# Patient Record
Sex: MALE | Race: WHITE | NOT HISPANIC OR LATINO | Employment: OTHER | URBAN - METROPOLITAN AREA
[De-identification: names, ages, dates, MRNs, and addresses within clinical notes are randomized per-mention and may not be internally consistent; named-entity substitution may affect disease eponyms.]

---

## 2017-05-15 DIAGNOSIS — Z79.899 OTHER LONG TERM (CURRENT) DRUG THERAPY: ICD-10-CM

## 2017-05-15 DIAGNOSIS — E78.5 HYPERLIPIDEMIA: ICD-10-CM

## 2017-05-15 DIAGNOSIS — E11.9 TYPE 2 DIABETES MELLITUS WITHOUT COMPLICATIONS (HCC): ICD-10-CM

## 2017-05-15 DIAGNOSIS — E78.00 PURE HYPERCHOLESTEROLEMIA: ICD-10-CM

## 2017-05-15 DIAGNOSIS — I10 ESSENTIAL (PRIMARY) HYPERTENSION: ICD-10-CM

## 2017-05-22 ENCOUNTER — APPOINTMENT (OUTPATIENT)
Dept: LAB | Facility: CLINIC | Age: 77
End: 2017-05-22
Payer: MEDICARE

## 2017-05-22 ENCOUNTER — GENERIC CONVERSION - ENCOUNTER (OUTPATIENT)
Dept: OTHER | Facility: OTHER | Age: 77
End: 2017-05-22

## 2017-05-22 DIAGNOSIS — E78.00 PURE HYPERCHOLESTEROLEMIA: ICD-10-CM

## 2017-05-22 DIAGNOSIS — E11.9 TYPE 2 DIABETES MELLITUS WITHOUT COMPLICATIONS (HCC): ICD-10-CM

## 2017-05-22 DIAGNOSIS — Z79.899 OTHER LONG TERM (CURRENT) DRUG THERAPY: ICD-10-CM

## 2017-05-22 DIAGNOSIS — I10 ESSENTIAL (PRIMARY) HYPERTENSION: ICD-10-CM

## 2017-05-22 DIAGNOSIS — E78.5 HYPERLIPIDEMIA: ICD-10-CM

## 2017-05-22 LAB
ALBUMIN SERPL BCP-MCNC: 3.8 G/DL (ref 3.5–5)
ALP SERPL-CCNC: 94 U/L (ref 46–116)
ALT SERPL W P-5'-P-CCNC: 29 U/L (ref 12–78)
ANION GAP SERPL CALCULATED.3IONS-SCNC: 5 MMOL/L (ref 4–13)
AST SERPL W P-5'-P-CCNC: 28 U/L (ref 5–45)
BILIRUB SERPL-MCNC: 0.48 MG/DL (ref 0.2–1)
BUN SERPL-MCNC: 16 MG/DL (ref 5–25)
CALCIUM SERPL-MCNC: 9 MG/DL (ref 8.3–10.1)
CHLORIDE SERPL-SCNC: 103 MMOL/L (ref 100–108)
CHOLEST SERPL-MCNC: 111 MG/DL (ref 50–200)
CO2 SERPL-SCNC: 28 MMOL/L (ref 21–32)
CREAT SERPL-MCNC: 0.91 MG/DL (ref 0.6–1.3)
CREAT UR-MCNC: 76.4 MG/DL
ERYTHROCYTE [DISTWIDTH] IN BLOOD BY AUTOMATED COUNT: 13.3 % (ref 11.6–15.1)
EST. AVERAGE GLUCOSE BLD GHB EST-MCNC: 180 MG/DL
GFR SERPL CREATININE-BSD FRML MDRD: >60 ML/MIN/1.73SQ M
GLUCOSE P FAST SERPL-MCNC: 139 MG/DL (ref 65–99)
HBA1C MFR BLD: 7.9 % (ref 4.2–6.3)
HCT VFR BLD AUTO: 36.5 % (ref 36.5–49.3)
HDLC SERPL-MCNC: 40 MG/DL (ref 40–60)
HGB BLD-MCNC: 11.8 G/DL (ref 12–17)
LDLC SERPL CALC-MCNC: 55 MG/DL (ref 0–100)
MCH RBC QN AUTO: 29.8 PG (ref 26.8–34.3)
MCHC RBC AUTO-ENTMCNC: 32.3 G/DL (ref 31.4–37.4)
MCV RBC AUTO: 92 FL (ref 82–98)
MICROALBUMIN UR-MCNC: 13.2 MG/L (ref 0–20)
MICROALBUMIN/CREAT 24H UR: 17 MG/G CREATININE (ref 0–30)
PLATELET # BLD AUTO: 266 THOUSANDS/UL (ref 149–390)
PMV BLD AUTO: 10.8 FL (ref 8.9–12.7)
POTASSIUM SERPL-SCNC: 4.4 MMOL/L (ref 3.5–5.3)
PROT SERPL-MCNC: 6.9 G/DL (ref 6.4–8.2)
RBC # BLD AUTO: 3.96 MILLION/UL (ref 3.88–5.62)
SODIUM SERPL-SCNC: 136 MMOL/L (ref 136–145)
TRIGL SERPL-MCNC: 79 MG/DL
WBC # BLD AUTO: 5.38 THOUSAND/UL (ref 4.31–10.16)

## 2017-05-22 PROCEDURE — 80053 COMPREHEN METABOLIC PANEL: CPT

## 2017-05-22 PROCEDURE — 83036 HEMOGLOBIN GLYCOSYLATED A1C: CPT

## 2017-05-22 PROCEDURE — 36415 COLL VENOUS BLD VENIPUNCTURE: CPT

## 2017-05-22 PROCEDURE — 82043 UR ALBUMIN QUANTITATIVE: CPT

## 2017-05-22 PROCEDURE — 82570 ASSAY OF URINE CREATININE: CPT

## 2017-05-22 PROCEDURE — 85027 COMPLETE CBC AUTOMATED: CPT

## 2017-05-22 PROCEDURE — 80061 LIPID PANEL: CPT

## 2017-06-06 ENCOUNTER — ALLSCRIPTS OFFICE VISIT (OUTPATIENT)
Dept: OTHER | Facility: OTHER | Age: 77
End: 2017-06-06

## 2017-06-06 ENCOUNTER — GENERIC CONVERSION - ENCOUNTER (OUTPATIENT)
Dept: OTHER | Facility: OTHER | Age: 77
End: 2017-06-06

## 2017-09-26 DIAGNOSIS — E11.9 TYPE 2 DIABETES MELLITUS WITHOUT COMPLICATIONS (HCC): ICD-10-CM

## 2017-09-26 DIAGNOSIS — S31.809A OPEN WOUND OF BUTTOCK: ICD-10-CM

## 2017-09-27 ENCOUNTER — GENERIC CONVERSION - ENCOUNTER (OUTPATIENT)
Dept: OTHER | Facility: OTHER | Age: 77
End: 2017-09-27

## 2017-09-29 ENCOUNTER — GENERIC CONVERSION - ENCOUNTER (OUTPATIENT)
Dept: OTHER | Facility: OTHER | Age: 77
End: 2017-09-29

## 2017-10-02 ENCOUNTER — APPOINTMENT (OUTPATIENT)
Dept: LAB | Facility: CLINIC | Age: 77
End: 2017-10-02
Payer: MEDICARE

## 2017-10-02 DIAGNOSIS — E11.9 TYPE 2 DIABETES MELLITUS WITHOUT COMPLICATIONS (HCC): ICD-10-CM

## 2017-10-02 LAB
EST. AVERAGE GLUCOSE BLD GHB EST-MCNC: 180 MG/DL
HBA1C MFR BLD: 7.9 % (ref 4.2–6.3)

## 2017-10-02 PROCEDURE — 83036 HEMOGLOBIN GLYCOSYLATED A1C: CPT

## 2017-10-02 PROCEDURE — 36415 COLL VENOUS BLD VENIPUNCTURE: CPT

## 2017-10-03 ENCOUNTER — GENERIC CONVERSION - ENCOUNTER (OUTPATIENT)
Dept: OTHER | Facility: OTHER | Age: 77
End: 2017-10-03

## 2017-10-12 ENCOUNTER — ALLSCRIPTS OFFICE VISIT (OUTPATIENT)
Dept: OTHER | Facility: OTHER | Age: 77
End: 2017-10-12

## 2017-10-30 ENCOUNTER — GENERIC CONVERSION - ENCOUNTER (OUTPATIENT)
Dept: OTHER | Facility: OTHER | Age: 77
End: 2017-10-30

## 2017-10-30 ENCOUNTER — DOCTOR'S OFFICE (OUTPATIENT)
Dept: URBAN - METROPOLITAN AREA CLINIC 136 | Facility: CLINIC | Age: 77
Setting detail: OPHTHALMOLOGY
End: 2017-10-30
Payer: COMMERCIAL

## 2017-10-30 DIAGNOSIS — E11.9: ICD-10-CM

## 2017-10-30 DIAGNOSIS — H35.372: ICD-10-CM

## 2017-10-30 DIAGNOSIS — H43.813: ICD-10-CM

## 2017-10-30 DIAGNOSIS — H27.8: ICD-10-CM

## 2017-10-30 PROCEDURE — 92134 CPTRZ OPH DX IMG PST SGM RTA: CPT | Performed by: OPHTHALMOLOGY

## 2017-10-30 PROCEDURE — 92014 COMPRE OPH EXAM EST PT 1/>: CPT | Performed by: OPHTHALMOLOGY

## 2017-10-30 ASSESSMENT — CONFRONTATIONAL VISUAL FIELD TEST (CVF)
OD_FINDINGS: FULL
OS_FINDINGS: FULL

## 2017-10-30 ASSESSMENT — REFRACTION_AUTOREFRACTION
OS_SPHERE: +0.75
OD_CYLINDER: -0.75
OD_SPHERE: +0.75
OS_CYLINDER: PL
OS_AXIS: 180
OD_AXIS: 70

## 2017-10-30 ASSESSMENT — REFRACTION_CURRENTRX
OS_OVR_VA: 20/
OS_ADD: +2.50
OS_VPRISM_DIRECTION: BF
OS_CYLINDER: SPHERE
OD_OVR_VA: 20/
OD_VPRISM_DIRECTION: BF
OD_ADD: +2.50
OD_CYLINDER: -1.25
OS_OVR_VA: 20/
OD_AXIS: 070
OS_OVR_VA: 20/
OD_SPHERE: +1.00
OS_SPHERE: +0.75
OD_OVR_VA: 20/
OD_OVR_VA: 20/

## 2017-10-30 ASSESSMENT — VISUAL ACUITY
OS_BCVA: 20/20
OD_BCVA: 20/20

## 2017-10-30 ASSESSMENT — REFRACTION_MANIFEST
OU_VA: 20/
OS_VA1: 20/
OD_VA2: 20/
OU_VA: 20/
OS_VA1: 20/
OS_VA3: 20/
OD_VA1: 20/
OS_VA3: 20/
OS_VA1: 20/
OD_VA1: 20/
OD_VA2: 20/
OU_VA: 20/
OD_VA3: 20/
OD_VA1: 20/
OS_VA2: 20/
OD_VA3: 20/
OD_VA2: 20/
OD_VA3: 20/
OS_VA3: 20/
OS_VA2: 20/
OS_VA2: 20/

## 2017-10-30 ASSESSMENT — SPHEQUIV_DERIVED: OD_SPHEQUIV: 0.375

## 2017-12-07 ENCOUNTER — ALLSCRIPTS OFFICE VISIT (OUTPATIENT)
Dept: OTHER | Facility: OTHER | Age: 77
End: 2017-12-07

## 2017-12-07 DIAGNOSIS — E11.9 TYPE 2 DIABETES MELLITUS WITHOUT COMPLICATIONS (HCC): ICD-10-CM

## 2017-12-07 DIAGNOSIS — Z00.00 ENCOUNTER FOR GENERAL ADULT MEDICAL EXAMINATION WITHOUT ABNORMAL FINDINGS: ICD-10-CM

## 2018-01-10 NOTE — RESULT NOTES
Verified Results  (1) COMPREHENSIVE METABOLIC PANEL [id number] 73:77SG Jayson Norton Order Number: [id number]  TW Order Number: [id number] Order Number: [id number]     Test Name Result Flag Reference   GLUCOSE,RANDM 124 mg/dL     If the patient is fasting, the ADA then defines impaired fasting glucose as > 100 mg/dL and diabetes as > or equal to 123 mg/dL  SODIUM 138 mmol/L  136-145   POTASSIUM 4 7 mmol/L  3 5-5 3   CHLORIDE 103 mmol/L  100-108   CARBON DIOXIDE 27 mmol/L  21-32   ANION GAP (CALC) 8 mmol/L  4-13   BLOOD UREA NITROGEN 20 mg/dL  5-25   CREATININE 0 93 mg/dL  0 60-1 30   Standardized to IDMS reference method   CALCIUM 8 8 mg/dL  8 3-10 1   BILI, TOTAL 0 49 mg/dL  0 20-1 00   ALK PHOSPHATAS 81 U/L     ALT (SGPT) 36 U/L  12-78   AST(SGOT) 31 U/L  5-45   ALBUMIN 3 9 g/dL  3 5-5 0   TOTAL PROTEIN 6 6 g/dL  6 4-8 2   eGFR Non-African American      >60 0 ml/min/1 73sq [facility] Disease Education Program recommendations are as follows:  GFR calculation is accurate only with a steady state creatinine  Chronic Kidney disease less than 60 ml/min/1 73 sq  meters  Kidney failure less than 15 ml/min/1 73 sq  meters  (1) HEMOGLOBIN A1C 71DQI2659 11:39AM [place]   TW Order Number: [id number]  TW Order Number: [id number]     Test Name Result Flag Reference   HEMOGLOBIN A1C 7 7 % H 4 2-6 3   EST  AVG   GLUCOSE 174 mg/dl       (1) MICROALBUMIN CREATININE RATIO, RANDOM URINE [id number] 11:39AM John [facility]   TW Order Number: [id number]  TW Order Number: [id number]     Test Name Result Flag Reference   MICROALBUMIN/ CREAT R 17 mg/g creatinine  0-30   MICROALBUMIN,URINE 11 2 mg/L  0 0-20 0   CREATININE URINE 65 4 mg/dL       (1) LIPID PANEL, FASTING [id number] 11:39AM John B[place]   TW Order Number: [id number]  TW Order Number: [id number] Order Number: [id number]     Test Name Result Flag Reference   CHOLESTEROL 112 mg/dL     HDL,DIRECT 40 mg/dL  40-60   Specimen collection should occur prior to Metamizole administration due to the potential for falsely depressed results  LDL CHOLESTEROL CALCULATED 53 mg/dL  0-100   Triglyceride:         Normal              <150 mg/dl       Borderline High    150-199 mg/dl       High               200-499 mg/dl       Very High          >499 mg/dl  Cholesterol:         Desirable        <200 mg/dl      Borderline High  200-239 mg/dl      High             >239 mg/dl  HDL Cholesterol:        High    >59 mg/dL      Low     <41 mg/dL  LDL CALCULATED:    This screening LDL is a calculated result  It does not have the accuracy of the Direct Measured LDL in the monitoring of patients with hyperlipidemia and/or statin therapy  Direct Measure LDL (DGF077) must be ordered separately in these patients  TRIGLYCERIDES 97 mg/dL  <=150   Specimen collection should occur prior to N-Acetylcysteine or Metamizole administration due to the potential for falsely depressed results

## 2018-01-11 NOTE — RESULT NOTES
Verified Results  (1) CBC/ PLT (NO DIFF) 23IQV5049 09:38AM [facility]    Order Number: [id number]     Test Name Result Flag Reference   HEMATOCRIT 36 5 %  36 5-49 3   HEMOGLOBIN 11 8 g/dL L 12 0-17 0   MCHC 32 3 g/dL  31 4-37 4   MCH 29 8 pg  26 8-34 3   MCV 92 fL  82-98   PLATELET COUNT 591 Thousands/uL  149-390   RBC COUNT 3 96 Million/uL  3 88-5 62   RDW 13 3 %  11 6-15 1   WBC COUNT 5 38 Thousand/uL  4 31-10 16   MPV 10 8 fL  8 9-12 7     (1) COMPREHENSIVE METABOLIC PANEL 55KZX8027 45:75ZR [facility]    Order Number: [id number]     Test Name Result Flag Reference   SODIUM 136 mmol/L  136-145   POTASSIUM 4 4 mmol/L  3 5-5 3   CHLORIDE 103 mmol/L  100-108   CARBON DIOXIDE 28 mmol/L  21-32   ANION GAP (CALC) 5 mmol/L  4-13   BLOOD UREA NITROGEN 16 mg/dL  5-25   CREATININE 0 91 mg/dL  0 60-1 30   Standardized to IDMS reference method   CALCIUM 9 0 mg/dL  8 3-10 1   BILI, TOTAL 0 48 mg/dL  0 20-1 00   ALK PHOSPHATAS 94 U/L     ALT (SGPT) 29 U/L  12-78   AST(SGOT) 28 U/L  5-45   ALBUMIN 3 8 g/dL  3 5-5 0   TOTAL PROTEIN 6 9 g/dL  6 4-8 2   eGFR Non-African American      >60 0 ml/min/1 73sq [facility] Disease Education Program recommendations are as follows:  GFR calculation is accurate only with a steady state creatinine  Chronic Kidney disease less than 60 ml/min/1 73 sq  meters  Kidney failure less than 15 ml/min/1 73 sq  meters  GLUCOSE FASTING 139 mg/dL H 65-99     (1) HEMOGLOBIN A1C [id number] 09:38AM [facility] Order Number: [id number]     Test Name Result Flag Reference   HEMOGLOBIN A1C 7 9 % H 4 2-6 3   EST  AVG   GLUCOSE 180 mg/dl       (1) LIPID PANEL, FASTING 45CKB0560 09:38AM [facility]    Order Number: [id number]     Test Name Result Flag Reference   CHOLESTEROL 111 mg/dL     HDL,DIRECT 40 mg/dL  40-60   Specimen collection should occur prior to Metamizole administration due to the potential for falsely depressed results  LDL CHOLESTEROL CALCULATED 55 mg/dL  0-100   Triglyceride:         Normal              <150 mg/dl       Borderline High    150-199 mg/dl       High               200-499 mg/dl       Very High          >499 mg/dl  Cholesterol:         Desirable        <200 mg/dl      Borderline High  200-239 mg/dl      High             >239 mg/dl  HDL Cholesterol:        High    >59 mg/dL      Low     <41 mg/dL  LDL CALCULATED:    This screening LDL is a calculated result  It does not have the accuracy of the Direct Measured LDL in the monitoring of patients with hyperlipidemia and/or statin therapy  Direct Measure LDL (NKJ960) must be ordered separately in these patients  TRIGLYCERIDES 79 mg/dL  <=150   Specimen collection should occur prior to N-Acetylcysteine or Metamizole administration due to the potential for falsely depressed results       (1) MICROALBUMIN CREATININE RATIO, RANDOM URINE 04HWU0283 09:38AM Sylvia Devi    Order Number: [id number]     Test Name Result Flag Reference   MICROALBUMIN/ CREAT R 17 mg/g creatinine  0-30   MICROALBUMIN,URINE 13 2 mg/L  0 0-20 0   CREATININE URINE 76 4 mg/dL

## 2018-01-13 VITALS
HEIGHT: 72 IN | SYSTOLIC BLOOD PRESSURE: 156 MMHG | DIASTOLIC BLOOD PRESSURE: 56 MMHG | WEIGHT: 175.38 LBS | BODY MASS INDEX: 23.75 KG/M2

## 2018-01-14 NOTE — RESULT NOTES
Verified Results  DIGITAL RECTAL EXAM 88UGU4098 12:20PM [facility] [place]     Test Name Result Flag Reference   DIGITAL RECTAL EXAM      Benign prostatic hypertrophy him a little concerned with the right lobe of the prostate, which is asymmetrically enlarged compared to the left lobe       Plan  Screening for diabetic peripheral neuropathy    · *VB - Foot Exam; Status:Active;  Requested ANANDA:[id number];

## 2018-01-15 NOTE — RESULT NOTES
Discussion/Summary   Hemoglobin A1c has remained unchanged  It is in the target range for his age and comorbid conditions  Verified Results  (1) HEMOGLOBIN A1C 02Oct2017 09:04AM Daryle Mould Order Number: [id number]     Test Name Result Flag Reference   HEMOGLOBIN A1C 7 9 % H 4 2-6 3   EST  AVG   GLUCOSE 180 mg/dl

## 2018-01-17 NOTE — PROGRESS NOTES
Assessment    1  Former smoker (V15 82) (C09 292)   2  Screening for neurological condition (V80 09) (Z13 89)   3  Screening for depression (V79 0) (Z13 89)   4  Encounter for preventive health examination (V70 0) (Z00 00)    Plan  Health Maintenance    · Amoxicillin 500 MG Oral Capsule; TAKE 2 CAPSULES TWICE DAILY UNTIL GONE  Screening for depression    · *VB-Depression Screening; Status:Complete - Retrospective By Protocol Authorization;    Done: 64YRZ6467 03:46PM  Screening for neurological condition    · *VB - Fall Risk Assessment  (Dx Z13 89 Screen for Neurologic Disorder);  Status:Complete - Retrospective By Protocol Authorization;   Done: 16FAW1985 03:45PM   · *VB - Urinary Incontinence Screen (Dx Z13 89 Screen for UI); Status:Complete -  Retrospective By Protocol Authorization;   Done: 10QJM4151 03:46PM    History of Present Illness  Welcome to Medicare and Wellness Visits: The patient is being seen for the subsequent annual wellness visit  Medicare Screening and Risk Factors   Hospitalizations: no previous hospitalizations  Medicare Screening Tests Risk Questions   Abdominal aortic aneurysm risk assessment: none indicated  Osteoporosis risk assessment: none indicated  HIV risk assessment: none indicated  Drug and Alcohol Use: The patient has never smoked cigarettes  The patient reports occasional alcohol use and drinking 2 drinks per day  Alcohol concern:   The patient has no concerns about alcohol abuse  He has never used illicit drugs  Diet and Physical Activity: Current diet includes well balanced meals, 3-4 servings of fruit per day, 1-2 servings of vegetables per day, 1 servings of meat per day, 1 servings of dairy products per day and 2 cups of tea per day  He exercises daily  Exercise: walking, strength training minutes per day  Mood Disorder and Cognitive Impairment Screening:   Depression screening was done using PHQ-9     negative for symptoms   He denies feeling down, depressed, or hopeless over the past two weeks  He denies feeling little interest or pleasure in doing things over the past two weeks  Cognitive impairment screening: denies difficulty learning/retaining new information, denies difficulty handling complex tasks, denies difficulty with reasoning, denies difficulty with spatial ability and orientation, denies difficulty with language and denies difficulty with behavior  Functional Ability/Level of Safety: Hearing is normal bilaterally, normal in the right ear, normal in the left ear and a hearing aid is not used  The patient is currently able to do activities of daily living without limitations  Activities of daily living details: does not need help using the phone, no transportation help needed, does not need help shopping, no meal preparation help needed, does not need help doing housework, does not need help doing laundry, does not need help managing medications and does not need help managing money  Fall risk factors: The patient fell 0 times in the past 12 months  Home safety risk factors:  no unfamiliar surroundings, no loose rugs, no poor household lighting, no uneven floors, no household clutter, grab bars in the bathroom and handrails on the stairs  Advance Directives: Advance directives: living will and durable power of  for health care directives, but no advance directives  Co-Managers and Medical Equipment/Suppliers: See Patient Care Team   Preventive Quality Program 65 and Older: Falls Risk: The patient fell 0 times in the past 12 months  The patient is currently asymptomatic Symptoms Include:  Associated symptoms:  No associated symptoms are reported  The patient currently has no urinary incontinence symptoms  Patient Care Team    Care Team Member Role Specialty Office Number   Gwenith Zayda   [phone]        Review of Systems    Constitutional: negative  Eyes: negative  ENT: negative  Cardiovascular: negative  Respiratory: negative  Gastrointestinal: negative  Genitourinary: negative  Musculoskeletal: negative  Integumentary and Breasts: negative  Neurological: negative  Psychiatric: negative  Endocrine: negative  Hematologic and Lymphatic: negative  Over the past 2 weeks, how often have you been bothered by the following problems? 1 ) Little interest or pleasure in doing things? Not at all    2 ) Feeling down, depressed or hopeless? Not at all    3 ) Trouble falling asleep or sleeping too much? Not at all    4 ) Feeling tired or having little energy? Not at all    5 ) Poor appetite or overeating? Not at all    6 ) Feeling bad about yourself, or that you are a failure, or have let yourself or your family down? Not at all    7 ) Trouble concentrating on things, such as reading a newspaper or watching television? Not at all    8 ) Moving or speaking so slowly that other people could have noticed, or the opposite, moving or speaking faster than usual? Not at all  How difficult have these problems made it for you to do your work, take care of things at home, or get along with people? Not at all  Score       Active Problems    1  Benign essential hypertension (401 1) (I10)   2  Chronic allergic rhinitis (477 9) (J30 9)   3  Dermatitis (692 9) (L30 9)   4  Diabetes mellitus type 2, controlled (250 00) (E11 9)   5  Flu vaccine need (V04 81) (Z23)   6  Herniated lumbar intervertebral disc (722 10) (M51 26)   7  High risk medication use (V58 69) (Z79 899)   8  Hypercholesteremia (272 0) (E78 00)   9  Hyperlipidemia (272 4) (E78 5)   10  Mechanical low back pain (724 2) (M54 5)   11  Need for influenza vaccination (V04 81) (Z23)   12  Need for pneumococcal vaccination (V03 82) (Z23)   13   Tick bite (919 4,E906 4) (W57 XXXA)    Past Medical History    · History of Abnormal blood chemistry (790 6) (R79 9)   · History of Abnormal glucose (790 29) (R73 09)   · History of Cellulitis (682 9) (L03 90)   · History of common cold (V12 09) (Z86 19)   · History of Influenza vaccine needed (V04 81) (Z23)   · History of Sciatica (724 3) (M54 30)   · History of Septic Bursitis (727 3)   · History of Sinusitis (473 9) (J32 9)   · History of Special screening examination for neoplasm of prostate (V76 44) (Z12 5)    The active problems and past medical history were reviewed and updated today  Surgical History    · History of Cataract Surgery   · History of Colonoscopy (Fiberoptic)   · History of Pilonidal Cyst Resection    The surgical history was reviewed and updated today  Family History  Mother    · Family history of Benign essential hypertension (401 1) (I10)  Family History    · Family history of colon cancer (V16 0) (Z80 0)    The family history was reviewed and updated today  Social History    · Being A Social Drinker   · Denied: Drug use (305 90) (F19 90)   · Former smoker (B40 95) (O22 410)  The social history was reviewed and updated today  The social history was reviewed and is unchanged  Current Meds   1  AmLODIPine Besylate 5 MG Oral Tablet; take 1 tablet by mouth every day; Therapy: 44TWS2328 to (Evaluate:04Nov2017)  Requested for: 30YAP9129; Last   Rx:09Nov2016 Ordered   2  B Complete TABS Recorded   3  Fly Contour Next Test In [employer]; TEST ONCE DAILY; Therapy: 60IGT7674 to (Evaluate:22Jan2017)  Requested for: 27Apr2016; Last   Rx:27Apr2016 Ordered   4  Fly Microlet Lancets Miscellaneous; TEST BLOOD SUGAR ONCE DAILY AS   DIRECTED; Therapy: 02Apr2013 to (Evaluate:02Jun2016)  Requested for: 79LSS4384; Last   Rx:08Jun2015 Ordered   5  Colace CAPS; TAKE 1 CAPSULE AT BEDTIME AS NEEDED Recorded   6  Fexofenadine HCl - 180 MG Oral Tablet Recorded   7  Fluticasone Propionate 50 MCG/ACT Nasal Suspension; USE 1 SPRAY EACH NOSTRIL   TWICE A DAY; Therapy: 68BNX2102 to (Ming Lucia)  Requested for: 68Wgf7538; Last   Rx:96Kuw9876 Ordered   8   Glimepiride 2 MG Oral Tablet; TAKE 1 TABLET EVERY DAY WITH BREAKFAST; Therapy: 08XUI2730 to (Lilly Hunter)  Requested for: 50RDF3024; Last   Rx:10Nov2015 Ordered   9  Ketoconazole 2 % External Cream; APPLY A THIN LAYER TO AFFECTED AREA(S)   AND RUB IN WELL TWICE DAILY; Therapy: 36UWU0838 to (Last Rx:16Jun2015)  Requested for: 25QJJ5691 Ordered   10  MetFORMIN HCl - 500 MG Oral Tablet; TAKE 2 OR 4 TABLETS DEPENDING ON    EVENING BLOOD SUGAR READING; Therapy: 63WPS6615 to (Lilly Andersonley)  Requested for: 29CNM4738; Last    Rx:10Nov2015 Ordered   11  Simvastatin 40 MG Oral Tablet; take 1 tablet every day; Therapy: 09CIH8082 to (Lilly Andersonley)  Requested for: 93EHC0799; Last    Rx:10Nov2015 Ordered   12  Thiamine CAPS Recorded    The medication list was reviewed and updated today  Allergies    1  No Known Drug Allergies    Immunizations   ** Printed in Appendix #1 below  Vitals  Signs    Systolic: 711  Diastolic: 82  Temperature: 97 7 F  Height: 6 ft   Weight: 175 lb 6 08 oz  BMI Calculated: 23 79  BSA Calculated: 2 01    Physical Exam    Constitutional   General appearance: No acute distress, well appearing and well nourished  Eyes   Conjunctiva and lids: No swelling, erythema, or discharge  Pupils and irises: Equal, round and reactive to light  Ears, Nose, Mouth, and Throat   External inspection of ears and nose: Normal     Otoscopic examination: Tympanic membrance translucent with normal light reflex  Canals patent without erythema  Oropharynx: Normal with no erythema, edema, exudate or lesions  Pulmonary   Respiratory effort: No increased work of breathing or signs of respiratory distress  Auscultation of lungs: Clear to auscultation  Cardiovascular   Palpation of heart: Normal PMI, no thrills  Auscultation of heart: Normal rate and rhythm, normal S1 and S2, without murmurs  Examination of extremities for edema and/or varicosities: Normal     Abdomen   Abdomen: Non-tender, no masses      Liver and spleen: No hepatomegaly or splenomegaly  Lymphatic   Palpation of lymph nodes in neck: No lymphadenopathy  Musculoskeletal   Gait and station: Normal     Digits and nails: Normal without clubbing or cyanosis  Inspection/palpation of joints, bones, and muscles: Normal     Skin   Skin and subcutaneous tissue: Normal without rashes or lesions  Neurologic   Cranial nerves: Cranial nerves 2-12 intact  Reflexes: 2+ and symmetric  Sensation: No sensory loss  Psychiatric   Orientation to person, place and time: Normal     Mood and affect: Normal        Results/Data  PHQ-2 Adult Depression Screening 59UXR2707 03:47PM User, Ahs     Test Name Result Flag Reference   PHQ-2 Adult Depression Score 0     Over the last two weeks, how often have you been bothered by any of the following problems? Little interest or pleasure in doing things: Not at all - 0  Feeling down, depressed, or hopeless: Not at all - 0   PHQ-2 Adult Depression Screening Negative       Falls Risk Assessment (Dx Z13 89 Screen for Neurologic Disorder) 66JGK6106 03:47PM User, Ahs     Test Name Result Flag Reference   Falls Risk      No falls in the past year     *VB - Urinary Incontinence Screen (Dx Z13 89 Screen for UI) 99PNN0072 03:46PM Jerre All     Test Name Result Flag Reference   Urinary Incontinence Assessment 66NEU3175       *VB-Depression Screening 01AET0978 03:46PM Jerre All     Test Name Result Flag Reference   Depression Scale Result      Depression Screen - Negative For Symptoms     *VB - Fall Risk Assessment  (Dx Z13 89 Screen for Neurologic Disorder) 41DWA4210 03:45PM Jerre All     Test Name Result Flag Reference   Falls Risk      No falls in the past year       Health Management  Health Maintenance   Medicare Annual Wellness Visit; every 1 year; Last 40VTD3110; Next Due: 83DMY9058;   Overdue    Signatures   Electronically signed by : Thomas Finch DO; Nov 17 2016  4:17PM EST (Author)    Appendix #1     Patient: Nathalia Gomez ; : 1940; MRN: [id number]      1 2 3 4 5 6    Influenza  13-Sep-2012 03-Sep-2013 11-Sep-2014 20-Oct-2015 06-Oct-2016 2011    PCV  10-Nov-2015         PPSV  Oct 2002         Tdap  2010         Zoster  2009

## 2018-01-22 VITALS
WEIGHT: 178 LBS | HEIGHT: 71 IN | SYSTOLIC BLOOD PRESSURE: 132 MMHG | DIASTOLIC BLOOD PRESSURE: 68 MMHG | BODY MASS INDEX: 24.92 KG/M2

## 2018-01-22 VITALS
DIASTOLIC BLOOD PRESSURE: 60 MMHG | WEIGHT: 170.25 LBS | SYSTOLIC BLOOD PRESSURE: 138 MMHG | HEART RATE: 82 BPM | BODY MASS INDEX: 23.09 KG/M2

## 2018-01-22 VITALS
SYSTOLIC BLOOD PRESSURE: 142 MMHG | WEIGHT: 169.25 LBS | BODY MASS INDEX: 23.69 KG/M2 | DIASTOLIC BLOOD PRESSURE: 64 MMHG | HEART RATE: 84 BPM | HEIGHT: 71 IN

## 2018-01-23 NOTE — PROGRESS NOTES
Assessment    1  Exercise counseling (V65 41) (Z71 82)   2  Advance directive on file (V49 89) (Z78 9)   3  Encounter for preventive health examination (V70 0) (Z00 00)    Plan  Benign essential hypertension    · AmLODIPine Besylate 5 MG Oral Tablet  Diabetes mellitus type 2, controlled, Health Maintenance    · (1) HEMOGLOBIN A1C; Status:Active; Requested for:41Tne3948; Health Maintenance    · Medicare Annual Wellness Visit ; every 1 year; Last 43NQI3597; Next 66TCE4240;  Status:Active  Screening for depression    · *VB-Depression Screening; Status:Complete - Retrospective Authorization;   Done:  57PKU9319 11:01AM  Screening for diabetic peripheral neuropathy    · *VB - Foot Exam; Status:Complete - Retrospective Authorization;   Done: 41XIU0179  11:01AM  Screening for neurological condition    · *VB - Fall Risk Assessment  (Dx Z13 89 Screen for Neurologic Disorder);  Status:Complete - Retrospective By Protocol Authorization;   Done: 26EKJ1791 11:02AM    Discussion/Summary  Impression: Subsequent Annual Wellness Visit  Cardiovascular screening and counseling: the risks and benefits of screening were discussed and screening is current  Diabetes screening and counseling: the risks and benefits of screening were discussed and screening is current  Colorectal cancer screening and counseling: the risks and benefits of screening were discussed and screening is current  Prostate cancer screening and counseling: the risks and benefits of screening were discussed and screening is current  Glaucoma screening and counseling: the risks and benefits of screening were discussed and screening is current  History of Present Illness  The patient is being seen for the subsequent annual wellness visit  Medicare Screening and Risk Factors   Hospitalizations: no previous hospitalizations  Once per lifetime medicare screening tests: ECG has not been done and AAA screening US has not yet been done    Medicare Screening Tests Risk Questions   Abdominal aortic aneurysm risk assessment: none indicated  Osteoporosis risk assessment:  and over 48years of age  HIV risk assessment: none indicated  Drug and Alcohol Use: The patient is a former cigarette smoker and quit smoking 30 years ago  He has 10 pack year(s) of cigarette use  The patient reports occasional alcohol use and drinking 2 drinks per day  Alcohol concern:   The patient has no concerns about alcohol abuse  He has never used illicit drugs  Diet and Physical Activity: Current diet includes well balanced meals, limited junk food and 3 cups of tea per day  He exercises 7 times per week  Exercise: walking, stretching, strength training, home exercises 30 minutes per day  Mood Disorder and Cognitive Impairment Screening: He denies feeling down, depressed, or hopeless over the past two weeks  He denies feeling little interest or pleasure in doing things over the past two weeks  Cognitive impairment screening: denies difficulty learning/retaining new information, denies difficulty handling complex tasks, denies difficulty with reasoning, denies difficulty with spatial ability and orientation, denies difficulty with language and denies difficulty with behavior  Functional Ability/Level of Safety: Hearing is normal bilaterally  He does not use a hearing aid  The patient is currently able to do activities of daily living without limitations, able to do instrumental activities of daily living without limitations, able to participate in social activities without limitations and able to drive without limitations  Activities of daily living details: does not need help using the phone, no transportation help needed, does not need help shopping, no meal preparation help needed, does not need help doing housework, does not need help doing laundry, does not need help managing medications and does not need help managing money     Injury History: no polypharmacy, alcohol use, no mobility impairment, no antidepressant use, no deconditioning, no postural hypotension, no sedative use, visual impairment, no urinary incontinence, antihypertensive use, no cognitive impairment, up and go test was normal and no previous fall  Home safety risk factors:  no unfamiliar surroundings, no loose rugs, no poor household lighting, no uneven floors, no household clutter, grab bars in the bathroom and handrails on the stairs  Advance Directives: Advance directives: living will, durable power of  for health care directives and advance directives  Co-Managers and Medical Equipment/Suppliers: See Patient Care Team     Last Medicare Wellness Visit Information was reviewed, patient interviewed and updates made to the record today  Falls Risk: The patient fell 0 times in the past 12 months  The patient is currently asymptomatic Symptoms Include: The patient currently has no urinary incontinence symptoms  Date of last glaucoma screen was October 2017 no diabetic retinopathy or glaucoma      Patient Care Team    Care Team Member Role Specialty Office Number   Rolando Wayne   [id number], [address] [place] [address] Referring Family Medicine [phone]   Corey Diaz MD Specialist Urology [phone]     Review of Systems    Head and Face: negative  Eyes: negative  ENT: negative  Cardiovascular: negative  Respiratory: negative  Gastrointestinal: negative  Genitourinary: negative  Musculoskeletal: negative  Integumentary and Breasts: negative  Neurological: negative  Psychiatric: negative  Endocrine: negative  Hematologic and Lymphatic: negative  Over the past 2 weeks, how often have you been bothered by the following problems? 1 ) Little interest or pleasure in doing things? Not at all    2 ) Feeling down, depressed or hopeless? Not at all    3 ) Trouble falling asleep or sleeping too much?  Not at all    4 ) Feeling tired or having little energy? Not at all    5 ) Poor appetite or overeating? Not at all    6 ) Feeling bad about yourself, or that you are a failure, or have let yourself or your family down? Not at all    7 ) Trouble concentrating on things, such as reading a newspaper or watching television? Not at all    8 ) Moving or speaking so slowly that other people could have noticed, or the opposite, moving or speaking faster than usual? Not at all    9 ) Thoughts that you would be better off dead or of hurting yourself in some way? Not at all  Score 0      Active Problems    1  Benign essential hypertension (401 1) (I10)   2  Benign prostatic hyperplasia, presence of lower urinary tract symptoms unspecified,   unspecified morphology   3  Buttock wound, unspecified laterality, initial encounter (877 0) (S31 809A)   4  Diabetes mellitus type 2, controlled (250 00) (E11 9)   5  Encounter for special screening examination for neoplasm of prostate (V76 44) (Z12 5)   6  Esophageal dysphagia (787 20) (R13 10)   7  Flu vaccine need (V04 81) (Z23)   8  Herniated lumbar intervertebral disc (722 10) (M51 26)   9  Hypercholesteremia (272 0) (E78 00)   10  Hyperlipidemia (272 4) (E78 5)   11  Screening for diabetic peripheral neuropathy (V80 09) (Z13 89)   12  Vasomotor rhinitis (477 9) (J30 0)    Past Medical History    1  History of Abnormal blood chemistry (790 6) (R79 9)   2  History of Abnormal glucose (790 29) (R73 09)   3  History of Benign essential hypertension (401 1) (I10)   4  History of Cellulitis (682 9) (L03 90)   5  History of Chronic allergic rhinitis (477 9) (J30 9)   6  History of High risk medication use (V58 69) (Z79 899)   7  History of common cold (V12 09) (Z86 19)   8  History of dermatitis (V13 3) (Z87 2)   9  History of Influenza vaccine needed (V04 81) (Z23)   10  History of Mechanical low back pain (724 2) (M54 5)   11  History of Need for influenza vaccination (V04 81) (Z23)   12   History of Need for pneumococcal vaccination (V03 82) (Z23)   13  History of Sciatica (724 3) (M54 30)   14  History of Septic Bursitis (727 3)   15  History of Sinusitis (473 9) (J32 9)   16  History of Special screening examination for neoplasm of prostate (V76 44) (Z12 5)   17  History of Tick bite (919 4,E906 4) (W57 XXXA)    The active problems and past medical history were reviewed and updated today  Surgical History    1  History of Cataract Surgery   2  History of Colonoscopy (Fiberoptic)   3  History of Pilonidal Cyst Resection    The surgical history was reviewed and updated today  Family History  Mother    1  Family history of Benign essential hypertension (401 1) (I10)  Family History    2  Family history of colon cancer (V16 0) (Z80 0)    The family history was reviewed and updated today  Social History    · Being A Social Drinker   · Denied: Drug use (305 90) (F19 90)   · Former smoker (L76 61) (A73 648)   ·   The social history was reviewed and updated today  The social history was reviewed and is unchanged  Current Meds   1  AmLODIPine Besylate 5 MG Oral Tablet; take 1 tablet by mouth every day; Therapy: 54DGK6339 to ([phone])  Requested for: 31Oct2017; Last   Rx:31Oct2017 Ordered   2  B Complete TABS Recorded   3  Fly Contour Next Test In [employer]; TEST ONCE DAILY; Therapy: 22PMW0861 to (Evaluate:22Jan2017)  Requested for: 27Apr2016; Last   Rx:27Apr2016 Ordered   4  Fly Microlet Lancets Miscellaneous; TEST BLOOD SUGAR ONCE DAILY AS   DIRECTED; Therapy: 02Apr2013 to (Evaluate:02Jun2016)  Requested for: 16EQH3747; Last   Rx:08Jun2015 Ordered   5  Colace CAPS; TAKE 1 CAPSULE AT BEDTIME AS NEEDED Recorded   6  Fexofenadine HCl - 180 MG Oral Tablet Recorded   7  Fluticasone Propionate 50 MCG/ACT Nasal Suspension; USE 1 SPRAY EACH NOSTRIL   TWICE A DAY; Therapy: 61HUF9773 to (Evaluate:05Grn5904)  Requested for: 83RFL1657; Last   MY:15HHL0429 Ordered   8   Glimepiride 2 MG Oral Tablet; TAKE 1/2 TABLET DAILY; Therapy: 86DFA0898 to (Evaluate:99Etc0034)  Requested for: 53IXG8927; Last   Rx:06Jun2017 Ordered   9  Lisinopril 20 MG Oral Tablet; take 1 tablet by mouth every day; Therapy: 15NJF0236 to (Evaluate:00Gaj9121)  Requested for: 72Ntp1488; Last   Rx:50Mjc9477 Ordered   10  MetFORMIN HCl - 500 MG Oral Tablet; TAKE 2-4 TABLETS EVERY DAY DEPENDING ON    EVENING BLOOD SUGAR READING; Therapy: 00FRH5719 to (Evaluate:04Jun2018)  Requested for: 56Iru2006; Last    Rx:82Mqj2101 Ordered   11  Simvastatin 40 MG Oral Tablet; take 1 tablet by mouth every day; Therapy: 10LCL8601 to (Evaluate:01Feb2018)  Requested for: 47AQM0047; Last    Rx:28Azo4212 Ordered   12  Thiamine CAPS Recorded    The medication list was reviewed and updated today  Allergies    1  No Known Drug Allergies    Immunizations  Influenza --- Jennifer Moody: 13-Sep-2012; Pepper Brought: 03-Sep-2013; Renuka Bon: 11-Sep-2014; Series4:  20-Oct-2015; Series5: 06-Oct-2016; Series6: 12-Oct-2017; Series7: 2011   PCV --- Series1: 10-Nov-2015   PPSV --- Series1: Oct 2002   Tdap --- Series1: 02-Nov-2010   Zoster --- Series1: 2009     Vitals  Signs   Recorded: 27XFR9754 57:63RE   Systolic: 164  Diastolic: 68  Recorded: 98PUK0635 54:69TI   Systolic: 981, LUE, Sitting  Diastolic: 68, LUE, Sitting  Height: 5 ft 11 in  Weight: 178 lb   BMI Calculated: 24 83  BSA Calculated: 2 01    Results/Data  *VB - Fall Risk Assessment  (Dx Z13 89 Screen for Neurologic Disorder) 03VMD8844 11:02AM [facility]     Test Name Result Flag Reference   Falls Risk      No falls in the past year     *VB - Foot Exam 12IHX0613 11:01AM [facility]     Test Name Result Flag Reference   FOOT [place] 12USA3142       *VB-Depression Screening 60JCX6733 11:01AM [facility]     Test Name Result Flag Reference   Depression Scale Result      Depression Screen - Negative For Symptoms       Health Management  Health Maintenance   Medicare Annual Wellness Visit; every 1 year;  Last 54NZD7526; Next Due: 10MAJ7904;  Active    Future Appointments    Date/Time Provider Specialty Site   01/29/2018 02:30 PM Kaye Richards [facility] ENDOCRINOLOGY   09/27/2018 09:30 AM Sony Campos MD Urology [address]     Signatures   Electronically signed by : Hemanth Donahue DO; Dec  7 2017 11:24AM EST                       (Author)

## 2018-01-29 ENCOUNTER — OFFICE VISIT (OUTPATIENT)
Dept: ENDOCRINOLOGY | Facility: CLINIC | Age: 78
End: 2018-01-29
Payer: MEDICARE

## 2018-01-29 VITALS
DIASTOLIC BLOOD PRESSURE: 62 MMHG | HEART RATE: 80 BPM | BODY MASS INDEX: 23.98 KG/M2 | HEIGHT: 72 IN | SYSTOLIC BLOOD PRESSURE: 138 MMHG | WEIGHT: 177 LBS

## 2018-01-29 DIAGNOSIS — I10 BENIGN ESSENTIAL HYPERTENSION: ICD-10-CM

## 2018-01-29 DIAGNOSIS — E78.2 MIXED HYPERLIPIDEMIA: ICD-10-CM

## 2018-01-29 DIAGNOSIS — IMO0001 UNCONTROLLED TYPE 2 DIABETES MELLITUS WITHOUT COMPLICATION, WITHOUT LONG-TERM CURRENT USE OF INSULIN: ICD-10-CM

## 2018-01-29 DIAGNOSIS — E11.9 TYPE 2 DIABETES MELLITUS WITHOUT COMPLICATION, WITHOUT LONG-TERM CURRENT USE OF INSULIN (HCC): Primary | ICD-10-CM

## 2018-01-29 PROBLEM — J30.0 VASOMOTOR RHINITIS: Status: ACTIVE | Noted: 2017-06-06

## 2018-01-29 PROBLEM — N40.1 BENIGN PROSTATIC HYPERPLASIA WITH LOWER URINARY TRACT SYMPTOMS: Status: ACTIVE | Noted: 2017-06-06

## 2018-01-29 PROBLEM — R13.19 ESOPHAGEAL DYSPHAGIA: Status: ACTIVE | Noted: 2017-06-06

## 2018-01-29 PROCEDURE — 99214 OFFICE O/P EST MOD 30 MIN: CPT | Performed by: PHYSICIAN ASSISTANT

## 2018-01-29 PROCEDURE — 95250 CONT GLUC MNTR PHYS/QHP EQP: CPT | Performed by: PHYSICIAN ASSISTANT

## 2018-01-29 RX ORDER — PYRIDOXINE HCL (VITAMIN B6) 100 MG
TABLET ORAL DAILY
COMMUNITY

## 2018-01-29 RX ORDER — DOCUSATE SODIUM 100 MG/1
1 CAPSULE, LIQUID FILLED ORAL AS NEEDED
COMMUNITY

## 2018-01-29 RX ORDER — AMLODIPINE BESYLATE 5 MG/1
1 TABLET ORAL DAILY
COMMUNITY
Start: 2011-03-16 | End: 2018-11-18 | Stop reason: SDUPTHER

## 2018-01-29 RX ORDER — LISINOPRIL 20 MG/1
20 TABLET ORAL DAILY
Refills: 3 | COMMUNITY
Start: 2017-12-13 | End: 2018-12-16 | Stop reason: SDUPTHER

## 2018-01-29 RX ORDER — SIMVASTATIN 40 MG
1 TABLET ORAL DAILY
COMMUNITY
Start: 2011-03-14 | End: 2018-02-25 | Stop reason: SDUPTHER

## 2018-01-29 RX ORDER — FEXOFENADINE HCL 180 MG/1
TABLET ORAL AS NEEDED
COMMUNITY

## 2018-01-29 RX ORDER — GLIMEPIRIDE 2 MG/1
TABLET ORAL
Refills: 3 | COMMUNITY
Start: 2017-12-17 | End: 2018-07-02 | Stop reason: SDUPTHER

## 2018-01-29 RX ORDER — FLUTICASONE PROPIONATE 50 MCG
SPRAY, SUSPENSION (ML) NASAL
COMMUNITY
Start: 2012-03-26 | End: 2018-05-13 | Stop reason: SDUPTHER

## 2018-01-29 NOTE — PROGRESS NOTES
Established Patient Progress Note      Chief Complaint   Patient presents with    Diabetes Type 2          History of Present Illness:   Quiana Qureshi is a 78 y o  male with a history of type 2 diabetes without long term use of insulin    Reports No known complications  Denies recent illness or hospitalizations, though getting over an upper respiratory infection    Denies recent severe hypoglycemic or severe hyperglycemic episodes  Denies any issues with his current regimen  home glucose monitoring: are performed regularly    Home blood glucose readings:   Before breakfast: around 140  Before lunch: around 115  Before dinner: not checking  Bedtime: not checking  Current regimen: Metformin 1000mg twice per day, Glimepiride 1mg daily in the morning  compliant all of the timedenies any side effects from medications  No Hypoglycemia  Last Eye Exam: October 2017  Last Foot Exam: Every 9 weeks, due February, Dr John Dumont    Diabetes education: No   Would be interested in meeting with dietician  -     Has hypertension: Taking amlodipine and lisinopril  Has hyperlipidemia: Taking atorvastatin  Thyroid disorders: none  Patient Active Problem List   Diagnosis    Benign essential hypertension    Benign prostatic hyperplasia with lower urinary tract symptoms    Uncontrolled diabetes mellitus type 2 without complications (HCC)    Esophageal dysphagia    Herniated lumbar intervertebral disc    Hyperlipidemia    Vasomotor rhinitis      History reviewed  No pertinent past medical history  History reviewed  No pertinent surgical history     Family History   Problem Relation Age of Onset    Hypertension Mother     Osteoporosis Mother     Diabetes type I Paternal Aunt      Social History   Substance Use Topics    Smoking status: Former Smoker    Smokeless tobacco: Never Used    Alcohol use Yes      Comment: social usage     No Known Allergies      Current Outpatient Prescriptions:    amLODIPine (NORVASC) 5 mg tablet, Take 1 tablet by mouth daily, Disp: , Rfl:     B Complex-Biotin-FA (B COMPLETE) TABS, Take by mouth, Disp: , Rfl:     docusate sodium (COLACE) 100 mg capsule, Take 1 capsule by mouth as needed, Disp: , Rfl:     fexofenadine (ALLEGRA) 180 MG tablet, Take by mouth, Disp: , Rfl:     fluticasone (FLONASE) 50 mcg/act nasal spray, into each nostril, Disp: , Rfl:     glimepiride (AMARYL) 2 mg tablet, TAKE ONE-HALF TABLET BY MOUTH EVERY DAY, Disp: , Rfl: 3    lisinopril (ZESTRIL) 20 mg tablet, Take 20 mg by mouth daily, Disp: , Rfl: 3    metFORMIN (GLUCOPHAGE) 500 mg tablet, TAKE TWO TO FOUR TABLETS BY MOUTH EVERY DAY PER BLOOD GLUCOSE LEVELS, Disp: , Rfl: 1    simvastatin (ZOCOR) 40 mg tablet, Take 1 tablet by mouth daily, Disp: , Rfl:     Thiamine 50 MG CAPS, Take 1 capsule by mouth daily, Disp: , Rfl:     Review of Systems   Constitutional: Negative for activity change, appetite change and fatigue  HENT: Negative for sore throat, trouble swallowing and voice change  Eyes: Negative for visual disturbance  Respiratory: Negative for choking, chest tightness and shortness of breath  Cardiovascular: Negative for chest pain, palpitations and leg swelling  Gastrointestinal: Negative for abdominal pain, constipation and diarrhea  Endocrine: Negative for cold intolerance, heat intolerance, polydipsia, polyphagia and polyuria  Genitourinary: Negative for frequency  Musculoskeletal: Negative for arthralgias and myalgias  Skin: Negative for rash  Neurological: Negative for dizziness and syncope  Hematological: Negative for adenopathy  Psychiatric/Behavioral: Negative for sleep disturbance  All other systems reviewed and are negative  Physical Exam:  Body mass index is 24 01 kg/m²    /62   Pulse 80   Ht 6' (1 829 m)   Wt 80 3 kg (177 lb)   BMI 24 01 kg/m²    Wt Readings from Last 3 Encounters:   01/29/18 80 3 kg (177 lb)   12/07/17 80 7 kg (178 lb) 09/29/17 77 2 kg (170 lb 4 oz)       Physical Exam   Constitutional: He is oriented to person, place, and time  He appears well-developed and well-nourished  No distress  HENT:   Head: Normocephalic and atraumatic  Mouth/Throat: Oropharynx is clear and moist    Eyes: Conjunctivae and EOM are normal  Pupils are equal, round, and reactive to light  Neck: Normal range of motion  Neck supple  No thyromegaly present  Cardiovascular: Normal rate, regular rhythm and normal heart sounds  No murmur heard  Pulmonary/Chest: Effort normal and breath sounds normal  No respiratory distress  He has no wheezes  He has no rales  Abdominal: Soft  Bowel sounds are normal  He exhibits no distension  There is no tenderness  Musculoskeletal: Normal range of motion  Lymphadenopathy:     He has no cervical adenopathy  Neurological: He is alert and oriented to person, place, and time  Skin: Skin is warm and dry  Psychiatric: He has a normal mood and affect  Vitals reviewed  Diabetic Foot Exam    Labs:     Lab Results   Component Value Date    HGBA1C 7 9 (H) 10/02/2017       Lab Results   Component Value Date    CREATININE 0 91 05/22/2017    CREATININE 0 93 05/12/2016    CREATININE 0 88 10/20/2015    BUN 16 05/22/2017     05/22/2017    K 4 4 05/22/2017     05/22/2017    CO2 28 05/22/2017     eGFR   Date Value Ref Range Status   05/22/2017 >60 0 ml/min/1 73sq m Final       Lab Results   Component Value Date    CHOL 111 05/22/2017    HDL 40 05/22/2017    TRIG 79 05/22/2017       Lab Results   Component Value Date    ALT 29 05/22/2017    AST 28 05/22/2017    ALKPHOS 94 05/22/2017    BILITOT 0 48 05/22/2017     Impression & Plan:    Problem List Items Addressed This Visit     Benign essential hypertension     Well controlled  Continue amlodipine and lisinopril            Relevant Medications    amLODIPine (NORVASC) 5 mg tablet    lisinopril (ZESTRIL) 20 mg tablet    Uncontrolled diabetes mellitus type 2 without complications ([facility] [address] )     Uncontrolled Based on last A1C of 7 9  He has order for upcoming A1C through family physician  He will be getting Parvin Diagnostic CGM placed today and will adjust medication if needed based on report  Refer to medical nutrition therapy  Relevant Medications    glimepiride (AMARYL) 2 mg tablet    metFORMIN (GLUCOPHAGE) 500 mg tablet    Hyperlipidemia     LDL at goal, continue atorvastatin  Relevant Medications    simvastatin (ZOCOR) 40 mg tablet      Other Visit Diagnoses     Type 2 diabetes mellitus without complication, without long-term current use of insulin ([facility])    -  Primary    Relevant Medications    glimepiride (AMARYL) 2 mg tablet    metFORMIN (GLUCOPHAGE) 500 mg tablet    Other Relevant Orders    Ambulatory referral to medical nutrition therapy for diabetes    Continous glucose monitoring parvin placement    Continous glucose monitoring parvin intrepretation          Orders Placed This Encounter   Procedures    Continous glucose monitoring parvin placement     Standing Status:   Future     Number of Occurrences:   1     Standing Expiration Date:   1/29/2019    Continous glucose monitoring parvin intrepretation     Standing Status:   Future     Standing Expiration Date:   1/29/2019    Ambulatory referral to medical nutrition therapy for diabetes     Standing Status:   Future     Standing Expiration Date:   7/29/2018     Referral Priority:   Routine     Referral Type:   Consult - AMB     Referral Reason:   Specialty Services Required     Requested Specialty:   Endocrinology     Number of Visits Requested:   1     Expiration Date:   1/29/2019       Patient Instructions   Will place Continuous glucose monitor today  Return in two weeks for sensor remonal and will review Data and call you with any medication changes if needed  Set up with dietician  When you get lab testing done through family doctor, please send copy of report           Discussed with the patient and all questioned fully answered  He will call me if any problems arise  Follow-up appointment in 4 months       Counseled patient on diagnostic results, prognosis, risk and benefit of treatment options, instruction for management, importance of treatment compliance, Risk  factor reduction and impressions      Meredith Rich PA-C

## 2018-01-29 NOTE — PATIENT INSTRUCTIONS
Will place Continuous glucose monitor today  Return in two weeks for sensor remonal and will review Data and call you with any medication changes if needed  Set up with dietician  When you get lab testing done through family doctor, please send copy of report

## 2018-01-29 NOTE — ASSESSMENT & PLAN NOTE
Uncontrolled Based on last A1C of 7 9  He has order for upcoming A1C through family physician  He will be getting Parvin Diagnostic CGM placed today and will adjust medication if needed based on report  Refer to medical nutrition therapy

## 2018-01-29 NOTE — PROGRESS NOTES
Continous glucose monitoring yanick placement     Date/Time 1/29/2018 3:02 PM     Performed by  Gretchen Llanes by Susan Cabrera       Consent: Verbal consent obtained  Written consent obtained    Risks and benefits: risks, benefits and alternatives were discussed  Consent given by: patient  Patient understanding: patient states understanding of the procedure being performed  Patient consent: the patient's understanding of the procedure matches consent given  Procedure consent: procedure consent matches procedure scheduled  Relevant documents: relevant documents present and verified  Test results: test results not available  Site marked: the operative site was not marked  Imaging studies: imaging studies not available  Required items: required blood products, implants, devices, and special equipment available  Patient identity confirmed: verbally with patient      Site preparation: Isopropyl alcohol    Local anesthesia used: no     Anesthesia   Local anesthesia used: no     Sedation   Patient sedated: no      Specimen: no    Culture: no    Patient tolerance: Patient tolerated the procedure well with no immediate complications

## 2018-01-29 NOTE — LETTER
January 29, 2018     Lavonne Thorpe DO  [facility]    Patient: Bhavna Martinez   YOB: 1940   Date of Visit: 1/29/2018       Dear Dr Clotilde Carey: Thank you for referring Ravin Vargas to me for evaluation  Below are my notes for this consultation  If you have questions, please do not hesitate to call me  I look forward to following your patient along with you  Sincerely,        Anshu Nelson PA-C        CC: No Recipients  Anshu Nelson PA-C  1/29/2018  3:10 PM  Sign at close encounter      Established Patient Progress Note      Chief Complaint   Patient presents with    Diabetes Type 2          History of Present Illness:   Bhavna Martinez is a 78 y o  male with a history of type 2 diabetes without long term use of insulin    Reports No known complications  Denies recent illness or hospitalizations, though getting over an upper respiratory infection    Denies recent severe hypoglycemic or severe hyperglycemic episodes  Denies any issues with his current regimen  home glucose monitoring: are performed regularly    Home blood glucose readings:   Before breakfast: around 140  Before lunch: around 115  Before dinner: not checking  Bedtime: not checking  Current regimen: Metformin 1000mg twice per day, Glimepiride 1mg daily in the morning  compliant all of the timedenies any side effects from medications  No Hypoglycemia  Last Eye Exam: October 2017  Last Foot Exam: Every 9 weeks, due February, Dr Kris Maloney    Diabetes education: No   Would be interested in meeting with dietician  -     Has hypertension: Taking amlodipine and lisinopril  Has hyperlipidemia: Taking atorvastatin  Thyroid disorders: none         Patient Active Problem List   Diagnosis    Benign essential hypertension    Benign prostatic hyperplasia with lower urinary tract symptoms    Uncontrolled diabetes mellitus type 2 without complications ([address] )    Esophageal dysphagia    Herniated lumbar intervertebral disc    Hyperlipidemia    Vasomotor rhinitis      History reviewed  No pertinent past medical history  History reviewed  No pertinent surgical history  Family History   Problem Relation Age of Onset    Hypertension Mother     Osteoporosis Mother     Diabetes type I Paternal Aunt      Social History   Substance Use Topics    Smoking status: Former Smoker    Smokeless tobacco: Never Used    Alcohol use Yes      Comment: social usage     No Known Allergies      Current Outpatient Prescriptions:     amLODIPine (NORVASC) 5 mg tablet, Take 1 tablet by mouth daily, Disp: , Rfl:     B Complex-Biotin-FA (B COMPLETE) TABS, Take by mouth, Disp: , Rfl:     docusate sodium (COLACE) 100 mg capsule, Take 1 capsule by mouth as needed, Disp: , Rfl:     fexofenadine (ALLEGRA) 180 MG tablet, Take by mouth, Disp: , Rfl:     fluticasone (FLONASE) 50 mcg/act nasal spray, into each nostril, Disp: , Rfl:     glimepiride (AMARYL) 2 mg tablet, TAKE ONE-HALF TABLET BY MOUTH EVERY DAY, Disp: , Rfl: 3    lisinopril (ZESTRIL) 20 mg tablet, Take 20 mg by mouth daily, Disp: , Rfl: 3    metFORMIN (GLUCOPHAGE) 500 mg tablet, TAKE TWO TO FOUR TABLETS BY MOUTH EVERY DAY PER BLOOD GLUCOSE LEVELS, Disp: , Rfl: 1    simvastatin (ZOCOR) 40 mg tablet, Take 1 tablet by mouth daily, Disp: , Rfl:     Thiamine 50 MG CAPS, Take 1 capsule by mouth daily, Disp: , Rfl:     Review of Systems   Constitutional: Negative for activity change, appetite change and fatigue  HENT: Negative for sore throat, trouble swallowing and voice change  Eyes: Negative for visual disturbance  Respiratory: Negative for choking, chest tightness and shortness of breath  Cardiovascular: Negative for chest pain, palpitations and leg swelling  Gastrointestinal: Negative for abdominal pain, constipation and diarrhea  Endocrine: Negative for cold intolerance, heat intolerance, polydipsia, polyphagia and polyuria  Genitourinary: Negative for frequency  Musculoskeletal: Negative for arthralgias and myalgias  Skin: Negative for rash  Neurological: Negative for dizziness and syncope  Hematological: Negative for adenopathy  Psychiatric/Behavioral: Negative for sleep disturbance  All other systems reviewed and are negative  Physical Exam:  Body mass index is 24 01 kg/m²  /62   Pulse 80   Ht 6' (1 829 m)   Wt 80 3 kg (177 lb)   BMI 24 01 kg/m²     Wt Readings from Last 3 Encounters:   01/29/18 80 3 kg (177 lb)   12/07/17 80 7 kg (178 lb)   09/29/17 77 2 kg (170 lb 4 oz)       Physical Exam   Constitutional: He is oriented to person, place, and time  He appears well-developed and well-nourished  No distress  HENT:   Head: Normocephalic and atraumatic  Mouth/Throat: Oropharynx is clear and moist    Eyes: Conjunctivae and EOM are normal  Pupils are equal, round, and reactive to light  Neck: Normal range of motion  Neck supple  No thyromegaly present  Cardiovascular: Normal rate, regular rhythm and normal heart sounds  No murmur heard  Pulmonary/Chest: Effort normal and breath sounds normal  No respiratory distress  He has no wheezes  He has no rales  Abdominal: Soft  Bowel sounds are normal  He exhibits no distension  There is no tenderness  Musculoskeletal: Normal range of motion  Lymphadenopathy:     He has no cervical adenopathy  Neurological: He is alert and oriented to person, place, and time  Skin: Skin is warm and dry  Psychiatric: He has a normal mood and affect  Vitals reviewed      Diabetic Foot Exam    Labs:     Lab Results   Component Value Date    HGBA1C 7 9 (H) 10/02/2017       Lab Results   Component Value Date    CREATININE 0 91 05/22/2017    CREATININE 0 93 05/12/2016    CREATININE 0 88 10/20/2015    BUN 16 05/22/2017     05/22/2017    K 4 4 05/22/2017     05/22/2017    CO2 28 05/22/2017     eGFR   Date Value Ref Range Status   05/22/2017 >60 0 ml/min/1 73sq m Final       Lab Results   Component Value Date    CHOL 111 05/22/2017    HDL 40 05/22/2017    TRIG 79 05/22/2017       Lab Results   Component Value Date    ALT 29 05/22/2017    AST 28 05/22/2017    ALKPHOS 94 05/22/2017    BILITOT 0 48 05/22/2017     Impression & Plan:    Problem List Items Addressed This Visit     Benign essential hypertension     Well controlled  Continue amlodipine and lisinopril  Relevant Medications    amLODIPine (NORVASC) 5 mg tablet    lisinopril (ZESTRIL) 20 mg tablet    Uncontrolled diabetes mellitus type 2 without complications ([facility] [address] )     Uncontrolled Based on last A1C of 7 9  He has order for upcoming A1C through family physician  He will be getting Parvin Diagnostic CGM placed today and will adjust medication if needed based on report  Refer to medical nutrition therapy  Relevant Medications    glimepiride (AMARYL) 2 mg tablet    metFORMIN (GLUCOPHAGE) 500 mg tablet    Hyperlipidemia     LDL at goal, continue atorvastatin            Relevant Medications    simvastatin (ZOCOR) 40 mg tablet      Other Visit Diagnoses     Type 2 diabetes mellitus without complication, without long-term current use of insulin ([facility])    -  Primary    Relevant Medications    glimepiride (AMARYL) 2 mg tablet    metFORMIN (GLUCOPHAGE) 500 mg tablet    Other Relevant Orders    Ambulatory referral to medical nutrition therapy for diabetes    Continous glucose monitoring parvin placement    Continous glucose monitoring parvin intrepretation          Orders Placed This Encounter   Procedures    Continous glucose monitoring parvin placement     Standing Status:   Future     Number of Occurrences:   1     Standing Expiration Date:   1/29/2019    Continous glucose monitoring parvin intrepretation     Standing Status:   Future     Standing Expiration Date:   1/29/2019    Ambulatory referral to medical nutrition therapy for diabetes     Standing Status:   Future     Standing Expiration Date:   7/29/2018     Referral Priority:   Routine     Referral Type:   Consult - AMB     Referral Reason:   Specialty Services Required     Requested Specialty:   Endocrinology     Number of Visits Requested:   1     Expiration Date:   1/29/2019       Patient Instructions   Will place Continuous glucose monitor today  Return in two weeks for sensor remonal and will review Data and call you with any medication changes if needed  Set up with dietician  When you get lab testing done through family doctor, please send copy of report  Discussed with the patient and all questioned fully answered  He will call me if any problems arise  Follow-up appointment in 4 months       Counseled patient on diagnostic results, prognosis, risk and benefit of treatment options, instruction for management, importance of treatment compliance, Risk  factor reduction and impressions      Ramez Oneill PA-C

## 2018-01-29 NOTE — LETTER
January 29, 2018     Author DO Murali Montalvo    Patient: Audi Willis   YOB: 1940   Date of Visit: 1/29/2018       Dear Dr Amalia Marquez: Thank you for referring Macho Sánchez to me for evaluation  Below are my notes for this consultation  If you have questions, please do not hesitate to call me  I look forward to following your patient along with you  Sincerely,        Jordy Velásquez PA-C        CC: No Recipients  Jordy Velásquez PA-C  1/29/2018  3:10 PM  Sign at close encounter      Established Patient Progress Note      Chief Complaint   Patient presents with    Diabetes Type 2          History of Present Illness:   Audi Willis is a 78 y o  male with a history of type 2 diabetes without long term use of insulin    Reports No known complications  Denies recent illness or hospitalizations, though getting over an upper respiratory infection    Denies recent severe hypoglycemic or severe hyperglycemic episodes  Denies any issues with his current regimen  home glucose monitoring: are performed regularly    Home blood glucose readings:   Before breakfast: around 140  Before lunch: around 115  Before dinner: not checking  Bedtime: not checking  Current regimen: Metformin 1000mg twice per day, Glimepiride 1mg daily in the morning  compliant all of the timedenies any side effects from medications  No Hypoglycemia  Last Eye Exam: October 2017  Last Foot Exam: Every 9 weeks, due February, Dr Marc Mcmahon    Diabetes education: No   Would be interested in meeting with dietician  -     Has hypertension: Taking amlodipine and lisinopril  Has hyperlipidemia: Taking atorvastatin  Thyroid disorders: none         Patient Active Problem List   Diagnosis    Benign essential hypertension    Benign prostatic hyperplasia with lower urinary tract symptoms    Uncontrolled diabetes mellitus type 2 without complications ([address] )    Esophageal dysphagia    Herniated lumbar intervertebral disc    Hyperlipidemia    Vasomotor rhinitis      History reviewed  No pertinent past medical history  History reviewed  No pertinent surgical history  Family History   Problem Relation Age of Onset    Hypertension Mother     Osteoporosis Mother     Diabetes type I Paternal Aunt      Social History   Substance Use Topics    Smoking status: Former Smoker    Smokeless tobacco: Never Used    Alcohol use Yes      Comment: social usage     No Known Allergies      Current Outpatient Prescriptions:     amLODIPine (NORVASC) 5 mg tablet, Take 1 tablet by mouth daily, Disp: , Rfl:     B Complex-Biotin-FA (B COMPLETE) TABS, Take by mouth, Disp: , Rfl:     docusate sodium (COLACE) 100 mg capsule, Take 1 capsule by mouth as needed, Disp: , Rfl:     fexofenadine (ALLEGRA) 180 MG tablet, Take by mouth, Disp: , Rfl:     fluticasone (FLONASE) 50 mcg/act nasal spray, into each nostril, Disp: , Rfl:     glimepiride (AMARYL) 2 mg tablet, TAKE ONE-HALF TABLET BY MOUTH EVERY DAY, Disp: , Rfl: 3    lisinopril (ZESTRIL) 20 mg tablet, Take 20 mg by mouth daily, Disp: , Rfl: 3    metFORMIN (GLUCOPHAGE) 500 mg tablet, TAKE TWO TO FOUR TABLETS BY MOUTH EVERY DAY PER BLOOD GLUCOSE LEVELS, Disp: , Rfl: 1    simvastatin (ZOCOR) 40 mg tablet, Take 1 tablet by mouth daily, Disp: , Rfl:     Thiamine 50 MG CAPS, Take 1 capsule by mouth daily, Disp: , Rfl:     Review of Systems   Constitutional: Negative for activity change, appetite change and fatigue  HENT: Negative for sore throat, trouble swallowing and voice change  Eyes: Negative for visual disturbance  Respiratory: Negative for choking, chest tightness and shortness of breath  Cardiovascular: Negative for chest pain, palpitations and leg swelling  Gastrointestinal: Negative for abdominal pain, constipation and diarrhea  Endocrine: Negative for cold intolerance, heat intolerance, polydipsia, polyphagia and polyuria  Genitourinary: Negative for frequency  Musculoskeletal: Negative for arthralgias and myalgias  Skin: Negative for rash  Neurological: Negative for dizziness and syncope  Hematological: Negative for adenopathy  Psychiatric/Behavioral: Negative for sleep disturbance  All other systems reviewed and are negative  Physical Exam:  Body mass index is 24 01 kg/m²  /62   Pulse 80   Ht 6' (1 829 m)   Wt 80 3 kg (177 lb)   BMI 24 01 kg/m²     Wt Readings from Last 3 Encounters:   01/29/18 80 3 kg (177 lb)   12/07/17 80 7 kg (178 lb)   09/29/17 77 2 kg (170 lb 4 oz)       Physical Exam   Constitutional: He is oriented to person, place, and time  He appears well-developed and well-nourished  No distress  HENT:   Head: Normocephalic and atraumatic  Mouth/Throat: Oropharynx is clear and moist    Eyes: Conjunctivae and EOM are normal  Pupils are equal, round, and reactive to light  Neck: Normal range of motion  Neck supple  No thyromegaly present  Cardiovascular: Normal rate, regular rhythm and normal heart sounds  No murmur heard  Pulmonary/Chest: Effort normal and breath sounds normal  No respiratory distress  He has no wheezes  He has no rales  Abdominal: Soft  Bowel sounds are normal  He exhibits no distension  There is no tenderness  Musculoskeletal: Normal range of motion  Lymphadenopathy:     He has no cervical adenopathy  Neurological: He is alert and oriented to person, place, and time  Skin: Skin is warm and dry  Psychiatric: He has a normal mood and affect  Vitals reviewed      Diabetic Foot Exam    Labs:     Lab Results   Component Value Date    HGBA1C 7 9 (H) 10/02/2017       Lab Results   Component Value Date    CREATININE 0 91 05/22/2017    CREATININE 0 93 05/12/2016    CREATININE 0 88 10/20/2015    BUN 16 05/22/2017     05/22/2017    K 4 4 05/22/2017     05/22/2017    CO2 28 05/22/2017     eGFR   Date Value Ref Range Status   05/22/2017 >60 0 ml/min/1 73sq m Final       Lab Results   Component Value Date    CHOL 111 05/22/2017    HDL 40 05/22/2017    TRIG 79 05/22/2017       Lab Results   Component Value Date    ALT 29 05/22/2017    AST 28 05/22/2017    ALKPHOS 94 05/22/2017    BILITOT 0 48 05/22/2017     Impression & Plan:    Problem List Items Addressed This Visit     Benign essential hypertension     Well controlled  Continue amlodipine and lisinopril  Relevant Medications    amLODIPine (NORVASC) 5 mg tablet    lisinopril (ZESTRIL) 20 mg tablet    Uncontrolled diabetes mellitus type 2 without complications ([facility] [address] )     Uncontrolled Based on last A1C of 7 9  He has order for upcoming A1C through family physician  He will be getting Parvin Diagnostic CGM placed today and will adjust medication if needed based on report  Refer to medical nutrition therapy  Relevant Medications    glimepiride (AMARYL) 2 mg tablet    metFORMIN (GLUCOPHAGE) 500 mg tablet    Hyperlipidemia     LDL at goal, continue atorvastatin            Relevant Medications    simvastatin (ZOCOR) 40 mg tablet      Other Visit Diagnoses     Type 2 diabetes mellitus without complication, without long-term current use of insulin ([facility])    -  Primary    Relevant Medications    glimepiride (AMARYL) 2 mg tablet    metFORMIN (GLUCOPHAGE) 500 mg tablet    Other Relevant Orders    Ambulatory referral to medical nutrition therapy for diabetes    Continous glucose monitoring parvin placement    Continous glucose monitoring parvin intrepretation          Orders Placed This Encounter   Procedures    Continous glucose monitoring parvin placement     Standing Status:   Future     Number of Occurrences:   1     Standing Expiration Date:   1/29/2019    Continous glucose monitoring parvin intrepretation     Standing Status:   Future     Standing Expiration Date:   1/29/2019    Ambulatory referral to medical nutrition therapy for diabetes     Standing Status:   Future     Standing Expiration Date:   7/29/2018     Referral Priority:   Routine     Referral Type:   Consult - AMB     Referral Reason:   Specialty Services Required     Requested Specialty:   Endocrinology     Number of Visits Requested:   1     Expiration Date:   1/29/2019       Patient Instructions   Will place Continuous glucose monitor today  Return in two weeks for sensor remonal and will review Data and call you with any medication changes if needed  Set up with dietician  When you get lab testing done through family doctor, please send copy of report  Discussed with the patient and all questioned fully answered  He will call me if any problems arise  Follow-up appointment in 4 months       Counseled patient on diagnostic results, prognosis, risk and benefit of treatment options, instruction for management, importance of treatment compliance, Risk  factor reduction and impressions      Anshu Nelson PA-C

## 2018-01-31 DIAGNOSIS — E11.9 TYPE 2 DIABETES MELLITUS WITHOUT COMPLICATION, WITHOUT LONG-TERM CURRENT USE OF INSULIN (HCC): Primary | ICD-10-CM

## 2018-02-12 ENCOUNTER — OFFICE VISIT (OUTPATIENT)
Dept: DIABETES SERVICES | Facility: CLINIC | Age: 78
End: 2018-02-12

## 2018-02-12 ENCOUNTER — CLINICAL SUPPORT (OUTPATIENT)
Dept: ENDOCRINOLOGY | Facility: CLINIC | Age: 78
End: 2018-02-12
Payer: MEDICARE

## 2018-02-12 DIAGNOSIS — E11.9 TYPE 2 DIABETES MELLITUS WITHOUT COMPLICATION, WITHOUT LONG-TERM CURRENT USE OF INSULIN (HCC): Primary | ICD-10-CM

## 2018-02-12 DIAGNOSIS — E11.9 TYPE 2 DIABETES MELLITUS WITHOUT COMPLICATION, WITHOUT LONG-TERM CURRENT USE OF INSULIN (HCC): ICD-10-CM

## 2018-02-12 PROCEDURE — 97802 MEDICAL NUTRITION INDIV IN: CPT | Performed by: DIETITIAN, REGISTERED

## 2018-02-12 PROCEDURE — 95251 CONT GLUC MNTR ANALYSIS I&R: CPT | Performed by: PHYSICIAN ASSISTANT

## 2018-02-12 NOTE — PROGRESS NOTES
[facility]     Date/Time 2/12/2018 9:56 AM     Performed by  Raffaele Shafer by Ananda Gant       Consent: Verbal consent obtained  Written consent obtained    Risks and benefits: risks, benefits and alternatives were discussed  Consent given by: patient  Patient understanding: patient states understanding of the procedure being performed  Patient consent: the patient's understanding of the procedure matches consent given  Procedure consent: procedure consent matches procedure scheduled  Relevant documents: relevant documents present and verified  Test results: test results available and properly labeled  Site marked: the operative site was not marked  Imaging studies: imaging studies not available  Patient identity confirmed: verbally with patient      Site preparation: Isopropyl alcohol    Local anesthesia used: no     Anesthesia   Local anesthesia used: no     Sedation   Patient sedated: no      Specimen: no    Culture: no    Patient tolerance: Patient tolerated the procedure well with no immediate complications

## 2018-02-12 NOTE — PROGRESS NOTES
Medical Nutrition Therapy        Assessment    Visit Type: Initial visit  Chief complaint/Medical Diagnosis/reason for visit E11 9 (Type 2 Diabetes Mellitus w/o complications w/o current use of     HPI Patient states, "when I do the right thing I get better results  I know this  It's a matter of doing it"  Problems identified in food recall include inconsistent carbohydrate intake at meals, low intake of plant based foods, whole grains and low fat dairy and general lack of balance  Discussed carbohydrate intake for a 1700 calorie meal plan to assist with consistency, balance and portion control  Encouraged the consumption of regular meals at regular times  Advised patient to keep carbohydrate intake to 45-60 grams per meal to assist with glycemic control  Marshall Alberto was encouraged to initiate aerobic exercise and work his way up to 150 minutes a week  Patient agreed to keep daily food logs  Follow-up TBD  RD will remain available for further dietary questions/concerns       Ht Readings from Last 1 Encounters:   01/29/18 6' (1 829 m)     Wt Readings from Last 3 Encounters:   01/29/18 80 3 kg (177 lb)   12/07/17 80 7 kg (178 lb)   09/29/17 77 2 kg (170 lb 4 oz)     Weight Change: No    Barriers to Learning: no barriers    Do you follow any special diet presently?: Yes - more vegetables and hard cooked eggs  Who shops: patient  Who cooks: patient and spouse    Food Log: Completed via the method of food recall    Breakfast:9:30-10:00AM medium banana, Greek yogurt or Light-n-Fit OR 1 cup oatmeal with 1/4 cup blueberries OR 1 cup Cheerios with 1 Tblsp raisins or Craisins, and 1/2 cup 1% milk; 4 oz OJ and tea with milk and Equal OR out, 2 eggs, 2 slices rye or wheat buttered toast, 1/2 cup home fries, meat or not  Morning Snack:none  Lunch:1:30-2:00PM home 1/2 sandwich: tuna made with mayonnanise on white bread or a roll OR turkey or ham on white bread with carvalho, sometimes potato chips; diet soda or 2x a week out: tuna or egg salad sandwich or bowl of soup (chicken rice or noodle or bean and kaba), tea with milk and Equal  Afternoon Snack: none  Dinner:6:30-6:45PM 3-4 oz chicken or hamburger or pork chops, 3/4 cup broccoli, peas, beans, corn, 3/4 cup brown/white rice or potato OR 1 cup pasta with marinara and sausage or ground beef; 1 beer or a mixed drink or 5 oz wine and SFJell-O pudding or fruit out of the can or a brownie  Evening Snack:none  Beverages: tea or water or diet beverage or juice  Eating out/Take out:2x a month: diner turkey with potato and gravy  Exercise strength training for 5 minutes a day, 5 minutes stretches, walks with wife  Calorie needs 1700 kcals/day Carbs: 45-60g/meal         Nutrition Diagnosis:  Inconsistent carbohydrate intake  intake related to Food and nutrition related knowledge deficit concerning appropriate amount and timing of carbohydrate intake as evidenced by  Estimated carbohydrate intake that is different from recommended types or ingested on an irregular basis    Intervention: label reading, behavior modification strategies, carbohydrate counting, increased plant based foods, weight/ BMI goals, exercise guidelines and food diary     Treatment Goals: Patient will monitor food intake daily with tracker, Patient will count carbohydrates and Patient will exercise    Monitoring and evaluation:    Term code indicator  FH 1 3 2 Food Intake Criteria: keep daily food logs  Term code indicator  FH 1 6 3 Carbohydrate Intake Criteria: 45-60 grams CHO per meal   Term code indicator  CH 2 2 Treatments/Therapy/Alternative Medicine Criteria: initiate aerobic exercise    Patients Response to Instruction:  Comprehensionvery good  Motivationvery good  Expected Compliancevery good    Thank you for coming to the [facility] for education today  Please feel free to call with any questions or concerns      Cari Hillman  [address], [address] [facility] [id number]

## 2018-02-25 DIAGNOSIS — E78.2 COMBINED HYPERLIPIDEMIA: Primary | ICD-10-CM

## 2018-02-26 RX ORDER — SIMVASTATIN 40 MG
TABLET ORAL
Qty: 90 TABLET | Refills: 0 | Status: SHIPPED | OUTPATIENT
Start: 2018-02-26 | End: 2018-05-13 | Stop reason: SDUPTHER

## 2018-02-27 ENCOUNTER — APPOINTMENT (OUTPATIENT)
Dept: LAB | Facility: CLINIC | Age: 78
End: 2018-02-27
Payer: MEDICARE

## 2018-02-27 DIAGNOSIS — Z00.00 ENCOUNTER FOR GENERAL ADULT MEDICAL EXAMINATION WITHOUT ABNORMAL FINDINGS: ICD-10-CM

## 2018-02-27 DIAGNOSIS — E11.9 TYPE 2 DIABETES MELLITUS WITHOUT COMPLICATIONS (HCC): ICD-10-CM

## 2018-02-27 LAB
EST. AVERAGE GLUCOSE BLD GHB EST-MCNC: 186 MG/DL
HBA1C MFR BLD: 8.1 % (ref 4.2–6.3)

## 2018-02-27 PROCEDURE — 83036 HEMOGLOBIN GLYCOSYLATED A1C: CPT

## 2018-02-27 PROCEDURE — 36415 COLL VENOUS BLD VENIPUNCTURE: CPT

## 2018-03-13 ENCOUNTER — OFFICE VISIT (OUTPATIENT)
Dept: FAMILY MEDICINE CLINIC | Facility: CLINIC | Age: 78
End: 2018-03-13
Payer: MEDICARE

## 2018-03-13 VITALS
HEIGHT: 72 IN | DIASTOLIC BLOOD PRESSURE: 74 MMHG | WEIGHT: 174 LBS | BODY MASS INDEX: 23.57 KG/M2 | SYSTOLIC BLOOD PRESSURE: 172 MMHG

## 2018-03-13 DIAGNOSIS — IMO0001 UNCONTROLLED TYPE 2 DIABETES MELLITUS WITHOUT COMPLICATION, WITHOUT LONG-TERM CURRENT USE OF INSULIN: ICD-10-CM

## 2018-03-13 DIAGNOSIS — I10 BENIGN ESSENTIAL HYPERTENSION: ICD-10-CM

## 2018-03-13 DIAGNOSIS — J30.0 VASOMOTOR RHINITIS, UNSPECIFIED CHRONICITY: ICD-10-CM

## 2018-03-13 DIAGNOSIS — E78.2 MIXED HYPERLIPIDEMIA: Primary | ICD-10-CM

## 2018-03-13 PROCEDURE — 99213 OFFICE O/P EST LOW 20 MIN: CPT | Performed by: FAMILY MEDICINE

## 2018-03-13 RX ORDER — IPRATROPIUM BROMIDE 42 UG/1
2 SPRAY, METERED NASAL 4 TIMES DAILY
Qty: 15 ML | Refills: 0 | Status: SHIPPED | OUTPATIENT
Start: 2018-03-13 | End: 2018-07-13 | Stop reason: SDUPTHER

## 2018-03-13 RX ORDER — IPRATROPIUM BROMIDE 42 UG/1
2 SPRAY, METERED NASAL 4 TIMES DAILY
COMMUNITY
End: 2018-03-13 | Stop reason: SDUPTHER

## 2018-03-13 NOTE — PROGRESS NOTES
Assessment/Plan:    No problem-specific Assessment & Plan notes found for this encounter  Diagnoses and all orders for this visit:    Mixed hyperlipidemia    Benign essential hypertension    Uncontrolled type 2 diabetes mellitus without complication, without long-term current use of insulin ([facility])    Vasomotor rhinitis, unspecified chronicity    Other orders  -     ipratropium (ATROVENT) 0 06 % nasal spray; 2 sprays into each nostril 4 (four) times a day          Subjective:      Patient ID: Nahid Wilcox is a 70 y o  male  Patient is here today for a follow-up of visit he brought along a continuous blood glucose monitor that was done from January 29th to February 12th actually it is really good his average glucose was 135 is estimated A1c was 6 3 and he met with a dietitian made a lot of improvements in his diet in fact he has developed quite a yet be diet now he is eating very healthy on and has noticed a big improvement in his blood sugars and feels well he has a little bit of a head cold right now which she caught from his granddaughter but I do not think on exam he warrants any antibiotics at the present time        The following portions of the patient's history were reviewed and updated as appropriate:   He  has a past medical history of Abnormal blood chemistry; Abnormal glucose; Benign essential hypertension; Chronic allergic rhinitis; Dermatitis; High risk medication use; Sciatica; Septic bursitis; and Tick bite    He   Patient Active Problem List    Diagnosis Date Noted    Benign essential hypertension 10/31/2017    Benign prostatic hyperplasia with lower urinary tract symptoms 06/06/2017    Esophageal dysphagia 06/06/2017    Vasomotor rhinitis 06/06/2017    [facility] 90/45/3624    Herniated lumbar intervertebral disc 06/21/2012    Hyperlipidemia 06/21/2012     He  has a past surgical history that includes Cataract extraction; Colonoscopy; and PILONIDAL CYST EXCISION  His family history includes Colon cancer in his family; Diabetes type I in his paternal aunt; Hypertension in his mother; Osteoporosis in his mother  He  reports that he has quit smoking  He has never used smokeless tobacco  He reports that he drinks alcohol  He reports that he does not use drugs  Current Outpatient Prescriptions   Medication Sig Dispense Refill    amLODIPine (NORVASC) 5 mg tablet Take 1 tablet by mouth daily      B Complex-Biotin-FA (B COMPLETE) TABS Take by mouth      BLAKE MICROLET LANCETS lancets Test twice daily 100 each 0    docusate sodium (COLACE) 100 mg capsule Take 1 capsule by mouth as needed      fexofenadine (ALLEGRA) 180 MG tablet Take by mouth      fluticasone (FLONASE) 50 mcg/act nasal spray into each nostril      glimepiride (AMARYL) 2 mg tablet TAKE ONE-HALF TABLET BY MOUTH EVERY DAY  3    glucose blood (BLAKE CONTOUR NEXT TEST) test strip Test blood sugar twice daily 100 each 2    ipratropium (ATROVENT) 0 06 % nasal spray 2 sprays into each nostril 4 (four) times a day      lisinopril (ZESTRIL) 20 mg tablet Take 20 mg by mouth daily  3    metFORMIN (GLUCOPHAGE) 500 mg tablet TAKE TWO TO FOUR TABLETS BY MOUTH EVERY DAY PER BLOOD GLUCOSE LEVELS  1    simvastatin (ZOCOR) 40 mg tablet TAKE ONE TABLET BY MOUTH EVERY DAY 90 tablet 0    Thiamine 50 MG CAPS Take 1 capsule by mouth daily       No current facility-administered medications for this visit        Current Outpatient Prescriptions on File Prior to Visit   Medication Sig    amLODIPine (NORVASC) 5 mg tablet Take 1 tablet by mouth daily    B Complex-Biotin-FA (B COMPLETE) TABS Take by mouth    BLAKE MICROLET LANCETS lancets Test twice daily    docusate sodium (COLACE) 100 mg capsule Take 1 capsule by mouth as needed    fexofenadine (ALLEGRA) 180 MG tablet Take by mouth    fluticasone (FLONASE) 50 mcg/act nasal spray into each nostril    glimepiride (AMARYL) 2 mg tablet TAKE ONE-HALF TABLET BY MOUTH EVERY DAY    glucose blood (BLAKE CONTOUR NEXT TEST) test strip Test blood sugar twice daily    lisinopril (ZESTRIL) 20 mg tablet Take 20 mg by mouth daily    metFORMIN (GLUCOPHAGE) 500 mg tablet TAKE TWO TO FOUR TABLETS BY MOUTH EVERY DAY PER BLOOD GLUCOSE LEVELS    simvastatin (ZOCOR) 40 mg tablet TAKE ONE TABLET BY MOUTH EVERY DAY    Thiamine 50 MG CAPS Take 1 capsule by mouth daily     No current facility-administered medications on file prior to visit  He has No Known Allergies       Review of Systems   Constitutional: Negative for activity change, appetite change, diaphoresis, fatigue and fever  HENT: Positive for postnasal drip and rhinorrhea  Eyes: Negative  Respiratory: Negative for apnea, cough, chest tightness, shortness of breath and wheezing  Cardiovascular: Negative for chest pain, palpitations and leg swelling  Gastrointestinal: Negative for abdominal distention, abdominal pain, anal bleeding, constipation, diarrhea, nausea and vomiting  Endocrine: Negative for cold intolerance, heat intolerance, polydipsia, polyphagia and polyuria  Genitourinary: Negative for difficulty urinating, dysuria, flank pain, hematuria and urgency  Musculoskeletal: Negative for arthralgias, back pain, gait problem, joint swelling and myalgias  Skin: Negative for color change, rash and wound  Allergic/Immunologic: Negative for environmental allergies, food allergies and immunocompromised state  Neurological: Negative for dizziness, seizures, syncope, speech difficulty, numbness and headaches  Hematological: Negative for adenopathy  Does not bruise/bleed easily  Psychiatric/Behavioral: Negative for agitation, behavioral problems, hallucinations, sleep disturbance and suicidal ideas           Objective:      BP (!) 172/74 (BP Location: Left arm, Patient Position: Sitting, Cuff Size: Standard)   Ht 6' (1 829 m)   Wt 78 9 kg (174 lb)   BMI 23 60 kg/m² Physical Exam   Constitutional: He is oriented to person, place, and time  He appears well-developed and well-nourished  No distress  HENT:   Head: Normocephalic  Right Ear: External ear normal    Left Ear: External ear normal    Nose: Nose normal    Mouth/Throat: Oropharynx is clear and moist    Eyes: Conjunctivae and EOM are normal  Pupils are equal, round, and reactive to light  Right eye exhibits no discharge  Left eye exhibits no discharge  No scleral icterus  Neck: Normal range of motion  No tracheal deviation present  No thyromegaly present  Cardiovascular: Normal rate, regular rhythm and normal heart sounds  Exam reveals no gallop and no friction rub  No murmur heard  Pulmonary/Chest: Effort normal and breath sounds normal  No respiratory distress  He has no wheezes  Abdominal: Soft  Bowel sounds are normal  He exhibits no mass  There is no tenderness  There is no guarding  Musculoskeletal: He exhibits no edema or deformity  Lymphadenopathy:     He has no cervical adenopathy  Neurological: He is alert and oriented to person, place, and time  No cranial nerve deficit  Skin: Skin is warm and dry  No rash noted  He is not diaphoretic  No erythema  Psychiatric: He has a normal mood and affect   Thought content normal

## 2018-04-25 DIAGNOSIS — E11.9 TYPE 2 DIABETES MELLITUS WITHOUT COMPLICATION, WITHOUT LONG-TERM CURRENT USE OF INSULIN (HCC): ICD-10-CM

## 2018-05-13 DIAGNOSIS — J30.1 SEASONAL ALLERGIC RHINITIS DUE TO POLLEN: Primary | ICD-10-CM

## 2018-05-13 DIAGNOSIS — E78.2 COMBINED HYPERLIPIDEMIA: ICD-10-CM

## 2018-05-14 RX ORDER — FLUTICASONE PROPIONATE 50 MCG
SPRAY, SUSPENSION (ML) NASAL
Qty: 32 G | Refills: 1 | Status: SHIPPED | OUTPATIENT
Start: 2018-05-14 | End: 2019-01-23 | Stop reason: SDUPTHER

## 2018-05-14 RX ORDER — SIMVASTATIN 40 MG
TABLET ORAL
Qty: 90 TABLET | Refills: 0 | Status: SHIPPED | OUTPATIENT
Start: 2018-05-14 | End: 2018-08-09 | Stop reason: SDUPTHER

## 2018-06-01 ENCOUNTER — LAB (OUTPATIENT)
Dept: LAB | Facility: CLINIC | Age: 78
End: 2018-06-01
Payer: MEDICARE

## 2018-06-01 ENCOUNTER — TRANSCRIBE ORDERS (OUTPATIENT)
Dept: LAB | Facility: CLINIC | Age: 78
End: 2018-06-01

## 2018-06-01 DIAGNOSIS — E78.2 MIXED HYPERLIPIDEMIA: ICD-10-CM

## 2018-06-01 DIAGNOSIS — IMO0001 UNCONTROLLED TYPE 2 DIABETES MELLITUS WITHOUT COMPLICATION, WITHOUT LONG-TERM CURRENT USE OF INSULIN: ICD-10-CM

## 2018-06-01 DIAGNOSIS — I10 BENIGN ESSENTIAL HYPERTENSION: ICD-10-CM

## 2018-06-01 LAB
ALBUMIN SERPL BCP-MCNC: 3.5 G/DL (ref 3.5–5)
ALP SERPL-CCNC: 100 U/L (ref 46–116)
ALT SERPL W P-5'-P-CCNC: 27 U/L (ref 12–78)
ANION GAP SERPL CALCULATED.3IONS-SCNC: 7 MMOL/L (ref 4–13)
AST SERPL W P-5'-P-CCNC: 21 U/L (ref 5–45)
BILIRUB SERPL-MCNC: 0.39 MG/DL (ref 0.2–1)
BUN SERPL-MCNC: 19 MG/DL (ref 5–25)
CALCIUM SERPL-MCNC: 8.9 MG/DL (ref 8.3–10.1)
CHLORIDE SERPL-SCNC: 103 MMOL/L (ref 100–108)
CHOLEST SERPL-MCNC: 90 MG/DL (ref 50–200)
CO2 SERPL-SCNC: 26 MMOL/L (ref 21–32)
CREAT SERPL-MCNC: 1.02 MG/DL (ref 0.6–1.3)
CREAT UR-MCNC: 66.1 MG/DL
EST. AVERAGE GLUCOSE BLD GHB EST-MCNC: 177 MG/DL
GFR SERPL CREATININE-BSD FRML MDRD: 71 ML/MIN/1.73SQ M
GLUCOSE P FAST SERPL-MCNC: 125 MG/DL (ref 65–99)
HBA1C MFR BLD: 7.8 % (ref 4.2–6.3)
HDLC SERPL-MCNC: 36 MG/DL (ref 40–60)
LDLC SERPL CALC-MCNC: 41 MG/DL (ref 0–100)
MICROALBUMIN UR-MCNC: 8.3 MG/L (ref 0–20)
MICROALBUMIN/CREAT 24H UR: 13 MG/G CREATININE (ref 0–30)
NONHDLC SERPL-MCNC: 54 MG/DL
POTASSIUM SERPL-SCNC: 4.4 MMOL/L (ref 3.5–5.3)
PROT SERPL-MCNC: 6.9 G/DL (ref 6.4–8.2)
SODIUM SERPL-SCNC: 136 MMOL/L (ref 136–145)
TRIGL SERPL-MCNC: 63 MG/DL
TSH SERPL DL<=0.05 MIU/L-ACNC: 4.14 UIU/ML (ref 0.36–3.74)

## 2018-06-01 PROCEDURE — 82043 UR ALBUMIN QUANTITATIVE: CPT

## 2018-06-01 PROCEDURE — 84443 ASSAY THYROID STIM HORMONE: CPT

## 2018-06-01 PROCEDURE — 36415 COLL VENOUS BLD VENIPUNCTURE: CPT

## 2018-06-01 PROCEDURE — 80061 LIPID PANEL: CPT

## 2018-06-01 PROCEDURE — 82570 ASSAY OF URINE CREATININE: CPT

## 2018-06-01 PROCEDURE — 83036 HEMOGLOBIN GLYCOSYLATED A1C: CPT

## 2018-06-01 PROCEDURE — 80053 COMPREHEN METABOLIC PANEL: CPT

## 2018-06-04 ENCOUNTER — OFFICE VISIT (OUTPATIENT)
Dept: ENDOCRINOLOGY | Facility: CLINIC | Age: 78
End: 2018-06-04
Payer: MEDICARE

## 2018-06-04 VITALS
SYSTOLIC BLOOD PRESSURE: 130 MMHG | DIASTOLIC BLOOD PRESSURE: 60 MMHG | WEIGHT: 165.4 LBS | BODY MASS INDEX: 22.4 KG/M2 | HEIGHT: 72 IN | HEART RATE: 88 BPM

## 2018-06-04 DIAGNOSIS — IMO0001 UNCONTROLLED TYPE 2 DIABETES MELLITUS WITHOUT COMPLICATION, WITHOUT LONG-TERM CURRENT USE OF INSULIN: ICD-10-CM

## 2018-06-04 DIAGNOSIS — I10 BENIGN ESSENTIAL HYPERTENSION: ICD-10-CM

## 2018-06-04 DIAGNOSIS — R79.89 TSH ELEVATION: ICD-10-CM

## 2018-06-04 DIAGNOSIS — E11.9 TYPE 2 DIABETES MELLITUS WITHOUT COMPLICATION, WITHOUT LONG-TERM CURRENT USE OF INSULIN (HCC): Primary | ICD-10-CM

## 2018-06-04 DIAGNOSIS — E78.2 MIXED HYPERLIPIDEMIA: ICD-10-CM

## 2018-06-04 PROCEDURE — 99214 OFFICE O/P EST MOD 30 MIN: CPT | Performed by: INTERNAL MEDICINE

## 2018-06-04 NOTE — PROGRESS NOTES
Please call the patient regarding his abnormal result   Urine sample is great on not spilling any protein in the urine chemistry panel is great sugar is 125 kidney function is excellent liver function is excellent lipid profile is excellent cholesterol 90 LD HDL 36 very good thyroid is a little bit under active it is possible that that is the laboratory air let's just repeat the TSH the next time he goes for labs because I am not convinced that he needs to be on thyroid hemoglobin A1c is 7 8 that is excellent

## 2018-06-04 NOTE — PROGRESS NOTES
Cateiam Filter 70 y o  male MRN: [id number]    Encounter: [id number]      Assessment/Plan     Assessment: This is a 70y o -year-old male with diabetes with hyperglycemia, hypertension and hyperlipidemia  Recently, he has noted to have an elevated TSH  Plan:  1  Type 2 diabetes with hyperglycemia-since his last visit, had decreased his glimepiride to 1 mg per day  I suspect this is the reason he had a less than optimal decrease in his hemoglobin A1c  His hemoglobin A1c remains above target of 7%  I have asked him to increase the glimepiride to 1 mg twice a day  Check hemoglobin A1c in about three months  2   Hypertension is well controlled  3   Hyperlipidemia-continue statin  4   Elevated TSH-he is scheduled to have a repeat TSH and free T4 prior to the next visit  CC: Diabetes    History of Present Illness     HPI:  82-year-old male with type 2 diabetes presents for follow-up  He is currently on glimepiride and metformin  He denies any hypoglycemic episodes  He did make changes to his diet that has helped his blood sugar  Hypertension is well controlled  Hyperlipidemia, he is on a statin  He denies any myalgia  Review of Systems   Constitutional: Negative for chills and fever  Respiratory: Negative for shortness of breath  Cardiovascular: Negative for chest pain  Gastrointestinal: Negative for constipation, diarrhea, nausea and vomiting  Endocrine: Negative for polydipsia and polyuria  Neurological: Negative for numbness  All other systems reviewed and are negative        Historical Information   Past Medical History:   Diagnosis Date    Abnormal blood chemistry     Abnormal glucose     Last assessed - 2/20/14    Benign essential hypertension     Last assessed - 5/15/17    Chronic allergic rhinitis     Last assessed - 8/1/16    Dermatitis     Last assessed - 6/16/15    High risk medication use     Last assessed - 5/2/16    Sciatica     Last assessed - 12/18/12  Septic bursitis     Last assessed - 9/3/13    Tick bite     Last assessed - 8/8/16     Past Surgical History:   Procedure Laterality Date    CATARACT EXTRACTION      COLONOSCOPY      Fiberoptic    PILONIDAL CYST EXCISION       Social History   History   Alcohol Use    Yes     Comment: social usage     History   Drug Use No     History   Smoking Status    Former Smoker   Smokeless Tobacco    Never Used     Family History:   Family History   Problem Relation Age of Onset    Hypertension Mother     Osteoporosis Mother     Diabetes type I Paternal Aunt     Colon cancer Family        Meds/Allergies   Current Outpatient Prescriptions   Medication Sig Dispense Refill    amLODIPine (NORVASC) 5 mg tablet Take 1 tablet by mouth daily      B Complex-Biotin-FA (B COMPLETE) TABS Take by mouth      BLAKE MICROLET LANCETS lancets TEST TWICE DAILY 100 each 0    docusate sodium (COLACE) 100 mg capsule Take 1 capsule by mouth as needed      fexofenadine (ALLEGRA) 180 MG tablet Take by mouth      fluticasone (FLONASE) 50 mcg/act nasal spray INSTILL ONE SPRAY TO EACH NOSTRIL TWICE A DAY 32 g 1    glimepiride (AMARYL) 2 mg tablet TAKE ONE-HALF TABLET BY MOUTH EVERY DAY  3    glucose blood (BLAKE CONTOUR NEXT TEST) test strip Test blood sugar twice daily 100 each 2    ipratropium (ATROVENT) 0 06 % nasal spray 2 sprays into each nostril 4 (four) times a day 15 mL 0    lisinopril (ZESTRIL) 20 mg tablet Take 20 mg by mouth daily  3    metFORMIN (GLUCOPHAGE) 500 mg tablet TAKE TWO TO FOUR TABLETS BY MOUTH EVERY DAY PER BLOOD GLUCOSE LEVELS  1    simvastatin (ZOCOR) 40 mg tablet TAKE ONE TABLET BY MOUTH EVERY DAY 90 tablet 0    Thiamine 50 MG CAPS Take 1 capsule by mouth daily       No current facility-administered medications for this visit  No Known Allergies    Objective   Vitals: Blood pressure 130/60, pulse 88, height 6' (1 829 m), weight 75 kg (165 lb 6 4 oz)      Physical Exam   Constitutional: He is oriented to person, place, and time  He appears well-developed and well-nourished  No distress  HENT:   Head: Normocephalic and atraumatic  Mouth/Throat: Oropharynx is clear and moist and mucous membranes are normal  No oropharyngeal exudate  Eyes: Conjunctivae, EOM and lids are normal  Right eye exhibits no discharge  Left eye exhibits no discharge  No scleral icterus  Neck: Neck supple  No thyromegaly present  Cardiovascular: Normal rate, regular rhythm and normal heart sounds  Exam reveals no gallop and no friction rub  No murmur heard  Pulmonary/Chest: Effort normal and breath sounds normal  No respiratory distress  He has no wheezes  Abdominal: Soft  Bowel sounds are normal  He exhibits no distension  There is no tenderness  Musculoskeletal: Normal range of motion  He exhibits no edema, tenderness or deformity  Lymphadenopathy:        Head (right side): No occipital adenopathy present  Head (left side): No occipital adenopathy present  Right: No supraclavicular adenopathy present  Left: No supraclavicular adenopathy present  Neurological: He is alert and oriented to person, place, and time  No cranial nerve deficit  Coordination normal    Skin: Skin is warm and intact  No rash noted  He is not diaphoretic  No erythema  Psychiatric: He has a normal mood and affect  His behavior is normal    Vitals reviewed  The history was obtained from the review of the chart, patient      Lab Results:   Lab Results   Component Value Date/Time    Hemoglobin A1C 7 8 (H) 06/01/2018 09:12 AM    Hemoglobin A1C 8 1 (H) 02/27/2018 09:13 AM    Hemoglobin A1C 7 9 (H) 10/02/2017 09:04 AM    BUN 19 06/01/2018 09:12 AM    Sodium 136 06/01/2018 09:12 AM    Potassium 4 4 06/01/2018 09:12 AM    Chloride 103 06/01/2018 09:12 AM    CO2 26 06/01/2018 09:12 AM    Creatinine 1 02 06/01/2018 09:12 AM    AST 21 06/01/2018 09:12 AM    ALT 27 06/01/2018 09:12 AM    Albumin 3 5 06/01/2018 09:12 AM Cholesterol 90 06/01/2018 09:12 AM    HDL, Direct 36 (L) 06/01/2018 09:12 AM    Triglycerides 63 06/01/2018 09:12 AM           Imaging Studies: I have personally reviewed pertinent reports  Portions of the record may have been created with voice recognition software  Occasional wrong word or "sound a like" substitutions may have occurred due to the inherent limitations of voice recognition software  Read the chart carefully and recognize, using context, where substitutions have occurred

## 2018-06-13 DIAGNOSIS — E11.59 TYPE 2 DIABETES MELLITUS WITH OTHER CIRCULATORY COMPLICATION, WITH LONG-TERM CURRENT USE OF INSULIN (HCC): Primary | ICD-10-CM

## 2018-06-13 DIAGNOSIS — Z79.4 TYPE 2 DIABETES MELLITUS WITH OTHER CIRCULATORY COMPLICATION, WITH LONG-TERM CURRENT USE OF INSULIN (HCC): Primary | ICD-10-CM

## 2018-07-02 DIAGNOSIS — E11.9 TYPE 2 DIABETES MELLITUS WITHOUT COMPLICATION, WITHOUT LONG-TERM CURRENT USE OF INSULIN (HCC): Primary | ICD-10-CM

## 2018-07-02 RX ORDER — GLIMEPIRIDE 2 MG/1
1 TABLET ORAL 2 TIMES DAILY
Qty: 90 TABLET | Refills: 1 | Status: SHIPPED | OUTPATIENT
Start: 2018-07-02 | End: 2019-01-02 | Stop reason: SDUPTHER

## 2018-07-13 DIAGNOSIS — J30.0 VASOMOTOR RHINITIS: ICD-10-CM

## 2018-07-13 RX ORDER — IPRATROPIUM BROMIDE 42 UG/1
SPRAY, METERED NASAL
Qty: 15 ML | Refills: 0 | Status: SHIPPED | OUTPATIENT
Start: 2018-07-13 | End: 2020-03-15 | Stop reason: SDUPTHER

## 2018-08-07 ENCOUNTER — OFFICE VISIT (OUTPATIENT)
Dept: FAMILY MEDICINE CLINIC | Facility: CLINIC | Age: 78
End: 2018-08-07
Payer: MEDICARE

## 2018-08-07 VITALS
WEIGHT: 164.4 LBS | SYSTOLIC BLOOD PRESSURE: 152 MMHG | BODY MASS INDEX: 22.27 KG/M2 | HEIGHT: 72 IN | DIASTOLIC BLOOD PRESSURE: 64 MMHG

## 2018-08-07 DIAGNOSIS — E03.9 ACQUIRED HYPOTHYROIDISM: Primary | ICD-10-CM

## 2018-08-07 DIAGNOSIS — E11.9 NON-INSULIN DEPENDENT TYPE 2 DIABETES MELLITUS (HCC): ICD-10-CM

## 2018-08-07 DIAGNOSIS — IMO0001 UNCONTROLLED TYPE 2 DIABETES MELLITUS WITHOUT COMPLICATION, WITHOUT LONG-TERM CURRENT USE OF INSULIN: ICD-10-CM

## 2018-08-07 DIAGNOSIS — I10 BENIGN ESSENTIAL HYPERTENSION: ICD-10-CM

## 2018-08-07 DIAGNOSIS — E78.2 MIXED HYPERLIPIDEMIA: ICD-10-CM

## 2018-08-07 PROCEDURE — 99213 OFFICE O/P EST LOW 20 MIN: CPT | Performed by: FAMILY MEDICINE

## 2018-08-07 NOTE — PROGRESS NOTES
Assessment/Plan:    No problem-specific Assessment & Plan notes found for this encounter  Diagnoses and all orders for this visit:    Acquired hypothyroidism    Non-insulin dependent type 2 diabetes mellitus ([facility] )  -     HEMOGLOBIN A1C W/ EAG ESTIMATION; Standing  -     Microalbumin / creatinine urine ratio; Standing  -     Basic metabolic panel; Standing    Uncontrolled type 2 diabetes mellitus without complication, without long-term current use of insulin ([facility])    Benign essential hypertension    Mixed hyperlipidemia          Subjective:      Patient ID: Robbie Home is a 70 y o  male  Mr Selina Simon here follow-up visit doing well no real complaints most recent labs were good hemoglobin A1c was little bit up for him but all is all is going well thyroid was evaluated by   But no concern on his part        The following portions of the patient's history were reviewed and updated as appropriate:   He  has a past medical history of Abnormal blood chemistry; Abnormal glucose; Benign essential hypertension; Chronic allergic rhinitis; Dermatitis; High risk medication use; Sciatica; Septic bursitis; and Tick bite  He   Patient Active Problem List    Diagnosis Date Noted    Benign essential hypertension 10/31/2017    Benign prostatic hyperplasia with lower urinary tract symptoms 06/06/2017    Esophageal dysphagia 06/06/2017    Vasomotor rhinitis 06/06/2017    [facility] 28/34/2900    Herniated lumbar intervertebral disc 06/21/2012    Hyperlipidemia 06/21/2012     He  has a past surgical history that includes Cataract extraction; Colonoscopy; and PILONIDAL CYST EXCISION  His family history includes Colon cancer in his family; Diabetes type I in his paternal aunt; Hypertension in his mother; Osteoporosis in his mother  He  reports that he quit smoking about 38 years ago  He has never used smokeless tobacco  He reports that he drinks alcohol   He reports that he does not use drugs  Current Outpatient Prescriptions   Medication Sig Dispense Refill    amLODIPine (NORVASC) 5 mg tablet Take 1 tablet by mouth daily      B Complex-Biotin-FA (B COMPLETE) TABS Take by mouth      BLAKE MICROLET LANCETS lancets TEST TWICE DAILY 100 each 0    docusate sodium (COLACE) 100 mg capsule Take 1 capsule by mouth as needed      fexofenadine (ALLEGRA) 180 MG tablet Take by mouth      fluticasone (FLONASE) 50 mcg/act nasal spray INSTILL ONE SPRAY TO EACH NOSTRIL TWICE A DAY 32 g 1    glimepiride (AMARYL) 2 mg tablet Take 0 5 tablets (1 mg total) by mouth 2 (two) times a day 90 tablet 1    glucose blood (BLAKE CONTOUR NEXT TEST) test strip Test blood sugar twice daily 100 each 2    ipratropium (ATROVENT) 0 06 % nasal spray INSTILL TWO SPRAYS INTO EACH NOSTRIL FOUR TIMES A DAY 15 mL 0    lisinopril (ZESTRIL) 20 mg tablet Take 20 mg by mouth daily  3    metFORMIN (GLUCOPHAGE) 500 mg tablet TAKE TWO TO FOUR TABLETS BY MOUTH EVERY DAY PER BLOOD GLUCOSE LEVELS 360 tablet 1    simvastatin (ZOCOR) 40 mg tablet TAKE ONE TABLET BY MOUTH EVERY DAY 90 tablet 0    Thiamine 50 MG CAPS Take 1 capsule by mouth daily       No current facility-administered medications for this visit        Current Outpatient Prescriptions on File Prior to Visit   Medication Sig    amLODIPine (NORVASC) 5 mg tablet Take 1 tablet by mouth daily    B Complex-Biotin-FA (B COMPLETE) TABS Take by mouth    BLAKE MICROLET LANCETS lancets TEST TWICE DAILY    docusate sodium (COLACE) 100 mg capsule Take 1 capsule by mouth as needed    fexofenadine (ALLEGRA) 180 MG tablet Take by mouth    fluticasone (FLONASE) 50 mcg/act nasal spray INSTILL ONE SPRAY TO EACH NOSTRIL TWICE A DAY    glimepiride (AMARYL) 2 mg tablet Take 0 5 tablets (1 mg total) by mouth 2 (two) times a day    glucose blood (BLAKE CONTOUR NEXT TEST) test strip Test blood sugar twice daily    ipratropium (ATROVENT) 0 06 % nasal spray INSTILL TWO SPRAYS INTO EACH NOSTRIL FOUR TIMES A DAY    lisinopril (ZESTRIL) 20 mg tablet Take 20 mg by mouth daily    metFORMIN (GLUCOPHAGE) 500 mg tablet TAKE TWO TO FOUR TABLETS BY MOUTH EVERY DAY PER BLOOD GLUCOSE LEVELS    simvastatin (ZOCOR) 40 mg tablet TAKE ONE TABLET BY MOUTH EVERY DAY    Thiamine 50 MG CAPS Take 1 capsule by mouth daily     No current facility-administered medications on file prior to visit  He has No Known Allergies       Review of Systems   Constitutional: Negative for fatigue  Cardiovascular: Negative for chest pain  Endocrine: Positive for polyuria  Negative for polydipsia and polyphagia  Skin: Negative for pallor  Neurological: Negative for dizziness, tremors, seizures, speech difficulty, weakness and headaches  Psychiatric/Behavioral: Negative for confusion  Objective:      /64 (BP Location: Left arm, Patient Position: Sitting, Cuff Size: Standard)   Ht 6' (1 829 m)   Wt 74 6 kg (164 lb 6 4 oz)   BMI 22 30 kg/m²          Physical Exam   Constitutional: He is oriented to person, place, and time  He appears well-developed and well-nourished  No distress  HENT:   Head: Normocephalic  Right Ear: External ear normal    Left Ear: External ear normal    Nose: Nose normal    Mouth/Throat: Oropharynx is clear and moist    Eyes: Conjunctivae and EOM are normal  Pupils are equal, round, and reactive to light  Right eye exhibits no discharge  Left eye exhibits no discharge  No scleral icterus  Neck: Normal range of motion  No tracheal deviation present  No thyromegaly present  Cardiovascular: Normal rate, regular rhythm and normal heart sounds  Exam reveals no gallop and no friction rub  No murmur heard  Pulmonary/Chest: Effort normal and breath sounds normal  No respiratory distress  He has no wheezes  Abdominal: Soft  Bowel sounds are normal  He exhibits no mass  There is no tenderness  There is no guarding     Musculoskeletal: He exhibits no edema or deformity  Lymphadenopathy:     He has no cervical adenopathy  Neurological: He is alert and oriented to person, place, and time  No cranial nerve deficit  Skin: Skin is warm and dry  No rash noted  He is not diaphoretic  No erythema  Psychiatric: He has a normal mood and affect   Thought content normal

## 2018-08-09 DIAGNOSIS — E78.2 COMBINED HYPERLIPIDEMIA: ICD-10-CM

## 2018-08-09 RX ORDER — SIMVASTATIN 40 MG
TABLET ORAL
Qty: 90 TABLET | Refills: 0 | Status: SHIPPED | OUTPATIENT
Start: 2018-08-09 | End: 2018-11-06 | Stop reason: SDUPTHER

## 2018-09-06 ENCOUNTER — APPOINTMENT (OUTPATIENT)
Dept: LAB | Facility: CLINIC | Age: 78
End: 2018-09-06
Payer: MEDICARE

## 2018-09-06 LAB
ANION GAP SERPL CALCULATED.3IONS-SCNC: 5 MMOL/L (ref 4–13)
BUN SERPL-MCNC: 21 MG/DL (ref 5–25)
CALCIUM SERPL-MCNC: 9 MG/DL (ref 8.3–10.1)
CHLORIDE SERPL-SCNC: 101 MMOL/L (ref 100–108)
CO2 SERPL-SCNC: 28 MMOL/L (ref 21–32)
CREAT SERPL-MCNC: 1.01 MG/DL (ref 0.6–1.3)
CREAT UR-MCNC: 64.1 MG/DL
EST. AVERAGE GLUCOSE BLD GHB EST-MCNC: 160 MG/DL
GFR SERPL CREATININE-BSD FRML MDRD: 72 ML/MIN/1.73SQ M
GLUCOSE P FAST SERPL-MCNC: 103 MG/DL (ref 65–99)
HBA1C MFR BLD: 7.2 % (ref 4.2–6.3)
MICROALBUMIN UR-MCNC: 7.2 MG/L (ref 0–20)
MICROALBUMIN/CREAT 24H UR: 11 MG/G CREATININE (ref 0–30)
POTASSIUM SERPL-SCNC: 4.3 MMOL/L (ref 3.5–5.3)
SODIUM SERPL-SCNC: 134 MMOL/L (ref 136–145)
TSH SERPL DL<=0.05 MIU/L-ACNC: 3.79 UIU/ML (ref 0.36–3.74)

## 2018-09-06 PROCEDURE — 80048 BASIC METABOLIC PNL TOTAL CA: CPT | Performed by: FAMILY MEDICINE

## 2018-09-06 PROCEDURE — 82570 ASSAY OF URINE CREATININE: CPT | Performed by: FAMILY MEDICINE

## 2018-09-06 PROCEDURE — 36415 COLL VENOUS BLD VENIPUNCTURE: CPT | Performed by: FAMILY MEDICINE

## 2018-09-06 PROCEDURE — 82043 UR ALBUMIN QUANTITATIVE: CPT | Performed by: FAMILY MEDICINE

## 2018-09-06 PROCEDURE — 84443 ASSAY THYROID STIM HORMONE: CPT | Performed by: FAMILY MEDICINE

## 2018-09-06 PROCEDURE — 83036 HEMOGLOBIN GLYCOSYLATED A1C: CPT | Performed by: FAMILY MEDICINE

## 2018-09-12 ENCOUNTER — OFFICE VISIT (OUTPATIENT)
Dept: ENDOCRINOLOGY | Facility: CLINIC | Age: 78
End: 2018-09-12
Payer: MEDICARE

## 2018-09-12 VITALS
SYSTOLIC BLOOD PRESSURE: 128 MMHG | HEIGHT: 72 IN | BODY MASS INDEX: 21.96 KG/M2 | HEART RATE: 66 BPM | DIASTOLIC BLOOD PRESSURE: 60 MMHG | WEIGHT: 162.1 LBS

## 2018-09-12 DIAGNOSIS — I10 BENIGN ESSENTIAL HYPERTENSION: ICD-10-CM

## 2018-09-12 DIAGNOSIS — R79.89 ELEVATED TSH: ICD-10-CM

## 2018-09-12 DIAGNOSIS — E78.2 MIXED HYPERLIPIDEMIA: ICD-10-CM

## 2018-09-12 DIAGNOSIS — IMO0001 UNCONTROLLED TYPE 2 DIABETES MELLITUS WITHOUT COMPLICATION, WITHOUT LONG-TERM CURRENT USE OF INSULIN: Primary | ICD-10-CM

## 2018-09-12 PROCEDURE — 99213 OFFICE O/P EST LOW 20 MIN: CPT | Performed by: NURSE PRACTITIONER

## 2018-09-12 NOTE — PROGRESS NOTES
Established Patient Progress Note      Chief Complaint   Patient presents with    Diabetes Type 2          History of Present Illness:   Jean-Paul Muller is a 70 y o  male with a history of HTN, HLD, and type 2 diabetes without long term use of insulin  Reports no complications of diabetes  Last A1C 7 2  Review of meter shows BG fairly well controlled  Denies recent illness or hospitalizations  Denies recent severe hypoglycemic or severe hyperglycemic episodes  Denies any issues with his current regimen  Home glucose monitoring: are performed regularly    Home blood glucose readings:   Before breakfast: 90-100s  Before lunch: does not check   Before dinner: does not check  Bedtime: does not check     Current regimen: Glimepiride 1 mg BID And Metformin 1,000 mg BID  compliant all of the timedenies any side effects from current medications     Hypoglycemic episodes: Yes infrequent   H/o of hypoglycemia causing hospitalization or Intervention such as glucagon injection or ambulance call Yes   Hypoglycemia symptoms: headache and sweating   Treatment of hypoglycemia: orange juice     Last Eye Exam: yearly, no retinopathy   Last Foot Exam: every 9 weeks     Has hypertension: Taking Amlodipine and Lisinopril   Has hyperlipidemia: Taking Simvastatin     Has elevated TSH: TSH has improved, now at 3 790, denies symptoms of hypo or hyperthyroidism, denies family history of thyroid disorders     Patient Active Problem List   Diagnosis    Benign essential hypertension    Benign prostatic hyperplasia with lower urinary tract symptoms    Uncontrolled diabetes mellitus type 2 without complications ([address] )    Esophageal dysphagia    Herniated lumbar intervertebral disc    Hyperlipidemia    Vasomotor rhinitis    Elevated TSH      Past Medical History:   Diagnosis Date    Abnormal blood chemistry     Abnormal glucose     Last assessed - 2/20/14    Benign essential hypertension     Last assessed - 5/15/17    Chronic allergic rhinitis     Last assessed - 8/1/16    Dermatitis     Last assessed - 6/16/15    High risk medication use     Last assessed - 5/2/16    Sciatica     Last assessed - 12/18/12    Septic bursitis     Last assessed - 9/3/13    Tick bite     Last assessed - 8/8/16      Past Surgical History:   Procedure Laterality Date    CATARACT EXTRACTION      COLONOSCOPY      Fiberoptic    PILONIDAL CYST EXCISION        Family History   Problem Relation Age of Onset    Hypertension Mother     Osteoporosis Mother     Diabetes type I Paternal Aunt     Colon cancer Family      Social History   Substance Use Topics    Smoking status: Former Smoker     Quit date: 1980    Smokeless tobacco: Never Used    Alcohol use Yes      Comment: social usage     No Known Allergies      Current Outpatient Prescriptions:     amLODIPine (NORVASC) 5 mg tablet, Take 1 tablet by mouth daily, Disp: , Rfl:     B Complex-Biotin-FA (B COMPLETE) TABS, Take by mouth, Disp: , Rfl:     BLAKE MICROLET LANCETS lancets, TEST TWICE DAILY, Disp: 100 each, Rfl: 0    Coenzyme Q10 (COQ10 PO), Take by mouth, Disp: , Rfl:     docusate sodium (COLACE) 100 mg capsule, Take 1 capsule by mouth as needed, Disp: , Rfl:     fexofenadine (ALLEGRA) 180 MG tablet, Take by mouth, Disp: , Rfl:     fluticasone (FLONASE) 50 mcg/act nasal spray, INSTILL ONE SPRAY TO EACH NOSTRIL TWICE A DAY, Disp: 32 g, Rfl: 1    glimepiride (AMARYL) 2 mg tablet, Take 0 5 tablets (1 mg total) by mouth 2 (two) times a day, Disp: 90 tablet, Rfl: 1    glucose blood (BLAKE CONTOUR NEXT TEST) test strip, Test blood sugar twice daily, Disp: 100 each, Rfl: 2    ipratropium (ATROVENT) 0 06 % nasal spray, INSTILL TWO SPRAYS INTO EACH NOSTRIL FOUR TIMES A DAY, Disp: 15 mL, Rfl: 0    lisinopril (ZESTRIL) 20 mg tablet, Take 20 mg by mouth daily, Disp: , Rfl: 3    metFORMIN (GLUCOPHAGE) 500 mg tablet, TAKE TWO TO FOUR TABLETS BY MOUTH EVERY DAY PER BLOOD GLUCOSE LEVELS, Disp: 360 tablet, Rfl: 1    simvastatin (ZOCOR) 40 mg tablet, TAKE ONE TABLET BY MOUTH EVERY DAY, Disp: 90 tablet, Rfl: 0    Thiamine 50 MG CAPS, Take 1 capsule by mouth daily, Disp: , Rfl:     Review of Systems   Constitutional: Negative for activity change, appetite change and fatigue  HENT: Negative for sore throat, trouble swallowing and voice change  Eyes: Negative for visual disturbance  Respiratory: Negative for choking, chest tightness and shortness of breath  Cardiovascular: Negative for chest pain, palpitations and leg swelling  Gastrointestinal: Negative for abdominal pain, constipation and diarrhea  Endocrine: Negative for cold intolerance, heat intolerance, polydipsia, polyphagia and polyuria  Genitourinary: Negative for frequency  Musculoskeletal: Negative for arthralgias and myalgias  Skin: Negative for rash  Neurological: Negative for dizziness and syncope  Hematological: Negative for adenopathy  Psychiatric/Behavioral: Negative for sleep disturbance  All other systems reviewed and are negative  Physical Exam:  Body mass index is 21 98 kg/m²  /60   Pulse 66   Ht 6' (1 829 m)   Wt 73 5 kg (162 lb 1 6 oz)   BMI 21 98 kg/m²    Wt Readings from Last 3 Encounters:   09/12/18 73 5 kg (162 lb 1 6 oz)   08/07/18 74 6 kg (164 lb 6 4 oz)   06/04/18 75 kg (165 lb 6 4 oz)       Physical Exam   Constitutional: He is oriented to person, place, and time  He appears well-developed and well-nourished  No distress  HENT:   Head: Normocephalic and atraumatic  Mouth/Throat: Oropharynx is clear and moist    Eyes: Conjunctivae and EOM are normal  Pupils are equal, round, and reactive to light  Neck: Normal range of motion  Neck supple  No thyromegaly present  Cardiovascular: Normal rate, regular rhythm and normal heart sounds  No murmur heard  Pulmonary/Chest: Effort normal and breath sounds normal  No respiratory distress  He has no wheezes  He has no rales  Abdominal: Soft   Bowel sounds are normal  He exhibits no distension  There is no tenderness  Musculoskeletal: Normal range of motion  He exhibits no edema  Lymphadenopathy:     He has no cervical adenopathy  Neurological: He is alert and oriented to person, place, and time  Skin: Skin is warm and dry  Psychiatric: He has a normal mood and affect  Vitals reviewed  Diabetic Foot Exam: not performed, sees podiatrist     Labs:     Lab Results   Component Value Date    HGBA1C 7 2 (H) 09/06/2018       Lab Results   Component Value Date     (L) 09/06/2018    K 4 3 09/06/2018     09/06/2018    CO2 28 09/06/2018    ANIONGAP 8 10/20/2015    BUN 21 09/06/2018    CREATININE 1 01 09/06/2018    GLUCOSE 112 10/20/2015    GLUF 103 (H) 09/06/2018    CALCIUM 9 0 09/06/2018    AST 21 06/01/2018    ALT 27 06/01/2018    ALKPHOS 100 06/01/2018    PROT 7 0 04/23/2015    BILITOT 0 54 04/23/2015    EGFR 72 09/06/2018         Lab Results   Component Value Date    CHOL 114 04/23/2015    HDL 36 (L) 06/01/2018    TRIG 63 06/01/2018       Lab Results   Component Value Date    LZS3AAYOLEBE 3 790 (H) 09/06/2018    OOF4USCDCWSN 4 140 (H) 06/01/2018         Impression & Plan:    Problem List Items Addressed This Visit     Benign essential hypertension     BP stable, continue current regimen          Uncontrolled diabetes mellitus type 2 without complications ([facility] [address] ) - Primary     A1C close to goal, recent BG within target range  Continue current regimen for now  Instructed to check BG 1-2x per at alternating times of day and to send readings into the office in two weeks for review              Relevant Orders    Hemoglobin A1C    Comprehensive metabolic panel    Microalbumin / creatinine urine ratio    Hyperlipidemia     Stable, continue statin         Relevant Orders    Lipid Panel with Direct LDL reflex    Elevated TSH     TSH has improved, will continue to monitor, check TSH and T4 prior to next visit         Relevant Orders    TSH, 3rd generation    T4, free          Orders Placed This Encounter   Procedures    Hemoglobin A1C     Standing Status:   Future     Standing Expiration Date:   12/12/2018    Comprehensive metabolic panel     This is a patient instruction: Patient fasting for 8 hours or longer recommended  Standing Status:   Future     Standing Expiration Date:   9/12/2019    Lipid Panel with Direct LDL reflex     This is a patient instruction: This test requires patient fasting for 10-12 hours or longer  Drinking of black coffee or black tea is acceptable  Standing Status:   Future     Standing Expiration Date:   9/12/2019    TSH, 3rd generation     This is a patient instruction: This test is non-fasting  Please drink two glasses of water morning of bloodwork  Standing Status:   Future     Standing Expiration Date:   12/12/2018    T4, free     Standing Status:   Future     Standing Expiration Date:   12/12/2018    Microalbumin / creatinine urine ratio     Standing Status:   Future     Standing Expiration Date:   9/12/2019         Discussed with the patient and all questioned fully answered  He will call me if any problems arise  Follow-up appointment in 3 months       Counseled patient on diagnostic results, prognosis, risk and benefit of treatment options, instruction for management, importance of treatment compliance, Risk  factor reduction and impressions      Hoa Mccann [address]

## 2018-09-12 NOTE — ASSESSMENT & PLAN NOTE
A1C close to goal, recent BG within target range  Continue current regimen for now  Instructed to check BG 1-2x per at alternating times of day and to send readings into the office in two weeks for review

## 2018-09-18 DIAGNOSIS — E11.9 TYPE 2 DIABETES MELLITUS WITHOUT COMPLICATION, WITHOUT LONG-TERM CURRENT USE OF INSULIN (HCC): ICD-10-CM

## 2018-09-27 ENCOUNTER — OFFICE VISIT (OUTPATIENT)
Dept: UROLOGY | Facility: AMBULATORY SURGERY CENTER | Age: 78
End: 2018-09-27
Payer: MEDICARE

## 2018-09-27 VITALS
SYSTOLIC BLOOD PRESSURE: 148 MMHG | WEIGHT: 164.4 LBS | BODY MASS INDEX: 22.27 KG/M2 | HEIGHT: 72 IN | HEART RATE: 80 BPM | DIASTOLIC BLOOD PRESSURE: 60 MMHG

## 2018-09-27 DIAGNOSIS — R35.1 BENIGN PROSTATIC HYPERPLASIA WITH NOCTURIA: Primary | ICD-10-CM

## 2018-09-27 DIAGNOSIS — N40.1 BENIGN PROSTATIC HYPERPLASIA WITH NOCTURIA: Primary | ICD-10-CM

## 2018-09-27 PROCEDURE — 99213 OFFICE O/P EST LOW 20 MIN: CPT | Performed by: UROLOGY

## 2018-09-27 NOTE — PROGRESS NOTES
9/27/2018    Peggy Johns  1940  [id number]        Assessment  Normal variant digital rectal examination, mild BPH      Discussion  I provided the patient with reassurance that his digital rectal examination is within normal limits  There is mild asymmetry but no evidence of nodularity  I see no indication to continue prostate cancer screening  There is no indication per AUA guidelines to continue PSA testing  With regards to his nocturia times 2-3 episodes per night, we discussed tamsulosin 0 4 mg daily at bedtime  The patient is not ready to start alpha blockade at this time  I recommend that he return in 1 year with a postvoid residual assessment and uroflow evaluation or sooner if he has progression of his lower urinary tract symptoms  History of Present Illness  70 y o  male with a history of prostatic asymmetry  A PSA from 2015 was noted to be 0 9  He was 70years of age at that time  There is no family history of prostate cancer  He returns for 1 year follow-up with a repeat digital rectal examination  He reports nocturia times 2-3 episodes per night but with minimal bother  We discussed alpha blockade but the patient is not interested at this time  He denies gross hematuria  AUA Symptom Score  AUA SYMPTOM SCORE      Most Recent Value   AUA SYMPTOM SCORE   How often have you had a sensation of not emptying your bladder completely after you finished urinating? 0   How often have you had to urinate again less than two hours after you finished urinating? 2   How often have you found you stopped and started again several times when you urinate? 1   How often have you found it difficult to postpone urination? 0   How often have you had a weak urinary stream?  0   How often have you had to push or strain to begin urination? 0   How many times did you most typically get up to urinate from the time you went to bed at night until the time you got up in the morning?   2   Quality of Life: If you were to spend the rest of your life with your urinary condition just the way it is now, how would you feel about that?  3   AUA SYMPTOM SCORE  5          Review of Systems  Review of Systems   Constitutional: Negative  HENT: Negative  Eyes: Negative  Respiratory: Negative  Cardiovascular: Negative  Gastrointestinal: Negative  Endocrine: Negative  Genitourinary:        Per HPI   Musculoskeletal: Negative  Skin: Negative  Allergic/Immunologic: Negative  Neurological: Negative  Hematological: Negative  Psychiatric/Behavioral: Negative  Past Medical History  Past Medical History:   Diagnosis Date    Abnormal blood chemistry     Abnormal glucose     Last assessed - 2/20/14    Benign essential hypertension     Last assessed - 5/15/17    Chronic allergic rhinitis     Last assessed - 8/1/16    Dermatitis     Last assessed - 6/16/15    High risk medication use     Last assessed - 5/2/16    Sciatica     Last assessed - 12/18/12    Septic bursitis     Last assessed - 9/3/13    Tick bite     Last assessed - 8/8/16       Past Social History  Past Surgical History:   Procedure Laterality Date    CATARACT EXTRACTION      COLONOSCOPY      Fiberoptic    PILONIDAL CYST EXCISION         Past Family History  Family History   Problem Relation Age of Onset    Hypertension Mother     Osteoporosis Mother     Diabetes type I Paternal Aunt     Colon cancer Family        Past Social history  Social History     Social History    Marital status: /Civil Union     Spouse name: N/A    Number of children: N/A    Years of education: N/A     Occupational History    Not on file       Social History Main Topics    Smoking status: Former Smoker     Quit date: 1980    Smokeless tobacco: Never Used    Alcohol use Yes      Comment: social usage    Drug use: No    Sexual activity: Not on file     Other Topics Concern    Not on file     Social History Narrative Advance directive on file           Current Medications  Current Outpatient Prescriptions   Medication Sig Dispense Refill    amLODIPine (NORVASC) 5 mg tablet Take 1 tablet by mouth daily      B Complex-Biotin-FA (B COMPLETE) TABS Take by mouth      BLAKE MICROLET LANCETS lancets TEST TWICE DAILY 100 each 0    Coenzyme Q10 (COQ10 PO) Take by mouth      docusate sodium (COLACE) 100 mg capsule Take 1 capsule by mouth as needed      fexofenadine (ALLEGRA) 180 MG tablet Take by mouth as needed        fluticasone (FLONASE) 50 mcg/act nasal spray INSTILL ONE SPRAY TO EACH NOSTRIL TWICE A DAY (Patient taking differently: INSTILL ONE SPRAY TO EACH NOSTRIL TWICE A DAY PRN) 32 g 1    glimepiride (AMARYL) 2 mg tablet Take 0 5 tablets (1 mg total) by mouth 2 (two) times a day 90 tablet 1    glucose blood (BLAKE CONTOUR NEXT TEST) test strip Test blood sugar twice daily 100 each 2    ipratropium (ATROVENT) 0 06 % nasal spray INSTILL TWO SPRAYS INTO EACH NOSTRIL FOUR TIMES A DAY (Patient taking differently: INSTILL TWO SPRAYS INTO EACH NOSTRIL FOUR TIMES A DAY PRN) 15 mL 0    lisinopril (ZESTRIL) 20 mg tablet Take 20 mg by mouth daily  3    metFORMIN (GLUCOPHAGE) 500 mg tablet TAKE TWO TO FOUR TABLETS BY MOUTH EVERY DAY PER BLOOD GLUCOSE LEVELS (Patient taking differently: 2 tabs BID) 360 tablet 1    simvastatin (ZOCOR) 40 mg tablet TAKE ONE TABLET BY MOUTH EVERY DAY 90 tablet 0    Thiamine 50 MG CAPS Take 1 capsule by mouth daily       No current facility-administered medications for this visit  Allergies  No Known Allergies    Past Medical History, Social History, Family History, medications and allergies were reviewed  Vitals  Vitals:    09/27/18 0931   BP: 148/60   BP Location: Left arm   Patient Position: Sitting   Cuff Size: Adult   Pulse: 80   Weight: 74 6 kg (164 lb 6 4 oz)   Height: 6' (1 829 m)       Physical Exam  Physical Exam  On examination he is in no acute distress    His abdomen is soft nontender nondistended   examination reveals normal phallus, scrotum and scrotal contents  Digital rectal examination reveals a 50-60 g gland with asymmetry  The right side is slightly larger than the left without evidence of nodularity or mass  There is no concern for underlying prostate cancer  Skin is warm  Extremities without edema    Neurologic is grossly intact and nonfocal   Gait normal   Affect normal      Results  Lab Results   Component Value Date    PSA 0 9 04/23/2015    PSA 0 7 04/01/2014     Lab Results   Component Value Date    GLUCOSE 112 10/20/2015    CALCIUM 9 0 09/06/2018     (L) 09/06/2018    K 4 3 09/06/2018    CO2 28 09/06/2018     09/06/2018    BUN 21 09/06/2018    CREATININE 1 01 09/06/2018     Lab Results   Component Value Date    WBC 5 38 05/22/2017    HGB 11 8 (L) 05/22/2017    HCT 36 5 05/22/2017    MCV 92 05/22/2017     05/22/2017         Office Urine Dip  No results found for this or any previous visit (from the past 1 hour(s)) ]

## 2018-10-15 ENCOUNTER — IMMUNIZATION (OUTPATIENT)
Dept: FAMILY MEDICINE CLINIC | Facility: CLINIC | Age: 78
End: 2018-10-15
Payer: MEDICARE

## 2018-10-15 DIAGNOSIS — Z23 NEED FOR VACCINATION FOR H FLU TYPE B: Primary | ICD-10-CM

## 2018-10-15 PROCEDURE — G0008 ADMIN INFLUENZA VIRUS VAC: HCPCS | Performed by: FAMILY MEDICINE

## 2018-10-15 PROCEDURE — 90662 IIV NO PRSV INCREASED AG IM: CPT | Performed by: FAMILY MEDICINE

## 2018-11-06 DIAGNOSIS — E78.2 COMBINED HYPERLIPIDEMIA: ICD-10-CM

## 2018-11-06 RX ORDER — SIMVASTATIN 40 MG
TABLET ORAL
Qty: 90 TABLET | Refills: 0 | Status: SHIPPED | OUTPATIENT
Start: 2018-11-06 | End: 2019-02-03 | Stop reason: SDUPTHER

## 2018-11-08 ENCOUNTER — DOCTOR'S OFFICE (OUTPATIENT)
Dept: URBAN - METROPOLITAN AREA CLINIC 136 | Facility: CLINIC | Age: 78
Setting detail: OPHTHALMOLOGY
End: 2018-11-08
Payer: COMMERCIAL

## 2018-11-08 DIAGNOSIS — H35.373: ICD-10-CM

## 2018-11-08 DIAGNOSIS — H27.8: ICD-10-CM

## 2018-11-08 DIAGNOSIS — H43.813: ICD-10-CM

## 2018-11-08 DIAGNOSIS — E11.9: ICD-10-CM

## 2018-11-08 DIAGNOSIS — H04.123: ICD-10-CM

## 2018-11-08 DIAGNOSIS — H35.372: ICD-10-CM

## 2018-11-08 LAB
LEFT EYE DIABETIC RETINOPATHY: NORMAL
RIGHT EYE DIABETIC RETINOPATHY: NORMAL
SEVERITY (EYE EXAM): NORMAL

## 2018-11-08 PROCEDURE — 92134 CPTRZ OPH DX IMG PST SGM RTA: CPT | Performed by: OPHTHALMOLOGY

## 2018-11-08 PROCEDURE — 92014 COMPRE OPH EXAM EST PT 1/>: CPT | Performed by: OPHTHALMOLOGY

## 2018-11-08 ASSESSMENT — CONFRONTATIONAL VISUAL FIELD TEST (CVF)
OS_FINDINGS: FULL
OD_FINDINGS: FULL

## 2018-11-09 ASSESSMENT — REFRACTION_CURRENTRX
OD_SPHERE: +1.00
OS_OVR_VA: 20/
OS_CYLINDER: SPHERE
OD_CYLINDER: -1.25
OS_OVR_VA: 20/
OS_ADD: +2.50
OD_OVR_VA: 20/
OD_ADD: +2.50
OS_SPHERE: +0.75
OD_OVR_VA: 20/
OS_OVR_VA: 20/
OD_OVR_VA: 20/
OD_VPRISM_DIRECTION: BF
OS_VPRISM_DIRECTION: BF
OD_AXIS: 070

## 2018-11-09 ASSESSMENT — REFRACTION_MANIFEST
OD_VA2: 20/
OS_VA3: 20/
OU_VA: 20/
OS_VA2: 20/
OD_VA2: 20/
OS_VA1: 20/
OD_VA3: 20/
OD_VA3: 20/
OU_VA: 20/
OD_VA1: 20/
OS_VA3: 20/
OD_VA1: 20/
OS_VA2: 20/
OS_VA1: 20/

## 2018-11-09 ASSESSMENT — REFRACTION_AUTOREFRACTION
OS_SPHERE: +0.75
OD_AXIS: 70
OS_CYLINDER: PL
OD_CYLINDER: -0.75
OD_SPHERE: +0.75
OS_AXIS: 180

## 2018-11-09 ASSESSMENT — VISUAL ACUITY
OD_BCVA: 20/20
OS_BCVA: 20/20-1

## 2018-11-09 ASSESSMENT — SPHEQUIV_DERIVED: OD_SPHEQUIV: 0.375

## 2018-11-09 NOTE — PROGRESS NOTES
Please call the patient regarding his abnormal result    Please make sure that shows up on the dashboard as a diabetic eye exam

## 2018-11-18 DIAGNOSIS — I10 ESSENTIAL HYPERTENSION: Primary | ICD-10-CM

## 2018-11-19 RX ORDER — AMLODIPINE BESYLATE 5 MG/1
TABLET ORAL
Qty: 90 TABLET | Refills: 2 | Status: SHIPPED | OUTPATIENT
Start: 2018-11-19 | End: 2019-05-14 | Stop reason: SDUPTHER

## 2018-12-08 DIAGNOSIS — Z79.4 TYPE 2 DIABETES MELLITUS WITH OTHER CIRCULATORY COMPLICATION, WITH LONG-TERM CURRENT USE OF INSULIN (HCC): ICD-10-CM

## 2018-12-08 DIAGNOSIS — E11.59 TYPE 2 DIABETES MELLITUS WITH OTHER CIRCULATORY COMPLICATION, WITH LONG-TERM CURRENT USE OF INSULIN (HCC): ICD-10-CM

## 2018-12-16 DIAGNOSIS — I10 ESSENTIAL HYPERTENSION: Primary | ICD-10-CM

## 2018-12-17 RX ORDER — LISINOPRIL 20 MG/1
TABLET ORAL
Qty: 90 TABLET | Refills: 3 | Status: SHIPPED | OUTPATIENT
Start: 2018-12-17 | End: 2019-12-05 | Stop reason: SDUPTHER

## 2018-12-27 ENCOUNTER — APPOINTMENT (OUTPATIENT)
Dept: LAB | Facility: CLINIC | Age: 78
End: 2018-12-27
Payer: MEDICARE

## 2018-12-27 DIAGNOSIS — IMO0001 UNCONTROLLED TYPE 2 DIABETES MELLITUS WITHOUT COMPLICATION, WITHOUT LONG-TERM CURRENT USE OF INSULIN: ICD-10-CM

## 2018-12-27 DIAGNOSIS — E78.2 MIXED HYPERLIPIDEMIA: ICD-10-CM

## 2018-12-27 LAB
ALBUMIN SERPL BCP-MCNC: 3.8 G/DL (ref 3.5–5)
ALP SERPL-CCNC: 81 U/L (ref 46–116)
ALT SERPL W P-5'-P-CCNC: 43 U/L (ref 12–78)
ANION GAP SERPL CALCULATED.3IONS-SCNC: 6 MMOL/L (ref 4–13)
AST SERPL W P-5'-P-CCNC: 36 U/L (ref 5–45)
BILIRUB SERPL-MCNC: 0.42 MG/DL (ref 0.2–1)
BUN SERPL-MCNC: 19 MG/DL (ref 5–25)
CALCIUM SERPL-MCNC: 9.4 MG/DL (ref 8.3–10.1)
CHLORIDE SERPL-SCNC: 100 MMOL/L (ref 100–108)
CHOLEST SERPL-MCNC: 112 MG/DL (ref 50–200)
CO2 SERPL-SCNC: 28 MMOL/L (ref 21–32)
CREAT SERPL-MCNC: 0.94 MG/DL (ref 0.6–1.3)
CREAT UR-MCNC: 76.1 MG/DL
GFR SERPL CREATININE-BSD FRML MDRD: 78 ML/MIN/1.73SQ M
GLUCOSE P FAST SERPL-MCNC: 97 MG/DL (ref 65–99)
HDLC SERPL-MCNC: 46 MG/DL (ref 40–60)
LDLC SERPL CALC-MCNC: 47 MG/DL (ref 0–100)
MICROALBUMIN UR-MCNC: 14.5 MG/L (ref 0–20)
MICROALBUMIN/CREAT 24H UR: 19 MG/G CREATININE (ref 0–30)
POTASSIUM SERPL-SCNC: 4.4 MMOL/L (ref 3.5–5.3)
PROT SERPL-MCNC: 7 G/DL (ref 6.4–8.2)
SODIUM SERPL-SCNC: 134 MMOL/L (ref 136–145)
TRIGL SERPL-MCNC: 93 MG/DL

## 2018-12-27 PROCEDURE — 82043 UR ALBUMIN QUANTITATIVE: CPT

## 2018-12-27 PROCEDURE — 84443 ASSAY THYROID STIM HORMONE: CPT | Performed by: NURSE PRACTITIONER

## 2018-12-27 PROCEDURE — 80053 COMPREHEN METABOLIC PANEL: CPT

## 2018-12-27 PROCEDURE — 82570 ASSAY OF URINE CREATININE: CPT

## 2018-12-27 PROCEDURE — 84439 ASSAY OF FREE THYROXINE: CPT | Performed by: NURSE PRACTITIONER

## 2018-12-27 PROCEDURE — 80061 LIPID PANEL: CPT

## 2018-12-27 PROCEDURE — 36415 COLL VENOUS BLD VENIPUNCTURE: CPT

## 2018-12-28 ENCOUNTER — OFFICE VISIT (OUTPATIENT)
Dept: ENDOCRINOLOGY | Facility: CLINIC | Age: 78
End: 2018-12-28
Payer: MEDICARE

## 2018-12-28 VITALS
WEIGHT: 174.8 LBS | HEART RATE: 78 BPM | SYSTOLIC BLOOD PRESSURE: 142 MMHG | BODY MASS INDEX: 23.68 KG/M2 | DIASTOLIC BLOOD PRESSURE: 60 MMHG | HEIGHT: 72 IN

## 2018-12-28 DIAGNOSIS — E78.2 MIXED HYPERLIPIDEMIA: ICD-10-CM

## 2018-12-28 DIAGNOSIS — I10 BENIGN ESSENTIAL HYPERTENSION: ICD-10-CM

## 2018-12-28 DIAGNOSIS — R79.89 ELEVATED TSH: ICD-10-CM

## 2018-12-28 DIAGNOSIS — Z79.4 ENCOUNTER FOR LONG-TERM (CURRENT) USE OF INSULIN (HCC): Primary | ICD-10-CM

## 2018-12-28 DIAGNOSIS — IMO0001 UNCONTROLLED DIABETES MELLITUS TYPE 2 WITHOUT COMPLICATIONS: ICD-10-CM

## 2018-12-28 LAB
SL AMB POCT HEMOGLOBIN AIC: 7 (ref ?–6.5)
T4 FREE SERPL-MCNC: 1.13 NG/DL (ref 0.76–1.46)
TSH SERPL DL<=0.05 MIU/L-ACNC: 4.19 UIU/ML (ref 0.36–3.74)

## 2018-12-28 PROCEDURE — 99214 OFFICE O/P EST MOD 30 MIN: CPT | Performed by: NURSE PRACTITIONER

## 2018-12-28 NOTE — PROGRESS NOTES
Established Patient Progress Note      Chief Complaint   Patient presents with    Diabetes Type 2          History of Present Illness:   Perfecto Dandy is a 70 y o  male with a history ofHTN, HLD, and type 2 diabetes without long term use of insulin  Reports no complications of diabetes  Last A1C 7 2  Denies recent illness or hospitalizations  Denies recent severe hypoglycemic or severe hyperglycemic episodes  Denies any issues with his current regimen  Home glucose monitoring: are performed regularly    Home blood glucose readings:   Before breakfast:   Before lunch: 100-130s  Before dinner: does not check   Bedtime: does not check     Current regimen: Glimipride 1 mg BID and Metformin 1,000 mg BID  compliant all of the timedenies any side effects from current medications     Hypoglycemic episodes: No never   H/o of hypoglycemia causing hospitalization or Intervention such as glucagon injection or ambulance call No     Last Eye Exam: 11/08/18  Last Foot Exam: UTD     Has hypertension: Taking Amlodipine and Lisinopril  Has hyperlipidemia: Taking Simvastatin       Patient Active Problem List   Diagnosis    Benign essential hypertension    Benign prostatic hyperplasia with lower urinary tract symptoms    Uncontrolled diabetes mellitus type 2 without complications ([address] )    Esophageal dysphagia    Herniated lumbar intervertebral disc    Hyperlipidemia    Vasomotor rhinitis    Elevated TSH      Past Medical History:   Diagnosis Date    Abnormal blood chemistry     Abnormal glucose     Last assessed - 2/20/14    Benign essential hypertension     Last assessed - 5/15/17    Chronic allergic rhinitis     Last assessed - 8/1/16    Dermatitis     Last assessed - 6/16/15    High risk medication use     Last assessed - 5/2/16    Sciatica     Last assessed - 12/18/12    Septic bursitis     Last assessed - 9/3/13    Tick bite     Last assessed - 8/8/16      Past Surgical History:   Procedure Laterality Date  CATARACT EXTRACTION      COLONOSCOPY      Fiberoptic    PILONIDAL CYST EXCISION        Family History   Problem Relation Age of Onset    Hypertension Mother     Osteoporosis Mother     Diabetes type I Paternal Aunt     Colon cancer Family      Social History   Substance Use Topics    Smoking status: Former Smoker     Quit date: 1980    Smokeless tobacco: Never Used    Alcohol use Yes      Comment: social usage     No Known Allergies      Current Outpatient Prescriptions:     amLODIPine (NORVASC) 5 mg tablet, TAKE ONE TABLET BY MOUTH EVERY DAY, Disp: 90 tablet, Rfl: 2    B Complex-Biotin-FA (B COMPLETE) TABS, Take by mouth, Disp: , Rfl:     Coenzyme Q10 (COQ10 PO), Take by mouth, Disp: , Rfl:     docusate sodium (COLACE) 100 mg capsule, Take 1 capsule by mouth as needed, Disp: , Rfl:     fexofenadine (ALLEGRA) 180 MG tablet, Take by mouth as needed  , Disp: , Rfl:     fluticasone (FLONASE) 50 mcg/act nasal spray, INSTILL ONE SPRAY TO EACH NOSTRIL TWICE A DAY (Patient taking differently: INSTILL ONE SPRAY TO EACH NOSTRIL TWICE A DAY PRN), Disp: 32 g, Rfl: 1    glimepiride (AMARYL) 2 mg tablet, Take 0 5 tablets (1 mg total) by mouth 2 (two) times a day, Disp: 90 tablet, Rfl: 1    ipratropium (ATROVENT) 0 06 % nasal spray, INSTILL TWO SPRAYS INTO EACH NOSTRIL FOUR TIMES A DAY (Patient taking differently: INSTILL TWO SPRAYS INTO EACH NOSTRIL FOUR TIMES A DAY PRN), Disp: 15 mL, Rfl: 0    lisinopril (ZESTRIL) 20 mg tablet, TAKE ONE TABLET BY MOUTH EVERY DAY, Disp: 90 tablet, Rfl: 3    metFORMIN (GLUCOPHAGE) 500 mg tablet, TAKE TWO TO FOUR TABLETS BY MOUTH EVERY DAY PER BLOOD GLUCOSE LEVELS, Disp: 360 tablet, Rfl: 1    simvastatin (ZOCOR) 40 mg tablet, TAKE ONE TABLET BY MOUTH EVERY DAY, Disp: 90 tablet, Rfl: 0    Thiamine 50 MG CAPS, Take 1 capsule by mouth daily, Disp: , Rfl:     BLAKE MICROLET LANCETS lancets, TEST BLOOD SUGAR TWO TIMES A DAY, Disp: 100 each, Rfl: 0    CONTOUR NEXT TEST test strip, TEST BLOOD SUGAR TWICE DAILY, Disp: 100 each, Rfl: 2    Review of Systems   Constitutional: Negative for activity change, appetite change and fatigue  HENT: Negative for sore throat, trouble swallowing and voice change  Eyes: Negative for visual disturbance  Respiratory: Negative for choking, chest tightness and shortness of breath  Cardiovascular: Negative for chest pain, palpitations and leg swelling  Gastrointestinal: Negative for abdominal pain, constipation and diarrhea  Endocrine: Negative for cold intolerance, heat intolerance, polydipsia, polyphagia and polyuria  Genitourinary: Negative for frequency  Musculoskeletal: Negative for arthralgias and myalgias  Skin: Negative for rash  Neurological: Negative for dizziness and syncope  Hematological: Negative for adenopathy  Psychiatric/Behavioral: Negative for sleep disturbance  All other systems reviewed and are negative  Physical Exam:  Body mass index is 23 71 kg/m²  /60   Pulse 78   Ht 6' (1 829 m)   Wt 79 3 kg (174 lb 12 8 oz)   BMI 23 71 kg/m²    Wt Readings from Last 3 Encounters:   12/28/18 79 3 kg (174 lb 12 8 oz)   09/27/18 74 6 kg (164 lb 6 4 oz)   09/12/18 73 5 kg (162 lb 1 6 oz)       Physical Exam   Constitutional: He is oriented to person, place, and time  He appears well-developed and well-nourished  No distress  HENT:   Head: Normocephalic and atraumatic  Mouth/Throat: Oropharynx is clear and moist    Eyes: Pupils are equal, round, and reactive to light  Conjunctivae and EOM are normal    Neck: Normal range of motion  Neck supple  No thyromegaly present  Cardiovascular: Normal rate, regular rhythm and normal heart sounds  No murmur heard  Pulmonary/Chest: Effort normal and breath sounds normal  No respiratory distress  He has no wheezes  He has no rales  Abdominal: Soft  Bowel sounds are normal  He exhibits no distension  There is no tenderness     Musculoskeletal: Normal range of motion  He exhibits no edema  Lymphadenopathy:     He has no cervical adenopathy  Neurological: He is alert and oriented to person, place, and time  Skin: Skin is warm and dry  Psychiatric: He has a normal mood and affect  Vitals reviewed  Diabetic Foot Exam: not performed, sees podiatrist     Labs:     Lab Results   Component Value Date    HGBA1C 7 2 (H) 09/06/2018       Lab Results   Component Value Date     10/20/2015    K 4 4 12/27/2018     12/27/2018    CO2 28 12/27/2018    ANIONGAP 8 10/20/2015    BUN 19 12/27/2018    CREATININE 0 94 12/27/2018    GLUCOSE 112 10/20/2015    GLUF 97 12/27/2018    CALCIUM 9 4 12/27/2018    AST 36 12/27/2018    ALT 43 12/27/2018    ALKPHOS 81 12/27/2018    PROT 7 0 04/23/2015    BILITOT 0 54 04/23/2015    EGFR 78 12/27/2018         Lab Results   Component Value Date    CHOL 114 04/23/2015    HDL 46 12/27/2018    TRIG 93 12/27/2018       Lab Results   Component Value Date    SOZ2ERSSZPOO 4 190 (H) 12/27/2018    HXC9KDXZPZUJ 3 790 (H) 09/06/2018    XEZ9BKOLDUJG 4 140 (H) 06/01/2018         Impression & Plan:    Problem List Items Addressed This Visit     Benign essential hypertension     BP stable, continue current regimen         Relevant Orders    Comprehensive metabolic panel    Uncontrolled diabetes mellitus type 2 without complications ([address] )     Close to goal, continue current regimen  Focus on dietary and lifestyle modifications            Relevant Orders    Hemoglobin A1C    Comprehensive metabolic panel    Hyperlipidemia     Stable, continue statin         Relevant Orders    Lipid Panel with Direct LDL reflex    Elevated TSH     TSH mildly elevated, will continue to monitor         Relevant Orders    T4, free (Completed)    TSH, 3rd generation (Completed)      Other Visit Diagnoses     Encounter for long-term (current) use of insulin ([address] )    -  Primary          Orders Placed This Encounter   Procedures    Hemoglobin A1C     Standing Status:   Future Standing Expiration Date:   12/28/2019    Comprehensive metabolic panel     This is a patient instruction: Patient fasting for 8 hours or longer recommended  Standing Status:   Future     Standing Expiration Date:   12/28/2019    Lipid Panel with Direct LDL reflex     This is a patient instruction: This test requires patient fasting for 10-12 hours or longer  Drinking of black coffee or black tea is acceptable  Standing Status:   Future     Standing Expiration Date:   12/28/2019    T4, free    TSH, 3rd generation     This is a patient instruction: This test is non-fasting  Please drink two glasses of water morning of bloodwork  Discussed with the patient and all questioned fully answered  He will call me if any problems arise  Follow-up appointment in 3 months       Counseled patient on diagnostic results, prognosis, risk and benefit of treatment options, instruction for management, importance of treatment compliance, Risk  factor reduction and impressions      Hoa Mccann [address] [facility]

## 2018-12-29 DIAGNOSIS — E11.9 TYPE 2 DIABETES MELLITUS WITHOUT COMPLICATION, WITHOUT LONG-TERM CURRENT USE OF INSULIN (HCC): ICD-10-CM

## 2018-12-31 RX ORDER — BLOOD SUGAR DIAGNOSTIC
STRIP MISCELLANEOUS
Qty: 100 EACH | Refills: 2 | Status: SHIPPED | OUTPATIENT
Start: 2018-12-31 | End: 2019-10-17 | Stop reason: SDUPTHER

## 2019-01-02 DIAGNOSIS — Z79.4 ENCOUNTER FOR LONG-TERM (CURRENT) USE OF INSULIN (HCC): Primary | ICD-10-CM

## 2019-01-02 DIAGNOSIS — E11.9 TYPE 2 DIABETES MELLITUS WITHOUT COMPLICATION, WITHOUT LONG-TERM CURRENT USE OF INSULIN (HCC): ICD-10-CM

## 2019-01-02 PROCEDURE — 83036 HEMOGLOBIN GLYCOSYLATED A1C: CPT

## 2019-01-02 RX ORDER — GLIMEPIRIDE 2 MG/1
TABLET ORAL
Qty: 90 TABLET | Refills: 1 | Status: SHIPPED | OUTPATIENT
Start: 2019-01-02 | End: 2019-05-30 | Stop reason: SDUPTHER

## 2019-01-23 DIAGNOSIS — J30.1 SEASONAL ALLERGIC RHINITIS DUE TO POLLEN: ICD-10-CM

## 2019-01-23 PROBLEM — E11.42 TYPE 2 DIABETES MELLITUS WITH DIABETIC POLYNEUROPATHY (HCC): Status: ACTIVE | Noted: 2019-01-23

## 2019-01-23 RX ORDER — FLUTICASONE PROPIONATE 50 MCG
SPRAY, SUSPENSION (ML) NASAL
Qty: 32 G | Refills: 1 | Status: SHIPPED | OUTPATIENT
Start: 2019-01-23 | End: 2019-06-05 | Stop reason: SDUPTHER

## 2019-02-03 DIAGNOSIS — E78.2 COMBINED HYPERLIPIDEMIA: ICD-10-CM

## 2019-02-04 RX ORDER — SIMVASTATIN 40 MG
TABLET ORAL
Qty: 90 TABLET | Refills: 0 | Status: SHIPPED | OUTPATIENT
Start: 2019-02-04 | End: 2019-05-01 | Stop reason: SDUPTHER

## 2019-02-27 ENCOUNTER — OFFICE VISIT (OUTPATIENT)
Dept: FAMILY MEDICINE CLINIC | Facility: CLINIC | Age: 79
End: 2019-02-27
Payer: MEDICARE

## 2019-02-27 VITALS
BODY MASS INDEX: 23.7 KG/M2 | WEIGHT: 175 LBS | HEIGHT: 72 IN | SYSTOLIC BLOOD PRESSURE: 168 MMHG | DIASTOLIC BLOOD PRESSURE: 58 MMHG

## 2019-02-27 DIAGNOSIS — I10 BENIGN ESSENTIAL HYPERTENSION: ICD-10-CM

## 2019-02-27 DIAGNOSIS — Z00.00 MEDICARE ANNUAL WELLNESS VISIT, SUBSEQUENT: Primary | ICD-10-CM

## 2019-02-27 PROBLEM — E11.9 TYPE 2 DIABETES MELLITUS WITHOUT COMPLICATION, WITHOUT LONG-TERM CURRENT USE OF INSULIN (HCC): Status: ACTIVE | Noted: 2019-02-27

## 2019-02-27 PROCEDURE — G0439 PPPS, SUBSEQ VISIT: HCPCS | Performed by: FAMILY MEDICINE

## 2019-02-27 NOTE — PROGRESS NOTES
Assessment and Plan:  Problem List Items Addressed This Visit     None        Health Maintenance Due   Topic Date Due    Medicare Annual Wellness Visit (AWV)  1940    SLP PLAN OF CARE  1940    HEPATITIS B VACCINES (1 of 3 - Risk 3-dose series) 1961    Pneumococcal PPSV23/PCV13 65+ Years / Low and Medium Risk (2 of 2 - PPSV23) 11/10/2016         HPI:  Patient Active Problem List   Diagnosis    Benign essential hypertension    Benign prostatic hyperplasia with lower urinary tract symptoms    Uncontrolled diabetes mellitus type 2 without complications ([address] )    Esophageal dysphagia    Herniated lumbar intervertebral disc    Hyperlipidemia    Vasomotor rhinitis    Elevated TSH    Type 2 diabetes mellitus with diabetic polyneuropathy (HCC)     Past Medical History:   Diagnosis Date    Abnormal blood chemistry     Abnormal glucose     Last assessed - 14    Benign essential hypertension     Last assessed - 5/15/17    Chronic allergic rhinitis     Last assessed - 16    Dermatitis     Last assessed - 6/16/15    High risk medication use     Last assessed - 16    Sciatica     Last assessed - 12    Septic bursitis     Last assessed - 9/3/13    Tick bite     Last assessed - 16     Past Surgical History:   Procedure Laterality Date    CATARACT EXTRACTION      COLONOSCOPY      Fiberoptic    PILONIDAL CYST EXCISION       Family History   Problem Relation Age of Onset    Hypertension Mother     Osteoporosis Mother     Diabetes type I Paternal Aunt     Colon cancer Family      Social History     Tobacco Use   Smoking Status Former Smoker    Last attempt to quit: Carlyle Rodríguez Years since quittin 1   Smokeless Tobacco Never Used     Social History     Substance and Sexual Activity   Alcohol Use Yes    Comment: social usage      Social History     Substance and Sexual Activity   Drug Use Never         Current Outpatient Medications   Medication Sig Dispense Refill  amLODIPine (NORVASC) 5 mg tablet TAKE ONE TABLET BY MOUTH EVERY DAY 90 tablet 2    B Complex-Biotin-FA (B COMPLETE) TABS Take by mouth      BLAKE MICROLET LANCETS lancets TEST BLOOD SUGAR TWO TIMES A DAY (Patient taking differently: TEST BLOOD SUGAR daily) 100 each 0    Coenzyme Q10 (COQ10 PO) Take by mouth      CONTOUR NEXT TEST test strip TEST BLOOD SUGAR TWICE DAILY (Patient taking differently: TEST BLOOD SUGAR  DAILY) 100 each 2    docusate sodium (COLACE) 100 mg capsule Take 1 capsule by mouth as needed      fexofenadine (ALLEGRA) 180 MG tablet Take by mouth as needed        fluticasone (FLONASE) 50 mcg/act nasal spray INSTILL ONE SPRAY TO EACH NOSTRIL TWICE A DAY 32 g 1    glimepiride (AMARYL) 2 mg tablet TAKE ONE-HALF TABLET BY MOUTH TWICE A DAY 90 tablet 1    ipratropium (ATROVENT) 0 06 % nasal spray INSTILL TWO SPRAYS INTO EACH NOSTRIL FOUR TIMES A DAY (Patient taking differently: INSTILL TWO SPRAYS INTO EACH NOSTRIL FOUR TIMES A DAY PRN) 15 mL 0    lisinopril (ZESTRIL) 20 mg tablet TAKE ONE TABLET BY MOUTH EVERY DAY 90 tablet 3    metFORMIN (GLUCOPHAGE) 500 mg tablet TAKE TWO TO FOUR TABLETS BY MOUTH EVERY DAY PER BLOOD GLUCOSE LEVELS 360 tablet 1    simvastatin (ZOCOR) 40 mg tablet TAKE ONE TABLET BY MOUTH EVERY DAY 90 tablet 0    Thiamine 50 MG CAPS Take 1 capsule by mouth daily       No current facility-administered medications for this visit        No Known Allergies  Immunization History   Administered Date(s) Administered    Influenza Quadrivalent Preservative Free 3 years and older IM 09/11/2014    Influenza Split High Dose Preservative Free IM 09/13/2012, 09/03/2013, 10/20/2015, 10/06/2016, 10/12/2017    Influenza TIV (IM) 1940    Influenza, high dose seasonal 0 5 mL 10/15/2018    Pneumococcal Conjugate 13-Valent 11/10/2015    Pneumococcal Polysaccharide PPV23 10/01/2002    Tdap 11/02/2010    Zoster 1940       Patient Care Team:  Hipolito Denney DO as PCP - Katerina Briseno MD as PCP - Endocrinology (Endocrinology)  DO Lance Bell MD    Medicare Screening Tests and Risk Assessments:  Charise Nissen is here for his Subsequent Wellness visit  Health Risk Assessment:  Patient rates overall health as good  Patient feels that their physical health rating is Slightly better  Eyesight was rated as Same  Hearing was rated as Same  Patient feels that their emotional and mental health rating is Same  Pain experienced by patient in the last 7 days has been Some  Patient's pain rating has been 4/10  Patient states that he has experienced no weight loss or gain in last 6 months  Emotional/Mental Health:  Patient has been feeling nervous/anxious  PHQ-9 Depression Screening:    Frequency of the following problems over the past two weeks:      1  Little interest or pleasure in doing things: 0 - not at all      2  Feeling down, depressed, or hopeless: 0 - not at all  PHQ-2 Score: 0          Broken Bones/Falls: Fall Risk Assessment:    In the past year, patient has experienced: No history of falling in past year          Bladder/Bowel:  Patient has not leaked urine accidently in the last six months  Patient reports no loss of bowel control  Immunizations:  Patient has had a flu vaccination within the last year  Patient has received a pneumonia shot  Patient has received a shingles shot  Patient has received tetanus/diphtheria shot  Home Safety:  Patient does not have trouble with stairs inside or outside of their home  Patient currently reports that there are no safety hazards present in home, working smoke alarms, working carbon monoxide detectors        Preventative Screenings:   prostate cancer screen performed, colon cancer screen completed, cholesterol screen completed, glaucoma eye exam completed,     Nutrition:  Current diet: Regular and Limited junk food with servings of the following:  (Additional Comments: Lots of fruit)    Medications:  Patient is currently taking over-the-counter supplements  List of OTC medications includes: colace, and vitamins  Patient is able to manage medications  Lifestyle Choices:  Patient reports no tobacco use  Patient has smoked or used tobacco in the past   Patient has stopped his tobacco use  Tobacco use quit date: 30 years ago  Patient reports alcohol use  Alcohol use per week: 7  Patient drives a vehicle  Patient wears seat belt  Current level of exercise of physical activity described by patient as: walks 3 days a week, does weights and streaching  Activities of Daily Living:  Can get out of bed by his or her self, able to dress self, able to make own meals, able to do own shopping, able to bathe self, can do own laundry/housekeeping, can manage own money, pay bills and track expenses    Previous Hospitalizations:  No hospitalization or ED visit in past 12 months        Advanced Directives:  Patient has decided on a power of   Patient has spoken to designated power of   Patient has completed advanced directive  Preventative Screening/Counseling:      Cardiovascular:      General: Screening Current and Risks and Benefits Discussed          Diabetes:      General: Risks and Benefits Discussed and Screening Current          Colorectal Cancer:      General: Risks and Benefits Discussed and Screening Current          Prostate Cancer:      General: Risks and Benefits Discussed and Screening Current          Osteoporosis:      General: Screening Not Indicated          AAA:      General: Screening Not Indicated          Glaucoma:      General: Risks and Benefits Discussed and Screening Current          HIV:      General: Screening Not Indicated          Hepatitis C:      General: Screening Not Indicated        Advanced Directives:   Patient has living will for healthcare, has durable POA for healthcare, patient has an advanced directive  Information on ACP and/or AD provided  5 wishes given  End of life assessment reviewed with patient  Provider agrees with end of life decisions   Immunizations:      Influenza: Risks & Benefits Discussed and Influenza UTD This Year      Pneumococcal: Risks & Benefits Discussed and Patient Declines      Shingrix: Risks & Benefits Discussed and Patient Declines            No exam data present    Physical Exam:  Review of Systems   Constitutional: Negative for activity change, appetite change, diaphoresis, fatigue and fever  HENT: Negative  Eyes: Negative  Respiratory: Negative for apnea, cough, chest tightness, shortness of breath and wheezing  Cardiovascular: Negative for chest pain, palpitations and leg swelling  Gastrointestinal: Negative for abdominal distention, abdominal pain, anal bleeding, bowel incontinence, constipation, diarrhea, nausea and vomiting  Endocrine: Negative for cold intolerance, heat intolerance, polydipsia, polyphagia and polyuria  Genitourinary: Positive for urgency  Negative for difficulty urinating, dysuria, flank pain and hematuria  Musculoskeletal: Negative for arthralgias, back pain, gait problem, joint swelling and myalgias  Skin: Negative for color change, rash and wound  Allergic/Immunologic: Negative for environmental allergies, food allergies and immunocompromised state  Neurological: Negative for dizziness, seizures, syncope, speech difficulty, numbness and headaches  Hematological: Negative for adenopathy  Does not bruise/bleed easily  Psychiatric/Behavioral: Negative for agitation, behavioral problems, hallucinations, sleep disturbance and suicidal ideas  The patient is nervous/anxious  Vitals:    02/27/19 1256   BP: 168/58   BP Location: Left arm   Patient Position: Sitting   Cuff Size: Standard   Weight: 79 4 kg (175 lb)   Height: 6' (1 829 m)   Body mass index is 23 73 kg/m²      Physical Exam   Constitutional: He is oriented to person, place, and time  He appears well-developed and well-nourished  HENT:   Head: Normocephalic and atraumatic  Right Ear: External ear normal    Left Ear: External ear normal    Nose: Nose normal    Mouth/Throat: Oropharynx is clear and moist    Eyes: Pupils are equal, round, and reactive to light  Conjunctivae and EOM are normal    Neck: Normal range of motion  Neck supple  Cardiovascular: Normal rate, regular rhythm, normal heart sounds and intact distal pulses  Exam reveals no friction rub  No murmur heard  Pulmonary/Chest: Effort normal and breath sounds normal  No respiratory distress  He has no wheezes  He has no rales  He exhibits no tenderness  Abdominal: Soft  Bowel sounds are normal    Musculoskeletal: Normal range of motion  Neurological: He is alert and oriented to person, place, and time  Skin: Skin is warm and dry  Capillary refill takes 2 to 3 seconds  Psychiatric: He has a normal mood and affect   His behavior is normal  Judgment and thought content normal

## 2019-03-28 ENCOUNTER — LAB (OUTPATIENT)
Dept: LAB | Facility: CLINIC | Age: 79
End: 2019-03-28
Payer: MEDICARE

## 2019-03-28 ENCOUNTER — TELEPHONE (OUTPATIENT)
Dept: ENDOCRINOLOGY | Facility: CLINIC | Age: 79
End: 2019-03-28

## 2019-03-28 DIAGNOSIS — E03.9 ACQUIRED HYPOTHYROIDISM: ICD-10-CM

## 2019-03-28 DIAGNOSIS — IMO0001 UNCONTROLLED DIABETES MELLITUS TYPE 2 WITHOUT COMPLICATIONS: ICD-10-CM

## 2019-03-28 DIAGNOSIS — E78.2 MIXED HYPERLIPIDEMIA: ICD-10-CM

## 2019-03-28 DIAGNOSIS — E11.9 NON-INSULIN DEPENDENT TYPE 2 DIABETES MELLITUS (HCC): ICD-10-CM

## 2019-03-28 DIAGNOSIS — I10 BENIGN ESSENTIAL HYPERTENSION: ICD-10-CM

## 2019-03-28 LAB
ALBUMIN SERPL BCP-MCNC: 3.9 G/DL (ref 3.5–5)
ALP SERPL-CCNC: 86 U/L (ref 46–116)
ALT SERPL W P-5'-P-CCNC: 37 U/L (ref 12–78)
ANION GAP SERPL CALCULATED.3IONS-SCNC: 4 MMOL/L (ref 4–13)
AST SERPL W P-5'-P-CCNC: 32 U/L (ref 5–45)
BILIRUB SERPL-MCNC: 0.45 MG/DL (ref 0.2–1)
BUN SERPL-MCNC: 17 MG/DL (ref 5–25)
CALCIUM SERPL-MCNC: 9.1 MG/DL (ref 8.3–10.1)
CHLORIDE SERPL-SCNC: 103 MMOL/L (ref 100–108)
CHOLEST SERPL-MCNC: 99 MG/DL (ref 50–200)
CO2 SERPL-SCNC: 27 MMOL/L (ref 21–32)
CREAT SERPL-MCNC: 0.98 MG/DL (ref 0.6–1.3)
CREAT UR-MCNC: 44.4 MG/DL
EST. AVERAGE GLUCOSE BLD GHB EST-MCNC: 163 MG/DL
GFR SERPL CREATININE-BSD FRML MDRD: 74 ML/MIN/1.73SQ M
GLUCOSE P FAST SERPL-MCNC: 117 MG/DL (ref 65–99)
HBA1C MFR BLD: 7.3 % (ref 4.2–6.3)
HDLC SERPL-MCNC: 38 MG/DL (ref 40–60)
LDLC SERPL CALC-MCNC: 45 MG/DL (ref 0–100)
MICROALBUMIN UR-MCNC: 10 MG/L (ref 0–20)
MICROALBUMIN/CREAT 24H UR: 23 MG/G CREATININE (ref 0–30)
POTASSIUM SERPL-SCNC: 4.4 MMOL/L (ref 3.5–5.3)
PROT SERPL-MCNC: 6.9 G/DL (ref 6.4–8.2)
SODIUM SERPL-SCNC: 134 MMOL/L (ref 136–145)
T4 FREE SERPL-MCNC: 1.18 NG/DL (ref 0.76–1.46)
TRIGL SERPL-MCNC: 82 MG/DL
TSH SERPL DL<=0.05 MIU/L-ACNC: 3.54 UIU/ML (ref 0.36–3.74)

## 2019-03-28 PROCEDURE — 84443 ASSAY THYROID STIM HORMONE: CPT

## 2019-03-28 PROCEDURE — 36415 COLL VENOUS BLD VENIPUNCTURE: CPT

## 2019-03-28 PROCEDURE — 83036 HEMOGLOBIN GLYCOSYLATED A1C: CPT

## 2019-03-28 PROCEDURE — 82570 ASSAY OF URINE CREATININE: CPT

## 2019-03-28 PROCEDURE — 82043 UR ALBUMIN QUANTITATIVE: CPT

## 2019-03-28 PROCEDURE — 84439 ASSAY OF FREE THYROXINE: CPT

## 2019-03-28 PROCEDURE — 80061 LIPID PANEL: CPT

## 2019-03-28 PROCEDURE — 80053 COMPREHEN METABOLIC PANEL: CPT

## 2019-03-28 NOTE — TELEPHONE ENCOUNTER
----- Message from [place] sent at 3/28/2019 12:47 PM EDT -----  Please call the patient regarding his abnormal result   A1C slightly above goal, rest of labs normal

## 2019-04-04 ENCOUNTER — OFFICE VISIT (OUTPATIENT)
Dept: ENDOCRINOLOGY | Facility: CLINIC | Age: 79
End: 2019-04-04
Payer: MEDICARE

## 2019-04-04 VITALS
HEIGHT: 72 IN | HEART RATE: 78 BPM | SYSTOLIC BLOOD PRESSURE: 140 MMHG | DIASTOLIC BLOOD PRESSURE: 64 MMHG | BODY MASS INDEX: 24.35 KG/M2 | WEIGHT: 179.8 LBS

## 2019-04-04 DIAGNOSIS — E78.2 MIXED HYPERLIPIDEMIA: ICD-10-CM

## 2019-04-04 DIAGNOSIS — I10 BENIGN ESSENTIAL HYPERTENSION: ICD-10-CM

## 2019-04-04 DIAGNOSIS — R79.89 ELEVATED TSH: ICD-10-CM

## 2019-04-04 DIAGNOSIS — E11.42 TYPE 2 DIABETES MELLITUS WITH DIABETIC POLYNEUROPATHY, WITHOUT LONG-TERM CURRENT USE OF INSULIN (HCC): Primary | ICD-10-CM

## 2019-04-04 PROCEDURE — 99214 OFFICE O/P EST MOD 30 MIN: CPT | Performed by: NURSE PRACTITIONER

## 2019-04-08 DIAGNOSIS — E11.9 TYPE 2 DIABETES MELLITUS WITHOUT COMPLICATION, WITHOUT LONG-TERM CURRENT USE OF INSULIN (HCC): ICD-10-CM

## 2019-05-01 DIAGNOSIS — E78.2 COMBINED HYPERLIPIDEMIA: ICD-10-CM

## 2019-05-01 RX ORDER — SIMVASTATIN 40 MG
TABLET ORAL
Qty: 90 TABLET | Refills: 0 | Status: SHIPPED | OUTPATIENT
Start: 2019-05-01 | End: 2019-05-05 | Stop reason: SDUPTHER

## 2019-05-05 DIAGNOSIS — E78.2 COMBINED HYPERLIPIDEMIA: ICD-10-CM

## 2019-05-06 RX ORDER — SIMVASTATIN 40 MG
TABLET ORAL
Qty: 90 TABLET | Refills: 0 | Status: SHIPPED | OUTPATIENT
Start: 2019-05-06 | End: 2019-11-04 | Stop reason: SDUPTHER

## 2019-05-14 DIAGNOSIS — I10 ESSENTIAL HYPERTENSION: ICD-10-CM

## 2019-05-14 RX ORDER — AMLODIPINE BESYLATE 5 MG/1
TABLET ORAL
Qty: 90 TABLET | Refills: 2 | Status: SHIPPED | OUTPATIENT
Start: 2019-05-14 | End: 2020-04-20

## 2019-05-30 DIAGNOSIS — E11.9 TYPE 2 DIABETES MELLITUS WITHOUT COMPLICATION, WITHOUT LONG-TERM CURRENT USE OF INSULIN (HCC): ICD-10-CM

## 2019-05-31 RX ORDER — GLIMEPIRIDE 2 MG/1
TABLET ORAL
Qty: 90 TABLET | Refills: 1 | Status: SHIPPED | OUTPATIENT
Start: 2019-05-31 | End: 2020-03-03 | Stop reason: SDUPTHER

## 2019-06-05 DIAGNOSIS — J30.1 SEASONAL ALLERGIC RHINITIS DUE TO POLLEN: ICD-10-CM

## 2019-06-05 DIAGNOSIS — E11.59 TYPE 2 DIABETES MELLITUS WITH OTHER CIRCULATORY COMPLICATION, WITH LONG-TERM CURRENT USE OF INSULIN (HCC): ICD-10-CM

## 2019-06-05 DIAGNOSIS — Z79.4 TYPE 2 DIABETES MELLITUS WITH OTHER CIRCULATORY COMPLICATION, WITH LONG-TERM CURRENT USE OF INSULIN (HCC): ICD-10-CM

## 2019-06-05 DIAGNOSIS — E11.9 TYPE 2 DIABETES MELLITUS WITHOUT COMPLICATION, WITHOUT LONG-TERM CURRENT USE OF INSULIN (HCC): ICD-10-CM

## 2019-06-05 RX ORDER — GLIMEPIRIDE 2 MG/1
TABLET ORAL
Qty: 90 TABLET | Refills: 1 | Status: SHIPPED | OUTPATIENT
Start: 2019-06-05 | End: 2019-06-27 | Stop reason: SDUPTHER

## 2019-06-05 RX ORDER — FLUTICASONE PROPIONATE 50 MCG
SPRAY, SUSPENSION (ML) NASAL
Qty: 32 G | Refills: 1 | Status: SHIPPED | OUTPATIENT
Start: 2019-06-05 | End: 2020-03-16

## 2019-06-27 ENCOUNTER — OFFICE VISIT (OUTPATIENT)
Dept: FAMILY MEDICINE CLINIC | Facility: CLINIC | Age: 79
End: 2019-06-27
Payer: MEDICARE

## 2019-06-27 VITALS
SYSTOLIC BLOOD PRESSURE: 152 MMHG | HEIGHT: 72 IN | DIASTOLIC BLOOD PRESSURE: 62 MMHG | BODY MASS INDEX: 23.43 KG/M2 | WEIGHT: 173 LBS

## 2019-06-27 DIAGNOSIS — I10 BENIGN ESSENTIAL HYPERTENSION: ICD-10-CM

## 2019-06-27 DIAGNOSIS — E11.42 TYPE 2 DIABETES MELLITUS WITH DIABETIC POLYNEUROPATHY, WITHOUT LONG-TERM CURRENT USE OF INSULIN (HCC): Primary | ICD-10-CM

## 2019-06-27 DIAGNOSIS — E78.2 MIXED HYPERLIPIDEMIA: ICD-10-CM

## 2019-06-27 PROCEDURE — 99213 OFFICE O/P EST LOW 20 MIN: CPT | Performed by: FAMILY MEDICINE

## 2019-07-12 ENCOUNTER — APPOINTMENT (OUTPATIENT)
Dept: LAB | Facility: CLINIC | Age: 79
End: 2019-07-12
Payer: MEDICARE

## 2019-07-12 ENCOUNTER — TRANSCRIBE ORDERS (OUTPATIENT)
Dept: ADMINISTRATIVE | Facility: HOSPITAL | Age: 79
End: 2019-07-12

## 2019-07-12 DIAGNOSIS — I10 BENIGN ESSENTIAL HYPERTENSION: ICD-10-CM

## 2019-07-12 DIAGNOSIS — E11.42 TYPE 2 DIABETES MELLITUS WITH DIABETIC POLYNEUROPATHY, WITHOUT LONG-TERM CURRENT USE OF INSULIN (HCC): ICD-10-CM

## 2019-07-12 DIAGNOSIS — E78.2 MIXED HYPERLIPIDEMIA: ICD-10-CM

## 2019-07-12 LAB
ALBUMIN SERPL BCP-MCNC: 3.8 G/DL (ref 3.5–5)
ALP SERPL-CCNC: 95 U/L (ref 46–116)
ALT SERPL W P-5'-P-CCNC: 27 U/L (ref 12–78)
ANION GAP SERPL CALCULATED.3IONS-SCNC: 8 MMOL/L (ref 4–13)
AST SERPL W P-5'-P-CCNC: 23 U/L (ref 5–45)
BILIRUB SERPL-MCNC: 0.32 MG/DL (ref 0.2–1)
BUN SERPL-MCNC: 16 MG/DL (ref 5–25)
CALCIUM SERPL-MCNC: 9 MG/DL (ref 8.3–10.1)
CHLORIDE SERPL-SCNC: 103 MMOL/L (ref 100–108)
CHOLEST SERPL-MCNC: 98 MG/DL (ref 50–200)
CO2 SERPL-SCNC: 26 MMOL/L (ref 21–32)
CREAT SERPL-MCNC: 0.92 MG/DL (ref 0.6–1.3)
GFR SERPL CREATININE-BSD FRML MDRD: 80 ML/MIN/1.73SQ M
GLUCOSE SERPL-MCNC: 118 MG/DL (ref 65–140)
HDLC SERPL-MCNC: 45 MG/DL (ref 40–60)
LDLC SERPL CALC-MCNC: 36 MG/DL (ref 0–100)
POTASSIUM SERPL-SCNC: 4.5 MMOL/L (ref 3.5–5.3)
PROT SERPL-MCNC: 6.8 G/DL (ref 6.4–8.2)
SODIUM SERPL-SCNC: 137 MMOL/L (ref 136–145)
TRIGL SERPL-MCNC: 83 MG/DL

## 2019-07-12 PROCEDURE — 80061 LIPID PANEL: CPT

## 2019-07-12 PROCEDURE — 80053 COMPREHEN METABOLIC PANEL: CPT

## 2019-07-16 ENCOUNTER — OFFICE VISIT (OUTPATIENT)
Dept: ENDOCRINOLOGY | Facility: CLINIC | Age: 79
End: 2019-07-16
Payer: MEDICARE

## 2019-07-16 VITALS
HEART RATE: 74 BPM | BODY MASS INDEX: 23.85 KG/M2 | WEIGHT: 176.1 LBS | HEIGHT: 72 IN | DIASTOLIC BLOOD PRESSURE: 62 MMHG | SYSTOLIC BLOOD PRESSURE: 140 MMHG

## 2019-07-16 DIAGNOSIS — E11.42 TYPE 2 DIABETES MELLITUS WITH DIABETIC POLYNEUROPATHY, WITHOUT LONG-TERM CURRENT USE OF INSULIN (HCC): Primary | ICD-10-CM

## 2019-07-16 DIAGNOSIS — E78.2 MIXED HYPERLIPIDEMIA: ICD-10-CM

## 2019-07-16 DIAGNOSIS — I10 BENIGN ESSENTIAL HYPERTENSION: ICD-10-CM

## 2019-07-16 PROCEDURE — 99214 OFFICE O/P EST MOD 30 MIN: CPT | Performed by: NURSE PRACTITIONER

## 2019-07-16 RX ORDER — MAGNESIUM OXIDE/MAG AA CHELATE 133 MG
1 TABLET ORAL DAILY
COMMUNITY

## 2019-07-16 NOTE — PROGRESS NOTES
Established Patient Progress Note      Chief Complaint   Patient presents with    Diabetes Type 2          History of Present Illness:   Janiya Cabello is a 70 y o  male with a history of HTN, HLD, and type 2 diabetes without long term use of insulin  Reports complications of neuropathy  Last A1C 7 4  Denies recent illness or hospitalizations  Denies recent severe hypoglycemic or severe hyperglycemic episodes  Denies any issues with his current regimen  Home glucose monitoring: are performed regularly    Home blood glucose readings:   Before breakfast: 80-110s  Before lunch: does not check   Before dinner: 120-130s  Bedtime: does not check     Current regimen:  Glimipride 1 mg BID and Metformin 1,000 mg BID  compliant all of the timedenies any side effects from current medications     Hypoglycemic episodes: No never   H/o of hypoglycemia causing hospitalization or Intervention such as glucagon injection or ambulance call No     Last Eye Exam: 11/08/18  Last Foot Exam: every 6 weeks, has appt next week     Has hypertension: Taking Amlodipine and Lisinopril  Has hyperlipidemia: Taking Simvastatin    Patient Active Problem List   Diagnosis    Benign essential hypertension    Benign prostatic hyperplasia with lower urinary tract symptoms    Esophageal dysphagia    Herniated lumbar intervertebral disc    Hyperlipidemia    Vasomotor rhinitis    Elevated TSH    Type 2 diabetes mellitus with diabetic polyneuropathy (HCC)    Type 2 diabetes mellitus without complication, without long-term current use of insulin ([facility] [address] )      Past Medical History:   Diagnosis Date    Abnormal blood chemistry     Abnormal glucose     Last assessed - 2/20/14    Benign essential hypertension     Last assessed - 5/15/17    Chronic allergic rhinitis     Last assessed - 8/1/16    Dermatitis     Last assessed - 6/16/15    High risk medication use     Last assessed - 5/2/16    Sciatica     Last assessed - 12/18/12    Septic bursitis Last assessed - 9/3/13    Tick bite     Last assessed - 16      Past Surgical History:   Procedure Laterality Date    CATARACT EXTRACTION      COLONOSCOPY      Fiberoptic    PILONIDAL CYST EXCISION        Family History   Problem Relation Age of Onset    Hypertension Mother     Osteoporosis Mother     Diabetes type I Paternal Aunt     Colon cancer Family      Social History     Tobacco Use    Smoking status: Former Smoker     Last attempt to quit:      Years since quittin 5    Smokeless tobacco: Never Used   Substance Use Topics    Alcohol use:  Yes     Alcohol/week: 1 0 standard drinks     Types: 1 Cans of beer per week     Frequency: 4 or more times a week     Comment: social usage     No Known Allergies      Current Outpatient Medications:     amLODIPine (NORVASC) 5 mg tablet, TAKE ONE TABLET BY MOUTH EVERY DAY, Disp: 90 tablet, Rfl: 2    B Complex-Biotin-FA (B COMPLETE) TABS, Take by mouth, Disp: , Rfl:     BLAKE MICROLET LANCETS lancets, TEST BLOOD SUGAR TWO TIMES A DAY, Disp: 100 each, Rfl: 0    Coenzyme Q10 (COQ10 PO), Take by mouth, Disp: , Rfl:     CONTOUR NEXT TEST test strip, TEST BLOOD SUGAR TWICE DAILY (Patient taking differently: TEST BLOOD SUGAR  DAILY), Disp: 100 each, Rfl: 2    docusate sodium (COLACE) 100 mg capsule, Take 1 capsule by mouth as needed, Disp: , Rfl:     fexofenadine (ALLEGRA) 180 MG tablet, Take by mouth as needed  , Disp: , Rfl:     fluticasone (FLONASE) 50 mcg/act nasal spray, INSTILL ONE SPRAY TO EACH NOSTRIL TWICE A DAY, Disp: 32 g, Rfl: 1    glimepiride (AMARYL) 2 mg tablet, TAKE ONE-HALF TABLET BY MOUTH TWICE A DAY, Disp: 90 tablet, Rfl: 1    ipratropium (ATROVENT) 0 06 % nasal spray, INSTILL TWO SPRAYS INTO EACH NOSTRIL FOUR TIMES A DAY, Disp: 15 mL, Rfl: 0    lisinopril (ZESTRIL) 20 mg tablet, TAKE ONE TABLET BY MOUTH EVERY DAY, Disp: 90 tablet, Rfl: 3    metFORMIN (GLUCOPHAGE) 500 mg tablet, TAKE TWO TO FOUR TABLETS BY MOUTH EVERY DAY PER BLOOD GLUCOSE LEVELS (Patient taking differently: Taking 2 tablets twice daily), Disp: 360 tablet, Rfl: 1    simvastatin (ZOCOR) 40 mg tablet, TAKE ONE TABLET BY MOUTH EVERY DAY, Disp: 90 tablet, Rfl: 0    Specialty Vitamins Products (MAGNESIUM, AMINO ACID CHELATE,) 133 MG tablet, Take 1 tablet by mouth 2 (two) times a day, Disp: , Rfl:     Thiamine 50 MG CAPS, Take 1 capsule by mouth daily, Disp: , Rfl:     Review of Systems   Constitutional: Negative for activity change, appetite change and fatigue  HENT: Negative for sore throat, trouble swallowing and voice change  Eyes: Negative for visual disturbance  Respiratory: Negative for choking, chest tightness and shortness of breath  Cardiovascular: Negative for chest pain, palpitations and leg swelling  Gastrointestinal: Negative for abdominal pain, constipation and diarrhea  Endocrine: Negative for cold intolerance, heat intolerance, polydipsia, polyphagia and polyuria  Genitourinary: Negative for frequency  Musculoskeletal: Negative for arthralgias and myalgias  Skin: Negative for rash  Neurological: Negative for dizziness and syncope  Hematological: Negative for adenopathy  Psychiatric/Behavioral: Negative for sleep disturbance  All other systems reviewed and are negative  Physical Exam:  Body mass index is 23 88 kg/m²  /62   Pulse 74   Ht 6' (1 829 m)   Wt 79 9 kg (176 lb 1 6 oz)   BMI 23 88 kg/m²    Wt Readings from Last 3 Encounters:   07/16/19 79 9 kg (176 lb 1 6 oz)   06/27/19 78 5 kg (173 lb)   04/04/19 81 6 kg (179 lb 12 8 oz)       Physical Exam   Constitutional: He is oriented to person, place, and time  He appears well-developed and well-nourished  No distress  HENT:   Head: Normocephalic and atraumatic  Mouth/Throat: Oropharynx is clear and moist    Eyes: Pupils are equal, round, and reactive to light  Conjunctivae and EOM are normal    Neck: Normal range of motion  Neck supple   No thyromegaly present  Cardiovascular: Normal rate, regular rhythm and normal heart sounds  Pulses are no weak pulses  No murmur heard  Pulses:       Dorsalis pedis pulses are 2+ on the right side, and 2+ on the left side  Pulmonary/Chest: Effort normal and breath sounds normal  No respiratory distress  He has no wheezes  He has no rales  Abdominal: Soft  Bowel sounds are normal  He exhibits no distension  There is no tenderness  Musculoskeletal: Normal range of motion  He exhibits no edema  Feet:   Right Foot:   Skin Integrity: Positive for dry skin  Negative for ulcer, skin breakdown, erythema, warmth or callus  Left Foot:   Skin Integrity: Positive for dry skin  Negative for ulcer, skin breakdown, erythema, warmth or callus  Lymphadenopathy:     He has no cervical adenopathy  Neurological: He is alert and oriented to person, place, and time  Skin: Skin is warm and dry  Psychiatric: He has a normal mood and affect  Vitals reviewed  Patient's shoes and socks removed  Right Foot/Ankle   Right Foot Inspection  Skin Exam: skin normal and dry skin skin not intact, no warmth, no callus, no erythema, no maceration, no abnormal color, no pre-ulcer, no ulcer and no callus                            Sensory       Monofilament testing: intact  Vascular    The right DP pulse is 2+  Left Foot/Ankle  Left Foot Inspection  Skin Exam: skin normal and dry skinskin not intact, no warmth, no erythema, no maceration, normal color, no pre-ulcer, no ulcer and no callus                                         Sensory       Monofilament: intact  Vascular    The left DP pulse is 2+  Assign Risk Category:  No deformity present; No loss of protective sensation;  No weak pulses       Risk: 0      Labs:     Lab Results   Component Value Date    HGBA1C 7 4 (H) 07/12/2019       Lab Results   Component Value Date     10/20/2015    SODIUM 137 07/12/2019    K 4 5 07/12/2019     07/12/2019    CO2 26 07/12/2019 ANIONGAP 8 10/20/2015    AGAP 8 07/12/2019    BUN 16 07/12/2019    CREATININE 0 92 07/12/2019    GLUC 118 07/12/2019    GLUF 117 (H) 03/28/2019    CALCIUM 9 0 07/12/2019    AST 23 07/12/2019    ALT 27 07/12/2019    ALKPHOS 95 07/12/2019    PROT 7 0 04/23/2015    TP 6 8 07/12/2019    BILITOT 0 54 04/23/2015    TBILI 0 32 07/12/2019    EGFR 80 07/12/2019         Lab Results   Component Value Date    CHOL 114 04/23/2015    HDL 45 07/12/2019    TRIG 83 07/12/2019       Lab Results   Component Value Date    ZGD9ZHTZMBMP 3 540 03/28/2019    XIW7KMYXACQO 4 190 (H) 12/27/2018    IDY8HQVKTQAN 3 790 (H) 09/06/2018     Lab Results   Component Value Date    FREET4 1 18 03/28/2019       Impression & Plan:    Problem List Items Addressed This Visit        Endocrine    Type 2 diabetes mellitus with diabetic polyneuropathy ([facility] [address] ) - Primary     Slightly above goal due to occasional dietary excursions  BG within target range on meter download  Continue current regimen for now  Focus on dietary and lifestyle modifications  Relevant Orders    Hemoglobin A1C    Comprehensive metabolic panel    Lipid Panel with Direct LDL reflex    Microalbumin / creatinine urine ratio    T4, free    TSH, 3rd generation       Cardiovascular and Mediastinum    Benign essential hypertension     BP stable, continue current regimen          Relevant Orders    Comprehensive metabolic panel       Other    Hyperlipidemia     Stable, continue statin          Relevant Orders    Lipid Panel with Direct LDL reflex          Orders Placed This Encounter   Procedures    Hemoglobin A1C     Standing Status:   Future     Standing Expiration Date:   10/16/2019    Comprehensive metabolic panel     This is a patient instruction: Patient fasting for 8 hours or longer recommended  Standing Status:   Future     Standing Expiration Date:   7/16/2020    Lipid Panel with Direct LDL reflex     This is a patient instruction:  This test requires patient fasting for 10-12 hours or longer  Drinking of black coffee or black tea is acceptable  Standing Status:   Future     Standing Expiration Date:   7/16/2020    Microalbumin / creatinine urine ratio     Standing Status:   Future     Standing Expiration Date:   7/16/2020    T4, free     Standing Status:   Future     Standing Expiration Date:   10/16/2019    TSH, 3rd generation     This is a patient instruction: This test is non-fasting  Please drink two glasses of water morning of bloodwork  Standing Status:   Future     Standing Expiration Date:   10/16/2019         Discussed with the patient and all questioned fully answered  He will call me if any problems arise  Follow-up appointment in 4 months       Counseled patient on diagnostic results, prognosis, risk and benefit of treatment options, instruction for management, importance of treatment compliance, Risk  factor reduction and impressions      Hoa Mccann [address] Gonsales

## 2019-07-16 NOTE — ASSESSMENT & PLAN NOTE
Slightly above goal due to occasional dietary excursions  BG within target range on meter download  Continue current regimen for now  Focus on dietary and lifestyle modifications

## 2019-10-08 ENCOUNTER — OFFICE VISIT (OUTPATIENT)
Dept: FAMILY MEDICINE CLINIC | Facility: CLINIC | Age: 79
End: 2019-10-08
Payer: MEDICARE

## 2019-10-08 VITALS
WEIGHT: 172 LBS | HEIGHT: 72 IN | SYSTOLIC BLOOD PRESSURE: 152 MMHG | BODY MASS INDEX: 23.3 KG/M2 | DIASTOLIC BLOOD PRESSURE: 62 MMHG

## 2019-10-08 DIAGNOSIS — I10 BENIGN ESSENTIAL HYPERTENSION: ICD-10-CM

## 2019-10-08 DIAGNOSIS — M54.31 RIGHT SIDED SCIATICA: ICD-10-CM

## 2019-10-08 DIAGNOSIS — E11.9 TYPE 2 DIABETES MELLITUS WITHOUT COMPLICATION, WITHOUT LONG-TERM CURRENT USE OF INSULIN (HCC): ICD-10-CM

## 2019-10-08 DIAGNOSIS — Z23 NEED FOR IMMUNIZATION AGAINST INFLUENZA: Primary | ICD-10-CM

## 2019-10-08 DIAGNOSIS — E78.2 MIXED HYPERLIPIDEMIA: ICD-10-CM

## 2019-10-08 PROCEDURE — 90662 IIV NO PRSV INCREASED AG IM: CPT | Performed by: FAMILY MEDICINE

## 2019-10-08 PROCEDURE — 99213 OFFICE O/P EST LOW 20 MIN: CPT | Performed by: FAMILY MEDICINE

## 2019-10-08 PROCEDURE — G0008 ADMIN INFLUENZA VIRUS VAC: HCPCS | Performed by: FAMILY MEDICINE

## 2019-10-08 NOTE — PROGRESS NOTES
Assessment/Plan:  Patient will be referred to physical therapy at [facility] he has been there previously to be evaluated for possible 10s unit    Problem List Items Addressed This Visit        Endocrine    Type 2 diabetes mellitus without complication, without long-term current use of insulin ([address] )       Cardiovascular and Mediastinum    Benign essential hypertension       Other    Hyperlipidemia      Other Visit Diagnoses     Need for immunization against influenza    -  Primary    Relevant Orders    influenza vaccine, [id number], high-dose, PF 0 5 mL (FLUZONE HIGH-DOSE) (Completed)           Diagnoses and all orders for this visit:    Need for immunization against influenza  -     influenza vaccine, [id number], high-dose, PF 0 5 mL (FLUZONE HIGH-DOSE)    Type 2 diabetes mellitus without complication, without long-term current use of insulin ([address] )    Benign essential hypertension    Mixed hyperlipidemia        No problem-specific Assessment & Plan notes found for this encounter  Subjective:      Patient ID: Justin Govea is a 70 y o  male  Mr Judson Allan here follow-up visit looks well feels well weight is holding steady he is due for his diabetic endocrinology visit in the near future and has labs ordered on he is going to be seeing Dr  [facility] in his urologist in the near future he is up-to-date on immunizations just got his flu shot for this year today we talked about the recent recall for Zantac      The following portions of the patient's history were reviewed and updated as appropriate:   He has a past medical history of Abnormal blood chemistry, Abnormal glucose, Benign essential hypertension, Chronic allergic rhinitis, Dermatitis, High risk medication use, Sciatica, Septic bursitis, and Tick bite ,  does not have any pertinent problems on file  ,   has a past surgical history that includes Cataract extraction; Colonoscopy; and PILONIDAL CYST EXCISION  ,  family history includes Colon cancer in his family; Diabetes type I in his paternal aunt; Hypertension in his mother; Osteoporosis in his mother  ,   reports that he quit smoking about 39 years ago  He has never used smokeless tobacco  He reports that he drinks about 1 0 standard drinks of alcohol per week  He reports that he does not use drugs  ,  has No Known Allergies     Current Outpatient Medications   Medication Sig Dispense Refill    amLODIPine (NORVASC) 5 mg tablet TAKE ONE TABLET BY MOUTH EVERY DAY 90 tablet 2    B Complex-Biotin-FA (B COMPLETE) TABS Take by mouth      BLAKE MICROLET LANCETS lancets TEST BLOOD SUGAR TWO TIMES A  each 0    Coenzyme Q10 (COQ10 PO) Take by mouth      CONTOUR NEXT TEST test strip TEST BLOOD SUGAR TWICE DAILY (Patient taking differently: TEST BLOOD SUGAR  DAILY) 100 each 2    docusate sodium (COLACE) 100 mg capsule Take 1 capsule by mouth as needed      fexofenadine (ALLEGRA) 180 MG tablet Take by mouth as needed        fluticasone (FLONASE) 50 mcg/act nasal spray INSTILL ONE SPRAY TO EACH NOSTRIL TWICE A DAY 32 g 1    glimepiride (AMARYL) 2 mg tablet TAKE ONE-HALF TABLET BY MOUTH TWICE A DAY 90 tablet 1    ipratropium (ATROVENT) 0 06 % nasal spray INSTILL TWO SPRAYS INTO EACH NOSTRIL FOUR TIMES A DAY 15 mL 0    lisinopril (ZESTRIL) 20 mg tablet TAKE ONE TABLET BY MOUTH EVERY DAY 90 tablet 3    metFORMIN (GLUCOPHAGE) 500 mg tablet TAKE TWO TO FOUR TABLETS BY MOUTH EVERY DAY PER BLOOD GLUCOSE LEVELS (Patient taking differently: Taking 2 tablets twice daily) 360 tablet 1    simvastatin (ZOCOR) 40 mg tablet TAKE ONE TABLET BY MOUTH EVERY DAY 90 tablet 0    Specialty Vitamins Products (MAGNESIUM, AMINO ACID CHELATE,) 133 MG tablet Take 1 tablet by mouth 2 (two) times a day      Thiamine 50 MG CAPS Take 1 capsule by mouth daily       No current facility-administered medications for this visit  Review of Systems   Constitutional: Positive for activity change   Negative for appetite change, diaphoresis, fatigue and fever  HENT: Negative  Eyes: Negative  Respiratory: Negative for apnea, cough, chest tightness, shortness of breath and wheezing  Cardiovascular: Negative for chest pain, palpitations and leg swelling  Gastrointestinal: Negative for abdominal distention, abdominal pain, anal bleeding, constipation, diarrhea, nausea and vomiting  Endocrine: Negative for cold intolerance, heat intolerance, polydipsia, polyphagia and polyuria  Genitourinary: Negative for difficulty urinating, dysuria, flank pain, hematuria and urgency  Musculoskeletal: Negative for arthralgias, back pain, gait problem, joint swelling and myalgias  Right sciatica   Skin: Negative for color change, rash and wound  Allergic/Immunologic: Negative for environmental allergies, food allergies and immunocompromised state  Neurological: Negative for dizziness, seizures, syncope, speech difficulty, numbness and headaches  Hematological: Negative for adenopathy  Does not bruise/bleed easily  Psychiatric/Behavioral: Negative for agitation, behavioral problems, hallucinations, sleep disturbance and suicidal ideas  Objective:  Vitals:    10/08/19 1222   BP: 152/62   BP Location: Left arm   Patient Position: Sitting   Cuff Size: Standard   Weight: 78 kg (172 lb)   Height: 6' (1 829 m)     Body mass index is 23 33 kg/m²  Physical Exam   Constitutional: He is oriented to person, place, and time  He appears well-developed and well-nourished  No distress  HENT:   Head: Normocephalic  Right Ear: External ear normal    Left Ear: External ear normal    Nose: Nose normal    Mouth/Throat: Oropharynx is clear and moist    Eyes: Pupils are equal, round, and reactive to light  Conjunctivae and EOM are normal  Right eye exhibits no discharge  Left eye exhibits no discharge  No scleral icterus  Neck: Normal range of motion  No tracheal deviation present  No thyromegaly present     Cardiovascular: Normal rate, regular rhythm and normal heart sounds  Exam reveals no gallop and no friction rub  No murmur heard  Pulmonary/Chest: Effort normal and breath sounds normal  No respiratory distress  He has no wheezes  Abdominal: Soft  Bowel sounds are normal  He exhibits no mass  There is no tenderness  There is no guarding  Musculoskeletal: He exhibits no edema or deformity  Positive straight leg raising right side 45°   Lymphadenopathy:     He has no cervical adenopathy  Neurological: He is alert and oriented to person, place, and time  No cranial nerve deficit  Skin: Skin is warm and dry  No rash noted  He is not diaphoretic  No erythema  Psychiatric: He has a normal mood and affect   Thought content normal

## 2019-10-14 ENCOUNTER — TELEPHONE (OUTPATIENT)
Dept: FAMILY MEDICINE CLINIC | Facility: CLINIC | Age: 79
End: 2019-10-14

## 2019-10-14 ENCOUNTER — EVALUATION (OUTPATIENT)
Dept: PHYSICAL THERAPY | Facility: CLINIC | Age: 79
End: 2019-10-14
Payer: MEDICARE

## 2019-10-14 DIAGNOSIS — M54.31 RIGHT SIDED SCIATICA: ICD-10-CM

## 2019-10-14 PROCEDURE — 97014 ELECTRIC STIMULATION THERAPY: CPT | Performed by: PHYSICAL THERAPIST

## 2019-10-14 PROCEDURE — 97161 PT EVAL LOW COMPLEX 20 MIN: CPT | Performed by: PHYSICAL THERAPIST

## 2019-10-16 ENCOUNTER — OFFICE VISIT (OUTPATIENT)
Dept: UROLOGY | Facility: AMBULATORY SURGERY CENTER | Age: 79
End: 2019-10-16
Payer: MEDICARE

## 2019-10-16 VITALS
BODY MASS INDEX: 23.3 KG/M2 | WEIGHT: 172 LBS | HEIGHT: 72 IN | DIASTOLIC BLOOD PRESSURE: 60 MMHG | SYSTOLIC BLOOD PRESSURE: 152 MMHG | HEART RATE: 88 BPM

## 2019-10-16 DIAGNOSIS — N40.1 BENIGN PROSTATIC HYPERPLASIA WITH NOCTURIA: Primary | ICD-10-CM

## 2019-10-16 DIAGNOSIS — R35.1 BENIGN PROSTATIC HYPERPLASIA WITH NOCTURIA: Primary | ICD-10-CM

## 2019-10-16 LAB — POST-VOID RESIDUAL VOLUME, ML POC: 140 ML

## 2019-10-16 PROCEDURE — 99213 OFFICE O/P EST LOW 20 MIN: CPT | Performed by: NURSE PRACTITIONER

## 2019-10-16 PROCEDURE — 51736 URINE FLOW MEASUREMENT: CPT | Performed by: NURSE PRACTITIONER

## 2019-10-16 PROCEDURE — 51798 US URINE CAPACITY MEASURE: CPT | Performed by: NURSE PRACTITIONER

## 2019-10-16 NOTE — PATIENT INSTRUCTIONS
Follow up with bladder scan PVR in 6 months  Call the office for concerning symptoms or questions  Increase the amount of water you are drinking per day - upwards to 60 oz per day  Decrease/limit the intake of coffee,soda, tea

## 2019-10-16 NOTE — PROGRESS NOTES
10/16/2019      Chief Complaint   Patient presents with    Benign Prostatic Hypertrophy     w/nocturia     Assessment and Plan    70 y o  male managed by Dr Tamiko Vale    1  Benign prostatic hyperplasia  · Bladder scan  ml  · Uroflow  (results below)  · Bladder Scan PVR at next office visit    2  Nocturia  · Stop fluids about 2 hours before bedtime  · Ensure adequate fluid intake upwards to 60 oz per day  · Avoid/Limit bladder irritants- soda, coffee, tea    History of Present Illness  Ivone Mcelroy is a 70 y o  male here for follow up evaluation of  urinary symptoms secondary to benign prostatic hyperplasia and a history of prostatic asymmetry  Patient denies a family history of prostate cancer  He reports here today for evaluation of urinary symptoms  It appears from previous evaluation of office visit routine prostate cancer screening has been discontinued in the setting of stable digital rectal exams and PSA readings  A prior office visit patient complained of nocturia getting up approximately 2-3 times at night to urinate  He currently complains of couple episodes of nocturia each night  Reports his symptoms stopping bothersome a to start therapy with tamsulosin 0 4 mg p o  Daily  He denies any significant changes to his health since prior office visit  Review of Systems   Constitutional: Negative for chills and fever  Respiratory: Negative for cough and shortness of breath  Cardiovascular: Negative for chest pain  Gastrointestinal: Negative for abdominal distention, abdominal pain, blood in stool, nausea and vomiting  Genitourinary: Negative for difficulty urinating, dysuria, enuresis, flank pain, frequency, hematuria and urgency  Nocturia   Musculoskeletal: Negative for back pain  Skin: Negative for rash  Neurological: Negative for dizziness       Past Medical History  Past Medical History:   Diagnosis Date    Abnormal blood chemistry     Abnormal glucose     Last assessed - 14    Benign essential hypertension     Last assessed - 5/15/17    Chronic allergic rhinitis     Last assessed - 16    Dermatitis     Last assessed - 6/16/15    High risk medication use     Last assessed - 16    Sciatica     Last assessed - 12    Septic bursitis     Last assessed - 9/3/13    Tick bite     Last assessed - 16       Past Social History  Past Surgical History:   Procedure Laterality Date    CATARACT EXTRACTION      COLONOSCOPY      Fiberoptic    PILONIDAL CYST EXCISION       Social History     Tobacco Use   Smoking Status Former Smoker    Last attempt to quit: [place]ie Ear Years since quittin 8   Smokeless Tobacco Never Used       Past Family History  Family History   Problem Relation Age of Onset    Hypertension Mother     Osteoporosis Mother     Diabetes type I Paternal Aunt     Colon cancer Family        Past Social history  Social History     Socioeconomic History    Marital status: /Civil Union     Spouse name: Not on file    Number of children: Not on file    Years of education: Not on file    Highest education level: Not on file   Occupational History    Not on file   Social Needs    Financial resource strain: Not on file    Food insecurity:     Worry: Not on file     Inability: Not on file    Transportation needs:     Medical: Not on file     Non-medical: Not on file   Tobacco Use    Smoking status: Former Smoker     Last attempt to quit: 1980     Years since quittin 8    Smokeless tobacco: Never Used   Substance and Sexual Activity    Alcohol use:  Yes     Alcohol/week: 1 0 standard drinks     Types: 1 Cans of beer per week     Frequency: 4 or more times a week     Comment: social usage    Drug use: Never    Sexual activity: Not on file   Lifestyle    Physical activity:     Days per week: Not on file     Minutes per session: Not on file    Stress: Not on file   Relationships    Social connections:     Talks on phone: Not on file     Gets together: Not on file     Attends Congregation service: Not on file     Active member of club or organization: Not on file     Attends meetings of clubs or organizations: Not on file     Relationship status: Not on file    Intimate partner violence:     Fear of current or ex partner: Not on file     Emotionally abused: Not on file     Physically abused: Not on file     Forced sexual activity: Not on file   Other Topics Concern    Not on file   Social History Narrative    Advance directive on file       Current Medications  Current Outpatient Medications   Medication Sig Dispense Refill    amLODIPine (NORVASC) 5 mg tablet TAKE ONE TABLET BY MOUTH EVERY DAY 90 tablet 2    B Complex-Biotin-FA (B COMPLETE) TABS Take by mouth      BLAKE MICROLET LANCETS lancets TEST BLOOD SUGAR TWO TIMES A  each 0    Coenzyme Q10 (COQ10 PO) Take by mouth      docusate sodium (COLACE) 100 mg capsule Take 1 capsule by mouth as needed      fexofenadine (ALLEGRA) 180 MG tablet Take by mouth as needed        fluticasone (FLONASE) 50 mcg/act nasal spray INSTILL ONE SPRAY TO EACH NOSTRIL TWICE A DAY 32 g 1    glimepiride (AMARYL) 2 mg tablet TAKE ONE-HALF TABLET BY MOUTH TWICE A DAY 90 tablet 1    ipratropium (ATROVENT) 0 06 % nasal spray INSTILL TWO SPRAYS INTO EACH NOSTRIL FOUR TIMES A DAY 15 mL 0    lisinopril (ZESTRIL) 20 mg tablet TAKE ONE TABLET BY MOUTH EVERY DAY 90 tablet 3    metFORMIN (GLUCOPHAGE) 500 mg tablet TAKE TWO TO FOUR TABLETS BY MOUTH EVERY DAY PER BLOOD GLUCOSE LEVELS (Patient taking differently: Taking 2 tablets twice daily) 360 tablet 1    simvastatin (ZOCOR) 40 mg tablet TAKE ONE TABLET BY MOUTH EVERY DAY 90 tablet 0    Specialty Vitamins Products (MAGNESIUM, AMINO ACID CHELATE,) 133 MG tablet Take 1 tablet by mouth 2 (two) times a day      Thiamine 50 MG CAPS Take 1 capsule by mouth daily      CONTOUR NEXT TEST test strip TEST BLOOD SUGAR TWICE DAILY 100 each 2     No current facility-administered medications for this visit  Allergies  No Known Allergies      The following portions of the patient's history were reviewed and updated as appropriate: allergies, current medications, past medical history, past social history, past surgical history and problem list       Vitals  Vitals:    10/16/19 1218   BP: 152/60   BP Location: Left arm   Patient Position: Sitting   Cuff Size: Adult   Pulse: 88   Weight: 78 kg (172 lb)   Height: 6' (1 829 m)           Physical Exam  Physical Exam   Constitutional: He is oriented to person, place, and time  He appears well-developed and well-nourished  HENT:   Head: Atraumatic  Eyes: EOM are normal    Neck: Neck supple  Cardiovascular: Normal rate  Pulmonary/Chest: Effort normal    Abdominal: Soft  He exhibits no distension  There is no tenderness  Musculoskeletal: Normal range of motion  Neurological: He is alert and oriented to person, place, and time  Skin: Skin is warm  Psychiatric: He has a normal mood and affect  Vitals reviewed  Results  No results found for this or any previous visit (from the past 1 hour(s))    Lab Results   Component Value Date    PSA 0 9 04/23/2015    PSA 0 7 04/01/2014     Lab Results   Component Value Date    GLUCOSE 112 10/20/2015    CALCIUM 9 2 10/23/2019     10/20/2015    K 4 4 10/23/2019    CO2 28 10/23/2019     10/23/2019    BUN 18 10/23/2019    CREATININE 0 99 10/23/2019     Lab Results   Component Value Date    WBC 5 38 05/22/2017    HGB 11 8 (L) 05/22/2017    HCT 36 5 05/22/2017    MCV 92 05/22/2017     05/22/2017       Uroflow  Date/Time: 10/16/2019 12:23 PM  Performed by: DANIEL Rdz  Authorized by: DANIEL Rdz   Procedure - Urodynamics:  Procedure details: Simple Uroflow          Post-procedure:     Patient tolerance: Patient tolerated procedure well with no immediate complications      Comments:      Maximum flow-19 4 mL/second  Average flow- 7 8 mL per 2nd  Avoiding time-40 6mm ss S  Voided amount -269 7 mL  PVR-140 mL        Orders  Orders Placed This Encounter   Procedures    POCT Measure PVR    Uroflow     This order was created via procedure documentation       DANIEL Mendoza

## 2019-10-17 DIAGNOSIS — E11.9 TYPE 2 DIABETES MELLITUS WITHOUT COMPLICATION, WITHOUT LONG-TERM CURRENT USE OF INSULIN (HCC): ICD-10-CM

## 2019-10-17 RX ORDER — BLOOD SUGAR DIAGNOSTIC
STRIP MISCELLANEOUS
Qty: 100 EACH | Refills: 2 | Status: SHIPPED | OUTPATIENT
Start: 2019-10-17 | End: 2020-12-28

## 2019-10-23 ENCOUNTER — APPOINTMENT (OUTPATIENT)
Dept: LAB | Facility: CLINIC | Age: 79
End: 2019-10-23
Payer: MEDICARE

## 2019-10-23 ENCOUNTER — TRANSCRIBE ORDERS (OUTPATIENT)
Dept: ADMINISTRATIVE | Facility: HOSPITAL | Age: 79
End: 2019-10-23

## 2019-10-23 DIAGNOSIS — E11.42 DIABETIC POLYNEUROPATHY ASSOCIATED WITH TYPE 2 DIABETES MELLITUS (HCC): ICD-10-CM

## 2019-10-23 DIAGNOSIS — E11.42 DIABETIC POLYNEUROPATHY ASSOCIATED WITH TYPE 2 DIABETES MELLITUS (HCC): Primary | ICD-10-CM

## 2019-10-23 DIAGNOSIS — I10 BENIGN ESSENTIAL HYPERTENSION: ICD-10-CM

## 2019-10-23 DIAGNOSIS — E11.42 TYPE 2 DIABETES MELLITUS WITH DIABETIC POLYNEUROPATHY, WITHOUT LONG-TERM CURRENT USE OF INSULIN (HCC): ICD-10-CM

## 2019-10-23 DIAGNOSIS — E78.2 MIXED HYPERLIPIDEMIA: ICD-10-CM

## 2019-10-23 LAB
ALBUMIN SERPL BCP-MCNC: 4.7 G/DL (ref 3.5–5)
ALP SERPL-CCNC: 95 U/L (ref 46–116)
ALT SERPL W P-5'-P-CCNC: 32 U/L (ref 12–78)
ANION GAP SERPL CALCULATED.3IONS-SCNC: 6 MMOL/L (ref 4–13)
AST SERPL W P-5'-P-CCNC: 24 U/L (ref 5–45)
BILIRUB SERPL-MCNC: 0.42 MG/DL (ref 0.2–1)
BUN SERPL-MCNC: 18 MG/DL (ref 5–25)
CALCIUM SERPL-MCNC: 9.2 MG/DL (ref 8.3–10.1)
CHLORIDE SERPL-SCNC: 104 MMOL/L (ref 100–108)
CHOLEST SERPL-MCNC: 116 MG/DL (ref 50–200)
CO2 SERPL-SCNC: 28 MMOL/L (ref 21–32)
CREAT SERPL-MCNC: 0.99 MG/DL (ref 0.6–1.3)
CREAT UR-MCNC: 43.6 MG/DL
GFR SERPL CREATININE-BSD FRML MDRD: 73 ML/MIN/1.73SQ M
GLUCOSE P FAST SERPL-MCNC: 99 MG/DL (ref 65–99)
HDLC SERPL-MCNC: 45 MG/DL
LDLC SERPL CALC-MCNC: 55 MG/DL (ref 0–100)
MICROALBUMIN UR-MCNC: 6.3 MG/L (ref 0–20)
MICROALBUMIN/CREAT 24H UR: 14 MG/G CREATININE (ref 0–30)
POTASSIUM SERPL-SCNC: 4.4 MMOL/L (ref 3.5–5.3)
PROT SERPL-MCNC: 7.7 G/DL (ref 6.4–8.2)
SODIUM SERPL-SCNC: 138 MMOL/L (ref 136–145)
T4 FREE SERPL-MCNC: 1.1 NG/DL (ref 0.76–1.46)
TRIGL SERPL-MCNC: 81 MG/DL
TSH SERPL DL<=0.05 MIU/L-ACNC: 3.7 UIU/ML (ref 0.36–3.74)

## 2019-10-23 PROCEDURE — 82570 ASSAY OF URINE CREATININE: CPT

## 2019-10-23 PROCEDURE — 36415 COLL VENOUS BLD VENIPUNCTURE: CPT

## 2019-10-23 PROCEDURE — 80061 LIPID PANEL: CPT

## 2019-10-23 PROCEDURE — 80053 COMPREHEN METABOLIC PANEL: CPT

## 2019-10-23 PROCEDURE — 84439 ASSAY OF FREE THYROXINE: CPT

## 2019-10-23 PROCEDURE — 84443 ASSAY THYROID STIM HORMONE: CPT

## 2019-10-23 PROCEDURE — 82043 UR ALBUMIN QUANTITATIVE: CPT

## 2019-10-25 ENCOUNTER — TELEPHONE (OUTPATIENT)
Dept: ENDOCRINOLOGY | Facility: CLINIC | Age: 79
End: 2019-10-25

## 2019-10-25 NOTE — TELEPHONE ENCOUNTER
----- Message from [place] sent at 10/25/2019  8:52 AM EDT -----  Please call the patient regarding his abnormal result   A1C improving, close to goal  Rest of labs normal

## 2019-11-04 DIAGNOSIS — E78.2 COMBINED HYPERLIPIDEMIA: ICD-10-CM

## 2019-11-04 RX ORDER — SIMVASTATIN 40 MG
TABLET ORAL
Qty: 90 TABLET | Refills: 0 | Status: SHIPPED | OUTPATIENT
Start: 2019-11-04 | End: 2020-02-03

## 2019-11-15 ENCOUNTER — OFFICE VISIT (OUTPATIENT)
Dept: ENDOCRINOLOGY | Facility: CLINIC | Age: 79
End: 2019-11-15
Payer: MEDICARE

## 2019-11-15 VITALS
DIASTOLIC BLOOD PRESSURE: 60 MMHG | SYSTOLIC BLOOD PRESSURE: 152 MMHG | HEART RATE: 72 BPM | HEIGHT: 72 IN | WEIGHT: 173.8 LBS | BODY MASS INDEX: 23.54 KG/M2

## 2019-11-15 DIAGNOSIS — I10 BENIGN ESSENTIAL HYPERTENSION: ICD-10-CM

## 2019-11-15 DIAGNOSIS — E78.2 MIXED HYPERLIPIDEMIA: ICD-10-CM

## 2019-11-15 DIAGNOSIS — E11.42 TYPE 2 DIABETES MELLITUS WITH DIABETIC POLYNEUROPATHY, WITHOUT LONG-TERM CURRENT USE OF INSULIN (HCC): Primary | ICD-10-CM

## 2019-11-15 PROCEDURE — 99214 OFFICE O/P EST MOD 30 MIN: CPT | Performed by: NURSE PRACTITIONER

## 2019-11-15 RX ORDER — BLOOD-GLUCOSE METER
1 EACH MISCELLANEOUS 2 TIMES DAILY
Qty: 1 KIT | Refills: 0 | Status: SHIPPED | OUTPATIENT
Start: 2019-11-15

## 2019-11-15 NOTE — ASSESSMENT & PLAN NOTE
Close to goal, BG within target range  Continue current regimen  Focus on dietary and lifestyle modifications

## 2019-11-20 ENCOUNTER — DOCTOR'S OFFICE (OUTPATIENT)
Dept: URBAN - METROPOLITAN AREA CLINIC 136 | Facility: CLINIC | Age: 79
Setting detail: OPHTHALMOLOGY
End: 2019-11-20
Payer: COMMERCIAL

## 2019-11-20 DIAGNOSIS — E11.9: ICD-10-CM

## 2019-11-20 DIAGNOSIS — H35.372: ICD-10-CM

## 2019-11-20 DIAGNOSIS — H43.813: ICD-10-CM

## 2019-11-20 DIAGNOSIS — H04.123: ICD-10-CM

## 2019-11-20 LAB
LEFT EYE DIABETIC RETINOPATHY: NORMAL
RIGHT EYE DIABETIC RETINOPATHY: NORMAL

## 2019-11-20 PROCEDURE — 92134 CPTRZ OPH DX IMG PST SGM RTA: CPT | Performed by: OPHTHALMOLOGY

## 2019-11-20 PROCEDURE — 92014 COMPRE OPH EXAM EST PT 1/>: CPT | Performed by: OPHTHALMOLOGY

## 2019-11-20 ASSESSMENT — REFRACTION_MANIFEST
OD_VA3: 20/
OU_VA: 20/
OS_VA3: 20/
OD_VA2: 20/
OU_VA: 20/
OS_VA2: 20/
OD_VA1: 20/
OD_VA1: 20/
OS_VA1: 20/
OD_VA3: 20/
OS_VA1: 20/
OS_VA3: 20/
OS_VA2: 20/
OD_VA2: 20/

## 2019-11-20 ASSESSMENT — VISUAL ACUITY
OS_BCVA: 20/20
OD_BCVA: 20/20

## 2019-11-20 ASSESSMENT — REFRACTION_CURRENTRX
OD_OVR_VA: 20/
OD_AXIS: 070
OD_VPRISM_DIRECTION: BF
OD_ADD: +2.50
OS_OVR_VA: 20/
OD_SPHERE: +1.00
OS_CYLINDER: SPHERE
OD_OVR_VA: 20/
OS_VPRISM_DIRECTION: BF
OD_CYLINDER: -1.25
OS_OVR_VA: 20/
OS_ADD: +2.50
OD_OVR_VA: 20/
OS_OVR_VA: 20/
OS_SPHERE: +0.75

## 2019-11-20 ASSESSMENT — SPHEQUIV_DERIVED: OD_SPHEQUIV: 0.375

## 2019-11-20 ASSESSMENT — REFRACTION_AUTOREFRACTION
OS_CYLINDER: PL
OD_CYLINDER: -0.75
OS_AXIS: 180
OS_SPHERE: +0.75
OD_AXIS: 70
OD_SPHERE: +0.75

## 2019-11-20 ASSESSMENT — CONFRONTATIONAL VISUAL FIELD TEST (CVF)
OS_FINDINGS: FULL
OD_FINDINGS: FULL

## 2019-11-21 NOTE — RESULT ENCOUNTER NOTE
Call patient to notify normal results please make sure that shows up as a diabetic eye exam on the dashboard

## 2019-12-04 DIAGNOSIS — E11.59 TYPE 2 DIABETES MELLITUS WITH OTHER CIRCULATORY COMPLICATION, WITH LONG-TERM CURRENT USE OF INSULIN (HCC): ICD-10-CM

## 2019-12-04 DIAGNOSIS — Z79.4 TYPE 2 DIABETES MELLITUS WITH OTHER CIRCULATORY COMPLICATION, WITH LONG-TERM CURRENT USE OF INSULIN (HCC): ICD-10-CM

## 2019-12-04 DIAGNOSIS — E11.9 TYPE 2 DIABETES MELLITUS WITHOUT COMPLICATION, WITHOUT LONG-TERM CURRENT USE OF INSULIN (HCC): ICD-10-CM

## 2019-12-04 RX ORDER — GLIMEPIRIDE 2 MG/1
TABLET ORAL
Qty: 90 TABLET | Refills: 1 | Status: SHIPPED | OUTPATIENT
Start: 2019-12-04 | End: 2020-04-20

## 2019-12-05 DIAGNOSIS — I10 ESSENTIAL HYPERTENSION: ICD-10-CM

## 2019-12-05 RX ORDER — LISINOPRIL 20 MG/1
TABLET ORAL
Qty: 90 TABLET | Refills: 3 | Status: SHIPPED | OUTPATIENT
Start: 2019-12-05 | End: 2020-08-27

## 2020-01-28 DIAGNOSIS — E11.9 TYPE 2 DIABETES MELLITUS WITHOUT COMPLICATION, WITHOUT LONG-TERM CURRENT USE OF INSULIN (HCC): ICD-10-CM

## 2020-02-02 DIAGNOSIS — E78.2 COMBINED HYPERLIPIDEMIA: ICD-10-CM

## 2020-02-03 RX ORDER — SIMVASTATIN 40 MG
TABLET ORAL
Qty: 90 TABLET | Refills: 0 | Status: SHIPPED | OUTPATIENT
Start: 2020-02-03 | End: 2020-04-29

## 2020-02-20 ENCOUNTER — APPOINTMENT (OUTPATIENT)
Dept: LAB | Facility: CLINIC | Age: 80
End: 2020-02-20
Payer: MEDICARE

## 2020-02-20 DIAGNOSIS — E11.42 TYPE 2 DIABETES MELLITUS WITH DIABETIC POLYNEUROPATHY, WITHOUT LONG-TERM CURRENT USE OF INSULIN (HCC): ICD-10-CM

## 2020-02-20 DIAGNOSIS — E78.2 MIXED HYPERLIPIDEMIA: ICD-10-CM

## 2020-02-20 DIAGNOSIS — I10 BENIGN ESSENTIAL HYPERTENSION: ICD-10-CM

## 2020-02-20 LAB
ALBUMIN SERPL BCP-MCNC: 4 G/DL (ref 3.5–5)
ALP SERPL-CCNC: 86 U/L (ref 46–116)
ALT SERPL W P-5'-P-CCNC: 30 U/L (ref 12–78)
ANION GAP SERPL CALCULATED.3IONS-SCNC: 4 MMOL/L (ref 4–13)
AST SERPL W P-5'-P-CCNC: 24 U/L (ref 5–45)
BILIRUB SERPL-MCNC: 0.42 MG/DL (ref 0.2–1)
BUN SERPL-MCNC: 17 MG/DL (ref 5–25)
CALCIUM SERPL-MCNC: 9.2 MG/DL (ref 8.3–10.1)
CHLORIDE SERPL-SCNC: 103 MMOL/L (ref 100–108)
CHOLEST SERPL-MCNC: 97 MG/DL (ref 50–200)
CO2 SERPL-SCNC: 28 MMOL/L (ref 21–32)
CREAT SERPL-MCNC: 0.95 MG/DL (ref 0.6–1.3)
EST. AVERAGE GLUCOSE BLD GHB EST-MCNC: 169 MG/DL
GFR SERPL CREATININE-BSD FRML MDRD: 77 ML/MIN/1.73SQ M
GLUCOSE P FAST SERPL-MCNC: 126 MG/DL (ref 65–99)
HBA1C MFR BLD: 7.5 %
HDLC SERPL-MCNC: 37 MG/DL
LDLC SERPL CALC-MCNC: 46 MG/DL (ref 0–100)
POTASSIUM SERPL-SCNC: 4.4 MMOL/L (ref 3.5–5.3)
PROT SERPL-MCNC: 7.1 G/DL (ref 6.4–8.2)
SODIUM SERPL-SCNC: 135 MMOL/L (ref 136–145)
TRIGL SERPL-MCNC: 71 MG/DL

## 2020-02-20 PROCEDURE — 83036 HEMOGLOBIN GLYCOSYLATED A1C: CPT

## 2020-02-20 PROCEDURE — 80061 LIPID PANEL: CPT

## 2020-02-20 PROCEDURE — 36415 COLL VENOUS BLD VENIPUNCTURE: CPT

## 2020-02-20 PROCEDURE — 80053 COMPREHEN METABOLIC PANEL: CPT

## 2020-02-26 ENCOUNTER — TELEPHONE (OUTPATIENT)
Dept: ENDOCRINOLOGY | Facility: CLINIC | Age: 80
End: 2020-02-26

## 2020-02-26 NOTE — TELEPHONE ENCOUNTER
----- Message from [place] sent at 2/25/2020  2:34 PM EST -----  Please call the patient regarding his abnormal result   Slight increase in A1C, rest of labs stable

## 2020-03-03 ENCOUNTER — OFFICE VISIT (OUTPATIENT)
Dept: FAMILY MEDICINE CLINIC | Facility: CLINIC | Age: 80
End: 2020-03-03
Payer: MEDICARE

## 2020-03-03 VITALS
WEIGHT: 177.4 LBS | SYSTOLIC BLOOD PRESSURE: 160 MMHG | HEIGHT: 72 IN | DIASTOLIC BLOOD PRESSURE: 70 MMHG | BODY MASS INDEX: 24.03 KG/M2

## 2020-03-03 DIAGNOSIS — M48.061 SPINAL STENOSIS OF LUMBAR REGION, UNSPECIFIED WHETHER NEUROGENIC CLAUDICATION PRESENT: ICD-10-CM

## 2020-03-03 DIAGNOSIS — R53.82 CHRONIC FATIGUE: ICD-10-CM

## 2020-03-03 DIAGNOSIS — J32.0 CHRONIC MAXILLARY SINUSITIS: ICD-10-CM

## 2020-03-03 DIAGNOSIS — Z00.00 MEDICARE ANNUAL WELLNESS VISIT, SUBSEQUENT: Primary | ICD-10-CM

## 2020-03-03 PROCEDURE — 3051F HG A1C>EQUAL 7.0%<8.0%: CPT | Performed by: FAMILY MEDICINE

## 2020-03-03 PROCEDURE — 4040F PNEUMOC VAC/ADMIN/RCVD: CPT | Performed by: FAMILY MEDICINE

## 2020-03-03 PROCEDURE — 1160F RVW MEDS BY RX/DR IN RCRD: CPT | Performed by: FAMILY MEDICINE

## 2020-03-03 PROCEDURE — 1125F AMNT PAIN NOTED PAIN PRSNT: CPT | Performed by: FAMILY MEDICINE

## 2020-03-03 PROCEDURE — 3077F SYST BP >= 140 MM HG: CPT | Performed by: FAMILY MEDICINE

## 2020-03-03 PROCEDURE — 2022F DILAT RTA XM EVC RTNOPTHY: CPT | Performed by: FAMILY MEDICINE

## 2020-03-03 PROCEDURE — 3008F BODY MASS INDEX DOCD: CPT | Performed by: FAMILY MEDICINE

## 2020-03-03 PROCEDURE — 1170F FXNL STATUS ASSESSED: CPT | Performed by: FAMILY MEDICINE

## 2020-03-03 PROCEDURE — 3078F DIAST BP <80 MM HG: CPT | Performed by: FAMILY MEDICINE

## 2020-03-03 PROCEDURE — G0438 PPPS, INITIAL VISIT: HCPCS | Performed by: FAMILY MEDICINE

## 2020-03-03 PROCEDURE — 1036F TOBACCO NON-USER: CPT | Performed by: FAMILY MEDICINE

## 2020-03-03 RX ORDER — AMOXICILLIN 500 MG/1
1000 CAPSULE ORAL EVERY 12 HOURS SCHEDULED
Qty: 40 CAPSULE | Refills: 0 | Status: SHIPPED | OUTPATIENT
Start: 2020-03-03 | End: 2020-03-13

## 2020-03-03 NOTE — PATIENT INSTRUCTIONS
Medicare Preventive Visit Patient Instructions  Thank you for completing your Welcome to Medicare Visit or Medicare Annual Wellness Visit today  Your next wellness visit will be due in one year (3/3/2021)  The screening/preventive services that you may require over the next 5-10 years are detailed below  Some tests may not apply to you based off risk factors and/or age  Screening tests ordered at today's visit but not completed yet may show as past due  Also, please note that scanned in results may not display below  Preventive Screenings:  Service Recommendations Previous Testing/Comments   Colorectal Cancer Screening  · Colonoscopy    · Fecal Occult Blood Test (FOBT)/Fecal Immunochemical Test (FIT)  · Fecal DNA/Cologuard Test  · Flexible Sigmoidoscopy Age: 54-65 years old   Colonoscopy: every 10 years (May be performed more frequently if at higher risk)  OR  FOBT/FIT: every 1 year  OR  Cologuard: every 3 years  OR  Sigmoidoscopy: every 5 years  Screening may be recommended earlier than age 48 if at higher risk for colorectal cancer  Also, an individualized decision between you and your healthcare provider will decide whether screening between the ages of 74-80 would be appropriate   Colonoscopy: Not on file  FOBT/FIT: Not on file  Cologuard: Not on file  Sigmoidoscopy: Not on file         Prostate Cancer Screening Individualized decision between patient and health care provider in men between ages of 53-78   Medicare will cover every 12 months beginning on the day after your 50th birthday PSA: 0 9 ng/mL     Screening Not Indicated     Hepatitis C Screening Once for adults born between 1945 and 1965  More frequently in patients at high risk for Hepatitis C Hep C Antibody: Not on file       Diabetes Screening 1-2 times per year if you're at risk for diabetes or have pre-diabetes Fasting glucose: 126 mg/dL   A1C: 7 5 %    Screening Not Indicated  History Diabetes   Cholesterol Screening Once every 5 years if you don't have a lipid disorder  May order more often based on risk factors  Lipid panel: 02/20/2020    Screening Not Indicated  History Lipid Disorder      Other Preventive Screenings Covered by Medicare:  1  Abdominal Aortic Aneurysm (AAA) Screening: covered once if your at risk  You're considered to be at risk if you have a family history of AAA or a male between the age of 73-68 who smoking at least 100 cigarettes in your lifetime  2  Lung Cancer Screening: covers low dose CT scan once per year if you meet all of the following conditions: (1) Age 50-69; (2) No signs or symptoms of lung cancer; (3) Current smoker or have quit smoking within the last 15 years; (4) You have a tobacco smoking history of at least 30 pack years (packs per day x number of years you smoked); (5) You get a written order from a healthcare provider  3  Glaucoma Screening: covered annually if you're considered high risk: (1) You have diabetes OR (2) Family history of glaucoma OR (3)  aged 48 and older OR (3)  American aged 72 and older  3  Osteoporosis Screening: covered every 2 years if you meet one of the following conditions: (1) Have a vertebral abnormality; (2) On glucocorticoid therapy for more than 3 months; (3) Have primary hyperparathyroidism; (4) On osteoporosis medications and need to assess response to drug therapy  5  HIV Screening: covered annually if you're between the age of 12-76  Also covered annually if you are younger than 13 and older than 72 with risk factors for HIV infection  For pregnant patients, it is covered up to 3 times per pregnancy      Immunizations:  Immunization Recommendations   Influenza Vaccine Annual influenza vaccination during flu season is recommended for all persons aged >= 6 months who do not have contraindications   Pneumococcal Vaccine (Prevnar and Pneumovax)  * Prevnar = PCV13  * Pneumovax = PPSV23 Adults 25-60 years old: 1-3 doses may be recommended based on certain risk factors  Adults 72 years old: Prevnar (PCV13) vaccine recommended followed by Pneumovax (PPSV23) vaccine  If already received PPSV23 since turning 65, then PCV13 recommended at least one year after PPSV23 dose  Hepatitis B Vaccine 3 dose series if at intermediate or high risk (ex: diabetes, end stage renal disease, liver disease)   Tetanus (Td) Vaccine - COST NOT COVERED BY MEDICARE PART B Following completion of primary series, a booster dose should be given every 10 years to maintain immunity against tetanus  Td may also be given as tetanus wound prophylaxis  Tdap Vaccine - COST NOT COVERED BY MEDICARE PART B Recommended at least once for all adults  For pregnant patients, recommended with each pregnancy  Shingles Vaccine (Shingrix) - COST NOT COVERED BY MEDICARE PART B  2 shot series recommended in those aged 48 and above     Health Maintenance Due:  There are no preventive care reminders to display for this patient  Immunizations Due:      Topic Date Due    Pneumococcal Vaccine: 65+ Years (2 of 2 - PPSV23) 11/10/2016     Advance Directives   What are advance directives? Advance directives are legal documents that state your wishes and plans for medical care  These plans are made ahead of time in case you lose your ability to make decisions for yourself  Advance directives can apply to any medical decision, such as the treatments you want, and if you want to donate organs  What are the types of advance directives? There are many types of advance directives, and each state has rules about how to use them  You may choose a combination of any of the following:  · Living will: This is a written record of the treatment you want  You can also choose which treatments you do not want, which to limit, and which to stop at a certain time  This includes surgery, medicine, IV fluid, and tube feedings  · Durable power of  for healthcare [facility]):   This is a written record that states who you want to make healthcare choices for you when you are unable to make them for yourself  This person, called a proxy, is usually a family member or a friend  You may choose more than 1 proxy  · Do not resuscitate (DNR) order:  A DNR order is used in case your heart stops beating or you stop breathing  It is a request not to have certain forms of treatment, such as CPR  A DNR order may be included in other types of advance directives  · Medical directive: This covers the care that you want if you are in a coma, near death, or unable to make decisions for yourself  You can list the treatments you want for each condition  Treatment may include pain medicine, surgery, blood transfusions, dialysis, IV or tube feedings, and a ventilator (breathing machine)  · Values history: This document has questions about your views, beliefs, and how you feel and think about life  This information can help others choose the care that you would choose  Why are advance directives important? An advance directive helps you control your care  Although spoken wishes may be used, it is better to have your wishes written down  Spoken wishes can be misunderstood, or not followed  Treatments may be given even if you do not want them  An advance directive may make it easier for your family to make difficult choices about your care  © Copyright [employer] 2018 Information is for End User's use only and may not be sold, redistributed or otherwise used for commercial purposes   All illustrations and images included in CareNotes® are the copyrighted property of [employer]  or [address] [place] [address] [employer]

## 2020-03-03 NOTE — PROGRESS NOTES
Assessment and Plan:  Patient has a chronic cough from sinus and postnasal drip has not been on any antibiotics sinuses are hyperemic he has been suffering with this for several weeks I am going to treat him with amoxicillin also his back is bothering him more than ever has if he goes out to do any work he can only work for short period of time in the feels like his back is tired and achy and he has to sit down he has radiculopathy also bilaterally so far no signs of cauda equina syndrome going to order reimaging of the lumbar spine to include x-rays and MRI     Problem List Items Addressed This Visit     None           Preventive health issues were discussed with patient, and age appropriate screening tests were ordered as noted in patient's After Visit Summary  Personalized health advice and appropriate referrals for health education or preventive services given if needed, as noted in patient's After Visit Summary  History of Present Illness:     Patient presents for Welcome to Medicare visit  Patient Care Team:  Senthil Garcia DO as PCP - Ramos Stuart MD as PCP - Endocrinology (Endocrinology)  DO Sb Cunha MD     Review of Systems:     Review of Systems   Constitutional: Negative for activity change, appetite change, diaphoresis, fatigue and fever  HENT: Positive for sinus pressure and sore throat  Eyes: Negative  Respiratory: Positive for cough  Negative for apnea, chest tightness, shortness of breath and wheezing  Cardiovascular: Negative for chest pain, palpitations and leg swelling  Gastrointestinal: Negative for abdominal distention, abdominal pain, anal bleeding, constipation, diarrhea, nausea and vomiting  Endocrine: Negative for cold intolerance, heat intolerance, polydipsia, polyphagia and polyuria  Genitourinary: Negative for difficulty urinating, dysuria, flank pain, hematuria and urgency     Musculoskeletal: Negative for arthralgias, back pain, gait problem, joint swelling and myalgias  Skin: Negative for color change, rash and wound  Allergic/Immunologic: Negative for environmental allergies, food allergies and immunocompromised state  Neurological: Negative for dizziness, seizures, syncope, speech difficulty, numbness and headaches  Hematological: Negative for adenopathy  Does not bruise/bleed easily  Psychiatric/Behavioral: Negative for agitation, behavioral problems, hallucinations, sleep disturbance and suicidal ideas        Problem List:     Patient Active Problem List   Diagnosis    Benign essential hypertension    Benign prostatic hyperplasia with lower urinary tract symptoms    Esophageal dysphagia    Herniated lumbar intervertebral disc    Hyperlipidemia    Vasomotor rhinitis    Elevated TSH    Type 2 diabetes mellitus with diabetic polyneuropathy (HCC)    Type 2 diabetes mellitus without complication, without long-term current use of insulin (HCC)      Past Medical and Surgical History:     Past Medical History:   Diagnosis Date    Abnormal blood chemistry     Abnormal glucose     Last assessed - 2/20/14    Benign essential hypertension     Last assessed - 5/15/17    Chronic allergic rhinitis     Last assessed - 8/1/16    Dermatitis     Last assessed - 6/16/15    High risk medication use     Last assessed - 5/2/16    Sciatica     Last assessed - 12/18/12    Septic bursitis     Last assessed - 9/3/13    Tick bite     Last assessed - 8/8/16     Past Surgical History:   Procedure Laterality Date    CATARACT EXTRACTION      COLONOSCOPY      Fiberoptic    PILONIDAL CYST EXCISION        Family History:     Family History   Problem Relation Age of Onset    Hypertension Mother     Osteoporosis Mother     Diabetes type I Paternal Aunt     Colon cancer Family       Social History:        Social History     Socioeconomic History    Marital status: /Civil Union     Spouse name: None    Number of children: None    Years of education: None    Highest education level: None   Occupational History    None   Social Needs    Financial resource strain: None    Food insecurity:     Worry: None     Inability: None    Transportation needs:     Medical: None     Non-medical: None   Tobacco Use    Smoking status: Former Smoker     Last attempt to quit:      Years since quittin 1    Smokeless tobacco: Never Used   Substance and Sexual Activity    Alcohol use:  Yes     Alcohol/week: 1 0 standard drinks     Types: 1 Cans of beer per week     Frequency: 4 or more times a week     Comment: social usage    Drug use: Never    Sexual activity: None   Lifestyle    Physical activity:     Days per week: None     Minutes per session: None    Stress: None   Relationships    Social connections:     Talks on phone: None     Gets together: None     Attends Rastafari service: None     Active member of club or organization: None     Attends meetings of clubs or organizations: None     Relationship status: None    Intimate partner violence:     Fear of current or ex partner: None     Emotionally abused: None     Physically abused: None     Forced sexual activity: None   Other Topics Concern    None   Social History Narrative    Advance directive on file      Medications and Allergies:     Current Outpatient Medications   Medication Sig Dispense Refill    amLODIPine (NORVASC) 5 mg tablet TAKE ONE TABLET BY MOUTH EVERY DAY 90 tablet 2    B Complex-Biotin-FA (B COMPLETE) TABS Take by mouth      BLAKE MICROLET LANCETS lancets TEST BLOOD SUGAR TWO TIMES A  each 0    Blood Glucose Monitoring Suppl (CONTOUR NEXT MONITOR) w/Device KIT 1 Device by Does not apply route 2 (two) times a day 1 kit 0    Coenzyme Q10 (COQ10 PO) Take by mouth      CONTOUR NEXT TEST test strip TEST BLOOD SUGAR TWICE DAILY 100 each 2    docusate sodium (COLACE) 100 mg capsule Take 1 capsule by mouth as needed      fexofenadine (ALLEGRA) 180 MG tablet Take by mouth as needed        fluticasone (FLONASE) 50 mcg/act nasal spray INSTILL ONE SPRAY TO EACH NOSTRIL TWICE A DAY 32 g 1    glimepiride (AMARYL) 2 mg tablet TAKE ONE-HALF TABLET BY MOUTH TWICE A DAY 90 tablet 1    ipratropium (ATROVENT) 0 06 % nasal spray INSTILL TWO SPRAYS INTO EACH NOSTRIL FOUR TIMES A DAY 15 mL 0    lisinopril (ZESTRIL) 20 mg tablet TAKE ONE TABLET BY MOUTH EVERY DAY 90 tablet 3    metFORMIN (GLUCOPHAGE) 500 mg tablet TAKE TWO TO FOUR TABLETS BY MOUTH EVERY DAY PER BLOOD GLUCOSE LEVELS 360 tablet 1    simvastatin (ZOCOR) 40 mg tablet TAKE ONE TABLET BY MOUTH EVERY DAY 90 tablet 0    Specialty Vitamins Products (MAGNESIUM, AMINO ACID CHELATE,) 133 MG tablet Take 1 tablet by mouth 2 (two) times a day      Thiamine 50 MG CAPS Take 1 capsule by mouth daily       No current facility-administered medications for this visit  No Known Allergies   Immunizations:     Immunization History   Administered Date(s) Administered    Influenza Quadrivalent Preservative Free 3 years and older IM 09/11/2014    Influenza Split High Dose Preservative Free IM 09/13/2012, 09/03/2013, 10/20/2015, 10/06/2016, 10/12/2017    Influenza TIV (IM) 1940    Influenza, high dose seasonal 0 5 mL 10/15/2018, 10/08/2019    Pneumococcal Conjugate 13-Valent 11/10/2015    Pneumococcal Polysaccharide PPV23 10/01/2002    Tdap 11/02/2010    Zoster 1940      Health Maintenance: There are no preventive care reminders to display for this patient  Topic Date Due    Pneumococcal Vaccine: 65+ Years (2 of 2 - PPSV23) 11/10/2016      Medicare Screening Tests and Risk Assessments:     Raheel Stapleton is here for his Subsequent Wellness visit  Health Risk Assessment:   Patient rates overall health as very good  Patient feels that their physical health rating is same  Eyesight was rated as same  Hearing was rated as same  Patient feels that their emotional and mental health rating is same   Pain experienced in the last 7 days has been some  Patient's pain rating has been 3/10  Patient states that he has experienced no weight loss or gain in last 6 months  Depression Screening:   PHQ-2 Score: 0      Fall Risk Screening: In the past year, patient has experienced: no history of falling in past year      Home Safety:  Patient does not have trouble with stairs inside or outside of their home  Patient has working smoke alarms and has working carbon monoxide detector  Home safety hazards include: none  Nutrition:   Current diet is Regular, Limited junk food and Low Carb  Medications:   Patient is currently taking over-the-counter supplements  OTC medications include: see medication list  Patient is able to manage medications  Activities of Daily Living (ADLs)/Instrumental Activities of Daily Living (IADLs):   Walk and transfer into and out of bed and chair?: Yes  Dress and groom yourself?: Yes    Bathe or shower yourself?: Yes    Feed yourself? Yes  Do your laundry/housekeeping?: Yes  Manage your money, pay your bills and track your expenses?: Yes  Make your own meals?: Yes    Do your own shopping?: Yes    Previous Hospitalizations:   Any hospitalizations or ED visits within the last 12 months?: No      Advance Care Planning:   Living will: Yes    Durable POA for healthcare:  Yes    Advanced directive: Yes    Advanced directive counseling given: No    Five wishes given: No    Patient declined ACP directive: No    End of Life Decisions reviewed with patient: Yes    Provider agrees with end of life decisions: Yes      PREVENTIVE SCREENINGS      Cardiovascular Screening:    General: Screening Not Indicated and History Lipid Disorder      Diabetes Screening:     General: Screening Not Indicated and History Diabetes      Prostate Cancer Screening:    General: Screening Not Indicated      Abdominal Aortic Aneurysm (AAA) Screening:    Risk factors include: tobacco use        Lung Cancer Screening:     General: Screening Not Indicated    No exam data present     Physical Exam:     /70 (BP Location: Left arm, Patient Position: Sitting, Cuff Size: Standard)   Ht 6' (1 829 m)   Wt 80 5 kg (177 lb 6 4 oz)   BMI 24 06 kg/m²     Physical Exam   Constitutional: He is oriented to person, place, and time  He appears well-developed and well-nourished  No distress  HENT:   Head: Normocephalic  Right Ear: External ear normal    Left Ear: External ear normal    Nose: Nose normal    Mouth/Throat: Oropharynx is clear and moist    Eyes: Pupils are equal, round, and reactive to light  Conjunctivae and EOM are normal  Right eye exhibits no discharge  Left eye exhibits no discharge  No scleral icterus  Neck: Normal range of motion  No tracheal deviation present  No thyromegaly present  Cardiovascular: Normal rate, regular rhythm and normal heart sounds  Exam reveals no gallop and no friction rub  No murmur heard  Pulmonary/Chest: Effort normal and breath sounds normal  No respiratory distress  He has no wheezes  Abdominal: Soft  Bowel sounds are normal  He exhibits no mass  There is no tenderness  There is no guarding  Musculoskeletal: He exhibits no edema or deformity  Lymphadenopathy:     He has no cervical adenopathy  Neurological: He is alert and oriented to person, place, and time  No cranial nerve deficit  Skin: Skin is warm and dry  No rash noted  He is not diaphoretic  No erythema  Psychiatric: He has a normal mood and affect   Thought content normal        Nasrin Butler DO

## 2020-03-06 ENCOUNTER — LAB (OUTPATIENT)
Dept: LAB | Facility: CLINIC | Age: 80
End: 2020-03-06
Payer: MEDICARE

## 2020-03-06 ENCOUNTER — APPOINTMENT (OUTPATIENT)
Dept: RADIOLOGY | Facility: CLINIC | Age: 80
End: 2020-03-06
Payer: MEDICARE

## 2020-03-06 DIAGNOSIS — R53.82 CHRONIC FATIGUE: ICD-10-CM

## 2020-03-06 DIAGNOSIS — M48.061 SPINAL STENOSIS OF LUMBAR REGION, UNSPECIFIED WHETHER NEUROGENIC CLAUDICATION PRESENT: ICD-10-CM

## 2020-03-06 LAB
BASOPHILS # BLD AUTO: 0.04 THOUSANDS/ΜL (ref 0–0.1)
BASOPHILS NFR BLD AUTO: 1 % (ref 0–1)
EOSINOPHIL # BLD AUTO: 0.11 THOUSAND/ΜL (ref 0–0.61)
EOSINOPHIL NFR BLD AUTO: 2 % (ref 0–6)
ERYTHROCYTE [DISTWIDTH] IN BLOOD BY AUTOMATED COUNT: 12.7 % (ref 11.6–15.1)
HCT VFR BLD AUTO: 34.4 % (ref 36.5–49.3)
HGB BLD-MCNC: 10.8 G/DL (ref 12–17)
IMM GRANULOCYTES # BLD AUTO: 0.02 THOUSAND/UL (ref 0–0.2)
IMM GRANULOCYTES NFR BLD AUTO: 0 % (ref 0–2)
LYMPHOCYTES # BLD AUTO: 1.39 THOUSANDS/ΜL (ref 0.6–4.47)
LYMPHOCYTES NFR BLD AUTO: 24 % (ref 14–44)
MCH RBC QN AUTO: 29.8 PG (ref 26.8–34.3)
MCHC RBC AUTO-ENTMCNC: 31.4 G/DL (ref 31.4–37.4)
MCV RBC AUTO: 95 FL (ref 82–98)
MONOCYTES # BLD AUTO: 0.5 THOUSAND/ΜL (ref 0.17–1.22)
MONOCYTES NFR BLD AUTO: 9 % (ref 4–12)
NEUTROPHILS # BLD AUTO: 3.83 THOUSANDS/ΜL (ref 1.85–7.62)
NEUTS SEG NFR BLD AUTO: 64 % (ref 43–75)
NRBC BLD AUTO-RTO: 0 /100 WBCS
PLATELET # BLD AUTO: 317 THOUSANDS/UL (ref 149–390)
PMV BLD AUTO: 10.3 FL (ref 8.9–12.7)
RBC # BLD AUTO: 3.62 MILLION/UL (ref 3.88–5.62)
WBC # BLD AUTO: 5.89 THOUSAND/UL (ref 4.31–10.16)

## 2020-03-06 PROCEDURE — 72114 X-RAY EXAM L-S SPINE BENDING: CPT

## 2020-03-06 PROCEDURE — 85025 COMPLETE CBC W/AUTO DIFF WBC: CPT

## 2020-03-06 PROCEDURE — 36415 COLL VENOUS BLD VENIPUNCTURE: CPT

## 2020-03-09 NOTE — RESULT ENCOUNTER NOTE
Please call the patient regarding his abnormal result    Hemoglobin is dropping slightly it is down to 10 8 so patient needs to be up-to-date on his colorectal screening and he needs a consultation with Hematology

## 2020-03-14 DIAGNOSIS — J30.1 SEASONAL ALLERGIC RHINITIS DUE TO POLLEN: ICD-10-CM

## 2020-03-15 DIAGNOSIS — J30.0 VASOMOTOR RHINITIS: ICD-10-CM

## 2020-03-16 RX ORDER — IPRATROPIUM BROMIDE 42 UG/1
1 SPRAY, METERED NASAL 3 TIMES DAILY
Qty: 15 ML | Refills: 0 | Status: SHIPPED | OUTPATIENT
Start: 2020-03-16 | End: 2021-03-16 | Stop reason: SDUPTHER

## 2020-03-16 RX ORDER — FLUTICASONE PROPIONATE 50 MCG
SPRAY, SUSPENSION (ML) NASAL
Qty: 32 G | Refills: 1 | Status: SHIPPED | OUTPATIENT
Start: 2020-03-16 | End: 2020-12-28

## 2020-03-17 NOTE — PROGRESS NOTES
Established Patient Progress Note      No chief complaint on file  History of Present Illness:   Fabi Singh is a 68 y o  male with a history of HTN, HLD, and type 2 diabetes without long term use of insulin  Reports complications of neuropathy  Last A1C 7 5  Denies recent illness or hospitalizations  Denies recent severe hypoglycemic or severe hyperglycemic episodes  Denies any issues with his current regimen   Home glucose monitoring: {Home testin}    Home blood glucose readings:   Before breakfast: ***  Before lunch: ***  Before dinner: ***  Bedtime: ***    Current regimen: Glimipride 1 mg BID and Metformin 1,000 mg BID  {Desc; compliance:5303::"compliant most of the time"}{EFO Amb Denies/Reports Side Effects:53909}   Injects in: *** Rotates sites: {YES/NO:38033}   Hypoglycemic episodes: {YES/NO:20547} {Frequencies; rare to continuous:5330}   H/o of hypoglycemia causing hospitalization or Intervention such as glucagon injection or ambulance call {YES/NO:57335}   Hypoglycemia symptoms: {symptoms; hypoglycemia:94868}   Treatment of hypoglycemia:   Glucagon:{YES/NO:36192}   Medic alert tag: recommended,{YES/NO:27637}       Last Eye Exam: 19  Last Foot Exam: 19    Has hypertension: Taking Amlodipine and Lisinopril  Has hyperlipidemia: Taking Simvastatin       Patient Active Problem List   Diagnosis    Benign essential hypertension    Benign prostatic hyperplasia with lower urinary tract symptoms    Esophageal dysphagia    Herniated lumbar intervertebral disc    Hyperlipidemia    Vasomotor rhinitis    Elevated TSH    Type 2 diabetes mellitus with diabetic polyneuropathy (HCC)    Type 2 diabetes mellitus without complication, without long-term current use of insulin (HCC)      Past Medical History:   Diagnosis Date    Abnormal blood chemistry     Abnormal glucose     Last assessed - 14    Benign essential hypertension     Last assessed - 5/15/17    Chronic allergic rhinitis Last assessed - 16    Dermatitis     Last assessed - 6/16/15    High risk medication use     Last assessed - 16    Sciatica     Last assessed - 12    Septic bursitis     Last assessed - 9/3/13    Tick bite     Last assessed - 16      Past Surgical History:   Procedure Laterality Date    CATARACT EXTRACTION      COLONOSCOPY      Fiberoptic    PILONIDAL CYST EXCISION        Family History   Problem Relation Age of Onset    Hypertension Mother     Osteoporosis Mother     Diabetes type I Paternal Aunt     Colon cancer Family      Social History     Tobacco Use    Smoking status: Former Smoker     Last attempt to quit:      Years since quittin 2    Smokeless tobacco: Never Used   Substance Use Topics    Alcohol use:  Yes     Alcohol/week: 1 0 standard drinks     Types: 1 Cans of beer per week     Frequency: 4 or more times a week     Comment: social usage     No Known Allergies      Current Outpatient Medications:     amLODIPine (NORVASC) 5 mg tablet, TAKE ONE TABLET BY MOUTH EVERY DAY, Disp: 90 tablet, Rfl: 2    B Complex-Biotin-FA (B COMPLETE) TABS, Take by mouth, Disp: , Rfl:     BLAKE MICROLET LANCETS lancets, TEST BLOOD SUGAR TWO TIMES A DAY, Disp: 100 each, Rfl: 0    Blood Glucose Monitoring Suppl (CONTOUR NEXT MONITOR) w/Device KIT, 1 Device by Does not apply route 2 (two) times a day, Disp: 1 kit, Rfl: 0    Coenzyme Q10 (COQ10 PO), Take by mouth, Disp: , Rfl:     CONTOUR NEXT TEST test strip, TEST BLOOD SUGAR TWICE DAILY, Disp: 100 each, Rfl: 2    docusate sodium (COLACE) 100 mg capsule, Take 1 capsule by mouth as needed, Disp: , Rfl:     fexofenadine (ALLEGRA) 180 MG tablet, Take by mouth as needed  , Disp: , Rfl:     fluticasone (FLONASE) 50 mcg/act nasal spray, INSTILL ONE SPRAY TO EACH NOSTRIL TWICE A DAY, Disp: 32 g, Rfl: 1    glimepiride (AMARYL) 2 mg tablet, TAKE ONE-HALF TABLET BY MOUTH TWICE A DAY, Disp: 90 tablet, Rfl: 1    ipratropium (ATROVENT) 0 06 % nasal spray, 1 spray into each nostril 3 (three) times a day, Disp: 15 mL, Rfl: 0    lisinopril (ZESTRIL) 20 mg tablet, TAKE ONE TABLET BY MOUTH EVERY DAY, Disp: 90 tablet, Rfl: 3    metFORMIN (GLUCOPHAGE) 500 mg tablet, TAKE TWO TO FOUR TABLETS BY MOUTH EVERY DAY PER BLOOD GLUCOSE LEVELS, Disp: 360 tablet, Rfl: 1    simvastatin (ZOCOR) 40 mg tablet, TAKE ONE TABLET BY MOUTH EVERY DAY, Disp: 90 tablet, Rfl: 0    Specialty Vitamins Products (MAGNESIUM, AMINO ACID CHELATE,) 133 MG tablet, Take 1 tablet by mouth 2 (two) times a day, Disp: , Rfl:     Thiamine 50 MG CAPS, Take 1 capsule by mouth daily, Disp: , Rfl:     Review of Systems   Constitutional: Negative for activity change, appetite change and fatigue  HENT: Negative for sore throat, trouble swallowing and voice change  Eyes: Negative for visual disturbance  Respiratory: Negative for choking, chest tightness and shortness of breath  Cardiovascular: Negative for chest pain, palpitations and leg swelling  Gastrointestinal: Negative for abdominal pain, constipation and diarrhea  Endocrine: Negative for cold intolerance, heat intolerance, polydipsia, polyphagia and polyuria  Genitourinary: Negative for frequency  Musculoskeletal: Negative for arthralgias and myalgias  Skin: Negative for rash  Neurological: Negative for dizziness and syncope  Hematological: Negative for adenopathy  Psychiatric/Behavioral: Negative for sleep disturbance  All other systems reviewed and are negative  Physical Exam:  There is no height or weight on file to calculate BMI  There were no vitals taken for this visit  Wt Readings from Last 3 Encounters:   03/03/20 80 5 kg (177 lb 6 4 oz)   11/15/19 78 8 kg (173 lb 12 8 oz)   10/16/19 78 kg (172 lb)       Physical Exam   Constitutional: He is oriented to person, place, and time  He appears well-developed and well-nourished  No distress  HENT:   Head: Normocephalic and atraumatic  Mouth/Throat: Oropharynx is clear and moist    Eyes: Pupils are equal, round, and reactive to light  Conjunctivae and EOM are normal    Neck: Normal range of motion  Neck supple  No thyromegaly present  Cardiovascular: Normal rate, regular rhythm and normal heart sounds  No murmur heard  Pulmonary/Chest: Effort normal and breath sounds normal  No respiratory distress  He has no wheezes  He has no rales  Abdominal: Soft  Bowel sounds are normal  He exhibits no distension  There is no tenderness  Musculoskeletal: Normal range of motion  He exhibits no edema  Lymphadenopathy:     He has no cervical adenopathy  Neurological: He is alert and oriented to person, place, and time  Skin: Skin is warm and dry  Psychiatric: He has a normal mood and affect  Vitals reviewed      Diabetic Foot Exam    Labs:     Lab Results   Component Value Date    HGBA1C 7 5 (H) 02/20/2020       Lab Results   Component Value Date     10/20/2015    SODIUM 135 (L) 02/20/2020    K 4 4 02/20/2020     02/20/2020    CO2 28 02/20/2020    ANIONGAP 8 10/20/2015    AGAP 4 02/20/2020    BUN 17 02/20/2020    CREATININE 0 95 02/20/2020    GLUC 118 07/12/2019    GLUF 126 (H) 02/20/2020    CALCIUM 9 2 02/20/2020    AST 24 02/20/2020    ALT 30 02/20/2020    ALKPHOS 86 02/20/2020    PROT 7 0 04/23/2015    TP 7 1 02/20/2020    BILITOT 0 54 04/23/2015    TBILI 0 42 02/20/2020    EGFR 77 02/20/2020         Lab Results   Component Value Date    CHOL 114 04/23/2015    HDL 37 (L) 02/20/2020    TRIG 71 02/20/2020       Lab Results   Component Value Date    QXY1JYTZVDSK 3 960 (H) 02/20/2020    BPY4KOWNOQIV 3 700 10/23/2019    IYU6SIHBHYRU 3 540 03/28/2019     Lab Results   Component Value Date    FREET4 1 10 10/23/2019       Impression & Plan:    Problem List Items Addressed This Visit        Endocrine    Type 2 diabetes mellitus without complication, without long-term current use of insulin ([facility] [address] ) - Primary       Cardiovascular and Mediastinum    Benign essential hypertension       Other    Hyperlipidemia          No orders of the defined types were placed in this encounter  There are no Patient Instructions on file for this visit  Discussed with the patient and all questioned fully answered  He will call me if any problems arise  Follow-up appointment in *** months       Counseled patient on diagnostic results, prognosis, risk and benefit of treatment options, instruction for management, importance of treatment compliance, Risk  factor reduction and impressions      Hoa Mccann [address]

## 2020-03-18 ENCOUNTER — TELEMEDICINE (OUTPATIENT)
Dept: ENDOCRINOLOGY | Facility: CLINIC | Age: 80
End: 2020-03-18
Payer: MEDICARE

## 2020-03-18 DIAGNOSIS — E78.2 MIXED HYPERLIPIDEMIA: ICD-10-CM

## 2020-03-18 DIAGNOSIS — I10 BENIGN ESSENTIAL HYPERTENSION: ICD-10-CM

## 2020-03-18 DIAGNOSIS — E11.9 TYPE 2 DIABETES MELLITUS WITHOUT COMPLICATION, WITHOUT LONG-TERM CURRENT USE OF INSULIN (HCC): Primary | ICD-10-CM

## 2020-03-18 PROCEDURE — 99442 PR PHYS/QHP TELEPHONE EVALUATION 11-20 MIN: CPT | Performed by: NURSE PRACTITIONER

## 2020-03-18 NOTE — ASSESSMENT & PLAN NOTE
Above goal due to dietary excursions has not been exercising as much as normal  Continue current regimen  Focus on dietary and lifestyle modifications

## 2020-03-18 NOTE — PROGRESS NOTES
Virtual Brief Visit    Reason for visit is follow up for Type 2 Diabetes    This virtual check-in was done via telephone  Encounter provider DANIEL Nugent    Provider located at [address]  [address] [place] [id number]      Recent Visits  No visits were found meeting these conditions  Showing recent visits within past 7 days and meeting all other requirements     Future Appointments  No visits were found meeting these conditions  Showing future appointments within next 150 days and meeting all other requirements        Patient agrees to participate in a virtual check in via telephone or video visit instead of presenting to the office to address urgent/immediate medical needs  Patient is aware this is a billable service  After connecting through telephone, the patient was identified by name and date of birth  Hiram Mejia was informed that this was a telemedicine visit and that the exam was being conducted confidentially over secure lines  My office door was closed  No one else was in the room  He acknowledged consent and understanding of privacy and security of the virtual check-in visit  I informed the patient that I have reviewed his record in Epic and presented the opportunity for him to ask any questions regarding the visit today  The patient initiated communication and agreed to participate  Subjective  Hiram Mejia is a 68 y o  male with history of HTN, HLD, and type 2 diabetes without long term use of insulin  Reports complications of neuropathy  Last A1C 7 5  Denies recent illness or hospitalizations  Denies recent severe hypoglycemic or severe hyperglycemic episodes  Denies any issues with his current regimen  Home glucose monitoring: are performed regularly      Home blood glucose readings:   Before breakfast: 90-100s  Before lunch: 80-100s  Before dinner: 140s  Bedtime: does not check     Current regimen: Glimipride 1 mg BID and Metformin 1,000 mg BID  compliant all of the timedenies any side effects from current medications     Hypoglycemic episodes: No never   H/o of hypoglycemia causing hospitalization or Intervention such as glucagon injection or ambulance call No     UTD on diabetic eye and foot exam, sees podiatrist every 8-9 weeks     Has hypertension: Taking Amlodipine and Lisinopril  Has hyperlipidemia: Taking Simvastatin    Labs reviewed with patient        Past Medical History:   Diagnosis Date    Abnormal blood chemistry     Abnormal glucose     Last assessed - 2/20/14    Benign essential hypertension     Last assessed - 5/15/17    Chronic allergic rhinitis     Last assessed - 8/1/16    Dermatitis     Last assessed - 6/16/15    High risk medication use     Last assessed - 5/2/16    Sciatica     Last assessed - 12/18/12    Septic bursitis     Last assessed - 9/3/13    Tick bite     Last assessed - 8/8/16       Past Surgical History:   Procedure Laterality Date    CATARACT EXTRACTION      COLONOSCOPY      Fiberoptic    PILONIDAL CYST EXCISION         Current Outpatient Medications   Medication Sig Dispense Refill    amLODIPine (NORVASC) 5 mg tablet TAKE ONE TABLET BY MOUTH EVERY DAY 90 tablet 2    B Complex-Biotin-FA (B COMPLETE) TABS Take by mouth      BLAKE MICROLET LANCETS lancets TEST BLOOD SUGAR TWO TIMES A  each 0    Blood Glucose Monitoring Suppl (CONTOUR NEXT MONITOR) w/Device KIT 1 Device by Does not apply route 2 (two) times a day 1 kit 0    Coenzyme Q10 (COQ10 PO) Take by mouth      CONTOUR NEXT TEST test strip TEST BLOOD SUGAR TWICE DAILY 100 each 2    docusate sodium (COLACE) 100 mg capsule Take 1 capsule by mouth as needed      fexofenadine (ALLEGRA) 180 MG tablet Take by mouth as needed        fluticasone (FLONASE) 50 mcg/act nasal spray INSTILL ONE SPRAY TO EACH NOSTRIL TWICE A DAY 32 g 1    glimepiride (AMARYL) 2 mg tablet TAKE ONE-HALF TABLET BY MOUTH TWICE A DAY 90 tablet 1    ipratropium (ATROVENT) 0 06 % nasal spray 1 spray into each nostril 3 (three) times a day 15 mL 0    lisinopril (ZESTRIL) 20 mg tablet TAKE ONE TABLET BY MOUTH EVERY DAY 90 tablet 3    metFORMIN (GLUCOPHAGE) 500 mg tablet TAKE TWO TO FOUR TABLETS BY MOUTH EVERY DAY PER BLOOD GLUCOSE LEVELS 360 tablet 1    simvastatin (ZOCOR) 40 mg tablet TAKE ONE TABLET BY MOUTH EVERY DAY 90 tablet 0    Specialty Vitamins Products (MAGNESIUM, AMINO ACID CHELATE,) 133 MG tablet Take 1 tablet by mouth 2 (two) times a day      Thiamine 50 MG CAPS Take 1 capsule by mouth daily       No current facility-administered medications for this visit  No Known Allergies    Assessment/Plan     Type 2 Diabetes: Above goal due to dietary excursions has not been exercising as much as normal  Continue current regimen  Focus on dietary and lifestyle modifications  HTN: Continue current regimen    HLD: Stable, continue statin        Disposition:    F/u in 3 months     I spent 15 minutes with the patient during this virtual check-in visit

## 2020-03-26 ENCOUNTER — HOSPITAL ENCOUNTER (OUTPATIENT)
Dept: MRI IMAGING | Facility: HOSPITAL | Age: 80
Discharge: HOME | End: 2020-03-26
Payer: MEDICARE

## 2020-03-26 DIAGNOSIS — M48.061 SPINAL STENOSIS OF LUMBAR REGION, UNSPECIFIED WHETHER NEUROGENIC CLAUDICATION PRESENT: ICD-10-CM

## 2020-03-26 PROCEDURE — 72148 MRI LUMBAR SPINE W/O DYE: CPT

## 2020-03-27 NOTE — RESULT ENCOUNTER NOTE
Please call the patient regarding his abnormal result    Patient has at least 2 areas of his lumbar spine where the nerve roots are being pinched in the spinal cord itself is being pinched by bulging discs he really should seek a neuro surgical consultation he should consult his daughter-in-law about their preference in the neuro surgical group at [facility] and whenever he lets me know who he would like to see all set up the visit his choices are Darci Arizmendi or others that her his daughter lawn might know

## 2020-03-31 DIAGNOSIS — M51.26 HERNIATED LUMBAR INTERVERTEBRAL DISC: Primary | ICD-10-CM

## 2020-04-08 ENCOUNTER — TELEMEDICINE (OUTPATIENT)
Dept: NEUROSURGERY | Facility: CLINIC | Age: 80
End: 2020-04-08
Payer: MEDICARE

## 2020-04-08 DIAGNOSIS — M54.10 RADICULOPATHY, UNSPECIFIED SPINAL REGION: ICD-10-CM

## 2020-04-08 DIAGNOSIS — M48.062 SPINAL STENOSIS OF LUMBAR REGION WITH NEUROGENIC CLAUDICATION: Primary | ICD-10-CM

## 2020-04-08 DIAGNOSIS — M51.26 HERNIATED LUMBAR INTERVERTEBRAL DISC: ICD-10-CM

## 2020-04-08 PROCEDURE — 99204 OFFICE O/P NEW MOD 45 MIN: CPT | Performed by: NEUROLOGICAL SURGERY

## 2020-04-08 PROCEDURE — 3051F HG A1C>EQUAL 7.0%<8.0%: CPT | Performed by: NEUROLOGICAL SURGERY

## 2020-04-17 ENCOUNTER — EVALUATION (OUTPATIENT)
Dept: PHYSICAL THERAPY | Facility: CLINIC | Age: 80
End: 2020-04-17
Payer: MEDICARE

## 2020-04-17 ENCOUNTER — TELEMEDICINE (OUTPATIENT)
Dept: PAIN MEDICINE | Facility: CLINIC | Age: 80
End: 2020-04-17
Payer: MEDICARE

## 2020-04-17 DIAGNOSIS — M48.062 SPINAL STENOSIS OF LUMBAR REGION WITH NEUROGENIC CLAUDICATION: ICD-10-CM

## 2020-04-17 DIAGNOSIS — M54.10 RADICULOPATHY, UNSPECIFIED SPINAL REGION: ICD-10-CM

## 2020-04-17 DIAGNOSIS — M51.26 HERNIATED LUMBAR INTERVERTEBRAL DISC: ICD-10-CM

## 2020-04-17 PROCEDURE — 99443 PR PHYS/QHP TELEPHONE EVALUATION 21-30 MIN: CPT | Performed by: ANESTHESIOLOGY

## 2020-04-17 PROCEDURE — 97162 PT EVAL MOD COMPLEX 30 MIN: CPT | Performed by: PHYSICAL THERAPIST

## 2020-04-17 PROCEDURE — 97530 THERAPEUTIC ACTIVITIES: CPT | Performed by: PHYSICAL THERAPIST

## 2020-04-18 DIAGNOSIS — E11.9 TYPE 2 DIABETES MELLITUS WITHOUT COMPLICATION, WITHOUT LONG-TERM CURRENT USE OF INSULIN (HCC): ICD-10-CM

## 2020-04-18 DIAGNOSIS — I10 ESSENTIAL HYPERTENSION: ICD-10-CM

## 2020-04-20 ENCOUNTER — TELEPHONE (OUTPATIENT)
Dept: UROLOGY | Facility: MEDICAL CENTER | Age: 80
End: 2020-04-20

## 2020-04-20 RX ORDER — GLIMEPIRIDE 2 MG/1
TABLET ORAL
Qty: 90 TABLET | Refills: 1 | Status: SHIPPED | OUTPATIENT
Start: 2020-04-20 | End: 2020-07-07

## 2020-04-20 RX ORDER — AMLODIPINE BESYLATE 5 MG/1
TABLET ORAL
Qty: 90 TABLET | Refills: 2 | Status: SHIPPED | OUTPATIENT
Start: 2020-04-20 | End: 2021-02-17

## 2020-04-21 ENCOUNTER — TELEMEDICINE (OUTPATIENT)
Dept: UROLOGY | Facility: AMBULATORY SURGERY CENTER | Age: 80
End: 2020-04-21
Payer: MEDICARE

## 2020-04-21 DIAGNOSIS — N40.1 BENIGN PROSTATIC HYPERPLASIA WITH NOCTURIA: Primary | ICD-10-CM

## 2020-04-21 DIAGNOSIS — R35.1 BENIGN PROSTATIC HYPERPLASIA WITH NOCTURIA: Primary | ICD-10-CM

## 2020-04-21 PROCEDURE — 99441 PR PHYS/QHP TELEPHONE EVALUATION 5-10 MIN: CPT | Performed by: NURSE PRACTITIONER

## 2020-04-22 ENCOUNTER — OFFICE VISIT (OUTPATIENT)
Dept: PHYSICAL THERAPY | Facility: CLINIC | Age: 80
End: 2020-04-22
Payer: MEDICARE

## 2020-04-22 DIAGNOSIS — M54.10 RADICULOPATHY, UNSPECIFIED SPINAL REGION: ICD-10-CM

## 2020-04-22 DIAGNOSIS — M48.062 SPINAL STENOSIS OF LUMBAR REGION WITH NEUROGENIC CLAUDICATION: ICD-10-CM

## 2020-04-22 DIAGNOSIS — M51.26 HERNIATED LUMBAR INTERVERTEBRAL DISC: Primary | ICD-10-CM

## 2020-04-22 PROCEDURE — 97110 THERAPEUTIC EXERCISES: CPT | Performed by: PHYSICAL THERAPIST

## 2020-04-22 PROCEDURE — 97140 MANUAL THERAPY 1/> REGIONS: CPT | Performed by: PHYSICAL THERAPIST

## 2020-04-24 ENCOUNTER — OFFICE VISIT (OUTPATIENT)
Dept: PHYSICAL THERAPY | Facility: CLINIC | Age: 80
End: 2020-04-24
Payer: MEDICARE

## 2020-04-24 DIAGNOSIS — M54.10 RADICULOPATHY, UNSPECIFIED SPINAL REGION: ICD-10-CM

## 2020-04-24 DIAGNOSIS — M48.062 SPINAL STENOSIS OF LUMBAR REGION WITH NEUROGENIC CLAUDICATION: ICD-10-CM

## 2020-04-24 DIAGNOSIS — M51.26 HERNIATED LUMBAR INTERVERTEBRAL DISC: Primary | ICD-10-CM

## 2020-04-24 PROCEDURE — 97110 THERAPEUTIC EXERCISES: CPT | Performed by: PHYSICAL THERAPIST

## 2020-04-24 PROCEDURE — 97140 MANUAL THERAPY 1/> REGIONS: CPT | Performed by: PHYSICAL THERAPIST

## 2020-04-27 ENCOUNTER — OFFICE VISIT (OUTPATIENT)
Dept: PHYSICAL THERAPY | Facility: CLINIC | Age: 80
End: 2020-04-27
Payer: MEDICARE

## 2020-04-27 DIAGNOSIS — M54.10 RADICULOPATHY, UNSPECIFIED SPINAL REGION: ICD-10-CM

## 2020-04-27 DIAGNOSIS — M51.26 HERNIATED LUMBAR INTERVERTEBRAL DISC: Primary | ICD-10-CM

## 2020-04-27 DIAGNOSIS — M48.062 SPINAL STENOSIS OF LUMBAR REGION WITH NEUROGENIC CLAUDICATION: ICD-10-CM

## 2020-04-27 PROCEDURE — 97112 NEUROMUSCULAR REEDUCATION: CPT | Performed by: PHYSICAL THERAPIST

## 2020-04-27 PROCEDURE — 97110 THERAPEUTIC EXERCISES: CPT | Performed by: PHYSICAL THERAPIST

## 2020-04-28 ENCOUNTER — TELEPHONE (OUTPATIENT)
Dept: UROLOGY | Facility: AMBULATORY SURGERY CENTER | Age: 80
End: 2020-04-28

## 2020-04-29 ENCOUNTER — OFFICE VISIT (OUTPATIENT)
Dept: PHYSICAL THERAPY | Facility: CLINIC | Age: 80
End: 2020-04-29
Payer: MEDICARE

## 2020-04-29 DIAGNOSIS — M51.26 HERNIATED LUMBAR INTERVERTEBRAL DISC: Primary | ICD-10-CM

## 2020-04-29 DIAGNOSIS — M54.10 RADICULOPATHY, UNSPECIFIED SPINAL REGION: ICD-10-CM

## 2020-04-29 DIAGNOSIS — M48.062 SPINAL STENOSIS OF LUMBAR REGION WITH NEUROGENIC CLAUDICATION: ICD-10-CM

## 2020-04-29 DIAGNOSIS — E78.2 COMBINED HYPERLIPIDEMIA: ICD-10-CM

## 2020-04-29 PROCEDURE — 97110 THERAPEUTIC EXERCISES: CPT | Performed by: PHYSICAL THERAPIST

## 2020-04-29 PROCEDURE — 97140 MANUAL THERAPY 1/> REGIONS: CPT | Performed by: PHYSICAL THERAPIST

## 2020-04-29 RX ORDER — SIMVASTATIN 40 MG
TABLET ORAL
Qty: 90 TABLET | Refills: 0 | Status: SHIPPED | OUTPATIENT
Start: 2020-04-29 | End: 2020-07-28

## 2020-05-04 ENCOUNTER — OFFICE VISIT (OUTPATIENT)
Dept: PHYSICAL THERAPY | Facility: CLINIC | Age: 80
End: 2020-05-04
Payer: MEDICARE

## 2020-05-04 DIAGNOSIS — M54.10 RADICULOPATHY, UNSPECIFIED SPINAL REGION: ICD-10-CM

## 2020-05-04 DIAGNOSIS — M48.062 SPINAL STENOSIS OF LUMBAR REGION WITH NEUROGENIC CLAUDICATION: ICD-10-CM

## 2020-05-04 DIAGNOSIS — M51.26 HERNIATED LUMBAR INTERVERTEBRAL DISC: Primary | ICD-10-CM

## 2020-05-04 PROCEDURE — 97110 THERAPEUTIC EXERCISES: CPT | Performed by: PHYSICAL THERAPIST

## 2020-05-04 PROCEDURE — 97140 MANUAL THERAPY 1/> REGIONS: CPT | Performed by: PHYSICAL THERAPIST

## 2020-05-06 ENCOUNTER — OFFICE VISIT (OUTPATIENT)
Dept: PHYSICAL THERAPY | Facility: CLINIC | Age: 80
End: 2020-05-06
Payer: MEDICARE

## 2020-05-06 DIAGNOSIS — M51.26 HERNIATED LUMBAR INTERVERTEBRAL DISC: Primary | ICD-10-CM

## 2020-05-06 DIAGNOSIS — M54.10 RADICULOPATHY, UNSPECIFIED SPINAL REGION: ICD-10-CM

## 2020-05-06 DIAGNOSIS — M48.062 SPINAL STENOSIS OF LUMBAR REGION WITH NEUROGENIC CLAUDICATION: ICD-10-CM

## 2020-05-06 PROCEDURE — 97112 NEUROMUSCULAR REEDUCATION: CPT | Performed by: PHYSICAL THERAPIST

## 2020-05-06 PROCEDURE — 97110 THERAPEUTIC EXERCISES: CPT | Performed by: PHYSICAL THERAPIST

## 2020-05-11 ENCOUNTER — TELEPHONE (OUTPATIENT)
Dept: RADIOLOGY | Facility: CLINIC | Age: 80
End: 2020-05-11

## 2020-05-11 ENCOUNTER — OFFICE VISIT (OUTPATIENT)
Dept: PHYSICAL THERAPY | Facility: CLINIC | Age: 80
End: 2020-05-11
Payer: MEDICARE

## 2020-05-11 DIAGNOSIS — M51.26 HERNIATED LUMBAR INTERVERTEBRAL DISC: Primary | ICD-10-CM

## 2020-05-11 DIAGNOSIS — M48.062 SPINAL STENOSIS OF LUMBAR REGION WITH NEUROGENIC CLAUDICATION: ICD-10-CM

## 2020-05-11 DIAGNOSIS — M54.10 RADICULOPATHY, UNSPECIFIED SPINAL REGION: ICD-10-CM

## 2020-05-11 PROCEDURE — 97140 MANUAL THERAPY 1/> REGIONS: CPT | Performed by: PHYSICAL THERAPIST

## 2020-05-11 PROCEDURE — 97110 THERAPEUTIC EXERCISES: CPT | Performed by: PHYSICAL THERAPIST

## 2020-05-13 ENCOUNTER — OFFICE VISIT (OUTPATIENT)
Dept: PHYSICAL THERAPY | Facility: CLINIC | Age: 80
End: 2020-05-13
Payer: MEDICARE

## 2020-05-13 DIAGNOSIS — M54.10 RADICULOPATHY, UNSPECIFIED SPINAL REGION: ICD-10-CM

## 2020-05-13 DIAGNOSIS — M48.062 SPINAL STENOSIS OF LUMBAR REGION WITH NEUROGENIC CLAUDICATION: ICD-10-CM

## 2020-05-13 DIAGNOSIS — M51.26 HERNIATED LUMBAR INTERVERTEBRAL DISC: Primary | ICD-10-CM

## 2020-05-13 PROCEDURE — 97110 THERAPEUTIC EXERCISES: CPT | Performed by: PHYSICAL THERAPIST

## 2020-05-13 PROCEDURE — 97140 MANUAL THERAPY 1/> REGIONS: CPT | Performed by: PHYSICAL THERAPIST

## 2020-05-18 ENCOUNTER — OFFICE VISIT (OUTPATIENT)
Dept: PHYSICAL THERAPY | Facility: CLINIC | Age: 80
End: 2020-05-18
Payer: MEDICARE

## 2020-05-18 DIAGNOSIS — M54.10 RADICULOPATHY, UNSPECIFIED SPINAL REGION: ICD-10-CM

## 2020-05-18 DIAGNOSIS — M51.26 HERNIATED LUMBAR INTERVERTEBRAL DISC: Primary | ICD-10-CM

## 2020-05-18 DIAGNOSIS — M48.062 SPINAL STENOSIS OF LUMBAR REGION WITH NEUROGENIC CLAUDICATION: ICD-10-CM

## 2020-05-18 PROCEDURE — 97110 THERAPEUTIC EXERCISES: CPT | Performed by: PHYSICAL THERAPIST

## 2020-05-18 PROCEDURE — 97140 MANUAL THERAPY 1/> REGIONS: CPT | Performed by: PHYSICAL THERAPIST

## 2020-05-20 ENCOUNTER — OFFICE VISIT (OUTPATIENT)
Dept: PHYSICAL THERAPY | Facility: CLINIC | Age: 80
End: 2020-05-20
Payer: MEDICARE

## 2020-05-20 DIAGNOSIS — M51.26 HERNIATED LUMBAR INTERVERTEBRAL DISC: Primary | ICD-10-CM

## 2020-05-20 DIAGNOSIS — M48.062 SPINAL STENOSIS OF LUMBAR REGION WITH NEUROGENIC CLAUDICATION: ICD-10-CM

## 2020-05-20 DIAGNOSIS — M54.10 RADICULOPATHY, UNSPECIFIED SPINAL REGION: ICD-10-CM

## 2020-05-20 PROCEDURE — 97140 MANUAL THERAPY 1/> REGIONS: CPT | Performed by: PHYSICAL THERAPIST

## 2020-05-20 PROCEDURE — 97110 THERAPEUTIC EXERCISES: CPT | Performed by: PHYSICAL THERAPIST

## 2020-05-21 ENCOUNTER — APPOINTMENT (OUTPATIENT)
Dept: PHYSICAL THERAPY | Facility: CLINIC | Age: 80
End: 2020-05-21
Payer: MEDICARE

## 2020-05-26 ENCOUNTER — APPOINTMENT (OUTPATIENT)
Dept: PHYSICAL THERAPY | Facility: CLINIC | Age: 80
End: 2020-05-26
Payer: MEDICARE

## 2020-05-27 ENCOUNTER — OFFICE VISIT (OUTPATIENT)
Dept: PHYSICAL THERAPY | Facility: CLINIC | Age: 80
End: 2020-05-27
Payer: MEDICARE

## 2020-05-27 DIAGNOSIS — M48.062 SPINAL STENOSIS OF LUMBAR REGION WITH NEUROGENIC CLAUDICATION: ICD-10-CM

## 2020-05-27 DIAGNOSIS — Z79.4 TYPE 2 DIABETES MELLITUS WITH OTHER CIRCULATORY COMPLICATION, WITH LONG-TERM CURRENT USE OF INSULIN (HCC): ICD-10-CM

## 2020-05-27 DIAGNOSIS — E11.59 TYPE 2 DIABETES MELLITUS WITH OTHER CIRCULATORY COMPLICATION, WITH LONG-TERM CURRENT USE OF INSULIN (HCC): ICD-10-CM

## 2020-05-27 DIAGNOSIS — M51.26 HERNIATED LUMBAR INTERVERTEBRAL DISC: Primary | ICD-10-CM

## 2020-05-27 DIAGNOSIS — M54.10 RADICULOPATHY, UNSPECIFIED SPINAL REGION: ICD-10-CM

## 2020-05-27 PROCEDURE — 97110 THERAPEUTIC EXERCISES: CPT | Performed by: PHYSICAL THERAPIST

## 2020-05-27 PROCEDURE — 97112 NEUROMUSCULAR REEDUCATION: CPT | Performed by: PHYSICAL THERAPIST

## 2020-05-29 ENCOUNTER — OFFICE VISIT (OUTPATIENT)
Dept: PHYSICAL THERAPY | Facility: CLINIC | Age: 80
End: 2020-05-29
Payer: MEDICARE

## 2020-05-29 DIAGNOSIS — M54.10 RADICULOPATHY, UNSPECIFIED SPINAL REGION: ICD-10-CM

## 2020-05-29 DIAGNOSIS — M48.062 SPINAL STENOSIS OF LUMBAR REGION WITH NEUROGENIC CLAUDICATION: ICD-10-CM

## 2020-05-29 DIAGNOSIS — M51.26 HERNIATED LUMBAR INTERVERTEBRAL DISC: Primary | ICD-10-CM

## 2020-05-29 PROCEDURE — 97110 THERAPEUTIC EXERCISES: CPT | Performed by: PHYSICAL THERAPIST

## 2020-05-29 PROCEDURE — 97112 NEUROMUSCULAR REEDUCATION: CPT | Performed by: PHYSICAL THERAPIST

## 2020-06-11 ENCOUNTER — HOSPITAL ENCOUNTER (OUTPATIENT)
Dept: RADIOLOGY | Facility: CLINIC | Age: 80
Discharge: HOME | End: 2020-06-11
Attending: ANESTHESIOLOGY | Admitting: ANESTHESIOLOGY
Payer: MEDICARE

## 2020-06-11 VITALS
OXYGEN SATURATION: 98 % | DIASTOLIC BLOOD PRESSURE: 69 MMHG | SYSTOLIC BLOOD PRESSURE: 178 MMHG | TEMPERATURE: 97.6 F | RESPIRATION RATE: 20 BRPM | HEART RATE: 83 BPM

## 2020-06-11 DIAGNOSIS — M48.062 SPINAL STENOSIS OF LUMBAR REGION WITH NEUROGENIC CLAUDICATION: ICD-10-CM

## 2020-06-11 DIAGNOSIS — M54.10 RADICULOPATHY, UNSPECIFIED SPINAL REGION: ICD-10-CM

## 2020-06-11 DIAGNOSIS — M51.26 HERNIATED LUMBAR INTERVERTEBRAL DISC: ICD-10-CM

## 2020-06-11 PROCEDURE — 62323 NJX INTERLAMINAR LMBR/SAC: CPT | Performed by: ANESTHESIOLOGY

## 2020-06-11 RX ORDER — LIDOCAINE HYDROCHLORIDE 10 MG/ML
5 INJECTION, SOLUTION EPIDURAL; INFILTRATION; INTRACAUDAL; PERINEURAL ONCE
Status: COMPLETED | OUTPATIENT
Start: 2020-06-11 | End: 2020-06-11

## 2020-06-11 RX ORDER — METHYLPREDNISOLONE ACETATE 80 MG/ML
160 INJECTION, SUSPENSION INTRA-ARTICULAR; INTRALESIONAL; INTRAMUSCULAR; PARENTERAL; SOFT TISSUE ONCE
Status: COMPLETED | OUTPATIENT
Start: 2020-06-11 | End: 2020-06-11

## 2020-06-11 RX ADMIN — IOHEXOL 1 ML: 300 INJECTION, SOLUTION INTRAVENOUS at 13:22

## 2020-06-11 RX ADMIN — LIDOCAINE HYDROCHLORIDE 3 ML: 10 INJECTION, SOLUTION EPIDURAL; INFILTRATION; INTRACAUDAL; PERINEURAL at 13:20

## 2020-06-11 RX ADMIN — METHYLPREDNISOLONE ACETATE 160 MG: 80 INJECTION, SUSPENSION INTRA-ARTICULAR; INTRALESIONAL; INTRAMUSCULAR; SOFT TISSUE at 13:22

## 2020-06-18 ENCOUNTER — TELEPHONE (OUTPATIENT)
Dept: PAIN MEDICINE | Facility: CLINIC | Age: 80
End: 2020-06-18

## 2020-06-18 NOTE — TELEPHONE ENCOUNTER
1st attempt  Unable to lm to cb with % improvement and pain level     Vm not set up       S/p LESI 6/11, No F/u

## 2020-06-19 ENCOUNTER — APPOINTMENT (OUTPATIENT)
Dept: LAB | Facility: HOSPITAL | Age: 80
End: 2020-06-19
Payer: MEDICARE

## 2020-06-19 DIAGNOSIS — I10 BENIGN ESSENTIAL HYPERTENSION: ICD-10-CM

## 2020-06-19 DIAGNOSIS — E11.9 TYPE 2 DIABETES MELLITUS WITHOUT COMPLICATION, WITHOUT LONG-TERM CURRENT USE OF INSULIN (HCC): ICD-10-CM

## 2020-06-19 DIAGNOSIS — E78.2 MIXED HYPERLIPIDEMIA: ICD-10-CM

## 2020-06-19 LAB
ALBUMIN SERPL BCP-MCNC: 3.9 G/DL (ref 3.5–5)
ALP SERPL-CCNC: 90 U/L (ref 46–116)
ALT SERPL W P-5'-P-CCNC: 33 U/L (ref 12–78)
ANION GAP SERPL CALCULATED.3IONS-SCNC: 6 MMOL/L (ref 4–13)
AST SERPL W P-5'-P-CCNC: 31 U/L (ref 5–45)
BILIRUB SERPL-MCNC: 0.41 MG/DL (ref 0.2–1)
BUN SERPL-MCNC: 17 MG/DL (ref 5–25)
CALCIUM SERPL-MCNC: 8.9 MG/DL (ref 8.3–10.1)
CHLORIDE SERPL-SCNC: 101 MMOL/L (ref 100–108)
CHOLEST SERPL-MCNC: 108 MG/DL (ref 50–200)
CO2 SERPL-SCNC: 26 MMOL/L (ref 21–32)
CREAT SERPL-MCNC: 0.9 MG/DL (ref 0.6–1.3)
CREAT UR-MCNC: 53.7 MG/DL
EST. AVERAGE GLUCOSE BLD GHB EST-MCNC: 166 MG/DL
GFR SERPL CREATININE-BSD FRML MDRD: 82 ML/MIN/1.73SQ M
GLUCOSE P FAST SERPL-MCNC: 104 MG/DL (ref 65–99)
HBA1C MFR BLD: 7.4 %
HDLC SERPL-MCNC: 44 MG/DL
LDLC SERPL CALC-MCNC: 53 MG/DL (ref 0–100)
MICROALBUMIN UR-MCNC: 31.2 MG/L (ref 0–20)
MICROALBUMIN/CREAT 24H UR: 58 MG/G CREATININE (ref 0–30)
POTASSIUM SERPL-SCNC: 4.6 MMOL/L (ref 3.5–5.3)
PROT SERPL-MCNC: 7.1 G/DL (ref 6.4–8.2)
SODIUM SERPL-SCNC: 133 MMOL/L (ref 136–145)
TRIGL SERPL-MCNC: 57 MG/DL

## 2020-06-19 PROCEDURE — 82043 UR ALBUMIN QUANTITATIVE: CPT

## 2020-06-19 PROCEDURE — 82570 ASSAY OF URINE CREATININE: CPT

## 2020-06-19 PROCEDURE — 83036 HEMOGLOBIN GLYCOSYLATED A1C: CPT

## 2020-06-19 PROCEDURE — 80053 COMPREHEN METABOLIC PANEL: CPT

## 2020-06-19 PROCEDURE — 80061 LIPID PANEL: CPT

## 2020-06-19 PROCEDURE — 36415 COLL VENOUS BLD VENIPUNCTURE: CPT

## 2020-06-19 NOTE — TELEPHONE ENCOUNTER
He is doing better he's moving around, so far so good  Pain level is 1/10, the injectiones help about 60%  He does not need a call back he will call if anything changes

## 2020-06-24 ENCOUNTER — OFFICE VISIT (OUTPATIENT)
Dept: NEUROSURGERY | Facility: CLINIC | Age: 80
End: 2020-06-24
Payer: MEDICARE

## 2020-06-24 VITALS
HEIGHT: 72 IN | SYSTOLIC BLOOD PRESSURE: 158 MMHG | DIASTOLIC BLOOD PRESSURE: 64 MMHG | TEMPERATURE: 97.2 F | WEIGHT: 167 LBS | HEART RATE: 82 BPM | BODY MASS INDEX: 22.62 KG/M2 | RESPIRATION RATE: 16 BRPM

## 2020-06-24 DIAGNOSIS — M54.10 RADICULOPATHY, UNSPECIFIED SPINAL REGION: ICD-10-CM

## 2020-06-24 DIAGNOSIS — M71.38 SYNOVIAL CYST OF LUMBAR SPINE: ICD-10-CM

## 2020-06-24 DIAGNOSIS — M48.062 SPINAL STENOSIS OF LUMBAR REGION WITH NEUROGENIC CLAUDICATION: Primary | ICD-10-CM

## 2020-06-24 PROCEDURE — 3008F BODY MASS INDEX DOCD: CPT | Performed by: NEUROLOGICAL SURGERY

## 2020-06-24 PROCEDURE — 1036F TOBACCO NON-USER: CPT | Performed by: NEUROLOGICAL SURGERY

## 2020-06-24 PROCEDURE — 3051F HG A1C>EQUAL 7.0%<8.0%: CPT | Performed by: NEUROLOGICAL SURGERY

## 2020-06-24 PROCEDURE — 2022F DILAT RTA XM EVC RTNOPTHY: CPT | Performed by: NEUROLOGICAL SURGERY

## 2020-06-24 PROCEDURE — 99213 OFFICE O/P EST LOW 20 MIN: CPT | Performed by: NEUROLOGICAL SURGERY

## 2020-06-24 PROCEDURE — 3078F DIAST BP <80 MM HG: CPT | Performed by: NEUROLOGICAL SURGERY

## 2020-06-24 PROCEDURE — 3060F POS MICROALBUMINURIA REV: CPT | Performed by: NEUROLOGICAL SURGERY

## 2020-06-24 PROCEDURE — 3077F SYST BP >= 140 MM HG: CPT | Performed by: NEUROLOGICAL SURGERY

## 2020-06-24 PROCEDURE — 1160F RVW MEDS BY RX/DR IN RCRD: CPT | Performed by: NEUROLOGICAL SURGERY

## 2020-06-24 PROCEDURE — 4040F PNEUMOC VAC/ADMIN/RCVD: CPT | Performed by: NEUROLOGICAL SURGERY

## 2020-07-01 ENCOUNTER — OFFICE VISIT (OUTPATIENT)
Dept: ENDOCRINOLOGY | Facility: CLINIC | Age: 80
End: 2020-07-01
Payer: MEDICARE

## 2020-07-01 VITALS
HEIGHT: 72 IN | HEART RATE: 84 BPM | WEIGHT: 168 LBS | SYSTOLIC BLOOD PRESSURE: 138 MMHG | TEMPERATURE: 98 F | RESPIRATION RATE: 16 BRPM | DIASTOLIC BLOOD PRESSURE: 78 MMHG | BODY MASS INDEX: 22.75 KG/M2

## 2020-07-01 DIAGNOSIS — E11.42 TYPE 2 DIABETES MELLITUS WITH DIABETIC POLYNEUROPATHY, WITHOUT LONG-TERM CURRENT USE OF INSULIN (HCC): Primary | ICD-10-CM

## 2020-07-01 DIAGNOSIS — E78.2 MIXED HYPERLIPIDEMIA: ICD-10-CM

## 2020-07-01 DIAGNOSIS — I10 BENIGN ESSENTIAL HYPERTENSION: ICD-10-CM

## 2020-07-01 PROCEDURE — 3060F POS MICROALBUMINURIA REV: CPT | Performed by: NURSE PRACTITIONER

## 2020-07-01 PROCEDURE — 3008F BODY MASS INDEX DOCD: CPT | Performed by: NURSE PRACTITIONER

## 2020-07-01 PROCEDURE — 1036F TOBACCO NON-USER: CPT | Performed by: NURSE PRACTITIONER

## 2020-07-01 PROCEDURE — 3075F SYST BP GE 130 - 139MM HG: CPT | Performed by: NURSE PRACTITIONER

## 2020-07-01 PROCEDURE — 4040F PNEUMOC VAC/ADMIN/RCVD: CPT | Performed by: NURSE PRACTITIONER

## 2020-07-01 PROCEDURE — 3078F DIAST BP <80 MM HG: CPT | Performed by: NURSE PRACTITIONER

## 2020-07-01 PROCEDURE — 2022F DILAT RTA XM EVC RTNOPTHY: CPT | Performed by: NURSE PRACTITIONER

## 2020-07-01 PROCEDURE — 1160F RVW MEDS BY RX/DR IN RCRD: CPT | Performed by: NURSE PRACTITIONER

## 2020-07-01 PROCEDURE — 99214 OFFICE O/P EST MOD 30 MIN: CPT | Performed by: NURSE PRACTITIONER

## 2020-07-01 PROCEDURE — 3051F HG A1C>EQUAL 7.0%<8.0%: CPT | Performed by: NURSE PRACTITIONER

## 2020-07-01 RX ORDER — METFORMIN HYDROCHLORIDE 500 MG/1
1000 TABLET, EXTENDED RELEASE ORAL 2 TIMES DAILY WITH MEALS
Qty: 360 TABLET | Refills: 0 | Status: SHIPPED | OUTPATIENT
Start: 2020-07-01 | End: 2020-09-24

## 2020-07-01 NOTE — ASSESSMENT & PLAN NOTE
Well controlled at this time  Patient denies any hypoglycemia  Denies polydipsia, polyphagia, polyuria  Reports reduction in neuropathic symptoms  Has been very active playing golf and doing house work  Continue current regimen and healthy lifestyle     Lab Results   Component Value Date    HGBA1C 7 4 (H) 06/19/2020

## 2020-07-01 NOTE — PROGRESS NOTES
Established Patient Progress Note      History of Present Illness:   Olesya Ridre is a 68 y o  male with HTN, HLD, and type 2 diabetes without long term use of insulin  Reports complications of neuropathy  Denies recent illness or hospitalizations  Denies recent severe hypoglycemic or severe hyperglycemic episodes  Denies any issues with his current regimen  home glucose monitoring: are performed regularly    Home blood glucose readings:   Before breakfast:   Before lunch:   Before dinner: 120-140  Bedtime: Sarah not check     Current regimen: Glimiperide 1 mg BID  Metformin 1,000 mg BID  Compliant all of the times  Last Eye Exam: 10/2019  Last Foot Exam: Every 6 weeks    Has hypertension: Taking amlodipine and lisinopril  Has hyperlipidemia: Taking simvastatin      Labs reviewed with patient       Patient Active Problem List   Diagnosis    Benign essential hypertension    Benign prostatic hyperplasia with lower urinary tract symptoms    Esophageal dysphagia    Herniated lumbar intervertebral disc    Hyperlipidemia    Vasomotor rhinitis    Elevated TSH    Type 2 diabetes mellitus with diabetic polyneuropathy (HCC)    Type 2 diabetes mellitus without complication, without long-term current use of insulin ([address] )    Spinal stenosis of lumbar region with neurogenic claudication    Radiculopathy      Past Medical History:   Diagnosis Date    Abnormal blood chemistry     Abnormal glucose     Last assessed - 2/20/14    Benign essential hypertension     Last assessed - 5/15/17    Chronic allergic rhinitis     Last assessed - 8/1/16    Dermatitis     Last assessed - 6/16/15    High risk medication use     Last assessed - 5/2/16    Sciatica     Last assessed - 12/18/12    Septic bursitis     Last assessed - 9/3/13    Tick bite     Last assessed - 8/8/16      Past Surgical History:   Procedure Laterality Date    CATARACT EXTRACTION      COLONOSCOPY      Fiberoptic    PILONIDAL CYST EXCISION        Family History   Problem Relation Age of Onset    Hypertension Mother     Osteoporosis Mother     Diabetes type I Paternal Aunt     Colon cancer Family      Social History     Tobacco Use    Smoking status: Former Smoker     Last attempt to quit: 1980     Years since quittin 5    Smokeless tobacco: Never Used   Substance Use Topics    Alcohol use:  Yes     Alcohol/week: 1 0 standard drinks     Types: 1 Cans of beer per week     Frequency: 4 or more times a week     Comment: social usage     No Known Allergies      Current Outpatient Medications:     amLODIPine (NORVASC) 5 mg tablet, TAKE ONE TABLET BY MOUTH EVERY DAY, Disp: 90 tablet, Rfl: 2    B Complex-Biotin-FA (B COMPLETE) TABS, Take by mouth, Disp: , Rfl:     BLAKE MICROLET LANCETS lancets, TEST BLOOD SUGAR TWO TIMES A DAY, Disp: 100 each, Rfl: 0    Blood Glucose Monitoring Suppl (CONTOUR NEXT MONITOR) w/Device KIT, 1 Device by Does not apply route 2 (two) times a day, Disp: 1 kit, Rfl: 0    Coenzyme Q10 (COQ10 PO), Take by mouth, Disp: , Rfl:     CONTOUR NEXT TEST test strip, TEST BLOOD SUGAR TWICE DAILY, Disp: 100 each, Rfl: 2    docusate sodium (COLACE) 100 mg capsule, Take 1 capsule by mouth as needed, Disp: , Rfl:     fexofenadine (ALLEGRA) 180 MG tablet, Take by mouth as needed  , Disp: , Rfl:     fluticasone (FLONASE) 50 mcg/act nasal spray, INSTILL ONE SPRAY TO EACH NOSTRIL TWICE A DAY, Disp: 32 g, Rfl: 1    glimepiride (AMARYL) 2 mg tablet, TAKE ONE-HALF TABLET BY MOUTH TWICE A DAY, Disp: 90 tablet, Rfl: 1    ipratropium (ATROVENT) 0 06 % nasal spray, 1 spray into each nostril 3 (three) times a day, Disp: 15 mL, Rfl: 0    lisinopril (ZESTRIL) 20 mg tablet, TAKE ONE TABLET BY MOUTH EVERY DAY, Disp: 90 tablet, Rfl: 3    metFORMIN (GLUCOPHAGE) 500 mg tablet, TAKE TWO TO FOUR TABLETS BY MOUTH EVERY DAY PER BLOOD GLUCOSE LEVELS (Patient taking differently: Take 1,000 mg by mouth 2 (two) times a day with meals ), Disp: 360 tablet, Rfl: 1    simvastatin (ZOCOR) 40 mg tablet, TAKE ONE TABLET BY MOUTH EVERY DAY, Disp: 90 tablet, Rfl: 0    Specialty Vitamins Products (MAGNESIUM, AMINO ACID CHELATE,) 133 MG tablet, Take 1 tablet by mouth 2 (two) times a day, Disp: , Rfl:     Thiamine 50 MG CAPS, Take 1 capsule by mouth daily, Disp: , Rfl:     Review of Systems   Constitutional: Negative for activity change, appetite change, fatigue and unexpected weight change  Eyes: Negative for visual disturbance  Respiratory: Negative for chest tightness and shortness of breath  Cardiovascular: Negative for chest pain, palpitations and leg swelling  Gastrointestinal: Negative for constipation, diarrhea, nausea and vomiting  Endocrine: Negative for polydipsia, polyphagia and polyuria  Genitourinary: Negative for frequency  Skin: Negative for wound  Neurological: Negative for dizziness, weakness, light-headedness, numbness and headaches  Psychiatric/Behavioral: Negative for sleep disturbance  Physical Exam:  Body mass index is 22 78 kg/m²  /78 (BP Location: Left arm, Patient Position: Sitting, Cuff Size: Standard)   Pulse 84   Temp 98 °F (36 7 °C) (Temporal)   Resp 16   Ht 6' (1 829 m)   Wt 76 2 kg (168 lb)   BMI 22 78 kg/m²    Wt Readings from Last 3 Encounters:   07/01/20 76 2 kg (168 lb)   06/24/20 75 8 kg (167 lb)   03/26/20 80 3 kg (177 lb)       Physical Exam   Constitutional: He is oriented to person, place, and time  He appears well-developed and well-nourished  HENT:   Head: Normocephalic and atraumatic  Mask in place  Eyes: Pupils are equal, round, and reactive to light  EOM are normal    Neck: Normal range of motion  Neck supple  No thyromegaly present  Cardiovascular: Normal rate, regular rhythm and intact distal pulses  Pulmonary/Chest: Effort normal and breath sounds normal    Abdominal: Soft  Bowel sounds are normal    Musculoskeletal: He exhibits no edema     Lymphadenopathy:     He has no cervical adenopathy  Neurological: He is alert and oriented to person, place, and time  Skin: Skin is warm and dry  Capillary refill takes less than 2 seconds  Vitals reviewed  Labs:   Lab Results   Component Value Date    HGBA1C 7 4 (H) 06/19/2020    HGBA1C 7 5 (H) 02/20/2020    HGBA1C 7 2 (H) 10/23/2019     Lab Results   Component Value Date    CREATININE 0 90 06/19/2020    CREATININE 0 95 02/20/2020    CREATININE 0 99 10/23/2019    BUN 17 06/19/2020     10/20/2015    K 4 6 06/19/2020     06/19/2020    CO2 26 06/19/2020     eGFR   Date Value Ref Range Status   06/19/2020 82 ml/min/1 73sq m Final     Lab Results   Component Value Date    CHOL 114 04/23/2015    HDL 44 06/19/2020    TRIG 57 06/19/2020     Lab Results   Component Value Date    ALT 33 06/19/2020    AST 31 06/19/2020    ALKPHOS 90 06/19/2020    BILITOT 0 54 04/23/2015     Lab Results   Component Value Date    VKS6VDXQWUWM 3 960 (H) 02/20/2020    AXH7MMSKAFGW 3 700 10/23/2019    UDK1QQUSBECY 3 540 03/28/2019     Lab Results   Component Value Date    FREET4 1 10 10/23/2019       Impression & Plan:    Problem List Items Addressed This Visit        Endocrine    Type 2 diabetes mellitus with diabetic polyneuropathy ([facility] [address] ) - Primary     Well controlled at this time  Patient denies any hypoglycemia  Denies polydipsia, polyphagia, polyuria  Reports reduction in neuropathic symptoms  Has been very active playing golf and doing house work  Continue current regimen and healthy lifestyle  Lab Results   Component Value Date    HGBA1C 7 4 (H) 06/19/2020               Cardiovascular and Mediastinum    Benign essential hypertension     Stable  Continue current regimen  Other    Hyperlipidemia     Lipid profile is excellent  Continue statin and healthy lifestyle  Discussed with the patient and all questioned fully answered  He will call me if any problems arise  Follow-up appointment in 6 months       Counseled patient on diagnostic results, prognosis, risk and benefit of treatment options, instruction for management, importance of treatment compliance, Risk  factor reduction and impressions    DANIEL Esposito

## 2020-07-07 ENCOUNTER — OFFICE VISIT (OUTPATIENT)
Dept: FAMILY MEDICINE CLINIC | Facility: CLINIC | Age: 80
End: 2020-07-07
Payer: MEDICARE

## 2020-07-07 VITALS
TEMPERATURE: 96.8 F | BODY MASS INDEX: 22.59 KG/M2 | HEIGHT: 72 IN | WEIGHT: 166.8 LBS | DIASTOLIC BLOOD PRESSURE: 62 MMHG | SYSTOLIC BLOOD PRESSURE: 144 MMHG

## 2020-07-07 DIAGNOSIS — I10 BENIGN ESSENTIAL HYPERTENSION: ICD-10-CM

## 2020-07-07 DIAGNOSIS — E78.2 MIXED HYPERLIPIDEMIA: ICD-10-CM

## 2020-07-07 DIAGNOSIS — E11.42 TYPE 2 DIABETES MELLITUS WITH DIABETIC POLYNEUROPATHY, WITHOUT LONG-TERM CURRENT USE OF INSULIN (HCC): Primary | ICD-10-CM

## 2020-07-07 DIAGNOSIS — E11.9 TYPE 2 DIABETES MELLITUS WITHOUT COMPLICATION, WITHOUT LONG-TERM CURRENT USE OF INSULIN (HCC): ICD-10-CM

## 2020-07-07 PROCEDURE — 3051F HG A1C>EQUAL 7.0%<8.0%: CPT | Performed by: FAMILY MEDICINE

## 2020-07-07 PROCEDURE — 1160F RVW MEDS BY RX/DR IN RCRD: CPT | Performed by: FAMILY MEDICINE

## 2020-07-07 PROCEDURE — 3078F DIAST BP <80 MM HG: CPT | Performed by: FAMILY MEDICINE

## 2020-07-07 PROCEDURE — 2022F DILAT RTA XM EVC RTNOPTHY: CPT | Performed by: FAMILY MEDICINE

## 2020-07-07 PROCEDURE — 3008F BODY MASS INDEX DOCD: CPT | Performed by: FAMILY MEDICINE

## 2020-07-07 PROCEDURE — 3060F POS MICROALBUMINURIA REV: CPT | Performed by: FAMILY MEDICINE

## 2020-07-07 PROCEDURE — 3077F SYST BP >= 140 MM HG: CPT | Performed by: FAMILY MEDICINE

## 2020-07-07 PROCEDURE — 1036F TOBACCO NON-USER: CPT | Performed by: FAMILY MEDICINE

## 2020-07-07 PROCEDURE — 99213 OFFICE O/P EST LOW 20 MIN: CPT | Performed by: FAMILY MEDICINE

## 2020-07-07 PROCEDURE — 4040F PNEUMOC VAC/ADMIN/RCVD: CPT | Performed by: FAMILY MEDICINE

## 2020-07-07 RX ORDER — GLIMEPIRIDE 2 MG/1
TABLET ORAL
Qty: 90 TABLET | Refills: 1 | Status: SHIPPED | OUTPATIENT
Start: 2020-07-07 | End: 2020-11-19

## 2020-07-07 NOTE — PROGRESS NOTES
Assessment/Plan:    Problem List Items Addressed This Visit        Endocrine    Type 2 diabetes mellitus with diabetic polyneuropathy ([facility] [address] ) - Primary       Cardiovascular and Mediastinum    Benign essential hypertension       Other    Hyperlipidemia           Diagnoses and all orders for this visit:    Type 2 diabetes mellitus with diabetic polyneuropathy, without long-term current use of insulin (HCC)    Benign essential hypertension    Mixed hyperlipidemia        No problem-specific Assessment & Plan notes found for this encounter  Subjective:      Patient ID: Nahid Wilcox is a 68 y o  male  Mr Fartun Macdonald here for a follow-up of visit he did get good results from his injection eyes now able to play golf and does not feel that his walking or his back become too fatigued      The following portions of the patient's history were reviewed and updated as appropriate:   He has a past medical history of Abnormal blood chemistry, Abnormal glucose, Benign essential hypertension, Chronic allergic rhinitis, Dermatitis, High risk medication use, Sciatica, Septic bursitis, and Tick bite ,  does not have any pertinent problems on file  ,   has a past surgical history that includes Cataract extraction; Colonoscopy; and PILONIDAL CYST EXCISION  ,  family history includes Colon cancer in his family; Diabetes type I in his paternal aunt; Hypertension in his mother; Osteoporosis in his mother  ,   reports that he quit smoking about 40 years ago  He has never used smokeless tobacco  He reports that he drinks about 1 0 standard drinks of alcohol per week  He reports that he does not use drugs  ,  has No Known Allergies     Current Outpatient Medications   Medication Sig Dispense Refill    amLODIPine (NORVASC) 5 mg tablet TAKE ONE TABLET BY MOUTH EVERY DAY 90 tablet 2    B Complex-Biotin-FA (B COMPLETE) TABS Take by mouth      BLAKE MICROLET LANCETS lancets TEST BLOOD SUGAR TWO TIMES A  each 0    Blood Glucose Monitoring Suppl (CONTOUR NEXT MONITOR) w/Device KIT 1 Device by Does not apply route 2 (two) times a day 1 kit 0    Coenzyme Q10 (COQ10 PO) Take by mouth      CONTOUR NEXT TEST test strip TEST BLOOD SUGAR TWICE DAILY 100 each 2    docusate sodium (COLACE) 100 mg capsule Take 1 capsule by mouth as needed      fexofenadine (ALLEGRA) 180 MG tablet Take by mouth as needed        fluticasone (FLONASE) 50 mcg/act nasal spray INSTILL ONE SPRAY TO EACH NOSTRIL TWICE A DAY 32 g 1    glimepiride (AMARYL) 2 mg tablet TAKE ONE-HALF TABLET BY MOUTH TWICE A DAY 90 tablet 1    ipratropium (ATROVENT) 0 06 % nasal spray 1 spray into each nostril 3 (three) times a day 15 mL 0    lisinopril (ZESTRIL) 20 mg tablet TAKE ONE TABLET BY MOUTH EVERY DAY 90 tablet 3    metFORMIN (GLUCOPHAGE-XR) 500 mg 24 hr tablet Take 2 tablets (1,000 mg total) by mouth 2 (two) times a day with meals 360 tablet 0    simvastatin (ZOCOR) 40 mg tablet TAKE ONE TABLET BY MOUTH EVERY DAY 90 tablet 0    Specialty Vitamins Products (MAGNESIUM, AMINO ACID CHELATE,) 133 MG tablet Take 1 tablet by mouth 2 (two) times a day      Thiamine 50 MG CAPS Take 1 capsule by mouth daily       No current facility-administered medications for this visit  Review of Systems   Constitutional: Negative for activity change, appetite change, diaphoresis, fatigue and fever  HENT: Negative  Eyes: Positive for visual disturbance  Respiratory: Negative for apnea, cough, chest tightness, shortness of breath and wheezing  Cardiovascular: Negative for chest pain, palpitations and leg swelling  Gastrointestinal: Negative for abdominal distention, abdominal pain, anal bleeding, constipation, diarrhea, nausea and vomiting  Endocrine: Negative for cold intolerance, heat intolerance, polydipsia, polyphagia and polyuria  Genitourinary: Negative for difficulty urinating, dysuria, flank pain, hematuria and urgency     Musculoskeletal: Negative for arthralgias, back pain, gait problem, joint swelling and myalgias  Skin: Negative for color change, rash and wound  Allergic/Immunologic: Negative for environmental allergies, food allergies and immunocompromised state  Neurological: Negative for dizziness, seizures, syncope, speech difficulty, numbness and headaches  Hematological: Negative for adenopathy  Does not bruise/bleed easily  Psychiatric/Behavioral: Negative for agitation, behavioral problems, hallucinations, sleep disturbance and suicidal ideas  Objective:  Vitals:    07/07/20 1146   BP: 144/62   BP Location: Left arm   Patient Position: Sitting   Cuff Size: Standard   Temp: (!) 96 8 °F (36 °C)   TempSrc: Temporal   Weight: 75 7 kg (166 lb 12 8 oz)   Height: 6' (1 829 m)     Body mass index is 22 62 kg/m²  Physical Exam   Constitutional: He is oriented to person, place, and time  He appears well-developed and well-nourished  No distress  HENT:   Head: Normocephalic  Right Ear: External ear normal    Left Ear: External ear normal    Nose: Nose normal    Mouth/Throat: Oropharynx is clear and moist    Eyes: Pupils are equal, round, and reactive to light  Conjunctivae and EOM are normal  Right eye exhibits no discharge  Left eye exhibits no discharge  No scleral icterus  Neck: Normal range of motion  No tracheal deviation present  No thyromegaly present  Cardiovascular: Normal rate, regular rhythm and normal heart sounds  Exam reveals no gallop and no friction rub  Pulses are no weak pulses  No murmur heard  Pulses:       Dorsalis pedis pulses are 2+ on the right side, and 2+ on the left side  Posterior tibial pulses are 2+ on the right side, and 2+ on the left side  Pulmonary/Chest: Effort normal and breath sounds normal  No respiratory distress  He has no wheezes  Abdominal: Soft  Bowel sounds are normal  He exhibits no mass  There is no tenderness  There is no guarding  Musculoskeletal: He exhibits no edema or deformity  Feet:   Right Foot:   Skin Integrity: Negative for ulcer, skin breakdown, erythema, warmth, callus or dry skin  Left Foot:   Skin Integrity: Negative for ulcer, skin breakdown, erythema, warmth, callus or dry skin  Lymphadenopathy:     He has no cervical adenopathy  Neurological: He is alert and oriented to person, place, and time  No cranial nerve deficit  Skin: Skin is warm and dry  No rash noted  He is not diaphoretic  No erythema  Psychiatric: He has a normal mood and affect  Thought content normal      Patient's shoes and socks removed  Right Foot/Ankle   Right Foot Inspection  Skin Exam: skin normal and skin intact no dry skin, no warmth, no callus, no erythema, no maceration, no abnormal color, no pre-ulcer, no ulcer and no callus                          Toe Exam: ROM and strength within normal limits  Sensory   Vibration: intact  Proprioception: intact   Monofilament testing: diminished  Vascular  Capillary refills: < 3 seconds  The right DP pulse is 2+  The right PT pulse is 2+  Left Foot/Ankle  Left Foot Inspection  Skin Exam: skin normal and skin intactno dry skin, no warmth, no erythema, no maceration, normal color, no pre-ulcer, no ulcer and no callus                         Toe Exam: ROM and strength within normal limits                   Sensory   Vibration: intact  Proprioception: intact  Monofilament: diminished  Vascular  Capillary refills: < 3 seconds  The left DP pulse is 2+  The left PT pulse is 2+  Assign Risk Category:  No deformity present; No loss of protective sensation;  No weak pulses       Risk: 0

## 2020-07-27 DIAGNOSIS — E78.2 COMBINED HYPERLIPIDEMIA: ICD-10-CM

## 2020-07-28 RX ORDER — SIMVASTATIN 40 MG
TABLET ORAL
Qty: 90 TABLET | Refills: 0 | Status: SHIPPED | OUTPATIENT
Start: 2020-07-28 | End: 2020-07-29

## 2020-07-29 DIAGNOSIS — E78.2 COMBINED HYPERLIPIDEMIA: ICD-10-CM

## 2020-07-29 RX ORDER — SIMVASTATIN 40 MG
TABLET ORAL
Qty: 90 TABLET | Refills: 0 | Status: SHIPPED | OUTPATIENT
Start: 2020-07-29 | End: 2020-10-21

## 2020-08-12 DIAGNOSIS — E11.9 TYPE 2 DIABETES MELLITUS WITHOUT COMPLICATION, WITHOUT LONG-TERM CURRENT USE OF INSULIN (HCC): ICD-10-CM

## 2020-08-12 RX ORDER — LANCETS
EACH MISCELLANEOUS
Qty: 100 EACH | Refills: 0 | Status: SHIPPED | OUTPATIENT
Start: 2020-08-12 | End: 2021-05-27

## 2020-08-21 NOTE — TELEPHONE ENCOUNTER
SW patient, states he had a procedure in June LESI (right) #1 and in the beginning patient was doing much better  Patient was walking, playing golf and very active  Patient states now he is getting stiffness in his back and pain 7-8/10  REBECCA Sweeney regarding this   Dr Mino Sweeney said to schedule patient for Right LESI #2

## 2020-08-21 NOTE — TELEPHONE ENCOUNTER
Pt called stating he had injection on 6/11/20 and pain is starting to come  Pt would like to know when he will be able to get another injection      Pt can be reached at [phone]

## 2020-08-27 DIAGNOSIS — I10 ESSENTIAL HYPERTENSION: ICD-10-CM

## 2020-08-27 RX ORDER — LISINOPRIL 20 MG/1
TABLET ORAL
Qty: 90 TABLET | Refills: 3 | Status: SHIPPED | OUTPATIENT
Start: 2020-08-27 | End: 2021-11-11

## 2020-09-17 ENCOUNTER — HOSPITAL ENCOUNTER (OUTPATIENT)
Dept: RADIOLOGY | Facility: CLINIC | Age: 80
Discharge: HOME | End: 2020-09-17
Admitting: ANESTHESIOLOGY
Payer: MEDICARE

## 2020-09-17 VITALS
TEMPERATURE: 98.8 F | HEART RATE: 70 BPM | SYSTOLIC BLOOD PRESSURE: 166 MMHG | DIASTOLIC BLOOD PRESSURE: 55 MMHG | OXYGEN SATURATION: 100 % | RESPIRATION RATE: 18 BRPM

## 2020-09-17 DIAGNOSIS — M51.26 HERNIATED LUMBAR INTERVERTEBRAL DISC: ICD-10-CM

## 2020-09-17 DIAGNOSIS — M48.062 SPINAL STENOSIS OF LUMBAR REGION WITH NEUROGENIC CLAUDICATION: ICD-10-CM

## 2020-09-17 DIAGNOSIS — M54.10 RADICULOPATHY: ICD-10-CM

## 2020-09-17 PROCEDURE — 62323 NJX INTERLAMINAR LMBR/SAC: CPT | Performed by: ANESTHESIOLOGY

## 2020-09-17 RX ORDER — LIDOCAINE HYDROCHLORIDE 10 MG/ML
5 INJECTION, SOLUTION EPIDURAL; INFILTRATION; INTRACAUDAL; PERINEURAL ONCE
Status: COMPLETED | OUTPATIENT
Start: 2020-09-17 | End: 2020-09-17

## 2020-09-17 RX ORDER — METHYLPREDNISOLONE ACETATE 80 MG/ML
80 INJECTION, SUSPENSION INTRA-ARTICULAR; INTRALESIONAL; INTRAMUSCULAR; PARENTERAL; SOFT TISSUE ONCE
Status: COMPLETED | OUTPATIENT
Start: 2020-09-17 | End: 2020-09-17

## 2020-09-17 RX ADMIN — LIDOCAINE HYDROCHLORIDE 3 ML: 10 INJECTION, SOLUTION EPIDURAL; INFILTRATION; INTRACAUDAL; PERINEURAL at 10:47

## 2020-09-17 RX ADMIN — IOHEXOL 1 ML: 300 INJECTION, SOLUTION INTRAVENOUS at 10:47

## 2020-09-17 RX ADMIN — METHYLPREDNISOLONE ACETATE 80 MG: 80 INJECTION, SUSPENSION INTRA-ARTICULAR; INTRALESIONAL; INTRAMUSCULAR; SOFT TISSUE at 10:47

## 2020-09-17 NOTE — NURSING NOTE
Reviewed dc instructions, provided written info  Pt verbalized understanding and dc to home with his wife

## 2020-09-17 NOTE — H&P
History of Present Illness: The patient is a 68 y o  male who presents with complaints of back and leg pain      Patient Active Problem List   Diagnosis    Benign essential hypertension    Benign prostatic hyperplasia with lower urinary tract symptoms    Esophageal dysphagia    Herniated lumbar intervertebral disc    Hyperlipidemia    Vasomotor rhinitis    Elevated TSH    Type 2 diabetes mellitus with diabetic polyneuropathy (HCC)    Type 2 diabetes mellitus without complication, without long-term current use of insulin ([address] )    Spinal stenosis of lumbar region with neurogenic claudication    Radiculopathy       Past Medical History:   Diagnosis Date    Abnormal blood chemistry     Abnormal glucose     Last assessed - 2/20/14    Benign essential hypertension     Last assessed - 5/15/17    Chronic allergic rhinitis     Last assessed - 8/1/16    Dermatitis     Last assessed - 6/16/15    High risk medication use     Last assessed - 5/2/16    Sciatica     Last assessed - 12/18/12    Septic bursitis     Last assessed - 9/3/13    Tick bite     Last assessed - 8/8/16       Past Surgical History:   Procedure Laterality Date    CATARACT EXTRACTION      COLONOSCOPY      Fiberoptic    PILONIDAL CYST EXCISION           Current Outpatient Medications:     amLODIPine (NORVASC) 5 mg tablet, TAKE ONE TABLET BY MOUTH EVERY DAY, Disp: 90 tablet, Rfl: 2    B Complex-Biotin-FA (B COMPLETE) TABS, Take by mouth, Disp: , Rfl:     Blood Glucose Monitoring Suppl (CONTOUR NEXT MONITOR) w/Device KIT, 1 Device by Does not apply route 2 (two) times a day, Disp: 1 kit, Rfl: 0    Coenzyme Q10 (COQ10 PO), Take by mouth, Disp: , Rfl:     CONTOUR NEXT TEST test strip, TEST BLOOD SUGAR TWICE DAILY, Disp: 100 each, Rfl: 2    docusate sodium (COLACE) 100 mg capsule, Take 1 capsule by mouth as needed, Disp: , Rfl:     fexofenadine (ALLEGRA) 180 MG tablet, Take by mouth as needed  , Disp: , Rfl:     fluticasone (FLONASE) 50 mcg/act nasal spray, INSTILL ONE SPRAY TO EACH NOSTRIL TWICE A DAY, Disp: 32 g, Rfl: 1    glimepiride (AMARYL) 2 mg tablet, TAKE ONE-HALF TABLET BY MOUTH TWICE A DAY, Disp: 90 tablet, Rfl: 1    ipratropium (ATROVENT) 0 06 % nasal spray, 1 spray into each nostril 3 (three) times a day, Disp: 15 mL, Rfl: 0    lisinopril (ZESTRIL) 20 mg tablet, TAKE ONE TABLET BY MOUTH EVERY DAY, Disp: 90 tablet, Rfl: 3    metFORMIN (GLUCOPHAGE-XR) 500 mg 24 hr tablet, Take 2 tablets (1,000 mg total) by mouth 2 (two) times a day with meals, Disp: 360 tablet, Rfl: 0    Microlet Lancets MISC, TEST BLOOD SUGAR TWO TIMES A DAY, Disp: 100 each, Rfl: 0    simvastatin (ZOCOR) 40 mg tablet, TAKE ONE TABLET BY MOUTH EVERY DAY, Disp: 90 tablet, Rfl: 0    Specialty Vitamins Products (MAGNESIUM, AMINO ACID CHELATE,) 133 MG tablet, Take 1 tablet by mouth 2 (two) times a day, Disp: , Rfl:     Thiamine 50 MG CAPS, Take 1 capsule by mouth daily, Disp: , Rfl:     Current Facility-Administered Medications:     iohexol (OMNIPAQUE) 300 mg/mL injection 50 mL, 50 mL, Epidural, Once, Philip Puga DO    lidocaine (PF) (XYLOCAINE-MPF) 1 % injection 5 mL, 5 mL, Other, Once, Philip Puga DO    methylPREDNISolone acetate (DEPO-MEDROL) injection 80 mg, 80 mg, Epidural, Once, Nabeel Wellington, DO    No Known Allergies    Physical Exam:   Vitals:    09/17/20 1026   BP: (!) 180/65   Pulse: 73   Resp: 20   Temp: 98 8 °F (37 1 °C)   SpO2: 99%     General: Awake, Alert, Oriented x 3, Mood and affect appropriate  Respiratory: Respirations even and unlabored  Cardiovascular: Peripheral pulses intact; no edema  Musculoskeletal Exam:  Decreased range of motion lumbar spine    ASA Score: II    Patient/Chart Verification  Patient ID Verified: Verbal  ID Band Applied: No  Consents Confirmed: Procedural  H&P( within 30 days) Verified: To be obtained in the Pre-Procedure area  Interval H&P(within 24 hr) Complete (required for Outpatients and Surgery Admit only):  To be obtained in the Pre-Procedure area  Allergies Reviewed: Yes  Anticoag/NSAID held?: No  Currently on antibiotics?: No  Pre-op Lab/Test Results Available: N/A    Assessment:   1  Herniated lumbar intervertebral disc    2  Spinal stenosis of lumbar region with neurogenic claudication    3   Radiculopathy        Plan: LESI to the right 2

## 2020-09-17 NOTE — DISCHARGE INSTRUCTIONS
Epidural Steroid Injection   WHAT YOU NEED TO KNOW:   An epidural steroid injection (EMELY) is a procedure to inject steroid medicine into the epidural space  The epidural space is between your spinal cord and vertebrae  Steroids reduce inflammation and fluid buildup in your spine that may be causing pain  You may be given pain medicine along with the steroids  ACTIVITY  · Do not drive or operate machinery today  · No strenuous activity today - bending, lifting, etc   · You may resume normal activites starting tomorrow - start slowly and as tolerated  · You may shower today, but no tub baths or hot tubs  · You may have numbness for several hours from the local anesthetic  Please use caution and common sense, especially with weight-bearing activities  CARE OF THE INJECTION SITE  · If you have soreness or pain, apply ice to the area today (20 minutes on/20 minutes off)  · Starting tomorrow, you may use warm, moist heat or ice if needed  · You may have an increase or change in your discomfort for 36-48 hours after your treatment  · Apply ice and continue with any pain medication you have been prescribed  · [facility]ify [facility] if you have any of the following: redness, drainage, swelling, headache, stiff neck or fever above 100°F     SPECIAL INSTRUCTIONS  · Our office will contact you in approximately 7 days for a progress report  MEDICATIONS  · Continue to take all routine medications  · Our office may have instructed you to hold some medications  If you have a problem specifically related to your procedure, please call our office at [phone]  Problems not related to your procedure should be directed to your primary care physician

## 2020-09-24 ENCOUNTER — TELEPHONE (OUTPATIENT)
Dept: PAIN MEDICINE | Facility: CLINIC | Age: 80
End: 2020-09-24

## 2020-09-24 DIAGNOSIS — E11.42 TYPE 2 DIABETES MELLITUS WITH DIABETIC POLYNEUROPATHY, WITHOUT LONG-TERM CURRENT USE OF INSULIN (HCC): ICD-10-CM

## 2020-09-24 RX ORDER — METFORMIN HYDROCHLORIDE 500 MG/1
TABLET, EXTENDED RELEASE ORAL
Qty: 360 TABLET | Refills: 0 | Status: SHIPPED | OUTPATIENT
Start: 2020-09-24 | End: 2020-11-17

## 2020-10-12 ENCOUNTER — TELEPHONE (OUTPATIENT)
Dept: FAMILY MEDICINE CLINIC | Facility: CLINIC | Age: 80
End: 2020-10-12

## 2020-10-13 ENCOUNTER — IMMUNIZATIONS (OUTPATIENT)
Dept: FAMILY MEDICINE CLINIC | Facility: CLINIC | Age: 80
End: 2020-10-13
Payer: MEDICARE

## 2020-10-13 DIAGNOSIS — Z23 NEED FOR IMMUNIZATION AGAINST INFLUENZA: Primary | ICD-10-CM

## 2020-10-13 PROCEDURE — G0008 ADMIN INFLUENZA VIRUS VAC: HCPCS | Performed by: FAMILY MEDICINE

## 2020-10-13 PROCEDURE — 90662 IIV NO PRSV INCREASED AG IM: CPT | Performed by: FAMILY MEDICINE

## 2020-10-21 ENCOUNTER — TELEPHONE (OUTPATIENT)
Dept: ENDOCRINOLOGY | Facility: CLINIC | Age: 80
End: 2020-10-21

## 2020-10-21 DIAGNOSIS — E78.2 COMBINED HYPERLIPIDEMIA: ICD-10-CM

## 2020-10-21 DIAGNOSIS — E78.2 MIXED HYPERLIPIDEMIA: ICD-10-CM

## 2020-10-21 DIAGNOSIS — E11.42 TYPE 2 DIABETES MELLITUS WITH DIABETIC POLYNEUROPATHY, WITHOUT LONG-TERM CURRENT USE OF INSULIN (HCC): Primary | ICD-10-CM

## 2020-10-21 DIAGNOSIS — I10 BENIGN ESSENTIAL HYPERTENSION: ICD-10-CM

## 2020-10-21 RX ORDER — SIMVASTATIN 40 MG
TABLET ORAL
Qty: 90 TABLET | Refills: 0 | Status: SHIPPED | OUTPATIENT
Start: 2020-10-21 | End: 2020-11-19

## 2020-10-28 ENCOUNTER — OFFICE VISIT (OUTPATIENT)
Dept: UROLOGY | Facility: HOSPITAL | Age: 80
End: 2020-10-28
Payer: MEDICARE

## 2020-10-28 VITALS
WEIGHT: 165 LBS | TEMPERATURE: 97.2 F | BODY MASS INDEX: 22.35 KG/M2 | HEART RATE: 79 BPM | HEIGHT: 72 IN | DIASTOLIC BLOOD PRESSURE: 58 MMHG | SYSTOLIC BLOOD PRESSURE: 142 MMHG

## 2020-10-28 DIAGNOSIS — N40.1 BENIGN PROSTATIC HYPERPLASIA WITH LOWER URINARY TRACT SYMPTOMS, SYMPTOM DETAILS UNSPECIFIED: Primary | ICD-10-CM

## 2020-10-28 PROCEDURE — 99213 OFFICE O/P EST LOW 20 MIN: CPT | Performed by: NURSE PRACTITIONER

## 2020-11-04 ENCOUNTER — LAB (OUTPATIENT)
Dept: LAB | Facility: CLINIC | Age: 80
End: 2020-11-04
Payer: MEDICARE

## 2020-11-04 ENCOUNTER — TRANSCRIBE ORDERS (OUTPATIENT)
Dept: LAB | Facility: CLINIC | Age: 80
End: 2020-11-04

## 2020-11-04 DIAGNOSIS — I10 ESSENTIAL HYPERTENSION, MALIGNANT: ICD-10-CM

## 2020-11-04 DIAGNOSIS — E78.2 MIXED HYPERLIPIDEMIA: ICD-10-CM

## 2020-11-04 DIAGNOSIS — E11.42 TYPE 2 DIABETES MELLITUS WITH DIABETIC POLYNEUROPATHY, WITHOUT LONG-TERM CURRENT USE OF INSULIN (HCC): ICD-10-CM

## 2020-11-04 DIAGNOSIS — I10 BENIGN ESSENTIAL HYPERTENSION: ICD-10-CM

## 2020-11-04 DIAGNOSIS — E13.42 DIABETIC POLYNEUROPATHY ASSOCIATED WITH OTHER SPECIFIED DIABETES MELLITUS (HCC): Primary | ICD-10-CM

## 2020-11-04 LAB
ALBUMIN SERPL BCP-MCNC: 4 G/DL (ref 3.5–5)
ALP SERPL-CCNC: 88 U/L (ref 46–116)
ALT SERPL W P-5'-P-CCNC: 32 U/L (ref 12–78)
ANION GAP SERPL CALCULATED.3IONS-SCNC: 4 MMOL/L (ref 4–13)
AST SERPL W P-5'-P-CCNC: 24 U/L (ref 5–45)
BILIRUB SERPL-MCNC: 0.41 MG/DL (ref 0.2–1)
BUN SERPL-MCNC: 18 MG/DL (ref 5–25)
CALCIUM SERPL-MCNC: 9.5 MG/DL (ref 8.3–10.1)
CHLORIDE SERPL-SCNC: 103 MMOL/L (ref 100–108)
CHOLEST SERPL-MCNC: 92 MG/DL (ref 50–200)
CO2 SERPL-SCNC: 29 MMOL/L (ref 21–32)
CREAT SERPL-MCNC: 1.03 MG/DL (ref 0.6–1.3)
CREAT UR-MCNC: 87.7 MG/DL
EST. AVERAGE GLUCOSE BLD GHB EST-MCNC: 163 MG/DL
GFR SERPL CREATININE-BSD FRML MDRD: 69 ML/MIN/1.73SQ M
GLUCOSE P FAST SERPL-MCNC: 101 MG/DL (ref 65–99)
HBA1C MFR BLD: 7.3 %
HDLC SERPL-MCNC: 50 MG/DL
LDLC SERPL CALC-MCNC: 29 MG/DL (ref 0–100)
MICROALBUMIN UR-MCNC: 26 MG/L (ref 0–20)
MICROALBUMIN/CREAT 24H UR: 30 MG/G CREATININE (ref 0–30)
NONHDLC SERPL-MCNC: 42 MG/DL
POTASSIUM SERPL-SCNC: 4.6 MMOL/L (ref 3.5–5.3)
PROT SERPL-MCNC: 7 G/DL (ref 6.4–8.2)
SODIUM SERPL-SCNC: 136 MMOL/L (ref 136–145)
TRIGL SERPL-MCNC: 63 MG/DL

## 2020-11-04 PROCEDURE — 82570 ASSAY OF URINE CREATININE: CPT

## 2020-11-04 PROCEDURE — 36415 COLL VENOUS BLD VENIPUNCTURE: CPT

## 2020-11-04 PROCEDURE — 82043 UR ALBUMIN QUANTITATIVE: CPT

## 2020-11-04 PROCEDURE — 83036 HEMOGLOBIN GLYCOSYLATED A1C: CPT

## 2020-11-04 PROCEDURE — 80053 COMPREHEN METABOLIC PANEL: CPT

## 2020-11-04 PROCEDURE — 80061 LIPID PANEL: CPT

## 2020-11-05 ENCOUNTER — TELEPHONE (OUTPATIENT)
Dept: ENDOCRINOLOGY | Facility: CLINIC | Age: 80
End: 2020-11-05

## 2020-11-17 DIAGNOSIS — E11.42 TYPE 2 DIABETES MELLITUS WITH DIABETIC POLYNEUROPATHY, WITHOUT LONG-TERM CURRENT USE OF INSULIN (HCC): ICD-10-CM

## 2020-11-17 RX ORDER — METFORMIN HYDROCHLORIDE 500 MG/1
TABLET, EXTENDED RELEASE ORAL
Qty: 360 TABLET | Refills: 0 | Status: SHIPPED | OUTPATIENT
Start: 2020-11-17 | End: 2020-11-19

## 2020-11-19 DIAGNOSIS — E11.9 TYPE 2 DIABETES MELLITUS WITHOUT COMPLICATION, WITHOUT LONG-TERM CURRENT USE OF INSULIN (HCC): ICD-10-CM

## 2020-11-19 DIAGNOSIS — E11.42 TYPE 2 DIABETES MELLITUS WITH DIABETIC POLYNEUROPATHY, WITHOUT LONG-TERM CURRENT USE OF INSULIN (HCC): ICD-10-CM

## 2020-11-19 DIAGNOSIS — E78.2 COMBINED HYPERLIPIDEMIA: ICD-10-CM

## 2020-11-19 RX ORDER — METFORMIN HYDROCHLORIDE 500 MG/1
TABLET, EXTENDED RELEASE ORAL
Qty: 360 TABLET | Refills: 0 | Status: SHIPPED | OUTPATIENT
Start: 2020-11-19 | End: 2021-05-18

## 2020-11-19 RX ORDER — GLIMEPIRIDE 2 MG/1
TABLET ORAL
Qty: 90 TABLET | Refills: 1 | Status: SHIPPED | OUTPATIENT
Start: 2020-11-19 | End: 2021-05-17

## 2020-11-19 RX ORDER — SIMVASTATIN 40 MG
TABLET ORAL
Qty: 90 TABLET | Refills: 0 | Status: SHIPPED | OUTPATIENT
Start: 2020-11-19 | End: 2021-02-17

## 2020-11-25 ENCOUNTER — DOCTOR'S OFFICE (OUTPATIENT)
Dept: URBAN - METROPOLITAN AREA CLINIC 136 | Facility: CLINIC | Age: 80
Setting detail: OPHTHALMOLOGY
End: 2020-11-25
Payer: COMMERCIAL

## 2020-11-25 DIAGNOSIS — H04.123: ICD-10-CM

## 2020-11-25 DIAGNOSIS — H35.372: ICD-10-CM

## 2020-11-25 DIAGNOSIS — E11.9: ICD-10-CM

## 2020-11-25 DIAGNOSIS — H43.813: ICD-10-CM

## 2020-11-25 DIAGNOSIS — H27.8: ICD-10-CM

## 2020-11-25 LAB
LEFT EYE DIABETIC RETINOPATHY: NORMAL
RIGHT EYE DIABETIC RETINOPATHY: NORMAL

## 2020-11-25 PROCEDURE — 92014 COMPRE OPH EXAM EST PT 1/>: CPT | Performed by: OPHTHALMOLOGY

## 2020-11-25 PROCEDURE — 92134 CPTRZ OPH DX IMG PST SGM RTA: CPT | Performed by: OPHTHALMOLOGY

## 2020-11-25 ASSESSMENT — REFRACTION_CURRENTRX
OS_VPRISM_DIRECTION: BF
OD_CYLINDER: -1.25
OS_ADD: +2.50
OS_CYLINDER: SPHERE
OS_SPHERE: +0.75
OD_VPRISM_DIRECTION: BF
OD_AXIS: 070
OS_OVR_VA: 20/
OD_SPHERE: +1.00
OD_ADD: +2.50
OD_OVR_VA: 20/

## 2020-11-25 ASSESSMENT — CONFRONTATIONAL VISUAL FIELD TEST (CVF)
OS_FINDINGS: FULL
OD_FINDINGS: FULL

## 2020-11-25 ASSESSMENT — SPHEQUIV_DERIVED: OD_SPHEQUIV: 0.375

## 2020-11-25 ASSESSMENT — VISUAL ACUITY
OS_BCVA: 20/20
OD_BCVA: 20/20

## 2020-11-25 ASSESSMENT — REFRACTION_AUTOREFRACTION
OD_CYLINDER: -0.75
OS_CYLINDER: PL
OS_SPHERE: +0.75
OD_AXIS: 70
OS_AXIS: 180
OD_SPHERE: +0.75

## 2020-12-10 ENCOUNTER — OFFICE VISIT (OUTPATIENT)
Dept: FAMILY MEDICINE CLINIC | Facility: CLINIC | Age: 80
End: 2020-12-10
Payer: MEDICARE

## 2020-12-10 VITALS
HEIGHT: 72 IN | DIASTOLIC BLOOD PRESSURE: 58 MMHG | BODY MASS INDEX: 23.04 KG/M2 | TEMPERATURE: 95.1 F | WEIGHT: 170.13 LBS | SYSTOLIC BLOOD PRESSURE: 132 MMHG

## 2020-12-10 DIAGNOSIS — E11.42 DIABETIC PERIPHERAL NEUROPATHY (HCC): ICD-10-CM

## 2020-12-10 DIAGNOSIS — E11.42 TYPE 2 DIABETES MELLITUS WITH DIABETIC POLYNEUROPATHY, WITHOUT LONG-TERM CURRENT USE OF INSULIN (HCC): Primary | ICD-10-CM

## 2020-12-10 DIAGNOSIS — Z12.11 SCREENING FOR COLORECTAL CANCER: ICD-10-CM

## 2020-12-10 DIAGNOSIS — I10 BENIGN ESSENTIAL HYPERTENSION: ICD-10-CM

## 2020-12-10 DIAGNOSIS — E11.9 TYPE 2 DIABETES MELLITUS WITHOUT COMPLICATION, WITHOUT LONG-TERM CURRENT USE OF INSULIN (HCC): ICD-10-CM

## 2020-12-10 DIAGNOSIS — Z12.12 SCREENING FOR COLORECTAL CANCER: ICD-10-CM

## 2020-12-10 PROCEDURE — 99214 OFFICE O/P EST MOD 30 MIN: CPT | Performed by: FAMILY MEDICINE

## 2020-12-10 RX ORDER — GABAPENTIN 100 MG/1
CAPSULE ORAL
Qty: 100 CAPSULE | Refills: 1 | Status: SHIPPED | OUTPATIENT
Start: 2020-12-10 | End: 2021-01-18 | Stop reason: SDUPTHER

## 2020-12-28 DIAGNOSIS — J30.1 SEASONAL ALLERGIC RHINITIS DUE TO POLLEN: ICD-10-CM

## 2020-12-28 DIAGNOSIS — E11.9 TYPE 2 DIABETES MELLITUS WITHOUT COMPLICATION, WITHOUT LONG-TERM CURRENT USE OF INSULIN (HCC): ICD-10-CM

## 2020-12-28 RX ORDER — BLOOD SUGAR DIAGNOSTIC
STRIP MISCELLANEOUS
Qty: 100 EACH | Refills: 2 | Status: SHIPPED | OUTPATIENT
Start: 2020-12-28 | End: 2022-07-15

## 2020-12-28 RX ORDER — FLUTICASONE PROPIONATE 50 MCG
SPRAY, SUSPENSION (ML) NASAL
Qty: 1 BOTTLE | Refills: 1 | Status: SHIPPED | OUTPATIENT
Start: 2020-12-28

## 2021-01-18 DIAGNOSIS — E11.42 DIABETIC PERIPHERAL NEUROPATHY (HCC): ICD-10-CM

## 2021-01-18 RX ORDER — GABAPENTIN 300 MG/1
300 CAPSULE ORAL DAILY
Qty: 90 CAPSULE | Refills: 1 | Status: SHIPPED | OUTPATIENT
Start: 2021-01-18 | End: 2021-02-15 | Stop reason: DRUGHIGH

## 2021-01-19 ENCOUNTER — OFFICE VISIT (OUTPATIENT)
Dept: ENDOCRINOLOGY | Facility: CLINIC | Age: 81
End: 2021-01-19
Payer: MEDICARE

## 2021-01-19 VITALS
HEART RATE: 78 BPM | WEIGHT: 170.6 LBS | BODY MASS INDEX: 23.14 KG/M2 | DIASTOLIC BLOOD PRESSURE: 66 MMHG | SYSTOLIC BLOOD PRESSURE: 134 MMHG

## 2021-01-19 DIAGNOSIS — E78.2 MIXED HYPERLIPIDEMIA: ICD-10-CM

## 2021-01-19 DIAGNOSIS — I10 BENIGN ESSENTIAL HYPERTENSION: ICD-10-CM

## 2021-01-19 DIAGNOSIS — E11.9 TYPE 2 DIABETES MELLITUS WITHOUT COMPLICATION, WITHOUT LONG-TERM CURRENT USE OF INSULIN (HCC): Primary | ICD-10-CM

## 2021-01-19 PROCEDURE — 99214 OFFICE O/P EST MOD 30 MIN: CPT | Performed by: NURSE PRACTITIONER

## 2021-01-19 NOTE — PROGRESS NOTES
Established Patient Progress Note      Chief Complaint   Patient presents with    Diabetes Type 2          History of Present Illness:   Gage Hinds is a 68 y o  male with a history of HTN, HLD, and type 2 diabetes without long term use of insulin  Reports complications of neuropathy  Last A1C 7 3  Denies recent illness or hospitalizations  Denies recent severe hypoglycemic or severe hyperglycemic episodes  Denies any issues with his current regimen  Home glucose monitoring: are performed regularly    Home blood glucose readings:   Before breakfast: 80-90s  Before lunch: does not check  Before dinner: 130-140s  Bedtime: does not check     Current regimen: Glimipride 1 mg BID and Metformin 1,000 mg BID  compliant all of the timedenies any side effects from current medications     Hypoglycemic episodes: No never   H/o of hypoglycemia causing hospitalization or Intervention such as glucagon injection or ambulance call No     Last Eye Exam: 11/25/20  Last Foot Exam:12/10/20    Has hypertension: Taking Amlodipine and Lisinopril  Has hyperlipidemia: Taking Simvastatin    Patient Active Problem List   Diagnosis    Benign essential hypertension    Benign prostatic hyperplasia with lower urinary tract symptoms    Esophageal dysphagia    Herniated lumbar intervertebral disc    Hyperlipidemia    Vasomotor rhinitis    Elevated TSH    Type 2 diabetes mellitus with diabetic polyneuropathy (HCC)    Type 2 diabetes mellitus without complication, without long-term current use of insulin ([facility] [address] )    Spinal stenosis of lumbar region with neurogenic claudication    Radiculopathy      Past Medical History:   Diagnosis Date    Abnormal blood chemistry     Abnormal glucose     Last assessed - 2/20/14    Benign essential hypertension     Last assessed - 5/15/17    Chronic allergic rhinitis     Last assessed - 8/1/16    Dermatitis     Last assessed - 6/16/15    High risk medication use     Last assessed - 5/2/16    Sciatica     Last assessed - 12    Septic bursitis     Last assessed - 9/3/13    Tick bite     Last assessed - 16      Past Surgical History:   Procedure Laterality Date    CATARACT EXTRACTION      COLONOSCOPY      Fiberoptic    PILONIDAL CYST EXCISION        Family History   Problem Relation Age of Onset    Hypertension Mother     Osteoporosis Mother     Diabetes type I Paternal Aunt     Colon cancer Family      Social History     Tobacco Use    Smoking status: Former Smoker     Quit date:      Years since quittin 0    Smokeless tobacco: Never Used   Substance Use Topics    Alcohol use:  Yes     Alcohol/week: 1 0 standard drinks     Types: 1 Cans of beer per week     Frequency: 4 or more times a week     Comment: social usage     No Known Allergies      Current Outpatient Medications:     amLODIPine (NORVASC) 5 mg tablet, TAKE ONE TABLET BY MOUTH EVERY DAY, Disp: 90 tablet, Rfl: 2    B Complex-Biotin-FA (B COMPLETE) TABS, Take by mouth, Disp: , Rfl:     Blood Glucose Monitoring Suppl (CONTOUR NEXT MONITOR) w/Device KIT, 1 Device by Does not apply route 2 (two) times a day, Disp: 1 kit, Rfl: 0    Coenzyme Q10 (COQ10 PO), Take by mouth, Disp: , Rfl:     Contour Next Test test strip, TEST BLOOD SUGAR TWICE DAILY, Disp: 100 each, Rfl: 2    docusate sodium (COLACE) 100 mg capsule, Take 1 capsule by mouth as needed, Disp: , Rfl:     fexofenadine (ALLEGRA) 180 MG tablet, Take by mouth as needed  , Disp: , Rfl:     fluticasone (FLONASE) 50 mcg/act nasal spray, APPLY ONE SPRAY IN EACH NOSTRIL TWICE A DAY, Disp: 1 Bottle, Rfl: 1    gabapentin (NEURONTIN) 300 mg capsule, Take 1 capsule (300 mg total) by mouth daily 1 cap daily, Disp: 90 capsule, Rfl: 1    glimepiride (AMARYL) 2 mg tablet, TAKE ONE-HALF TABLET BY MOUTH TWO TIMES A DAY, Disp: 90 tablet, Rfl: 1    ipratropium (ATROVENT) 0 06 % nasal spray, 1 spray into each nostril 3 (three) times a day, Disp: 15 mL, Rfl: 0    lisinopril (ZESTRIL) 20 mg tablet, TAKE ONE TABLET BY MOUTH EVERY DAY, Disp: 90 tablet, Rfl: 3    metFORMIN (GLUCOPHAGE-XR) 500 mg 24 hr tablet, TAKE TWO TABLETS BY MOUTH TWICE A DAY WITH MEALS, Disp: 360 tablet, Rfl: 0    Microlet Lancets MISC, TEST BLOOD SUGAR TWO TIMES A DAY, Disp: 100 each, Rfl: 0    simvastatin (ZOCOR) 40 mg tablet, TAKE ONE TABLET BY MOUTH EVERY DAY, Disp: 90 tablet, Rfl: 0    Specialty Vitamins Products (MAGNESIUM, AMINO ACID CHELATE,) 133 MG tablet, Take 1 tablet by mouth 2 (two) times a day, Disp: , Rfl:     Thiamine 50 MG CAPS, Take 1 capsule by mouth daily, Disp: , Rfl:     Review of Systems   Constitutional: Negative for activity change, appetite change and fatigue  HENT: Negative for sore throat, trouble swallowing and voice change  Eyes: Negative for visual disturbance  Respiratory: Negative for choking, chest tightness and shortness of breath  Cardiovascular: Negative for chest pain, palpitations and leg swelling  Gastrointestinal: Negative for abdominal pain, constipation and diarrhea  Endocrine: Negative for cold intolerance, heat intolerance, polydipsia, polyphagia and polyuria  Genitourinary: Negative for frequency  Musculoskeletal: Negative for arthralgias and myalgias  Skin: Negative for rash  Neurological: Negative for dizziness and syncope  Hematological: Negative for adenopathy  Psychiatric/Behavioral: Negative for sleep disturbance  All other systems reviewed and are negative  Physical Exam:  Body mass index is 23 14 kg/m²  /66   Pulse 78   Wt 77 4 kg (170 lb 9 6 oz)   BMI 23 14 kg/m²    Wt Readings from Last 3 Encounters:   01/19/21 77 4 kg (170 lb 9 6 oz)   12/10/20 77 2 kg (170 lb 2 oz)   10/28/20 74 8 kg (165 lb)       Physical Exam  Vitals signs reviewed  Constitutional:       General: He is not in acute distress  Appearance: He is well-developed  HENT:      Head: Normocephalic and atraumatic     Eyes:      Conjunctiva/sclera: Conjunctivae normal       Pupils: Pupils are equal, round, and reactive to light  Neck:      Musculoskeletal: Normal range of motion and neck supple  Thyroid: No thyromegaly  Cardiovascular:      Rate and Rhythm: Normal rate and regular rhythm  Heart sounds: Normal heart sounds  No murmur  Pulmonary:      Effort: Pulmonary effort is normal  No respiratory distress  Breath sounds: Normal breath sounds  No wheezing or rales  Abdominal:      General: Bowel sounds are normal  There is no distension  Palpations: Abdomen is soft  Tenderness: There is no abdominal tenderness  Musculoskeletal: Normal range of motion  Lymphadenopathy:      Cervical: No cervical adenopathy  Skin:     General: Skin is warm and dry  Neurological:      Mental Status: He is alert and oriented to person, place, and time             Labs:     Lab Results   Component Value Date    HGBA1C 7 3 (H) 11/04/2020       Lab Results   Component Value Date     10/20/2015    SODIUM 136 11/04/2020    K 4 6 11/04/2020     11/04/2020    CO2 29 11/04/2020    ANIONGAP 8 10/20/2015    AGAP 4 11/04/2020    BUN 18 11/04/2020    CREATININE 1 03 11/04/2020    GLUC 118 07/12/2019    GLUF 101 (H) 11/04/2020    CALCIUM 9 5 11/04/2020    AST 24 11/04/2020    ALT 32 11/04/2020    ALKPHOS 88 11/04/2020    PROT 7 0 04/23/2015    TP 7 0 11/04/2020    BILITOT 0 54 04/23/2015    TBILI 0 41 11/04/2020    EGFR 69 11/04/2020         Lab Results   Component Value Date    CHOL 114 04/23/2015    HDL 50 11/04/2020    TRIG 63 11/04/2020       Lab Results   Component Value Date    VTN1BJGSACFB 3 960 (H) 02/20/2020    MZD0WPZBEKAO 3 700 10/23/2019    JIC6AOHOCYPM 3 540 03/28/2019     Lab Results   Component Value Date    FREET4 1 10 10/23/2019       Impression & Plan:    Problem List Items Addressed This Visit        Endocrine    Type 2 diabetes mellitus without complication, without long-term current use of insulin ([facility] [address] ) - Primary     At goal based on patient with advanced age  No episodes of hypoglycemia  Continue current regimen  Notify office of BG less than 70  Relevant Orders    Hemoglobin A1C    Comprehensive metabolic panel    Hemoglobin A1C    Comprehensive metabolic panel    Lipid Panel with Direct LDL reflex    Microalbumin / creatinine urine ratio       Cardiovascular and Mediastinum    Benign essential hypertension     BP stable, continue current regimen          Relevant Orders    Comprehensive metabolic panel    Comprehensive metabolic panel       Other    Hyperlipidemia     Stable, continue statin          Relevant Orders    Lipid Panel with Direct LDL reflex          Orders Placed This Encounter   Procedures    Hemoglobin A1C    Comprehensive metabolic panel     This is a patient instruction: Patient fasting for 8 hours or longer recommended   Hemoglobin A1C     Standing Status:   Future     Standing Expiration Date:   1/19/2022    Comprehensive metabolic panel     This is a patient instruction: Patient fasting for 8 hours or longer recommended  Standing Status:   Future     Standing Expiration Date:   1/19/2022    Lipid Panel with Direct LDL reflex     This is a patient instruction: This test requires patient fasting for 10-12 hours or longer  Drinking of black coffee or black tea is acceptable  Standing Status:   Future     Standing Expiration Date:   1/19/2022    Microalbumin / creatinine urine ratio     Standing Status:   Future     Standing Expiration Date:   1/19/2022           Discussed with the patient and all questioned fully answered  He will call me if any problems arise  Follow-up appointment in 4 months       Counseled patient on diagnostic results, prognosis, risk and benefit of treatment options, instruction for management, importance of treatment compliance, Risk  factor reduction and impressions      Hoa [address]

## 2021-01-19 NOTE — ASSESSMENT & PLAN NOTE
At goal based on patient with advanced age  No episodes of hypoglycemia  Continue current regimen  Notify office of BG less than 70

## 2021-01-20 DIAGNOSIS — Z23 ENCOUNTER FOR IMMUNIZATION: ICD-10-CM

## 2021-01-25 ENCOUNTER — TELEPHONE (OUTPATIENT)
Dept: RADIOLOGY | Facility: CLINIC | Age: 81
End: 2021-01-25

## 2021-01-25 NOTE — TELEPHONE ENCOUNTER
Okay to schedule lumbar epidural steroid injection same diagnosis codes as previous and then a follow-up with MARIBELL

## 2021-01-25 NOTE — TELEPHONE ENCOUNTER
Pt called in to schedule procedure pt last seen 9/2020 pt states that it is the same pain   Please advise

## 2021-01-26 DIAGNOSIS — M48.062 SPINAL STENOSIS OF LUMBAR REGION WITH NEUROGENIC CLAUDICATION: ICD-10-CM

## 2021-01-26 DIAGNOSIS — M54.10 RADICULOPATHY, UNSPECIFIED SPINAL REGION: ICD-10-CM

## 2021-01-26 DIAGNOSIS — M51.26 HERNIATED LUMBAR INTERVERTEBRAL DISC: Primary | ICD-10-CM

## 2021-01-26 NOTE — TELEPHONE ENCOUNTER
Instructed pt to arrive at appt time, no earlier  Pt to wear a mask  Pt will need a  but they must wait in the car  NPO 1 hr prior, wear pants w/o metal, call back to r/s if become ill/abx  Pt aware the possibility of immunosuppression due to steroid and to continue the practice of social distancing, masking, handwashing  COVID screening done  Pt verbalized understanding

## 2021-01-28 ENCOUNTER — IMMUNIZATIONS (OUTPATIENT)
Dept: FAMILY MEDICINE CLINIC | Facility: HOSPITAL | Age: 81
End: 2021-01-28

## 2021-01-28 DIAGNOSIS — Z23 ENCOUNTER FOR IMMUNIZATION: Primary | ICD-10-CM

## 2021-01-28 PROCEDURE — 0011A SARS-COV-2 / COVID-19 MRNA VACCINE (MODERNA) 100 MCG: CPT

## 2021-01-28 PROCEDURE — 91301 SARS-COV-2 / COVID-19 MRNA VACCINE (MODERNA) 100 MCG: CPT

## 2021-02-05 ENCOUNTER — HOSPITAL ENCOUNTER (OUTPATIENT)
Dept: RADIOLOGY | Facility: CLINIC | Age: 81
Discharge: HOME | End: 2021-02-05
Attending: ANESTHESIOLOGY | Admitting: ANESTHESIOLOGY
Payer: MEDICARE

## 2021-02-05 VITALS
RESPIRATION RATE: 20 BRPM | TEMPERATURE: 97.7 F | HEART RATE: 83 BPM | SYSTOLIC BLOOD PRESSURE: 147 MMHG | DIASTOLIC BLOOD PRESSURE: 60 MMHG | OXYGEN SATURATION: 97 %

## 2021-02-05 DIAGNOSIS — M54.10 RADICULOPATHY, UNSPECIFIED SPINAL REGION: ICD-10-CM

## 2021-02-05 DIAGNOSIS — M51.26 HERNIATED LUMBAR INTERVERTEBRAL DISC: ICD-10-CM

## 2021-02-05 DIAGNOSIS — M48.062 SPINAL STENOSIS OF LUMBAR REGION WITH NEUROGENIC CLAUDICATION: ICD-10-CM

## 2021-02-05 PROCEDURE — 62323 NJX INTERLAMINAR LMBR/SAC: CPT | Performed by: ANESTHESIOLOGY

## 2021-02-05 RX ORDER — METHYLPREDNISOLONE ACETATE 80 MG/ML
80 INJECTION, SUSPENSION INTRA-ARTICULAR; INTRALESIONAL; INTRAMUSCULAR; PARENTERAL; SOFT TISSUE ONCE
Status: COMPLETED | OUTPATIENT
Start: 2021-02-05 | End: 2021-02-05

## 2021-02-05 RX ORDER — LIDOCAINE HYDROCHLORIDE 10 MG/ML
5 INJECTION, SOLUTION EPIDURAL; INFILTRATION; INTRACAUDAL; PERINEURAL ONCE
Status: COMPLETED | OUTPATIENT
Start: 2021-02-05 | End: 2021-02-05

## 2021-02-05 RX ADMIN — METHYLPREDNISOLONE ACETATE 80 MG: 80 INJECTION, SUSPENSION INTRA-ARTICULAR; INTRALESIONAL; INTRAMUSCULAR; SOFT TISSUE at 10:58

## 2021-02-05 RX ADMIN — IOHEXOL 1 ML: 300 INJECTION, SOLUTION INTRAVENOUS at 10:57

## 2021-02-05 RX ADMIN — LIDOCAINE HYDROCHLORIDE 3 ML: 10 INJECTION, SOLUTION EPIDURAL; INFILTRATION; INTRACAUDAL; PERINEURAL at 10:57

## 2021-02-05 NOTE — DISCHARGE INSTRUCTIONS
Epidural Steroid Injection   WHAT YOU NEED TO KNOW:   An epidural steroid injection (EMELY) is a procedure to inject steroid medicine into the epidural space  The epidural space is between your spinal cord and vertebrae  Steroids reduce inflammation and fluid buildup in your spine that may be causing pain  You may be given pain medicine along with the steroids  ACTIVITY  · Do not drive or operate machinery today  · No strenuous activity today - bending, lifting, etc   · You may resume normal activites starting tomorrow - start slowly and as tolerated  · You may shower today, but no tub baths or hot tubs  · You may have numbness for several hours from the local anesthetic  Please use caution and common sense, especially with weight-bearing activities  CARE OF THE INJECTION SITE  · If you have soreness or pain, apply ice to the area today (20 minutes on/20 minutes off)  · Starting tomorrow, you may use warm, moist heat or ice if needed  · You may have an increase or change in your discomfort for 36-48 hours after your treatment  · Apply ice and continue with any pain medication you have been prescribed  · Notify the [facility] if you have any of the following: redness, drainage, swelling, headache, stiff neck or fever above 100°F     SPECIAL INSTRUCTIONS  · Our office will contact you in approximately 7 days for a progress report  MEDICATIONS  · Continue to take all routine medications  · Our office may have instructed you to hold some medications  If you have a problem specifically related to your procedure, please call our office at [phone]  Problems not related to your procedure should be directed to your primary care physician

## 2021-02-05 NOTE — H&P
History of Present Illness: The patient is a 68 y o  male who presents with complaints of  Low back and leg pain      Patient Active Problem List   Diagnosis    Benign essential hypertension    Benign prostatic hyperplasia with lower urinary tract symptoms    Esophageal dysphagia    Herniated lumbar intervertebral disc    Hyperlipidemia    Vasomotor rhinitis    Elevated TSH    Type 2 diabetes mellitus with diabetic polyneuropathy (HCC)    Type 2 diabetes mellitus without complication, without long-term current use of insulin ([address] )    Spinal stenosis of lumbar region with neurogenic claudication    Radiculopathy       Past Medical History:   Diagnosis Date    Abnormal blood chemistry     Abnormal glucose     Last assessed - 2/20/14    Benign essential hypertension     Last assessed - 5/15/17    Chronic allergic rhinitis     Last assessed - 8/1/16    Dermatitis     Last assessed - 6/16/15    High risk medication use     Last assessed - 5/2/16    Sciatica     Last assessed - 12/18/12    Septic bursitis     Last assessed - 9/3/13    Tick bite     Last assessed - 8/8/16       Past Surgical History:   Procedure Laterality Date    CATARACT EXTRACTION      COLONOSCOPY      Fiberoptic    PILONIDAL CYST EXCISION           Current Outpatient Medications:     amLODIPine (NORVASC) 5 mg tablet, TAKE ONE TABLET BY MOUTH EVERY DAY, Disp: 90 tablet, Rfl: 2    B Complex-Biotin-FA (B COMPLETE) TABS, Take by mouth, Disp: , Rfl:     Blood Glucose Monitoring Suppl (CONTOUR NEXT MONITOR) w/Device KIT, 1 Device by Does not apply route 2 (two) times a day, Disp: 1 kit, Rfl: 0    Coenzyme Q10 (COQ10 PO), Take by mouth, Disp: , Rfl:     Contour Next Test test strip, TEST BLOOD SUGAR TWICE DAILY, Disp: 100 each, Rfl: 2    docusate sodium (COLACE) 100 mg capsule, Take 1 capsule by mouth as needed, Disp: , Rfl:     fexofenadine (ALLEGRA) 180 MG tablet, Take by mouth as needed  , Disp: , Rfl:     fluticasone (FLONASE) 50 mcg/act nasal spray, APPLY ONE SPRAY IN EACH NOSTRIL TWICE A DAY, Disp: 1 Bottle, Rfl: 1    gabapentin (NEURONTIN) 300 mg capsule, Take 1 capsule (300 mg total) by mouth daily 1 cap daily, Disp: 90 capsule, Rfl: 1    glimepiride (AMARYL) 2 mg tablet, TAKE ONE-HALF TABLET BY MOUTH TWO TIMES A DAY, Disp: 90 tablet, Rfl: 1    ipratropium (ATROVENT) 0 06 % nasal spray, 1 spray into each nostril 3 (three) times a day, Disp: 15 mL, Rfl: 0    lisinopril (ZESTRIL) 20 mg tablet, TAKE ONE TABLET BY MOUTH EVERY DAY, Disp: 90 tablet, Rfl: 3    metFORMIN (GLUCOPHAGE-XR) 500 mg 24 hr tablet, TAKE TWO TABLETS BY MOUTH TWICE A DAY WITH MEALS, Disp: 360 tablet, Rfl: 0    Microlet Lancets MISC, TEST BLOOD SUGAR TWO TIMES A DAY, Disp: 100 each, Rfl: 0    simvastatin (ZOCOR) 40 mg tablet, TAKE ONE TABLET BY MOUTH EVERY DAY, Disp: 90 tablet, Rfl: 0    Specialty Vitamins Products (MAGNESIUM, AMINO ACID CHELATE,) 133 MG tablet, Take 1 tablet by mouth 2 (two) times a day, Disp: , Rfl:     Thiamine 50 MG CAPS, Take 1 capsule by mouth daily, Disp: , Rfl:     Current Facility-Administered Medications:     iohexol (OMNIPAQUE) 300 mg/mL injection 50 mL, 50 mL, Epidural, Once, Philip Puga DO    lidocaine (PF) (XYLOCAINE-MPF) 1 % injection 5 mL, 5 mL, Other, Once, Philip Puga DO    methylPREDNISolone acetate (DEPO-MEDROL) injection 80 mg, 80 mg, Epidural, Once, Philip Puga DO    No Known Allergies    Physical Exam:   General: Awake, Alert, Oriented x 3, Mood and affect appropriate  Respiratory: Respirations even and unlabored  Cardiovascular: Peripheral pulses intact; no edema  Musculoskeletal Exam:   Decreased range of motion lumbar spine    ASA Score: II         Assessment:   1  Herniated lumbar intervertebral disc    2  Spinal stenosis of lumbar region with neurogenic claudication    3   Radiculopathy, unspecified spinal region        Plan: RIGHT LESI

## 2021-02-12 ENCOUNTER — TELEPHONE (OUTPATIENT)
Dept: PAIN MEDICINE | Facility: CLINIC | Age: 81
End: 2021-02-12

## 2021-02-15 ENCOUNTER — TELEPHONE (OUTPATIENT)
Dept: FAMILY MEDICINE CLINIC | Facility: CLINIC | Age: 81
End: 2021-02-15

## 2021-02-15 DIAGNOSIS — G62.9 PERIPHERAL POLYNEUROPATHY: Primary | ICD-10-CM

## 2021-02-15 RX ORDER — GABAPENTIN 100 MG/1
CAPSULE ORAL
Qty: 90 CAPSULE | Refills: 5 | Status: SHIPPED | OUTPATIENT
Start: 2021-02-15 | End: 2021-09-02

## 2021-02-15 NOTE — TELEPHONE ENCOUNTER
He is getting bad side effects form the 300mg, he is light headed, he said it works great, but would like to go back on 100mg in the am and 200mg in the pm

## 2021-02-17 DIAGNOSIS — E78.2 COMBINED HYPERLIPIDEMIA: ICD-10-CM

## 2021-02-17 DIAGNOSIS — I10 ESSENTIAL HYPERTENSION: ICD-10-CM

## 2021-02-17 RX ORDER — AMLODIPINE BESYLATE 5 MG/1
TABLET ORAL
Qty: 90 TABLET | Refills: 2 | Status: SHIPPED | OUTPATIENT
Start: 2021-02-17 | End: 2021-11-11

## 2021-02-17 RX ORDER — SIMVASTATIN 40 MG
TABLET ORAL
Qty: 90 TABLET | Refills: 0 | Status: SHIPPED | OUTPATIENT
Start: 2021-02-17 | End: 2021-05-17

## 2021-02-26 ENCOUNTER — IMMUNIZATIONS (OUTPATIENT)
Dept: FAMILY MEDICINE CLINIC | Facility: HOSPITAL | Age: 81
End: 2021-02-26

## 2021-02-26 DIAGNOSIS — Z23 ENCOUNTER FOR IMMUNIZATION: Primary | ICD-10-CM

## 2021-02-26 PROCEDURE — 0012A SARS-COV-2 / COVID-19 MRNA VACCINE (MODERNA) 100 MCG: CPT

## 2021-02-26 PROCEDURE — 91301 SARS-COV-2 / COVID-19 MRNA VACCINE (MODERNA) 100 MCG: CPT

## 2021-03-03 ENCOUNTER — TELEMEDICINE (OUTPATIENT)
Dept: PAIN MEDICINE | Facility: CLINIC | Age: 81
End: 2021-03-03
Payer: MEDICARE

## 2021-03-03 DIAGNOSIS — G89.29 CHRONIC BILATERAL LOW BACK PAIN, UNSPECIFIED WHETHER SCIATICA PRESENT: ICD-10-CM

## 2021-03-03 DIAGNOSIS — M51.16 INTERVERTEBRAL DISC DISORDER WITH RADICULOPATHY OF LUMBAR REGION: ICD-10-CM

## 2021-03-03 DIAGNOSIS — M54.50 CHRONIC BILATERAL LOW BACK PAIN, UNSPECIFIED WHETHER SCIATICA PRESENT: ICD-10-CM

## 2021-03-03 DIAGNOSIS — M47.816 LUMBAR SPONDYLOSIS: ICD-10-CM

## 2021-03-03 DIAGNOSIS — M54.16 LUMBAR RADICULOPATHY: ICD-10-CM

## 2021-03-03 DIAGNOSIS — M48.062 SPINAL STENOSIS OF LUMBAR REGION WITH NEUROGENIC CLAUDICATION: ICD-10-CM

## 2021-03-03 DIAGNOSIS — G89.4 CHRONIC PAIN SYNDROME: Primary | ICD-10-CM

## 2021-03-03 PROCEDURE — 99442 PR PHYS/QHP TELEPHONE EVALUATION 11-20 MIN: CPT | Performed by: NURSE PRACTITIONER

## 2021-03-03 NOTE — PROGRESS NOTES
Virtual Brief Visit    Assessment/Plan:     The patient has a history of chronic pain syndrome secondary to low back pain, lumbar spondylosis, severe lumbar stenosis L3-L4, and lumbar intervertebral disc disorder with radiculopathy  The patient continues with ongoing low back pain  He had underwent a right translaminar epidural steroid injection at L5-S1 on February 5, 2021 which had provided 40% pain relief  He states he is able to sleep on the right side, which he was not able to do prior to the injection  He also is able to exercise without as much stiffness and pain  He is currently rating his pain a 4-5/10 on the numeric rating scale  We discussed repeating  the injection to see if a 2nd injection will provide more pain relief  However, the patient would prefer to wait until the spring when he becomes more active  I instructed the patient to call our office at that time  He will call sooner if symptoms worsen    Problem List Items Addressed This Visit        Nervous and Auditory    Radiculopathy       Other    Spinal stenosis of lumbar region with neurogenic claudication      Other Visit Diagnoses     Chronic pain syndrome    -  Primary    Chronic bilateral low back pain, unspecified whether sciatica present        Lumbar spondylosis        Intervertebral disc disorder with radiculopathy of lumbar region                    Reason for visit is procedure follow up   Chief Complaint   Patient presents with    Virtual Brief Visit        Encounter provider DANIEL Olson    Provider located at [address] [address] [address]  [place] [address] [address]  [phone]    Recent Visits  No visits were found meeting these conditions     Showing recent visits within past 7 days and meeting all other requirements     Today's Visits  Date Type Provider Dept   03/03/21 Telemedicine DANIEL [facility] [place]   Showing today's visits and meeting all other requirements     Future Appointments  No visits were found meeting these conditions  Showing future appointments within next 150 days and meeting all other requirements        After connecting through telephone, the patient was identified by name and date of birth  Bertrand Wall was informed that this is a telemedicine visit and that the visit is being conducted through telephone  My office door was closed  No one else was in the room  He acknowledged consent and understanding of privacy and security of the platform  The patient has agreed to participate and understands he can discontinue the visit at any time  It was my intent to perform this visit via video technology but the patient was not able to do a video connection so the visit was completed via audio telephone only  Patient is aware this is a billable service  Subjective    Bertrand Wall is a 80 y o  male  who is concerned with the current crisis regarding COVID19 and has opted to proceed with the telephone visit, however, is currently not noting any signs or symptoms at this time  The patient has a history of chronic pain syndrome secondary to low back pain, lumbar spondylosis, severe lumbar stenosis L3-L4, and lumbar intervertebral disc disorder with radiculopathy  He was last seen in the office on February 5, 2021, in which he underwent a right  Lumbar translaminar epidural steroid injection at L5-S1  He states the injection had provided 40% pain relief  He continues with ongoing pain that is located in the low back, on the right side  He denies leg pain  He states since the injection, he does not notice the pain as often  He also states he can now sleep on his right side, which he couldn't do prior to the injection  He continues to exercises daily, and has noticed there is not as much stiffness and pain when performing the exercises, sine the injection   He has concerns the pain will increase in the Spring, when he is more active  He is rating the pain a 4-5/10 on the numeric rating scale  He had saw Dr Martina Marroquin in the past ( June 24, 2020) to discuss surgical options  It was felt he is not a surgical candidate and should continue with conservative options  He is currently taking gabapentin 100 mg 1 tab twice a day which is prescribed by his primary care physician  This helps to control the restless leg symptoms he was experiencing       Past Medical History:   Diagnosis Date    Abnormal blood chemistry     Abnormal glucose     Last assessed - 2/20/14    Benign essential hypertension     Last assessed - 5/15/17    Chronic allergic rhinitis     Last assessed - 8/1/16    Dermatitis     Last assessed - 6/16/15    High risk medication use     Last assessed - 5/2/16    Sciatica     Last assessed - 12/18/12    Septic bursitis     Last assessed - 9/3/13    Tick bite     Last assessed - 8/8/16       Past Surgical History:   Procedure Laterality Date    CATARACT EXTRACTION      COLONOSCOPY      Fiberoptic    PILONIDAL CYST EXCISION         Current Outpatient Medications   Medication Sig Dispense Refill    amLODIPine (NORVASC) 5 mg tablet TAKE ONE TABLET BY MOUTH EVERY DAY 90 tablet 2    B Complex-Biotin-FA (B COMPLETE) TABS Take by mouth      Blood Glucose Monitoring Suppl (CONTOUR NEXT MONITOR) w/Device KIT 1 Device by Does not apply route 2 (two) times a day 1 kit 0    Coenzyme Q10 (COQ10 PO) Take by mouth      Contour Next Test test strip TEST BLOOD SUGAR TWICE DAILY 100 each 2    docusate sodium (COLACE) 100 mg capsule Take 1 capsule by mouth as needed      fexofenadine (ALLEGRA) 180 MG tablet Take by mouth as needed        fluticasone (FLONASE) 50 mcg/act nasal spray APPLY ONE SPRAY IN EACH NOSTRIL TWICE A DAY 1 Bottle 1    gabapentin (NEURONTIN) 100 mg capsule 1 in a m  2 at HS 90 capsule 5    glimepiride (AMARYL) 2 mg tablet TAKE ONE-HALF TABLET BY MOUTH TWO TIMES A DAY 90 tablet 1  ipratropium (ATROVENT) 0 06 % nasal spray 1 spray into each nostril 3 (three) times a day 15 mL 0    lisinopril (ZESTRIL) 20 mg tablet TAKE ONE TABLET BY MOUTH EVERY DAY 90 tablet 3    metFORMIN (GLUCOPHAGE-XR) 500 mg 24 hr tablet TAKE TWO TABLETS BY MOUTH TWICE A DAY WITH MEALS 360 tablet 0    Microlet Lancets MISC TEST BLOOD SUGAR TWO TIMES A  each 0    simvastatin (ZOCOR) 40 mg tablet TAKE ONE TABLET BY MOUTH EVERY DAY 90 tablet 0    Specialty Vitamins Products (MAGNESIUM, AMINO ACID CHELATE,) 133 MG tablet Take 1 tablet by mouth 2 (two) times a day      Thiamine 50 MG CAPS Take 1 capsule by mouth daily       No current facility-administered medications for this visit  No Known Allergies    Review of Systems   Musculoskeletal: Positive for back pain  There were no vitals filed for this visit  MRI LUMBAR SPINE WITHOUT CONTRAST 3/26/20     INDICATION: [id number]: Spinal stenosis, lumbar region without neurogenic claudication      COMPARISON:  3/6/2020     TECHNIQUE:  Sagittal T1, sagittal T2, sagittal inversion recovery, axial T1 and axial T2, coronal T2     IMAGE QUALITY:  Diagnostic     FINDINGS:     VERTEBRAL BODIES:  There are 5 lumbar type vertebral bodies  There is trace retrolisthesis of L1-L2, L2-L3 and L5-S1  There is no suspicious marrow signal normality identified  There is Modic type I endplate degenerative change at L5-S1 with ventral   osteophytosis at this level      SACRUM:  Normal signal within the sacrum  No evidence of insufficiency or stress fracture      DISTAL CORD AND CONUS:  Normal size and signal within the distal cord and conus      PARASPINAL SOFT TISSUES:  There is a partially imaged left-sided renal cyst, incompletely evaluated  There is no significant prevertebral or paravertebral soft tissue swelling  Is a probable sebaceous cyst posteriorly at L1-L2      LOWER THORACIC DISC SPACES:  There are degenerative changes in the lower thoracic spine    There is partially imaged annular fissure at T12-L1      LUMBAR DISC SPACES:     L1-L2:  There is a bulging annulus  There is ventral osteophytosis  There is facet arthrosis with ligament sprain thickening  There is no significant foraminal narrowing      L2-L3:  There is a bulging annulus  There is facet arthrosis with ligamentum flavum thickening  There is mild mass effect on the thecal sac  There is mild to moderate right greater than left foraminal narrowing with near abutment of the exiting nerve   roots      L3-L4:  There is a bulging annulus  There is facet arthrosis with ligamentum flavum thickening  There is severe canal stenosis with near complete obliteration of the thecal sac and crowding and redundancy of the cauda equina nerve roots  There is   endplate spurring  There is moderate to severe foraminal narrowing bilaterally with contact of the exiting nerve roots      L4-L5:  There is a bulging annulus  There is facet arthrosis with ligament sprain thickening  There is moderate mass effect on the thecal sac, predominantly in the transverse dimension  There is marginal osteophytosis  There is mild to moderate left   foraminal narrowing  There is moderate to severe right foraminal narrowing      L5-S1:  There is a bulging annulus  There is a posterior annular fissure  There is a superimposed left paracentral disc protrusion  There is effacement of the left lateral recess with impingement of the exiting left descending S1 nerve root  There is   severe left foraminal narrowing with impingement of the exiting left L5 nerve root  There is moderate to severe right foraminal narrowing with contact of the exiting right L5 nerve root  Marginal osteophytosis is present      IMPRESSION:     Multilevel degenerative changes of the lumbar spine, as described above      Multifactorial disease results in overall severe canal stenosis with crowding and redundancy of the cauda equina nerve roots at L3-L4  Moderate to severe foraminal narrowing bilaterally is present at this level with abutment of the exiting nerve roots      Bulging annulus with a superimposed left paracentral disc protrusion contacts the descending left S1 nerve root within the left lateral recess  Severe left foraminal narrowing results in impingement the exiting left L5 nerve root at this level         I spent 11 minutes directly with the patient during this visit    [place] Micheline acknowledges that he has consented to an online visit or consultation  He understands that the online visit is based solely on information provided by him, and that, in the absence of a face-to-face physical evaluation by the physician, the diagnosis he receives is both limited and provisional in terms of accuracy and completeness  This is not intended to replace a full medical face-to-face evaluation by the physician  Brayan Nunn understands and accepts these terms

## 2021-03-04 ENCOUNTER — TRANSCRIBE ORDERS (OUTPATIENT)
Dept: PAIN MEDICINE | Facility: CLINIC | Age: 81
End: 2021-03-04

## 2021-03-16 ENCOUNTER — OFFICE VISIT (OUTPATIENT)
Dept: FAMILY MEDICINE CLINIC | Facility: CLINIC | Age: 81
End: 2021-03-16
Payer: MEDICARE

## 2021-03-16 ENCOUNTER — LAB (OUTPATIENT)
Dept: LAB | Facility: MEDICAL CENTER | Age: 81
End: 2021-03-16
Payer: MEDICARE

## 2021-03-16 VITALS
HEIGHT: 72 IN | SYSTOLIC BLOOD PRESSURE: 164 MMHG | DIASTOLIC BLOOD PRESSURE: 70 MMHG | BODY MASS INDEX: 23.08 KG/M2 | WEIGHT: 170.4 LBS | TEMPERATURE: 96.8 F

## 2021-03-16 DIAGNOSIS — Z12.12 SCREENING FOR COLORECTAL CANCER: ICD-10-CM

## 2021-03-16 DIAGNOSIS — Z00.00 MEDICARE ANNUAL WELLNESS VISIT, SUBSEQUENT: Primary | ICD-10-CM

## 2021-03-16 DIAGNOSIS — J30.0 VASOMOTOR RHINITIS: ICD-10-CM

## 2021-03-16 DIAGNOSIS — Z12.11 SCREENING FOR COLORECTAL CANCER: ICD-10-CM

## 2021-03-16 DIAGNOSIS — E11.42 DIABETIC PERIPHERAL NEUROPATHY (HCC): ICD-10-CM

## 2021-03-16 LAB — HEMOCCULT STL QL IA: NEGATIVE

## 2021-03-16 PROCEDURE — G0328 FECAL BLOOD SCRN IMMUNOASSAY: HCPCS

## 2021-03-16 PROCEDURE — 1123F ACP DISCUSS/DSCN MKR DOCD: CPT | Performed by: FAMILY MEDICINE

## 2021-03-16 PROCEDURE — G0439 PPPS, SUBSEQ VISIT: HCPCS | Performed by: FAMILY MEDICINE

## 2021-03-16 RX ORDER — IPRATROPIUM BROMIDE 42 UG/1
1 SPRAY, METERED NASAL 3 TIMES DAILY
Qty: 15 ML | Refills: 0 | Status: SHIPPED | OUTPATIENT
Start: 2021-03-16 | End: 2021-05-27

## 2021-03-16 NOTE — ASSESSMENT & PLAN NOTE
Lab Results   Component Value Date    HGBA1C 7 3 (H) 11/04/2020   Gabapentin caused brain fog  taking tylenol with same amount of relief

## 2021-03-16 NOTE — PATIENT INSTRUCTIONS
Medicare Preventive Visit Patient Instructions  Thank you for completing your Welcome to Medicare Visit or Medicare Annual Wellness Visit today  Your next wellness visit will be due in one year (3/17/2022)  The screening/preventive services that you may require over the next 5-10 years are detailed below  Some tests may not apply to you based off risk factors and/or age  Screening tests ordered at today's visit but not completed yet may show as past due  Also, please note that scanned in results may not display below  Preventive Screenings:  Service Recommendations Previous Testing/Comments   Colorectal Cancer Screening  · Colonoscopy    · Fecal Occult Blood Test (FOBT)/Fecal Immunochemical Test (FIT)  · Fecal DNA/Cologuard Test  · Flexible Sigmoidoscopy Age: 54-65 years old   Colonoscopy: every 10 years (May be performed more frequently if at higher risk)  OR  FOBT/FIT: every 1 year  OR  Cologuard: every 3 years  OR  Sigmoidoscopy: every 5 years  Screening may be recommended earlier than age 48 if at higher risk for colorectal cancer  Also, an individualized decision between you and your healthcare provider will decide whether screening between the ages of 74-80 would be appropriate   Colonoscopy: Not on file  FOBT/FIT: Not on file  Cologuard: Not on file  Sigmoidoscopy: Not on file          Prostate Cancer Screening Individualized decision between patient and health care provider in men between ages of 53-78   Medicare will cover every 12 months beginning on the day after your 50th birthday PSA: No results in last 5 years     Screening Not Indicated     Hepatitis C Screening Once for adults born between [facility]  More frequently in patients at high risk for Hepatitis C Hep C Antibody: Not on file        Diabetes Screening 1-2 times per year if you're at risk for diabetes or have pre-diabetes Fasting glucose: 101 mg/dL   A1C: 7 3 %    Screening Not Indicated  History Diabetes   Cholesterol Screening Once every 5 years if you don't have a lipid disorder  May order more often based on risk factors  Lipid panel: 11/04/2020    Screening Not Indicated  History Lipid Disorder      Other Preventive Screenings Covered by Medicare:  1  Abdominal Aortic Aneurysm (AAA) Screening: covered once if your at risk  You're considered to be at risk if you have a family history of AAA or a male between the age of 73-68 who smoking at least 100 cigarettes in your lifetime  2  Lung Cancer Screening: covers low dose CT scan once per year if you meet all of the following conditions: (1) Age 50-69; (2) No signs or symptoms of lung cancer; (3) Current smoker or have quit smoking within the last 15 years; (4) You have a tobacco smoking history of at least 30 pack years (packs per day x number of years you smoked); (5) You get a written order from a healthcare provider  3  Glaucoma Screening: covered annually if you're considered high risk: (1) You have diabetes OR (2) Family history of glaucoma OR (3)  aged 48 and older OR (3)  American aged 72 and older  3  Osteoporosis Screening: covered every 2 years if you meet one of the following conditions: (1) Have a vertebral abnormality; (2) On glucocorticoid therapy for more than 3 months; (3) Have primary hyperparathyroidism; (4) On osteoporosis medications and need to assess response to drug therapy  5  HIV Screening: covered annually if you're between the age of 12-76  Also covered annually if you are younger than 13 and older than 72 with risk factors for HIV infection  For pregnant patients, it is covered up to 3 times per pregnancy      Immunizations:  Immunization Recommendations   Influenza Vaccine Annual influenza vaccination during flu season is recommended for all persons aged >= 6 months who do not have contraindications   Pneumococcal Vaccine (Prevnar and Pneumovax)  * Prevnar = PCV13  * Pneumovax = PPSV23 Adults 25-60 years old: 1-3 doses may be recommended based on certain risk factors  Adults 72 years old: Prevnar (PCV13) vaccine recommended followed by Pneumovax (PPSV23) vaccine  If already received PPSV23 since turning 65, then PCV13 recommended at least one year after PPSV23 dose  Hepatitis B Vaccine 3 dose series if at intermediate or high risk (ex: diabetes, end stage renal disease, liver disease)   Tetanus (Td) Vaccine - COST NOT COVERED BY MEDICARE PART B Following completion of primary series, a booster dose should be given every 10 years to maintain immunity against tetanus  Td may also be given as tetanus wound prophylaxis  Tdap Vaccine - COST NOT COVERED BY MEDICARE PART B Recommended at least once for all adults  For pregnant patients, recommended with each pregnancy  Shingles Vaccine (Shingrix) - COST NOT COVERED BY MEDICARE PART B  2 shot series recommended in those aged 48 and above     Health Maintenance Due:  There are no preventive care reminders to display for this patient  Immunizations Due:      Topic Date Due    Pneumococcal Vaccine: 65+ Years (2 of 2 - PPSV23) 11/10/2016    DTaP,Tdap,and Td Vaccines (2 - Td) 11/02/2020     Advance Directives   What are advance directives? Advance directives are legal documents that state your wishes and plans for medical care  These plans are made ahead of time in case you lose your ability to make decisions for yourself  Advance directives can apply to any medical decision, such as the treatments you want, and if you want to donate organs  What are the types of advance directives? There are many types of advance directives, and each state has rules about how to use them  You may choose a combination of any of the following:  · Living will: This is a written record of the treatment you want  You can also choose which treatments you do not want, which to limit, and which to stop at a certain time  This includes surgery, medicine, IV fluid, and tube feedings     · Durable power of  for [facility]): This is a written record that states who you want to make healthcare choices for you when you are unable to make them for yourself  This person, called a proxy, is usually a family member or a friend  You may choose more than 1 proxy  · Do not resuscitate (DNR) order:  A DNR order is used in case your heart stops beating or you stop breathing  It is a request not to have certain forms of treatment, such as CPR  A DNR order may be included in other types of advance directives  · Medical directive: This covers the care that you want if you are in a coma, near death, or unable to make decisions for yourself  You can list the treatments you want for each condition  Treatment may include pain medicine, surgery, blood transfusions, dialysis, IV or tube feedings, and a ventilator (breathing machine)  · Values history: This document has questions about your views, beliefs, and how you feel and think about life  This information can help others choose the care that you would choose  Why are advance directives important? An advance directive helps you control your care  Although spoken wishes may be used, it is better to have your wishes written down  Spoken wishes can be misunderstood, or not followed  Treatments may be given even if you do not want them  An advance directive may make it easier for your family to make difficult choices about your care  © Copyright [employer] 2018 Information is for End User's use only and may not be sold, redistributed or otherwise used for commercial purposes   All illustrations and images included in CareNotes® are the copyrighted property of [facility]  or [address] [place] [address] [facility]

## 2021-03-19 ENCOUNTER — TELEPHONE (OUTPATIENT)
Dept: PAIN MEDICINE | Facility: CLINIC | Age: 81
End: 2021-03-19

## 2021-03-19 NOTE — TELEPHONE ENCOUNTER
S/w pt, he is looking to repeat the R CAROLYN that he has in Feb, pt said the pain is in the same areas R low back and leg, pt got around 40% improvement from the first one

## 2021-03-30 ENCOUNTER — TELEPHONE (OUTPATIENT)
Dept: FAMILY MEDICINE CLINIC | Facility: CLINIC | Age: 81
End: 2021-03-30

## 2021-03-30 NOTE — TELEPHONE ENCOUNTER
CC: Low back pain    Asking your recommendations & thoughts on acupuncture for low back pain  - Now covered by Medicare   - Do you know of anyone that does it?

## 2021-04-21 ENCOUNTER — APPOINTMENT (OUTPATIENT)
Dept: LAB | Facility: CLINIC | Age: 81
End: 2021-04-21
Payer: MEDICARE

## 2021-04-21 DIAGNOSIS — E11.9 TYPE 2 DIABETES MELLITUS WITHOUT COMPLICATION, WITHOUT LONG-TERM CURRENT USE OF INSULIN (HCC): ICD-10-CM

## 2021-04-21 DIAGNOSIS — E78.2 MIXED HYPERLIPIDEMIA: ICD-10-CM

## 2021-04-21 DIAGNOSIS — I10 BENIGN ESSENTIAL HYPERTENSION: ICD-10-CM

## 2021-04-21 LAB
ALBUMIN SERPL BCP-MCNC: 4 G/DL (ref 3.5–5)
ALP SERPL-CCNC: 80 U/L (ref 46–116)
ALT SERPL W P-5'-P-CCNC: 37 U/L (ref 12–78)
ANION GAP SERPL CALCULATED.3IONS-SCNC: 5 MMOL/L (ref 4–13)
AST SERPL W P-5'-P-CCNC: 28 U/L (ref 5–45)
BILIRUB SERPL-MCNC: 0.4 MG/DL (ref 0.2–1)
BUN SERPL-MCNC: 15 MG/DL (ref 5–25)
CALCIUM SERPL-MCNC: 9.6 MG/DL (ref 8.3–10.1)
CHLORIDE SERPL-SCNC: 106 MMOL/L (ref 100–108)
CHOLEST SERPL-MCNC: 101 MG/DL (ref 50–200)
CO2 SERPL-SCNC: 27 MMOL/L (ref 21–32)
CREAT SERPL-MCNC: 0.92 MG/DL (ref 0.6–1.3)
CREAT UR-MCNC: 35.2 MG/DL
EST. AVERAGE GLUCOSE BLD GHB EST-MCNC: 160 MG/DL
GFR SERPL CREATININE-BSD FRML MDRD: 79 ML/MIN/1.73SQ M
GLUCOSE P FAST SERPL-MCNC: 122 MG/DL (ref 65–99)
HBA1C MFR BLD: 7.2 %
HDLC SERPL-MCNC: 43 MG/DL
LDLC SERPL CALC-MCNC: 42 MG/DL (ref 0–100)
MICROALBUMIN UR-MCNC: 12.1 MG/L (ref 0–20)
MICROALBUMIN/CREAT 24H UR: 34 MG/G CREATININE (ref 0–30)
POTASSIUM SERPL-SCNC: 4.6 MMOL/L (ref 3.5–5.3)
PROT SERPL-MCNC: 7.2 G/DL (ref 6.4–8.2)
SODIUM SERPL-SCNC: 138 MMOL/L (ref 136–145)
TRIGL SERPL-MCNC: 82 MG/DL

## 2021-04-21 PROCEDURE — 80061 LIPID PANEL: CPT

## 2021-04-21 PROCEDURE — 36415 COLL VENOUS BLD VENIPUNCTURE: CPT | Performed by: NURSE PRACTITIONER

## 2021-04-21 PROCEDURE — 82043 UR ALBUMIN QUANTITATIVE: CPT

## 2021-04-21 PROCEDURE — 80053 COMPREHEN METABOLIC PANEL: CPT | Performed by: NURSE PRACTITIONER

## 2021-04-21 PROCEDURE — 82570 ASSAY OF URINE CREATININE: CPT

## 2021-04-21 PROCEDURE — 83036 HEMOGLOBIN GLYCOSYLATED A1C: CPT | Performed by: NURSE PRACTITIONER

## 2021-04-23 ENCOUNTER — HOSPITAL ENCOUNTER (OUTPATIENT)
Dept: RADIOLOGY | Facility: CLINIC | Age: 81
Discharge: HOME | End: 2021-04-23
Attending: ANESTHESIOLOGY | Admitting: ANESTHESIOLOGY
Payer: MEDICARE

## 2021-04-23 VITALS
TEMPERATURE: 97 F | HEART RATE: 99 BPM | SYSTOLIC BLOOD PRESSURE: 170 MMHG | DIASTOLIC BLOOD PRESSURE: 64 MMHG | RESPIRATION RATE: 18 BRPM | OXYGEN SATURATION: 97 %

## 2021-04-23 DIAGNOSIS — M51.26 DISPLACEMENT OF LUMBAR INTERVERTEBRAL DISC: ICD-10-CM

## 2021-04-23 DIAGNOSIS — M54.10 RADICULOPATHY, UNSPECIFIED SPINAL REGION: ICD-10-CM

## 2021-04-23 DIAGNOSIS — M48.062 SPINAL STENOSIS, LUMBAR REGION, WITH NEUROGENIC CLAUDICATION: ICD-10-CM

## 2021-04-23 PROCEDURE — 62323 NJX INTERLAMINAR LMBR/SAC: CPT | Performed by: ANESTHESIOLOGY

## 2021-04-23 RX ORDER — METHYLPREDNISOLONE ACETATE 80 MG/ML
80 INJECTION, SUSPENSION INTRA-ARTICULAR; INTRALESIONAL; INTRAMUSCULAR; PARENTERAL; SOFT TISSUE ONCE
Status: COMPLETED | OUTPATIENT
Start: 2021-04-23 | End: 2021-04-23

## 2021-04-23 RX ADMIN — METHYLPREDNISOLONE ACETATE 80 MG: 80 INJECTION, SUSPENSION INTRA-ARTICULAR; INTRALESIONAL; INTRAMUSCULAR; SOFT TISSUE at 11:01

## 2021-04-23 RX ADMIN — IOHEXOL 1 ML: 300 INJECTION, SOLUTION INTRAVENOUS at 11:01

## 2021-04-23 NOTE — H&P
History of Present Illness: The patient is a 80 y o  male who presents with complaints of low back and leg pain      Patient Active Problem List   Diagnosis    Benign essential hypertension    Benign prostatic hyperplasia with lower urinary tract symptoms    Esophageal dysphagia    Herniated lumbar intervertebral disc    Hyperlipidemia    Vasomotor rhinitis    Elevated TSH    Type 2 diabetes mellitus with diabetic polyneuropathy (HCC)    Type 2 diabetes mellitus without complication, without long-term current use of insulin ([address] )    Spinal stenosis, lumbar region, with neurogenic claudication    Radiculopathy    Displacement of lumbar intervertebral disc       Past Medical History:   Diagnosis Date    Abnormal blood chemistry     Abnormal glucose     Last assessed - 2/20/14    Benign essential hypertension     Last assessed - 5/15/17    Chronic allergic rhinitis     Last assessed - 8/1/16    Dermatitis     Last assessed - 6/16/15    High risk medication use     Last assessed - 5/2/16    Sciatica     Last assessed - 12/18/12    Septic bursitis     Last assessed - 9/3/13    Tick bite     Last assessed - 8/8/16       Past Surgical History:   Procedure Laterality Date    CATARACT EXTRACTION      COLONOSCOPY      Fiberoptic    PILONIDAL CYST EXCISION           Current Outpatient Medications:     amLODIPine (NORVASC) 5 mg tablet, TAKE ONE TABLET BY MOUTH EVERY DAY, Disp: 90 tablet, Rfl: 2    B Complex-Biotin-FA (B COMPLETE) TABS, Take by mouth, Disp: , Rfl:     Blood Glucose Monitoring Suppl (CONTOUR NEXT MONITOR) w/Device KIT, 1 Device by Does not apply route 2 (two) times a day, Disp: 1 kit, Rfl: 0    Coenzyme Q10 (COQ10 PO), Take by mouth, Disp: , Rfl:     Contour Next Test test strip, TEST BLOOD SUGAR TWICE DAILY, Disp: 100 each, Rfl: 2    docusate sodium (COLACE) 100 mg capsule, Take 1 capsule by mouth as needed, Disp: , Rfl:     fexofenadine (ALLEGRA) 180 MG tablet, Take by mouth as needed  , Disp: , Rfl:     fluticasone (FLONASE) 50 mcg/act nasal spray, APPLY ONE SPRAY IN EACH NOSTRIL TWICE A DAY, Disp: 1 Bottle, Rfl: 1    gabapentin (NEURONTIN) 100 mg capsule, 1 in a m  2 at  (Patient not taking: Reported on 3/16/2021), Disp: 90 capsule, Rfl: 5    glimepiride (AMARYL) 2 mg tablet, TAKE ONE-HALF TABLET BY MOUTH TWO TIMES A DAY, Disp: 90 tablet, Rfl: 1    ipratropium (ATROVENT) 0 06 % nasal spray, 1 spray into each nostril 3 (three) times a day, Disp: 15 mL, Rfl: 0    lisinopril (ZESTRIL) 20 mg tablet, TAKE ONE TABLET BY MOUTH EVERY DAY, Disp: 90 tablet, Rfl: 3    metFORMIN (GLUCOPHAGE-XR) 500 mg 24 hr tablet, TAKE TWO TABLETS BY MOUTH TWICE A DAY WITH MEALS, Disp: 360 tablet, Rfl: 0    Microlet Lancets MISC, TEST BLOOD SUGAR TWO TIMES A DAY, Disp: 100 each, Rfl: 0    simvastatin (ZOCOR) 40 mg tablet, TAKE ONE TABLET BY MOUTH EVERY DAY, Disp: 90 tablet, Rfl: 0    Specialty Vitamins Products (MAGNESIUM, AMINO ACID CHELATE,) 133 MG tablet, Take 1 tablet by mouth 2 (two) times a day, Disp: , Rfl:     Thiamine 50 MG CAPS, Take 1 capsule by mouth daily, Disp: , Rfl:     Current Facility-Administered Medications:     iohexol (OMNIPAQUE) 300 mg/mL injection 50 mL, 50 mL, Epidural, Once, Philip Puga DO    methylPREDNISolone acetate (DEPO-MEDROL) injection 80 mg, 80 mg, Epidural, Once, Philip Puga DO    No Known Allergies    Physical Exam:   General: Awake, Alert, Oriented x 3, Mood and affect appropriate  Respiratory: Respirations even and unlabored  Cardiovascular: Peripheral pulses intact; no edema  Musculoskeletal Exam:  Decreased range of motion lumbar    ASA Score: II    Patient/Chart Verification  Patient ID Verified: Verbal  ID Band Applied: No  Consents Confirmed: Procedural  H&P( within 30 days) Verified: To be obtained in the Pre-Procedure area  Interval H&P(within 24 hr) Complete (required for Outpatients and Surgery Admit only):  To be obtained in the Pre-Procedure area  Allergies Reviewed: Yes  Anticoag/NSAID held?: NA  Currently on antibiotics?: No    Assessment:   1  Displacement of lumbar intervertebral disc    2  Spinal stenosis, lumbar region, with neurogenic claudication    3   Radiculopathy, unspecified spinal region        Plan: RT CAROLYN 2

## 2021-04-30 ENCOUNTER — TELEPHONE (OUTPATIENT)
Dept: PAIN MEDICINE | Facility: CLINIC | Age: 81
End: 2021-04-30

## 2021-05-11 ENCOUNTER — OFFICE VISIT (OUTPATIENT)
Dept: PAIN MEDICINE | Facility: CLINIC | Age: 81
End: 2021-05-11
Payer: MEDICARE

## 2021-05-11 VITALS
DIASTOLIC BLOOD PRESSURE: 62 MMHG | HEIGHT: 72 IN | WEIGHT: 170 LBS | BODY MASS INDEX: 23.03 KG/M2 | SYSTOLIC BLOOD PRESSURE: 152 MMHG | HEART RATE: 86 BPM | TEMPERATURE: 97 F

## 2021-05-11 DIAGNOSIS — M47.816 LUMBAR SPONDYLOSIS: Primary | ICD-10-CM

## 2021-05-11 DIAGNOSIS — M48.061 SPINAL STENOSIS OF LUMBAR REGION WITHOUT NEUROGENIC CLAUDICATION: ICD-10-CM

## 2021-05-11 PROCEDURE — 99213 OFFICE O/P EST LOW 20 MIN: CPT | Performed by: ANESTHESIOLOGY

## 2021-05-11 NOTE — PROGRESS NOTES
Assessment  1  Lumbar spondylosis    2  Spinal stenosis of lumbar region without neurogenic claudication        Plan    At this point time the patient pain after his lumbar epidural steroid injection is not interfering with daily living activities  We will re-evaluate in two months time certainly sooner if needed  If at that point he maintains right-sided low back pain I would consider a right L3, L4, L5, and S1 medial branch block to consider radiofrequency  However if he has radicular symptoms and one may consider repeat epidural steroid injection  My impressions and treatment recommendations were discussed in detail with the patient who verbalized understanding and had no further questions  Discharge instructions were provided  I personally saw and examined the patient and I agree with the above discussed plan of care  This note is created using dictation transcription  It may contain typographical errors, grammatical errors, improperly dictated words, background noise and other errors  No orders of the defined types were placed in this encounter  No orders of the defined types were placed in this encounter  History of Present Illness    Stacia Ford is a 80 y o  male presents and follow-up from interlaminar epidural steroid injection performed April 23, 2021  Overall he reports his pain is better rates as 410 on the visual analog scale it is worse in the morning describes occasional achy and numbness with minimal interference with daily living activities  His pain is worse with standing and walking  I have personally reviewed and/or updated the patient's past medical history, past surgical history, family history, social history, current medications, allergies, and vital signs today  Review of Systems   Constitutional: Negative for fever and unexpected weight change  HENT: Negative for trouble swallowing  Eyes: Negative for visual disturbance     Respiratory: Negative for shortness of breath and wheezing  Cardiovascular: Negative for chest pain and palpitations  Gastrointestinal: Negative for constipation, diarrhea, nausea and vomiting  Endocrine: Negative for cold intolerance, heat intolerance and polydipsia  Genitourinary: Negative for difficulty urinating and frequency  Musculoskeletal: Positive for gait problem  Negative for arthralgias, joint swelling (joint stiffness) and myalgias  Skin: Negative for rash  Neurological: Negative for dizziness, seizures, syncope, weakness and headaches  Hematological: Does not bruise/bleed easily  Psychiatric/Behavioral: Negative for dysphoric mood  All other systems reviewed and are negative        Patient Active Problem List   Diagnosis    Benign essential hypertension    Benign prostatic hyperplasia with lower urinary tract symptoms    Esophageal dysphagia    Herniated lumbar intervertebral disc    Hyperlipidemia    Vasomotor rhinitis    Elevated TSH    Type 2 diabetes mellitus with diabetic polyneuropathy (HCC)    Type 2 diabetes mellitus without complication, without long-term current use of insulin ([facility] [address] )    Spinal stenosis, lumbar region, with neurogenic claudication    Radiculopathy    Displacement of lumbar intervertebral disc       Past Medical History:   Diagnosis Date    Abnormal blood chemistry     Abnormal glucose     Last assessed - 2/20/14    Benign essential hypertension     Last assessed - 5/15/17    Chronic allergic rhinitis     Last assessed - 8/1/16    Dermatitis     Last assessed - 6/16/15    High risk medication use     Last assessed - 5/2/16    Sciatica     Last assessed - 12/18/12    Septic bursitis     Last assessed - 9/3/13    Tick bite     Last assessed - 8/8/16       Past Surgical History:   Procedure Laterality Date    CATARACT EXTRACTION      COLONOSCOPY      Fiberoptic    PILONIDAL CYST EXCISION         Family History   Problem Relation Age of Onset    Hypertension Mother     Osteoporosis Mother     Diabetes type I Paternal [de-identified]     Colon cancer Family        Social History     Occupational History    Not on file   Tobacco Use    Smoking status: Former Smoker     Quit date:      Years since quittin 3    Smokeless tobacco: Never Used   Substance and Sexual Activity    Alcohol use:  Yes     Alcohol/week: 1 0 standard drinks     Types: 1 Cans of beer per week     Frequency: 4 or more times a week     Comment: social usage    Drug use: Never    Sexual activity: Not on file       Current Outpatient Medications on File Prior to Visit   Medication Sig    amLODIPine (NORVASC) 5 mg tablet TAKE ONE TABLET BY MOUTH EVERY DAY    B Complex-Biotin-FA (B COMPLETE) TABS Take by mouth    Blood Glucose Monitoring Suppl (CONTOUR NEXT MONITOR) w/Device KIT 1 Device by Does not apply route 2 (two) times a day    Coenzyme Q10 (COQ10 PO) Take by mouth    Contour Next Test test strip TEST BLOOD SUGAR TWICE DAILY    docusate sodium (COLACE) 100 mg capsule Take 1 capsule by mouth as needed    fexofenadine (ALLEGRA) 180 MG tablet Take by mouth as needed      fluticasone (FLONASE) 50 mcg/act nasal spray APPLY ONE SPRAY IN EACH NOSTRIL TWICE A DAY    glimepiride (AMARYL) 2 mg tablet TAKE ONE-HALF TABLET BY MOUTH TWO TIMES A DAY    ipratropium (ATROVENT) 0 06 % nasal spray 1 spray into each nostril 3 (three) times a day    lisinopril (ZESTRIL) 20 mg tablet TAKE ONE TABLET BY MOUTH EVERY DAY    metFORMIN (GLUCOPHAGE-XR) 500 mg 24 hr tablet TAKE TWO TABLETS BY MOUTH TWICE A DAY WITH MEALS    simvastatin (ZOCOR) 40 mg tablet TAKE ONE TABLET BY MOUTH EVERY DAY    Specialty Vitamins Products (MAGNESIUM, AMINO ACID CHELATE,) 133 MG tablet Take 1 tablet by mouth 2 (two) times a day    Thiamine 50 MG CAPS Take 1 capsule by mouth daily    gabapentin (NEURONTIN) 100 mg capsule 1 in a m  2 at HS (Patient not taking: Reported on 3/16/2021)    Microlet Lancets MISC TEST BLOOD SUGAR TWO TIMES A DAY     No current facility-administered medications on file prior to visit  No Known Allergies    Physical Exam    /62 (BP Location: Left arm, Patient Position: Sitting, Cuff Size: Standard)   Pulse 86   Temp (!) 97 °F (36 1 °C)   Ht 6' (1 829 m)   Wt 77 1 kg (170 lb)   BMI 23 06 kg/m²     Constitutional: normal, well developed, well nourished, alert, in no distress and non-toxic and no overt pain behavior    Eyes: anicteric  HEENT: grossly intact  Neck: supple, symmetric, trachea midline and no masses   Pulmonary:even and unlabored  Cardiovascular:No edema or pitting edema present  Skin:Normal without rashes or lesions and well hydrated  Psychiatric:Mood and affect appropriate  Neurologic:Cranial Nerves II-XII grossly intact  Musculoskeletal:normal

## 2021-05-17 DIAGNOSIS — E11.42 TYPE 2 DIABETES MELLITUS WITH DIABETIC POLYNEUROPATHY, WITHOUT LONG-TERM CURRENT USE OF INSULIN (HCC): ICD-10-CM

## 2021-05-17 DIAGNOSIS — E11.9 TYPE 2 DIABETES MELLITUS WITHOUT COMPLICATION, WITHOUT LONG-TERM CURRENT USE OF INSULIN (HCC): ICD-10-CM

## 2021-05-17 DIAGNOSIS — E78.2 COMBINED HYPERLIPIDEMIA: ICD-10-CM

## 2021-05-17 RX ORDER — GLIMEPIRIDE 2 MG/1
TABLET ORAL
Qty: 90 TABLET | Refills: 1 | Status: SHIPPED | OUTPATIENT
Start: 2021-05-17 | End: 2021-07-15 | Stop reason: SDUPTHER

## 2021-05-17 RX ORDER — SIMVASTATIN 40 MG
TABLET ORAL
Qty: 90 TABLET | Refills: 0 | Status: SHIPPED | OUTPATIENT
Start: 2021-05-17 | End: 2021-08-13

## 2021-05-18 RX ORDER — METFORMIN HYDROCHLORIDE 500 MG/1
TABLET, EXTENDED RELEASE ORAL
Qty: 360 TABLET | Refills: 0 | Status: SHIPPED | OUTPATIENT
Start: 2021-05-18 | End: 2021-08-13

## 2021-05-19 ENCOUNTER — OFFICE VISIT (OUTPATIENT)
Dept: ENDOCRINOLOGY | Facility: CLINIC | Age: 81
End: 2021-05-19
Payer: MEDICARE

## 2021-05-19 ENCOUNTER — TELEPHONE (OUTPATIENT)
Dept: ADMINISTRATIVE | Facility: OTHER | Age: 81
End: 2021-05-19

## 2021-05-19 VITALS
HEART RATE: 80 BPM | HEIGHT: 72 IN | SYSTOLIC BLOOD PRESSURE: 134 MMHG | BODY MASS INDEX: 23.08 KG/M2 | DIASTOLIC BLOOD PRESSURE: 60 MMHG | WEIGHT: 170.38 LBS

## 2021-05-19 DIAGNOSIS — I10 BENIGN ESSENTIAL HYPERTENSION: ICD-10-CM

## 2021-05-19 DIAGNOSIS — E78.2 MIXED HYPERLIPIDEMIA: ICD-10-CM

## 2021-05-19 DIAGNOSIS — R79.89 ELEVATED TSH: ICD-10-CM

## 2021-05-19 DIAGNOSIS — E11.42 TYPE 2 DIABETES MELLITUS WITH DIABETIC POLYNEUROPATHY, WITHOUT LONG-TERM CURRENT USE OF INSULIN (HCC): Primary | ICD-10-CM

## 2021-05-19 PROCEDURE — 99214 OFFICE O/P EST MOD 30 MIN: CPT | Performed by: INTERNAL MEDICINE

## 2021-05-19 NOTE — LETTER
Diabetic Foot Exam Form    Date Requested: 21  Patient: Melisa Jessica  Patient : 1940   Referring Provider: Bowen Terry DO    Diabetic Foot Exam Performed with shoes and socks removed        Yes         No     Date of Diabetic Foot Exam ______________________________  Risk Score ____________________________________________    Left Foot       Visual Inspection         Monofilament Testing Sensory Exam        Pedal Pulses         Additional Comments         Right Foot      Visual Inspection         Monofilament Testing Sensory Exam       Pedal Pulses         Additional Comments         Comments __________________________________________________________    Practice Providing Exam ______________________________________________    Exam Performed By (print name) _______________________________________      Provider Signature ___________________________________________________      These reports are needed for  compliance    Please fax this completed form and a copy of the Diabetic Foot Exam report to our office located at [place] [address] as soon as possible to [phone] attention Ayse: Phone [phone]    We thank you for your assistance in treating our mutual patient

## 2021-05-19 NOTE — PROGRESS NOTES
Donnell Zaragoza 80 y o  male MRN: [id number]    Encounter: [id number]      Assessment/Plan     Assessment: This is a 80y o -year-old male with diabetes with hyperglycemia, hypertension and hyperlipidemia  Plan:  1  Type 2 diabetes with hyperglycemia - his blood sugars are in the target range based on hemoglobin A1c of 7 2%  As he becomes more active, he may have episodes of hypoglycemia  If this occurs, I asked him to call me so we can change his glimepiride  He does see a podiatrist   I have asked our staff to get the report from them  2   Hypertension - the blood pressure is under good control  3   Hyperlipidemia -the LDL is at target  4   Elevated TSH- check TSH and free T4     CC: Diabetes    History of Present Illness     HPI:    61-year-old male with type 2 diabetes who is on oral agents presents for follow-up  He denies any hypoglycemia  He states that he is going to be more active as the summer approaches  For hyperlipidemia, he is on a statin  He denies any myalgia  The hypertension is managed with lifestyle modification and amlodipine  He denies any swelling  Review of Systems   Constitutional: Negative for chills and fever  Respiratory: Negative for shortness of breath  Cardiovascular: Negative for chest pain  Gastrointestinal: Negative for constipation, diarrhea, nausea and vomiting  Endocrine: Negative for polyuria  All other systems reviewed and are negative        Historical Information   Past Medical History:   Diagnosis Date    Abnormal blood chemistry     Abnormal glucose     Last assessed - 2/20/14    Benign essential hypertension     Last assessed - 5/15/17    Chronic allergic rhinitis     Last assessed - 8/1/16    Dermatitis     Last assessed - 6/16/15    High risk medication use     Last assessed - 5/2/16    Sciatica     Last assessed - 12/18/12    Septic bursitis     Last assessed - 9/3/13    Tick bite     Last assessed - 8/8/16     Past Surgical History:   Procedure Laterality Date    CATARACT EXTRACTION      COLONOSCOPY      Fiberoptic    PILONIDAL CYST EXCISION       Social History   Social History     Substance and Sexual Activity   Alcohol Use Yes    Alcohol/week: 1 0 standard drinks    Types: 1 Cans of beer per week    Frequency: 4 or more times a week    Comment: social usage     Social History     Substance and Sexual Activity   Drug Use Never     Social History     Tobacco Use   Smoking Status Former Smoker    Quit date: 38    Years since quittin 4   Smokeless Tobacco Never Used     Family History:   Family History   Problem Relation Age of Onset    Hypertension Mother     Osteoporosis Mother     Diabetes type I Paternal Aunt     Colon cancer Family        Meds/Allergies   Current Outpatient Medications   Medication Sig Dispense Refill    amLODIPine (NORVASC) 5 mg tablet TAKE ONE TABLET BY MOUTH EVERY DAY 90 tablet 2    B Complex-Biotin-FA (B COMPLETE) TABS Take by mouth      Blood Glucose Monitoring Suppl (CONTOUR NEXT MONITOR) w/Device KIT 1 Device by Does not apply route 2 (two) times a day 1 kit 0    Coenzyme Q10 (COQ10 PO) Take by mouth      Contour Next Test test strip TEST BLOOD SUGAR TWICE DAILY 100 each 2    docusate sodium (COLACE) 100 mg capsule Take 1 capsule by mouth as needed      fexofenadine (ALLEGRA) 180 MG tablet Take by mouth as needed        fluticasone (FLONASE) 50 mcg/act nasal spray APPLY ONE SPRAY IN EACH NOSTRIL TWICE A DAY 1 Bottle 1    glimepiride (AMARYL) 2 mg tablet TAKE ONE-HALF TABLET BY MOUTH TWICE A DAY 90 tablet 1    ipratropium (ATROVENT) 0 06 % nasal spray 1 spray into each nostril 3 (three) times a day 15 mL 0    lisinopril (ZESTRIL) 20 mg tablet TAKE ONE TABLET BY MOUTH EVERY DAY 90 tablet 3    metFORMIN (GLUCOPHAGE-XR) 500 mg 24 hr tablet TAKE TWO TABLETS BY MOUTH TWICE A DAY WITH MEALS 360 tablet 0    Microlet Lancets MISC TEST BLOOD SUGAR TWO TIMES A  each 0    simvastatin (ZOCOR) 40 mg tablet TAKE ONE TABLET BY MOUTH EVERY DAY 90 tablet 0    Specialty Vitamins Products (MAGNESIUM, AMINO ACID CHELATE,) 133 MG tablet Take 1 tablet by mouth 2 (two) times a day      Thiamine 50 MG CAPS Take 1 capsule by mouth daily      gabapentin (NEURONTIN) 100 mg capsule 1 in a m  2 at HS (Patient not taking: Reported on 3/16/2021) 90 capsule 5     No current facility-administered medications for this visit  No Known Allergies    Objective   Vitals: Blood pressure 134/60, pulse 80, height 6' (1 829 m), weight 77 3 kg (170 lb 6 oz)  Physical Exam  Vitals signs reviewed  Constitutional:       General: He is not in acute distress  Appearance: He is well-developed  He is not diaphoretic  HENT:      Head: Normocephalic and atraumatic  Eyes:      General: Lids are normal  No scleral icterus  Right eye: No discharge  Left eye: No discharge  Conjunctiva/sclera: Conjunctivae normal    Neck:      Musculoskeletal: Neck supple  Thyroid: No thyromegaly  Cardiovascular:      Rate and Rhythm: Normal rate and regular rhythm  Heart sounds: Normal heart sounds  No murmur  No friction rub  No gallop  Pulmonary:      Effort: Pulmonary effort is normal  No respiratory distress  Breath sounds: Normal breath sounds  No wheezing  Abdominal:      General: Bowel sounds are normal  There is no distension  Palpations: Abdomen is soft  Tenderness: There is no abdominal tenderness  Musculoskeletal: Normal range of motion  General: No tenderness or deformity  Lymphadenopathy:      Head:      Right side of head: No occipital adenopathy  Left side of head: No occipital adenopathy  Upper Body:      Right upper body: No supraclavicular adenopathy  Left upper body: No supraclavicular adenopathy  Skin:     General: Skin is warm  Findings: No erythema or rash     Neurological:      Mental Status: He is alert and oriented to person, place, and time  Coordination: Coordination normal    Psychiatric:         Mood and Affect: Mood normal          Behavior: Behavior normal          The history was obtained from the review of the chart, patient  Lab Results:   Lab Results   Component Value Date/Time    Hemoglobin A1C 7 2 (H) 04/21/2021 09:40 AM    Hemoglobin A1C 7 3 (H) 11/04/2020 09:04 AM    Hemoglobin A1C 7 4 (H) 06/19/2020 09:29 AM    BUN 15 04/21/2021 09:40 AM    BUN 18 11/04/2020 09:04 AM    BUN 17 06/19/2020 09:29 AM    Potassium 4 6 04/21/2021 09:40 AM    Potassium 4 6 11/04/2020 09:04 AM    Potassium 4 6 06/19/2020 09:29 AM    Chloride 106 04/21/2021 09:40 AM    Chloride 103 11/04/2020 09:04 AM    Chloride 101 06/19/2020 09:29 AM    CO2 27 04/21/2021 09:40 AM    CO2 29 11/04/2020 09:04 AM    CO2 26 06/19/2020 09:29 AM    Creatinine 0 92 04/21/2021 09:40 AM    Creatinine 1 03 11/04/2020 09:04 AM    Creatinine 0 90 06/19/2020 09:29 AM    AST 28 04/21/2021 09:40 AM    AST 24 11/04/2020 09:04 AM    AST 31 06/19/2020 09:29 AM    ALT 37 04/21/2021 09:40 AM    ALT 32 11/04/2020 09:04 AM    ALT 33 06/19/2020 09:29 AM    Albumin 4 0 04/21/2021 09:40 AM    Albumin 4 0 11/04/2020 09:04 AM    Albumin 3 9 06/19/2020 09:29 AM    HDL, Direct 43 04/21/2021 09:40 AM    HDL, Direct 50 11/04/2020 09:04 AM    HDL, Direct 44 06/19/2020 09:29 AM    Triglycerides 82 04/21/2021 09:40 AM    Triglycerides 63 11/04/2020 09:04 AM    Triglycerides 57 06/19/2020 09:29 AM           Imaging Studies: I have personally reviewed pertinent reports  Portions of the record may have been created with voice recognition software  Occasional wrong word or "sound a like" substitutions may have occurred due to the inherent limitations of voice recognition software  Read the chart carefully and recognize, using context, where substitutions have occurred

## 2021-05-19 NOTE — TELEPHONE ENCOUNTER
----- Message from Kari Donaldson sent at 5/19/2021 10:04 AM EDT -----  Regarding: HM DM  FOOT EXAM  05/19/21 10:05 AM    Hello, our patient Zhou Dutton has had Diabetic Foot Exam completed/performed   Please assist in updating the patient chart by calling Rea Bo DPM  [address] [place] [address], [address] [place] [address], [phone]  Thank you,  Josselyn Perez  [facility]

## 2021-05-25 NOTE — TELEPHONE ENCOUNTER
Upon review of the In Basket request and the patient's chart, initial outreach has been made via fax, please see Contacts section for details       Thank you  Reynaldo García MA

## 2021-05-26 NOTE — TELEPHONE ENCOUNTER
Upon review of the In Basket request we were able to locate, review, and update the patient chart as requested for Diabetic Foot Exam     Any additional questions or concerns should be emailed to the Practice Liaisons via [email or web address] email, please do not reply via In Basket      Thank you  Alphonse Orr MA

## 2021-05-27 DIAGNOSIS — J30.0 VASOMOTOR RHINITIS: ICD-10-CM

## 2021-05-27 DIAGNOSIS — E11.9 TYPE 2 DIABETES MELLITUS WITHOUT COMPLICATION, WITHOUT LONG-TERM CURRENT USE OF INSULIN (HCC): ICD-10-CM

## 2021-05-27 RX ORDER — LANCETS
EACH MISCELLANEOUS
Qty: 100 EACH | Refills: 0 | Status: SHIPPED | OUTPATIENT
Start: 2021-05-27 | End: 2022-02-04 | Stop reason: SDUPTHER

## 2021-05-27 RX ORDER — IPRATROPIUM BROMIDE 42 UG/1
SPRAY, METERED NASAL
Qty: 15 ML | Refills: 3 | Status: SHIPPED | OUTPATIENT
Start: 2021-05-27 | End: 2022-05-27

## 2021-07-15 ENCOUNTER — OFFICE VISIT (OUTPATIENT)
Dept: FAMILY MEDICINE CLINIC | Facility: CLINIC | Age: 81
End: 2021-07-15
Payer: MEDICARE

## 2021-07-15 VITALS
SYSTOLIC BLOOD PRESSURE: 154 MMHG | BODY MASS INDEX: 22.75 KG/M2 | TEMPERATURE: 97.2 F | DIASTOLIC BLOOD PRESSURE: 70 MMHG | HEIGHT: 72 IN | WEIGHT: 168 LBS

## 2021-07-15 DIAGNOSIS — M48.062 SPINAL STENOSIS, LUMBAR REGION, WITH NEUROGENIC CLAUDICATION: ICD-10-CM

## 2021-07-15 DIAGNOSIS — E11.42 TYPE 2 DIABETES MELLITUS WITH DIABETIC POLYNEUROPATHY, WITHOUT LONG-TERM CURRENT USE OF INSULIN (HCC): Primary | ICD-10-CM

## 2021-07-15 DIAGNOSIS — E78.2 MIXED HYPERLIPIDEMIA: ICD-10-CM

## 2021-07-15 DIAGNOSIS — E11.9 TYPE 2 DIABETES MELLITUS WITHOUT COMPLICATION, WITHOUT LONG-TERM CURRENT USE OF INSULIN (HCC): ICD-10-CM

## 2021-07-15 DIAGNOSIS — I10 BENIGN ESSENTIAL HYPERTENSION: ICD-10-CM

## 2021-07-15 PROCEDURE — 99213 OFFICE O/P EST LOW 20 MIN: CPT | Performed by: FAMILY MEDICINE

## 2021-07-15 RX ORDER — GLIMEPIRIDE 1 MG/1
1 TABLET ORAL 2 TIMES DAILY
Qty: 180 TABLET | Refills: 2 | Status: SHIPPED | OUTPATIENT
Start: 2021-07-15 | End: 2022-05-12

## 2021-07-15 NOTE — PROGRESS NOTES
Assessment/Plan:continue current meds    Problem List Items Addressed This Visit        Endocrine    Type 2 diabetes mellitus with diabetic polyneuropathy ([facility] ) - Primary    Type 2 diabetes mellitus without complication, without long-term current use of insulin ([facility] )       Cardiovascular and Mediastinum    Benign essential hypertension       Other    Hyperlipidemia    Spinal stenosis, lumbar region, with neurogenic claudication           Diagnoses and all orders for this visit:    Type 2 diabetes mellitus with diabetic polyneuropathy, without long-term current use of insulin (HCC)    Type 2 diabetes mellitus without complication, without long-term current use of insulin (HCC)  -     glimepiride (AMARYL) 1 mg tablet; Take 1 tablet (1 mg total) by mouth 2 (two) times a day    Benign essential hypertension    Mixed hyperlipidemia    Spinal stenosis, lumbar region, with neurogenic claudication        No problem-specific Assessment & Plan notes found for this encounter  Subjective:      Patient ID: Vick Obando is a 80 y o  male  Mr Seng Montiel here follow-up visit still battling pain in his back most recent recommendation is for him to have a medial nerve branch procedure done and a generator and I have agreed that this is probably a very good option for him his sugars are under really good control he is going to have his thyroid evaluated by Dr Lizzy Mcclendon at his next set of lab work he has been to his optometrist and has no diabetic retinopathy no open sores on his feet and patient will be seeing his podiatrist in the near future      The following portions of the patient's history were reviewed and updated as appropriate:   He has a past medical history of Abnormal blood chemistry, Abnormal glucose, Benign essential hypertension, Chronic allergic rhinitis, Dermatitis, High risk medication use, Sciatica, Septic bursitis, and Tick bite ,  does not have any pertinent problems on file  ,   has a past surgical history that includes Cataract extraction; Colonoscopy; and PILONIDAL CYST EXCISION  ,  family history includes Colon cancer in his family; Diabetes type I in his paternal aunt; Hypertension in his mother; Osteoporosis in his mother  ,   reports that he quit smoking about 41 years ago  He has never used smokeless tobacco  He reports current alcohol use of about 1 0 standard drinks of alcohol per week  He reports that he does not use drugs  ,  has No Known Allergies     Current Outpatient Medications   Medication Sig Dispense Refill    amLODIPine (NORVASC) 5 mg tablet TAKE ONE TABLET BY MOUTH EVERY DAY 90 tablet 2    B Complex-Biotin-FA (B COMPLETE) TABS Take by mouth      Blood Glucose Monitoring Suppl (CONTOUR NEXT MONITOR) w/Device KIT 1 Device by Does not apply route 2 (two) times a day 1 kit 0    Coenzyme Q10 (COQ10 PO) Take by mouth      Contour Next Test test strip TEST BLOOD SUGAR TWICE DAILY 100 each 2    docusate sodium (COLACE) 100 mg capsule Take 1 capsule by mouth as needed      fexofenadine (ALLEGRA) 180 MG tablet Take by mouth as needed        fluticasone (FLONASE) 50 mcg/act nasal spray APPLY ONE SPRAY IN EACH NOSTRIL TWICE A DAY 1 Bottle 1    glimepiride (AMARYL) 2 mg tablet TAKE ONE-HALF TABLET BY MOUTH TWICE A DAY 90 tablet 1    ipratropium (ATROVENT) 0 06 % nasal spray USE 1 SPRAY INTO EACH NOSTRIL THREE TIMES A DAY 15 mL 3    lisinopril (ZESTRIL) 20 mg tablet TAKE ONE TABLET BY MOUTH EVERY DAY 90 tablet 3    metFORMIN (GLUCOPHAGE-XR) 500 mg 24 hr tablet TAKE TWO TABLETS BY MOUTH TWICE A DAY WITH MEALS 360 tablet 0    Microlet Lancets MISC TEST BLOOD SUGAR TWO TIMES A  each 0    simvastatin (ZOCOR) 40 mg tablet TAKE ONE TABLET BY MOUTH EVERY DAY 90 tablet 0    Specialty Vitamins Products (MAGNESIUM, AMINO ACID CHELATE,) 133 MG tablet Take 1 tablet by mouth 2 (two) times a day      Thiamine 50 MG CAPS Take 1 capsule by mouth daily      gabapentin (NEURONTIN) 100 mg capsule 1 in a m  2 at [facility] (Patient not taking: Reported on 3/16/2021) 90 capsule 5     No current facility-administered medications for this visit  Review of Systems   Constitutional: Negative for activity change, appetite change, diaphoresis, fatigue and fever  HENT: Negative  Negative for dental problem and hearing loss  Eyes: Positive for visual disturbance  Glasses   Respiratory: Negative for apnea, cough, chest tightness, shortness of breath and wheezing  Cardiovascular: Negative for chest pain, palpitations and leg swelling  Gastrointestinal: Negative for abdominal distention, abdominal pain, anal bleeding, constipation, diarrhea, nausea and vomiting  Endocrine: Negative for cold intolerance, heat intolerance, polydipsia, polyphagia and polyuria  Genitourinary: Negative for difficulty urinating, dysuria, flank pain, hematuria and urgency  Musculoskeletal: Negative for arthralgias, back pain, gait problem, joint swelling and myalgias  Skin: Negative for color change, rash and wound  Allergic/Immunologic: Negative for environmental allergies, food allergies and immunocompromised state  Neurological: Negative for dizziness, seizures, syncope, speech difficulty, numbness and headaches  Hematological: Negative for adenopathy  Does not bruise/bleed easily  Psychiatric/Behavioral: Negative for agitation, behavioral problems, hallucinations, sleep disturbance and suicidal ideas  Objective:  Vitals:    07/15/21 1353   BP: 154/70   BP Location: Left arm   Patient Position: Sitting   Cuff Size: Standard   Temp: (!) 97 2 °F (36 2 °C)   TempSrc: Temporal   Weight: 76 2 kg (168 lb)   Height: 6' (1 829 m)     Body mass index is 22 78 kg/m²  Physical Exam  Constitutional:       General: He is not in acute distress  Appearance: He is well-developed  He is not diaphoretic  HENT:      Head: Normocephalic        Right Ear: External ear normal       Left Ear: External ear normal       Nose: Nose normal    Eyes:      General: No scleral icterus  Right eye: No discharge  Left eye: No discharge  Conjunctiva/sclera: Conjunctivae normal       Pupils: Pupils are equal, round, and reactive to light  Neck:      Thyroid: No thyromegaly  Trachea: No tracheal deviation  Cardiovascular:      Rate and Rhythm: Normal rate and regular rhythm  Pulses: no weak pulses          Dorsalis pedis pulses are 2+ on the right side and 2+ on the left side  Posterior tibial pulses are 2+ on the right side and 2+ on the left side  Heart sounds: Normal heart sounds  No murmur heard  No friction rub  No gallop  Pulmonary:      Effort: Pulmonary effort is normal  No respiratory distress  Breath sounds: Normal breath sounds  No wheezing  Abdominal:      General: Bowel sounds are normal       Palpations: Abdomen is soft  There is no mass  Tenderness: There is no abdominal tenderness  There is no guarding  Musculoskeletal:         General: No deformity  Cervical back: Normal range of motion  Feet:      Right foot:      Skin integrity: No ulcer, skin breakdown, erythema, warmth, callus or dry skin  Left foot:      Skin integrity: No ulcer, skin breakdown, erythema, warmth, callus or dry skin  Lymphadenopathy:      Cervical: No cervical adenopathy  Skin:     General: Skin is warm and dry  Findings: No erythema or rash  Neurological:      Mental Status: He is alert and oriented to person, place, and time  Cranial Nerves: No cranial nerve deficit  Psychiatric:         Thought Content: Thought content normal        Patient's shoes and socks removed  Right Foot/Ankle   Right Foot Inspection  Skin Exam: skin normal and skin intact no dry skin, no warmth, no callus, no erythema, no maceration, no abnormal color, no pre-ulcer, no ulcer and no callus                          Toe Exam: ROM and strength within normal limits  Sensory   Vibration: intact  Proprioception: intact   Monofilament testing: intact  Vascular  Capillary refills: < 3 seconds  The right DP pulse is 2+  The right PT pulse is 2+  Left Foot/Ankle  Left Foot Inspection  Skin Exam: skin normal and skin intactno dry skin, no warmth, no erythema, no maceration, normal color, no pre-ulcer, no ulcer and no callus                         Toe Exam: ROM and strength within normal limits                   Sensory   Vibration: intact  Proprioception: intact  Monofilament: intact  Vascular  Capillary refills: < 3 seconds  The left DP pulse is 2+  The left PT pulse is 2+  Assign Risk Category:  No deformity present; No loss of protective sensation;  No weak pulses       Risk: 0

## 2021-08-12 DIAGNOSIS — E78.2 COMBINED HYPERLIPIDEMIA: ICD-10-CM

## 2021-08-12 DIAGNOSIS — E11.42 TYPE 2 DIABETES MELLITUS WITH DIABETIC POLYNEUROPATHY, WITHOUT LONG-TERM CURRENT USE OF INSULIN (HCC): ICD-10-CM

## 2021-08-13 RX ORDER — SIMVASTATIN 40 MG
TABLET ORAL
Qty: 90 TABLET | Refills: 0 | Status: SHIPPED | OUTPATIENT
Start: 2021-08-13 | End: 2021-11-11

## 2021-08-13 RX ORDER — METFORMIN HYDROCHLORIDE 500 MG/1
TABLET, EXTENDED RELEASE ORAL
Qty: 360 TABLET | Refills: 0 | Status: SHIPPED | OUTPATIENT
Start: 2021-08-13 | End: 2021-11-11

## 2021-09-02 ENCOUNTER — OFFICE VISIT (OUTPATIENT)
Dept: PAIN MEDICINE | Facility: CLINIC | Age: 81
End: 2021-09-02
Payer: MEDICARE

## 2021-09-02 VITALS
HEIGHT: 72 IN | WEIGHT: 166 LBS | DIASTOLIC BLOOD PRESSURE: 70 MMHG | HEART RATE: 78 BPM | BODY MASS INDEX: 22.48 KG/M2 | SYSTOLIC BLOOD PRESSURE: 136 MMHG | TEMPERATURE: 97.9 F

## 2021-09-02 DIAGNOSIS — M47.816 LUMBAR SPONDYLOSIS: ICD-10-CM

## 2021-09-02 DIAGNOSIS — M51.26 HERNIATED LUMBAR INTERVERTEBRAL DISC: ICD-10-CM

## 2021-09-02 DIAGNOSIS — M54.50 CHRONIC RIGHT-SIDED LOW BACK PAIN WITHOUT SCIATICA: ICD-10-CM

## 2021-09-02 DIAGNOSIS — G89.4 CHRONIC PAIN SYNDROME: Primary | ICD-10-CM

## 2021-09-02 DIAGNOSIS — M48.062 SPINAL STENOSIS, LUMBAR REGION, WITH NEUROGENIC CLAUDICATION: ICD-10-CM

## 2021-09-02 DIAGNOSIS — G89.29 CHRONIC RIGHT-SIDED LOW BACK PAIN WITHOUT SCIATICA: ICD-10-CM

## 2021-09-02 PROCEDURE — 99214 OFFICE O/P EST MOD 30 MIN: CPT | Performed by: NURSE PRACTITIONER

## 2021-09-02 RX ORDER — MULTIVITAMIN
1 TABLET ORAL DAILY
COMMUNITY

## 2021-09-02 NOTE — PROGRESS NOTES
Assessment:  1  Chronic pain syndrome    2  Chronic right-sided low back pain without sciatica    3  Herniated lumbar intervertebral disc    4  Lumbar spondylosis    5  Spinal stenosis, lumbar region, with neurogenic claudication        Plan:    While the patient was in the office today, I did have a thorough conversation with the patient regarding their chronic pain syndrome, symptoms, medication regimen, and treatment plan  I discussed with the patient that at this point time since he does have a continued right-sided low back and lower extremity radicular symptoms we could try a repeat epidural steroid injection but proceed with a right L5 and S1 transforaminal injection to see if that would allow for better concentration of the medication along the affected nerve roots and help both his back and leg pain and hopefully provide more moderate stable relief  However, at this point he was not overly interested in a repeat epidural steroid injection even if it was a different approach  However, he did have questions about the previously discussed radiofrequency ablation procedure  I did explain to the patient that the lumbar medial branch blocks and radiofrequency ablation procedure arch only treat and manage back pain, which she does feel is a big part of his issue and I did discuss that we would consider proceed with a right L3 through S1 diagnostic medial branch block and then if it is positive, a confirmatory block, and then if the confirmatory block is positive he would then be a candidate for the radiofrequency ablation procedure  I did thoroughly discussed with the patient and his wife how the process works and the theory behind the radiofrequency ablation procedure as well as the possible outcomes and expectations    The patient and his wife report that at this point they want to continue to think about the injection or radiofrequency ablation procedure and if he decides he would like to proceed with any of the injections or procedures, he will call our office and we can proceed from there as appropriate  I did discuss with the patient and his wife because he is not overly interested in surgical options at this point time we could also look at neuropathic medications such as Cymbalta because I do believe that there is definitely significant neuropathic, myofascial, and osteoarthritic component to his pain and medications such as Cymbalta could helpful each keep things more manageable and tolerable  The patient is going to take some time to review the literature I gave him regarding Cymbalta and if he decides he would like to proceed with the Cymbalta in the future, he will call our office  We also discussed that at this point his obvious option is just to continue to live with the pain as it is and manage it the best he can on his own as he does not feel it is significantly limiting his overall quality of life on a regular basis, but there are still are times that can be quite uncomfortable and is specially does seem to keep him from doing his activities he needs to do and likes to do for extended periods of time because of the increase in pain  I discussed with the patient that at this point time he can followup with our office on an as-needed basis  I did review the patient that if his pain symptoms should change, worsen, and/or if he would experience any new symptoms he would like to be evaluated for, he should give our office a call  The patient was agreeable and verbalized an understanding  I attest that I have spent at least 30 minutes face to face with the patient and that at least 50% of the time was educating and/or discussing the patient's symptoms and treatment plan options until the patient was satisfied with the plan  History of Present Illness:     The patient is a 80 y o  male last seen on 4/23/2021 who presents for a follow up office visit in regards to Chronic pain syndrome secondary to herniated lumbar discs with spondylosis and stenosis  The patient currently reports that since his last office visit overall his pain symptoms have pretty much remained relatively the same although initially he does feel the injections did provide relief but at this point time his pain is pretty much quickly faded back to where was prior to the injections as he is status post his 2nd L5-S1 interlaminar lumbar epidural steroid injection directed towards the right on April 23, 2021 with Dr Rodriguez Graves  The patient reports that this point he continues with the right-sided low back and lower extremity radicular symptoms and although the back pain is more of a constant he still continues with the lower extremity radicular symptoms and presents today to discuss the possibility of a repeat injection versus a medial branch block or any other options we can recommend  The patient and wife present today for regular postprocedure follow-up visit  I have personally reviewed and/or updated the patient's past medical history, past surgical history, family history, social history, current medications, allergies, and vital signs today  Review of Systems:    Review of Systems   Respiratory: Negative for shortness of breath  Cardiovascular: Negative for chest pain  Gastrointestinal: Negative for constipation, diarrhea, nausea and vomiting  Musculoskeletal: Positive for gait problem  Negative for arthralgias, joint swelling and myalgias  Skin: Negative for rash  Neurological: Positive for weakness  Negative for dizziness and seizures  All other systems reviewed and are negative          Past Medical History:   Diagnosis Date    Abnormal blood chemistry     Abnormal glucose     Last assessed - 2/20/14    Benign essential hypertension     Last assessed - 5/15/17    Chronic allergic rhinitis     Last assessed - 8/1/16    Dermatitis     Last assessed - 6/16/15    High risk medication use     Last assessed - 16    Sciatica     Last assessed - 12    Septic bursitis     Last assessed - 9/3/13    Tick bite     Last assessed - 16       Past Surgical History:   Procedure Laterality Date    CATARACT EXTRACTION      COLONOSCOPY      Fiberoptic    PILONIDAL CYST EXCISION         Family History   Problem Relation Age of Onset    Hypertension Mother     Osteoporosis Mother     Diabetes type I Paternal [de-identified]     Colon cancer Family        Social History     Occupational History    Not on file   Tobacco Use    Smoking status: Former Smoker     Quit date:      Years since quittin 7    Smokeless tobacco: Never Used   Substance and Sexual Activity    Alcohol use:  Yes     Alcohol/week: 1 0 standard drinks     Types: 1 Cans of beer per week     Comment: social usage    Drug use: Never    Sexual activity: Not on file         Current Outpatient Medications:     amLODIPine (NORVASC) 5 mg tablet, TAKE ONE TABLET BY MOUTH EVERY DAY, Disp: 90 tablet, Rfl: 2    Coenzyme Q10 (COQ10 PO), Take by mouth, Disp: , Rfl:     docusate sodium (COLACE) 100 mg capsule, Take 1 capsule by mouth as needed, Disp: , Rfl:     fexofenadine (ALLEGRA) 180 MG tablet, Take by mouth as needed  , Disp: , Rfl:     fluticasone (FLONASE) 50 mcg/act nasal spray, APPLY ONE SPRAY IN EACH NOSTRIL TWICE A DAY, Disp: 1 Bottle, Rfl: 1    glimepiride (AMARYL) 1 mg tablet, Take 1 tablet (1 mg total) by mouth 2 (two) times a day, Disp: 180 tablet, Rfl: 2    ipratropium (ATROVENT) 0 06 % nasal spray, USE 1 SPRAY INTO EACH NOSTRIL THREE TIMES A DAY, Disp: 15 mL, Rfl: 3    lisinopril (ZESTRIL) 20 mg tablet, TAKE ONE TABLET BY MOUTH EVERY DAY, Disp: 90 tablet, Rfl: 3    metFORMIN (GLUCOPHAGE-XR) 500 mg 24 hr tablet, TAKE TWO TABLETS BY MOUTH TWICE A DAY WITH MEALS, Disp: 360 tablet, Rfl: 0    Multiple Vitamin (multivitamin) tablet, Take 1 tablet by mouth daily, Disp: , Rfl:     simvastatin (ZOCOR) 40 mg tablet, TAKE ONE TABLET BY MOUTH EVERY DAY, Disp: 90 tablet, Rfl: 0    Specialty Vitamins Products (MAGNESIUM, AMINO ACID CHELATE,) 133 MG tablet, Take 1 tablet by mouth 2 (two) times a day, Disp: , Rfl:     Thiamine 50 MG CAPS, Take 1 capsule by mouth daily, Disp: , Rfl:     B Complex-Biotin-FA (B COMPLETE) TABS, Take by mouth, Disp: , Rfl:     Blood Glucose Monitoring Suppl (CONTOUR NEXT MONITOR) w/Device KIT, 1 Device by Does not apply route 2 (two) times a day, Disp: 1 kit, Rfl: 0    Contour Next Test test strip, TEST BLOOD SUGAR TWICE DAILY, Disp: 100 each, Rfl: 2    Microlet Lancets MISC, TEST BLOOD SUGAR TWO TIMES A DAY, Disp: 100 each, Rfl: 0    No Known Allergies    Physical Exam:    /70 (BP Location: Left arm, Patient Position: Sitting, Cuff Size: Standard)   Pulse 78   Temp 97 9 °F (36 6 °C)   Ht 6' (1 829 m)   Wt 75 3 kg (166 lb)   BMI 22 51 kg/m²     Constitutional:normal, well developed, well nourished, alert, in no distress and non-toxic and no overt pain behavior  Eyes:anicteric  HEENT:grossly intact  Neck:supple, symmetric, trachea midline and no masses   Pulmonary:even and unlabored  Cardiovascular:No edema or pitting edema present  Skin:Normal without rashes or lesions and well hydrated  Psychiatric:Mood and affect appropriate  Neurologic:Cranial Nerves II-XII grossly intact  Musculoskeletal:The patient's gait is slightly antalgic, but steady without the use of any assistive devices  Imaging  No orders to display         No orders of the defined types were placed in this encounter

## 2021-09-02 NOTE — PATIENT INSTRUCTIONS
1  Repeat epidural steroid injection  2  Right L3-S1 Diagnostic Medial Branch block to see if you are a candidate for the radiofrequency ablation  3  Consider trying Cymbalta   4  Just continue to live with it and manage as you are  Duloxetine (By mouth)   Duloxetine ([id number]-LOX-[id number]-teen)  Treats depression, anxiety, diabetic peripheral neuropathy, fibromyalgia, and chronic muscle or bone pain  This medicine is an SSNRI  Brand Name(s): Cymbalta, Drizalma Sprinkle, Irenka   There may be other brand names for this medicine  When This Medicine Should Not Be Used: This medicine is not right for everyone  Do not use it if you had an allergic reaction to duloxetine  How to Use This Medicine:   Capsule, Delayed Release Capsule  · Take your medicine as directed  Your dose may need to be changed several times to find what works best for you  · Delayed-release capsule: Swallow the capsule whole  Do not crush, chew, break, or open it  Do not open the Cymbalta® delayed-release capsule and sprinkle the contents on food or in liquids  · If you have trouble swallowing the Kool [place] delayed release capsule:   ? You may open the capsule and sprinkle the contents over one tablespoon (15 mL) of applesauce  Swallow the mixture right away and do not save any of the mixture to use later  ? You may open the capsule and pour the contents to an all plastic catheter tip syringe and add 50 mL of water  Do not use other liquids  Gently shake it for 10 seconds, and then use it through a nasogastric tube  Rinse with additional water (about 15 mL) if needed  · This medicine should come with a Medication Guide  Ask your pharmacist for a copy if you do not have one  · Missed dose: Take a dose as soon as you remember  If it is almost time for your next dose, wait until then and take a regular dose  Do not take extra medicine to make up for a missed dose    · Store the medicine in a closed container at room temperature, away from heat, moisture, and direct light  Drugs and Foods to Avoid:   Ask your doctor or pharmacist before using any other medicine, including over-the-counter medicines, vitamins, and herbal products  · Do not take duloxetine if you have used an MAO inhibitor (MAOI) within the past 14 days  Do not start taking an MAO inhibitor within 5 days of stopping duloxetine  · Some medicines can affect how duloxetine works  Tell your doctor if you are using any of the following:  ? Buspirone, cimetidine, ciprofloxacin, enoxacin, fentanyl, lithium, Becky's wort, theophylline, tramadol, tryptophan, or warfarin  ? Amphetamines  ? Blood pressure medicine  ? Diuretic (water pill)  ? Medicine for heart rhythm problems (including flecainide, propafenone, quinidine)  ? Medicine to treat migraine headaches (including triptans)  ? NSAID pain or arthritis medicine (including aspirin, celecoxib, diclofenac, ibuprofen, naproxen)  ? Other medicine to treat depression or mood disorders (including amitriptyline, desipramine, fluoxetine, imipramine, nortriptyline, paroxetine)  ? Phenothiazine medicine (including thioridazine)  · Tell your doctor if you use anything else that makes you sleepy  Some examples are allergy medicine, narcotic pain medicine, and alcohol  · Do not drink alcohol while you are using this medicine  Warnings While Using This Medicine:   · Tell your doctor if you are pregnant or breastfeeding, or if you have kidney disease, liver disease, diabetes, digestion problems, glaucoma, heart disease, high or low blood pressure, or problems with urination  Tell your doctor if you smoke or you have a history of seizures, or drug or alcohol addiction  · This medicine may cause the following problems:   ? Serious liver problems  ? Serotonin syndrome (more likely when used with certain other medicines)  ? Increased risk of bleeding problems  ? Serious skin reactions  ?  Low sodium levels in the blood  · This medicine can increase thoughts of suicide  Tell your doctor right away if you start to feel depressed and have thoughts about hurting yourself  · This medicine can cause changes in your blood pressure  This may make you dizzy or drowsy  Do not drive or do anything that could be dangerous until you know how this medicine affects you  Stand up slowly to avoid falls  · Do not stop using this medicine suddenly  Your doctor will need to slowly decrease your dose before you stop it completely  · Your doctor will check your progress and the effects of this medicine at regular visits  Keep all appointments  · Keep all medicine out of the reach of children  Never share your medicine with anyone  Possible Side Effects While Using This Medicine:   Call your doctor right away if you notice any of these side effects:  · Allergic reaction: Itching or hives, swelling in your face or hands, swelling or tingling in your mouth or throat, chest tightness, trouble breathing  · Anxiety, restlessness, fever, fast heartbeat, sweating, muscle spasms, diarrhea, seeing or hearing things that are not there  · Blistering, peeling, red skin rash  · Confusion, weakness, muscle twitching  · Dark urine or pale stools, nausea, vomiting, loss of appetite, stomach pain, yellow skin or eyes  · Decrease in how much or how often you urinate  · Eye pain, vision changes, seeing halos around lights  · Feeling more energetic than usual  · Lightheadedness, dizziness, fainting  · Unusual moods or behaviors, worsening depression, thoughts about hurting yourself, trouble sleeping  · Unusual bleeding or bruising  If you notice these less serious side effects, talk with your doctor:   · Decrease in appetite or weight  · Dry mouth, constipation, mild nausea  · Headache  · Unusual drowsiness, sleepiness, or tiredness  If you notice other side effects that you think are caused by this medicine, tell your doctor  Call your doctor for medical advice about side effects   You may report side effects to FDA at 7-691-FDA-3950  © Copyright [employer] 2021 Information is for End User's use only and may not be sold, redistributed or otherwise used for commercial purposes  The above information is an  only  It is not intended as medical advice for individual conditions or treatments  Talk to your doctor, nurse or pharmacist before following any medical regimen to see if it is safe and effective for you

## 2021-09-07 ENCOUNTER — TELEPHONE (OUTPATIENT)
Dept: PAIN MEDICINE | Facility: CLINIC | Age: 81
End: 2021-09-07

## 2021-09-07 DIAGNOSIS — G89.29 CHRONIC RIGHT-SIDED LOW BACK PAIN WITHOUT SCIATICA: ICD-10-CM

## 2021-09-07 DIAGNOSIS — M48.062 SPINAL STENOSIS, LUMBAR REGION, WITH NEUROGENIC CLAUDICATION: ICD-10-CM

## 2021-09-07 DIAGNOSIS — M54.50 CHRONIC RIGHT-SIDED LOW BACK PAIN WITHOUT SCIATICA: ICD-10-CM

## 2021-09-07 DIAGNOSIS — G89.4 CHRONIC PAIN SYNDROME: ICD-10-CM

## 2021-09-07 DIAGNOSIS — M47.816 LUMBAR SPONDYLOSIS: ICD-10-CM

## 2021-09-07 DIAGNOSIS — M51.26 HERNIATED LUMBAR INTERVERTEBRAL DISC: Primary | ICD-10-CM

## 2021-09-07 RX ORDER — DULOXETIN HYDROCHLORIDE 20 MG/1
CAPSULE, DELAYED RELEASE ORAL
Qty: 60 CAPSULE | Refills: 1 | Status: SHIPPED | OUTPATIENT
Start: 2021-09-07 | End: 2021-11-01 | Stop reason: SDUPTHER

## 2021-09-07 NOTE — TELEPHONE ENCOUNTER
Patient   [phone]  Elvis Bryson     Pt is calling in to let Ariel Rutledge know that he would like to try the Duloxetine medication     [facility] [address] [facility], [address] [facility]

## 2021-09-07 NOTE — TELEPHONE ENCOUNTER
I sent a script for Cymbalta 20 mg, 1 PO HS x 2 weeks, then 2 PO HS  The patient is to see how the medication affects him before he drives or operates machinery and and should not abruptly stop this medication, but call our office if he experiences any side effects or issues  He is to schedule a f/u OV in 6-7 weeks to re-group and for medication refill  Thank you

## 2021-09-15 ENCOUNTER — TELEPHONE (OUTPATIENT)
Dept: PAIN MEDICINE | Facility: CLINIC | Age: 81
End: 2021-09-15

## 2021-09-15 NOTE — TELEPHONE ENCOUNTER
S/w pt, stated that he is leaving for vacation and does not want to increase his duloxetine while on vacation  Pt stated that he would like to increase it to 2 pills / day when he returns on 10/5  Pt stated that he is a little tired with cymbalta  Advised pt, ok to continue 1 pill per day  Take this medication consistently  Do not stop abruptly  Cb if se's do not improve / worsen  Pt verbalized understanding and appreciation

## 2021-10-20 ENCOUNTER — IMMUNIZATIONS (OUTPATIENT)
Dept: FAMILY MEDICINE CLINIC | Facility: CLINIC | Age: 81
End: 2021-10-20
Payer: MEDICARE

## 2021-10-20 DIAGNOSIS — Z23 NEED FOR INFLUENZA VACCINATION: Primary | ICD-10-CM

## 2021-10-20 PROCEDURE — G0008 ADMIN INFLUENZA VIRUS VAC: HCPCS | Performed by: FAMILY MEDICINE

## 2021-10-20 PROCEDURE — 90662 IIV NO PRSV INCREASED AG IM: CPT | Performed by: FAMILY MEDICINE

## 2021-10-28 ENCOUNTER — LAB (OUTPATIENT)
Dept: LAB | Facility: CLINIC | Age: 81
End: 2021-10-28
Payer: MEDICARE

## 2021-10-28 DIAGNOSIS — E11.42 TYPE 2 DIABETES MELLITUS WITH DIABETIC POLYNEUROPATHY, WITHOUT LONG-TERM CURRENT USE OF INSULIN (HCC): ICD-10-CM

## 2021-10-28 DIAGNOSIS — R79.89 ELEVATED TSH: ICD-10-CM

## 2021-10-28 DIAGNOSIS — E78.2 MIXED HYPERLIPIDEMIA: ICD-10-CM

## 2021-10-28 LAB
ALBUMIN SERPL BCP-MCNC: 4.1 G/DL (ref 3.5–5)
ALP SERPL-CCNC: 97 U/L (ref 46–116)
ALT SERPL W P-5'-P-CCNC: 37 U/L (ref 12–78)
ANION GAP SERPL CALCULATED.3IONS-SCNC: 4 MMOL/L (ref 4–13)
AST SERPL W P-5'-P-CCNC: 28 U/L (ref 5–45)
BILIRUB SERPL-MCNC: 0.42 MG/DL (ref 0.2–1)
BUN SERPL-MCNC: 17 MG/DL (ref 5–25)
CALCIUM SERPL-MCNC: 9.7 MG/DL (ref 8.3–10.1)
CHLORIDE SERPL-SCNC: 99 MMOL/L (ref 100–108)
CO2 SERPL-SCNC: 29 MMOL/L (ref 21–32)
CREAT SERPL-MCNC: 1.04 MG/DL (ref 0.6–1.3)
CREAT UR-MCNC: 102 MG/DL
EST. AVERAGE GLUCOSE BLD GHB EST-MCNC: 160 MG/DL
GFR SERPL CREATININE-BSD FRML MDRD: 68 ML/MIN/1.73SQ M
GLUCOSE P FAST SERPL-MCNC: 89 MG/DL (ref 65–99)
HBA1C MFR BLD: 7.2 %
MICROALBUMIN UR-MCNC: 25.1 MG/L (ref 0–20)
MICROALBUMIN/CREAT 24H UR: 25 MG/G CREATININE (ref 0–30)
POTASSIUM SERPL-SCNC: 4.7 MMOL/L (ref 3.5–5.3)
PROT SERPL-MCNC: 7.1 G/DL (ref 6.4–8.2)
SODIUM SERPL-SCNC: 132 MMOL/L (ref 136–145)
T4 FREE SERPL-MCNC: 1.17 NG/DL (ref 0.76–1.46)
TSH SERPL DL<=0.05 MIU/L-ACNC: 3.7 UIU/ML (ref 0.36–3.74)

## 2021-10-28 PROCEDURE — 84443 ASSAY THYROID STIM HORMONE: CPT

## 2021-10-28 PROCEDURE — 82570 ASSAY OF URINE CREATININE: CPT

## 2021-10-28 PROCEDURE — 80053 COMPREHEN METABOLIC PANEL: CPT

## 2021-10-28 PROCEDURE — 36415 COLL VENOUS BLD VENIPUNCTURE: CPT

## 2021-10-28 PROCEDURE — 83036 HEMOGLOBIN GLYCOSYLATED A1C: CPT

## 2021-10-28 PROCEDURE — 84439 ASSAY OF FREE THYROXINE: CPT

## 2021-10-28 PROCEDURE — 82043 UR ALBUMIN QUANTITATIVE: CPT

## 2021-11-01 ENCOUNTER — OFFICE VISIT (OUTPATIENT)
Dept: PAIN MEDICINE | Facility: CLINIC | Age: 81
End: 2021-11-01
Payer: MEDICARE

## 2021-11-01 VITALS
DIASTOLIC BLOOD PRESSURE: 68 MMHG | TEMPERATURE: 97.8 F | SYSTOLIC BLOOD PRESSURE: 136 MMHG | WEIGHT: 170 LBS | BODY MASS INDEX: 23.03 KG/M2 | HEART RATE: 69 BPM | HEIGHT: 72 IN

## 2021-11-01 DIAGNOSIS — M54.50 CHRONIC RIGHT-SIDED LOW BACK PAIN WITHOUT SCIATICA: ICD-10-CM

## 2021-11-01 DIAGNOSIS — G89.29 CHRONIC RIGHT-SIDED LOW BACK PAIN WITHOUT SCIATICA: ICD-10-CM

## 2021-11-01 DIAGNOSIS — M48.062 SPINAL STENOSIS, LUMBAR REGION, WITH NEUROGENIC CLAUDICATION: ICD-10-CM

## 2021-11-01 DIAGNOSIS — G89.4 CHRONIC PAIN SYNDROME: Primary | ICD-10-CM

## 2021-11-01 DIAGNOSIS — M51.26 HERNIATED LUMBAR INTERVERTEBRAL DISC: ICD-10-CM

## 2021-11-01 DIAGNOSIS — M47.816 LUMBAR SPONDYLOSIS: ICD-10-CM

## 2021-11-01 PROCEDURE — 99214 OFFICE O/P EST MOD 30 MIN: CPT | Performed by: NURSE PRACTITIONER

## 2021-11-01 RX ORDER — DULOXETIN HYDROCHLORIDE 20 MG/1
CAPSULE, DELAYED RELEASE ORAL
Qty: 60 CAPSULE | Refills: 2 | Status: SHIPPED | OUTPATIENT
Start: 2021-11-01 | End: 2021-11-23 | Stop reason: SDUPTHER

## 2021-11-11 DIAGNOSIS — E78.2 COMBINED HYPERLIPIDEMIA: ICD-10-CM

## 2021-11-11 DIAGNOSIS — E11.42 TYPE 2 DIABETES MELLITUS WITH DIABETIC POLYNEUROPATHY, WITHOUT LONG-TERM CURRENT USE OF INSULIN (HCC): ICD-10-CM

## 2021-11-11 DIAGNOSIS — I10 ESSENTIAL HYPERTENSION: ICD-10-CM

## 2021-11-11 RX ORDER — AMLODIPINE BESYLATE 5 MG/1
TABLET ORAL
Qty: 90 TABLET | Refills: 2 | Status: SHIPPED | OUTPATIENT
Start: 2021-11-11 | End: 2022-08-08

## 2021-11-11 RX ORDER — SIMVASTATIN 40 MG
TABLET ORAL
Qty: 90 TABLET | Refills: 0 | Status: SHIPPED | OUTPATIENT
Start: 2021-11-11 | End: 2022-02-10

## 2021-11-11 RX ORDER — METFORMIN HYDROCHLORIDE 500 MG/1
TABLET, EXTENDED RELEASE ORAL
Qty: 360 TABLET | Refills: 0 | Status: SHIPPED | OUTPATIENT
Start: 2021-11-11 | End: 2022-02-10

## 2021-11-11 RX ORDER — LISINOPRIL 20 MG/1
TABLET ORAL
Qty: 90 TABLET | Refills: 3 | Status: SHIPPED | OUTPATIENT
Start: 2021-11-11

## 2021-11-22 ENCOUNTER — TELEPHONE (OUTPATIENT)
Dept: PAIN MEDICINE | Facility: CLINIC | Age: 81
End: 2021-11-22

## 2021-11-22 DIAGNOSIS — G89.29 CHRONIC RIGHT-SIDED LOW BACK PAIN WITHOUT SCIATICA: ICD-10-CM

## 2021-11-22 DIAGNOSIS — M47.816 LUMBAR SPONDYLOSIS: ICD-10-CM

## 2021-11-22 DIAGNOSIS — M54.50 CHRONIC RIGHT-SIDED LOW BACK PAIN WITHOUT SCIATICA: ICD-10-CM

## 2021-11-22 DIAGNOSIS — G89.4 CHRONIC PAIN SYNDROME: ICD-10-CM

## 2021-11-22 DIAGNOSIS — M48.062 SPINAL STENOSIS, LUMBAR REGION, WITH NEUROGENIC CLAUDICATION: ICD-10-CM

## 2021-11-22 DIAGNOSIS — M51.26 HERNIATED LUMBAR INTERVERTEBRAL DISC: ICD-10-CM

## 2021-11-23 ENCOUNTER — OFFICE VISIT (OUTPATIENT)
Dept: ENDOCRINOLOGY | Facility: CLINIC | Age: 81
End: 2021-11-23
Payer: MEDICARE

## 2021-11-23 VITALS
HEART RATE: 78 BPM | BODY MASS INDEX: 22.89 KG/M2 | WEIGHT: 169 LBS | HEIGHT: 72 IN | SYSTOLIC BLOOD PRESSURE: 138 MMHG | DIASTOLIC BLOOD PRESSURE: 74 MMHG

## 2021-11-23 DIAGNOSIS — E11.42 TYPE 2 DIABETES MELLITUS WITH DIABETIC POLYNEUROPATHY, WITHOUT LONG-TERM CURRENT USE OF INSULIN (HCC): Primary | ICD-10-CM

## 2021-11-23 DIAGNOSIS — E78.2 MIXED HYPERLIPIDEMIA: ICD-10-CM

## 2021-11-23 DIAGNOSIS — I10 BENIGN ESSENTIAL HYPERTENSION: ICD-10-CM

## 2021-11-23 PROCEDURE — 99214 OFFICE O/P EST MOD 30 MIN: CPT | Performed by: NURSE PRACTITIONER

## 2021-11-23 RX ORDER — DULOXETIN HYDROCHLORIDE 60 MG/1
CAPSULE, DELAYED RELEASE ORAL
Qty: 30 CAPSULE | Refills: 2 | Status: SHIPPED | OUTPATIENT
Start: 2021-11-23 | End: 2022-01-24 | Stop reason: SDUPTHER

## 2022-01-19 NOTE — PROGRESS NOTES
I have reviewed and agree with the assessment below. Thank you.    PT Evaluation     Today's date: 10/14/2019   Patient name: Jef Blackwell  : 1940  MRN: [id number]  Referring provider: Bertha Espinal DO  Dx:   Encounter Diagnosis     ICD-10-CM    1  Right sided sciatica M54 31 Ambulatory referral to Physical Therapy                  Assessment  Assessment details: Jef Blackwell is a 70 y o  male who presents to physical therapy with diagnosis of right sided sciatica  Chalo Martin presents with limitations in lumbar range of motion, demonstrates an abnormal gait pattern and has tenderness to palpation along lumbar and right glute musculature  He demonstrates a good response to trial of TENS unit and would benefit from home unit  Patient will return to PT when a home unit is available for him, patient agrees with current plan of care  All questions were answered, thank you for the referral        Symptom irritability: lowUnderstanding of Dx/Px/POC: good   Prognosis: good    Goals  Patient will be independent with home exercise program  Patient will demonstrate good understanding of how to use TENs unit independently  Plan  Plan details: Patient will return when PT has home unit available for patient  Patient would benefit from: skilled physical therapy  Planned modality interventions: TENS  Frequency: 1x week  Duration in visits: 2  Treatment plan discussed with: patient        Subjective Evaluation    History of Present Illness  Mechanism of injury: Chalo Martin is a 70 y o  male presenting to physical therapy on 10/14/19 with primary complaints of low back pain  He mentions the pain started over 20 years ago when he pulled something in his back  He reports he came to physical therapy about 6 years ago and still does many of the exercises provided  He reports going to the doctor recently to discuss acupuncture and they also discussed an injection which he is considering  However, the Dr  Also mentioned getting a home TENS unit which is why he is here today        He mentions he has no difficulty completing ADLs but does have difficulty with yard/outside work  He mentions that his "nerve feels like it jumps" and all symptoms are predominantly right sided  He mentions he can stand for about 20 minutes prior to having some pain and has no difficulty sitting/resting  He reports he continues to do his home exercises, stretches, and uses a foam roller/ball to roll out his low back/glute every day  Pain  Current pain ratin  At best pain ratin  At worst pain ratin  Location: across low back  Quality: dull ache and throbbing  Relieving factors: rest    Social Support  Lives with: spouse    Treatments  Previous treatment: physical therapy  Patient Goals  Patient goal: recieve and learn how to use TENs approprately        Objective     Palpation     Additional Palpation Details  Paraspinals, piriformis, glute mild-mod tenderness    Active Range of Motion     Lumbar   Flexion:  Restriction level: moderate  Extension:  Restriction level: maximal  Left lateral flexion:  Restriction level: moderate  Right lateral flexion:  Restriction level: maximal  Left rotation:  Restriction level: moderate  Right rotation:  Restriction level: moderate    Additional Active Range of Motion Details  Reports feeling stretch/tightness at end range, no increased pain    General Comments:      Lumbar Comments  Patient demonstrates minimal trunk rotation and decreased stance time on R LE                Precautions: HTN, Diabetes, Hyperlipidemia      Manual  10/14                                                                                 Exercise Diary  10/14                                                                                                                                                                                                                                                                                    Modalities  10/14            TENS 10"

## 2022-01-24 ENCOUNTER — OFFICE VISIT (OUTPATIENT)
Dept: PAIN MEDICINE | Facility: CLINIC | Age: 82
End: 2022-01-24
Payer: MEDICARE

## 2022-01-24 VITALS
HEIGHT: 72 IN | SYSTOLIC BLOOD PRESSURE: 140 MMHG | DIASTOLIC BLOOD PRESSURE: 70 MMHG | WEIGHT: 173 LBS | BODY MASS INDEX: 23.43 KG/M2 | TEMPERATURE: 97.5 F

## 2022-01-24 DIAGNOSIS — G89.4 CHRONIC PAIN SYNDROME: Primary | ICD-10-CM

## 2022-01-24 DIAGNOSIS — M54.50 CHRONIC RIGHT-SIDED LOW BACK PAIN WITHOUT SCIATICA: ICD-10-CM

## 2022-01-24 DIAGNOSIS — M47.816 LUMBAR SPONDYLOSIS: ICD-10-CM

## 2022-01-24 DIAGNOSIS — M54.16 LUMBAR RADICULOPATHY: ICD-10-CM

## 2022-01-24 DIAGNOSIS — G89.29 CHRONIC RIGHT-SIDED LOW BACK PAIN WITHOUT SCIATICA: ICD-10-CM

## 2022-01-24 DIAGNOSIS — M51.26 HERNIATED LUMBAR INTERVERTEBRAL DISC: ICD-10-CM

## 2022-01-24 DIAGNOSIS — M48.062 SPINAL STENOSIS, LUMBAR REGION, WITH NEUROGENIC CLAUDICATION: ICD-10-CM

## 2022-01-24 PROCEDURE — 99214 OFFICE O/P EST MOD 30 MIN: CPT | Performed by: NURSE PRACTITIONER

## 2022-01-24 RX ORDER — DULOXETIN HYDROCHLORIDE 30 MG/1
CAPSULE, DELAYED RELEASE ORAL
Qty: 18 CAPSULE | Refills: 0 | Status: SHIPPED | OUTPATIENT
Start: 2022-01-24 | End: 2022-03-28 | Stop reason: ALTCHOICE

## 2022-01-24 NOTE — PROGRESS NOTES
Assessment:  1  Chronic pain syndrome    2  Chronic right-sided low back pain without sciatica    3  Lumbar radiculopathy    4  Herniated lumbar intervertebral disc    5  Lumbar spondylosis    6  Spinal stenosis, lumbar region, with neurogenic claudication        Plan:  While the patient was in the office today, I did have a thorough conversation with the patient regarding their chronic pain syndrome, symptoms, medication regimen, and treatment plan  I did discuss with the patient that at this point time, as per his wishes we can titrate him off of the Cymbalta over the next 2 weeks as discussed and see how he does  The patient is to call us once he is off the Cymbalta for at least a week or so to give us an update on how he is doing  At that point time we can then decide whether not he wants to stay off of the Cymbalta and just see how his pain goes without any medication or I did discuss with the patient that because I feel there is significant and chronic neuropathic component to his pain we could try different neuron membrane stabilizer such as Lyrica since he has tried and failed gabapentin in the past   However, for now, we will await his return phone call and see how he does  The patient was agreeable and verbalized an understanding  I discussed with the patient that at this point time he can followup with our office on an as-needed basis  I did review the patient that if his pain symptoms should change, worsen, and/or if he would experience any new symptoms he would like to be evaluated for, he should give our office a call  The patient was agreeable and verbalized an understanding  History of Present Illness: The patient is a 80 y o  male last seen on 11/1/21 who presents for a follow up office visit in regards to chronic pain syndrome, as the patient's pain has been ongoing for greater than a year, secondary to    The patient currently reports that since his last office visit overall his pain symptoms have somewhat improved with the increase in the Cymbalta, however, he does feel that he is much more tired and that it is difficult for him to sit down and watch TV or read a book because he very quickly falls asleep  The patient also reports that since he has increase his Cymbalta he feels his blood pressure has elevated but is not totally sure it is related to the Cymbalta  The patient and his wife would like to discuss the possibility of coming off of the Cymbalta and just seeing how his pain is now that things have improved and maybe that he has been on the Cymbalta long enough that his pain will be more tolerable and see if he can just manage it without medications possible  He also want to discuss that there is any other medications that might be helpful  Current pain medications includes:  Cymbalta 60 mg daily  The patient reports that this regimen is providing moderate pain relief  The patient is reporting sleepiness from this pain medication regimen  I have personally reviewed and/or updated the patient's past medical history, past surgical history, family history, social history, current medications, allergies, and vital signs today  Review of Systems:    Review of Systems   Musculoskeletal: Positive for arthralgias           Past Medical History:   Diagnosis Date    Abnormal blood chemistry     Abnormal glucose     Last assessed - 2/20/14    Benign essential hypertension     Last assessed - 5/15/17    Chronic allergic rhinitis     Last assessed - 8/1/16    Dermatitis     Last assessed - 6/16/15    High risk medication use     Last assessed - 5/2/16    Sciatica     Last assessed - 12/18/12    Septic bursitis     Last assessed - 9/3/13    Tick bite     Last assessed - 8/8/16       Past Surgical History:   Procedure Laterality Date    CATARACT EXTRACTION      COLONOSCOPY      Fiberoptic    PILONIDAL CYST EXCISION         Family History   Problem Relation Age of Onset    Hypertension Mother     Osteoporosis Mother     Diabetes type I Paternal [de-identified]     Colon cancer Family        Social History     Occupational History    Not on file   Tobacco Use    Smoking status: Former Smoker     Quit date:      Years since quittin 0    Smokeless tobacco: Never Used   Substance and Sexual Activity    Alcohol use:  Yes     Alcohol/week: 1 0 standard drink     Types: 1 Cans of beer per week     Comment: social usage    Drug use: Never    Sexual activity: Not on file         Current Outpatient Medications:     amLODIPine (NORVASC) 5 mg tablet, TAKE ONE TABLET BY MOUTH EVERY DAY, Disp: 90 tablet, Rfl: 2    B Complex-Biotin-FA (B COMPLETE) TABS, Take by mouth, Disp: , Rfl:     Blood Glucose Monitoring Suppl (CONTOUR NEXT MONITOR) w/Device KIT, 1 Device by Does not apply route 2 (two) times a day, Disp: 1 kit, Rfl: 0    Coenzyme Q10 (COQ10 PO), Take by mouth, Disp: , Rfl:     Contour Next Test test strip, TEST BLOOD SUGAR TWICE DAILY, Disp: 100 each, Rfl: 2    docusate sodium (COLACE) 100 mg capsule, Take 1 capsule by mouth as needed, Disp: , Rfl:     DULoxetine (CYMBALTA) 30 mg delayed release capsule, Take 1 PO HS x 2 weeks, then 1 PO HS every other day x 1 week, then STOP , Disp: 18 capsule, Rfl: 0    fexofenadine (ALLEGRA) 180 MG tablet, Take by mouth as needed  , Disp: , Rfl:     fluticasone (FLONASE) 50 mcg/act nasal spray, APPLY ONE SPRAY IN EACH NOSTRIL TWICE A DAY (Patient taking differently: as needed  ), Disp: 1 Bottle, Rfl: 1    glimepiride (AMARYL) 1 mg tablet, Take 1 tablet (1 mg total) by mouth 2 (two) times a day, Disp: 180 tablet, Rfl: 2    ipratropium (ATROVENT) 0 06 % nasal spray, USE 1 SPRAY INTO EACH NOSTRIL THREE TIMES A DAY (Patient taking differently: as needed  ), Disp: 15 mL, Rfl: 3    lisinopril (ZESTRIL) 20 mg tablet, TAKE ONE TABLET BY MOUTH EVERY DAY, Disp: 90 tablet, Rfl: 3    metFORMIN (GLUCOPHAGE-XR) 500 mg 24 hr tablet, TAKE TWO TABLETS BY MOUTH TWICE A DAY WITH MEALS, Disp: 360 tablet, Rfl: 0    Microlet Lancets MISC, TEST BLOOD SUGAR TWO TIMES A DAY, Disp: 100 each, Rfl: 0    Multiple Vitamin (multivitamin) tablet, Take 1 tablet by mouth daily, Disp: , Rfl:     simvastatin (ZOCOR) 40 mg tablet, TAKE ONE TABLET BY MOUTH EVERY DAY, Disp: 90 tablet, Rfl: 0    Specialty Vitamins Products (MAGNESIUM, AMINO ACID CHELATE,) 133 MG tablet, Take 1 tablet by mouth 2 (two) times a day, Disp: , Rfl:     Thiamine 50 MG CAPS, Take 1 capsule by mouth daily, Disp: , Rfl:     No Known Allergies    Physical Exam:    /70   Temp 97 5 °F (36 4 °C)   Ht 6' (1 829 m)   Wt 78 5 kg (173 lb)   BMI 23 46 kg/m²     Constitutional:normal, well developed, well nourished, alert, in no distress and non-toxic and no overt pain behavior  Eyes:anicteric  HEENT:grossly intact  Neck:supple, symmetric, trachea midline and no masses   Pulmonary:even and unlabored  Cardiovascular:No edema or pitting edema present  Skin:Normal without rashes or lesions and well hydrated  Psychiatric:Mood and affect appropriate  Neurologic:Cranial Nerves II-XII grossly intact  Musculoskeletal:The patient's gait is slightly antalgic, but steady without the use of any assistive devices  Imaging  No orders to display         No orders of the defined types were placed in this encounter

## 2022-01-24 NOTE — PATIENT INSTRUCTIONS
Duloxetine: Decrease to 30 mg, 1 pill daily x 2 weeks, then 1 pill every other day at bed times x 1 week, then STOP  Call 1-2 weeks after stopping to give an update and if you want to try Edgar Haider  Pregabalin (By mouth)   Pregabalin (pre-GA-ba-lin)  Treats nerve and muscle pain, including fibromyalgia  Also treats partial-onset seizures  Brand Name(s): Lyricujno Lyrica BINA   There may be other brand names for this medicine  When This Medicine Should Not Be Used: This medicine is not right for everyone  Do not use it if you had an allergic reaction to pregabalin  How to Use This Medicine:   Capsule, Liquid, Long Acting Tablet  · Take your medicine as directed  Your dose may need to be changed several times to find what works best for you  · Extended-release tablet: Swallow the extended-release tablet whole  Do not crush, break, or chew it  Take it after an evening meal   · Oral liquid: Measure the oral liquid medicine with a marked measuring spoon, oral syringe, or medicine cup  · This medicine should come with a Medication Guide  Ask your pharmacist for a copy if you do not have one  · Missed dose: Take a dose as soon as you remember  If it is almost time for your next dose, wait until then and take a regular dose  Do not take extra medicine to make up for a missed dose  If you miss a dose of the extended-release tablet after your evening meal, take it before bedtime after a snack  If you miss the dose before bedtime, take it after your morning meal  If you do not take the dose the following morning, then take the next dose at your regular time after your evening meal  Do not take 2 doses at the same time  · Store the medicine in a closed container at room temperature, away from heat, moisture, and direct light  Drugs and Foods to Avoid:   Ask your doctor or pharmacist before using any other medicine, including over-the-counter medicines, vitamins, and herbal products    · Some medicines can affect how pregabalin works  Tell your doctor if you are using any of the following:   ? ACE inhibitor (including benazepril, enalapril, lisinopril, quinapril, ramipril)  ? Oral diabetes medicine (including metformin, pioglitazone, rosiglitazone)  · Do not drink alcohol while you are using this medicine  · Tell your doctor if you use anything else that makes you sleepy  Some examples are allergy medicine, narcotic pain medicine, and alcohol  Tell your doctor if you are also using oxycodone, lorazepam, or zolpidem  Warnings While Using This Medicine:   · Tell your doctor if you are pregnant or breastfeeding, or if you have kidney disease, heart failure, heart rhythm problems, lung or breathing problems, a bleeding disorder, diabetes, sores or skin problems, or a low blood platelet count  Tell your doctor if you have a history of angioedema (severe swelling), alcohol or drug abuse, depression, or other mood problems  · This medicine may cause the following problems:   ? Angioedema (severe swelling), which may be life-threatening  ? Changes in mood or behavior, including suicidal thoughts or behavior  ? Respiratory depression (serious breathing problem that can be life-threatening), when used with narcotic pain medicines  ? Peripheral edema (swelling of your hands, ankles, feet, or lower legs)  ? Increased risk for cancer and bleeding  ? Serious muscle problems  ? Heart rhythm changes  · This medicine may make you dizzy or drowsy  It may also cause blurry or double vision  Do not drive or do anything else that could be dangerous until you know how this medicine affects you  · Do not stop using this medicine suddenly  Your doctor will need to slowly decrease your dose before you stop it completely  · Your doctor will do lab tests at regular visits to check on the effects of this medicine  Keep all appointments  · Keep all medicine out of the reach of children  Never share your medicine with anyone    Possible Side Effects While Using This Medicine:   Call your doctor right away if you notice any of these side effects:  · Allergic reaction: Itching or hives, swelling in your face or hands, swelling or tingling in your mouth or throat, chest tightness, trouble breathing  · Blistering, peeling, red skin rash  · Blue lips, fingernails, or skin, trouble breathing, chest pain  · Blurry or double vision  · Fever, chills, cough, sore throat, body aches  · Muscle pain, tenderness, or weakness, general feeling of illness  · Rapid weight gain, swelling in your hands, ankles, or feet  · Severe dizziness or drowsiness  · Sudden mood changes, unusual moods or behavior, including extreme happiness or depression, thoughts or attempts of killing oneself  · Swelling in your throat, head, or neck  · Uneven heartbeat  · Unusual bleeding, bruising, or weakness  If you notice these less serious side effects, talk with your doctor:   · Confusion, trouble concentrating  · Constipation  · Dry mouth  If you notice other side effects that you think are caused by this medicine, tell your doctor  Call your doctor for medical advice about side effects  You may report side effects to FDA at 3-097-FDA-4083    © Copyright [employer] 2021 Information is for End User's use only and may not be sold, redistributed or otherwise used for commercial purposes  The above information is an  only  It is not intended as medical advice for individual conditions or treatments  Talk to your doctor, nurse or pharmacist before following any medical regimen to see if it is safe and effective for you

## 2022-02-04 ENCOUNTER — TELEPHONE (OUTPATIENT)
Dept: ENDOCRINOLOGY | Facility: CLINIC | Age: 82
End: 2022-02-04

## 2022-02-04 DIAGNOSIS — E11.9 TYPE 2 DIABETES MELLITUS WITHOUT COMPLICATION, WITHOUT LONG-TERM CURRENT USE OF INSULIN (HCC): ICD-10-CM

## 2022-02-04 NOTE — TELEPHONE ENCOUNTER
Needs lancets [facility] said he has to see the doctor he has questions please call

## 2022-02-07 RX ORDER — LANCETS
EACH MISCELLANEOUS
Qty: 100 EACH | Refills: 3 | Status: SHIPPED | OUTPATIENT
Start: 2022-02-07

## 2022-02-10 DIAGNOSIS — E11.42 TYPE 2 DIABETES MELLITUS WITH DIABETIC POLYNEUROPATHY, WITHOUT LONG-TERM CURRENT USE OF INSULIN (HCC): ICD-10-CM

## 2022-02-10 DIAGNOSIS — E78.2 COMBINED HYPERLIPIDEMIA: ICD-10-CM

## 2022-02-10 RX ORDER — SIMVASTATIN 40 MG
TABLET ORAL
Qty: 90 TABLET | Refills: 0 | Status: SHIPPED | OUTPATIENT
Start: 2022-02-10 | End: 2022-02-11

## 2022-02-10 RX ORDER — METFORMIN HYDROCHLORIDE 500 MG/1
TABLET, EXTENDED RELEASE ORAL
Qty: 360 TABLET | Refills: 0 | Status: SHIPPED | OUTPATIENT
Start: 2022-02-10 | End: 2022-05-20 | Stop reason: SDUPTHER

## 2022-02-11 DIAGNOSIS — E78.2 COMBINED HYPERLIPIDEMIA: ICD-10-CM

## 2022-02-11 RX ORDER — SIMVASTATIN 40 MG
TABLET ORAL
Qty: 90 TABLET | Refills: 0 | Status: SHIPPED | OUTPATIENT
Start: 2022-02-11 | End: 2022-05-12

## 2022-03-23 ENCOUNTER — APPOINTMENT (OUTPATIENT)
Dept: LAB | Facility: CLINIC | Age: 82
End: 2022-03-23
Payer: MEDICARE

## 2022-03-23 DIAGNOSIS — I10 BENIGN ESSENTIAL HYPERTENSION: ICD-10-CM

## 2022-03-23 DIAGNOSIS — E78.2 MIXED HYPERLIPIDEMIA: ICD-10-CM

## 2022-03-23 DIAGNOSIS — E11.42 TYPE 2 DIABETES MELLITUS WITH DIABETIC POLYNEUROPATHY, WITHOUT LONG-TERM CURRENT USE OF INSULIN (HCC): ICD-10-CM

## 2022-03-23 LAB
ALBUMIN SERPL BCP-MCNC: 3.7 G/DL (ref 3.5–5)
ALP SERPL-CCNC: 89 U/L (ref 46–116)
ALT SERPL W P-5'-P-CCNC: 28 U/L (ref 12–78)
ANION GAP SERPL CALCULATED.3IONS-SCNC: 4 MMOL/L (ref 4–13)
AST SERPL W P-5'-P-CCNC: 25 U/L (ref 5–45)
BILIRUB SERPL-MCNC: 0.36 MG/DL (ref 0.2–1)
BUN SERPL-MCNC: 16 MG/DL (ref 5–25)
CALCIUM SERPL-MCNC: 9.2 MG/DL (ref 8.3–10.1)
CHLORIDE SERPL-SCNC: 107 MMOL/L (ref 100–108)
CHOLEST SERPL-MCNC: 101 MG/DL
CO2 SERPL-SCNC: 27 MMOL/L (ref 21–32)
CREAT SERPL-MCNC: 0.92 MG/DL (ref 0.6–1.3)
EST. AVERAGE GLUCOSE BLD GHB EST-MCNC: 171 MG/DL
GFR SERPL CREATININE-BSD FRML MDRD: 78 ML/MIN/1.73SQ M
GLUCOSE P FAST SERPL-MCNC: 118 MG/DL (ref 65–99)
HBA1C MFR BLD: 7.6 %
HDLC SERPL-MCNC: 37 MG/DL
LDLC SERPL CALC-MCNC: 47 MG/DL (ref 0–100)
POTASSIUM SERPL-SCNC: 4.4 MMOL/L (ref 3.5–5.3)
PROT SERPL-MCNC: 6.6 G/DL (ref 6.4–8.2)
SODIUM SERPL-SCNC: 138 MMOL/L (ref 136–145)
TRIGL SERPL-MCNC: 86 MG/DL

## 2022-03-23 PROCEDURE — 83036 HEMOGLOBIN GLYCOSYLATED A1C: CPT

## 2022-03-23 PROCEDURE — 36415 COLL VENOUS BLD VENIPUNCTURE: CPT

## 2022-03-23 PROCEDURE — 80053 COMPREHEN METABOLIC PANEL: CPT

## 2022-03-23 PROCEDURE — 80061 LIPID PANEL: CPT

## 2022-03-28 ENCOUNTER — OFFICE VISIT (OUTPATIENT)
Dept: ENDOCRINOLOGY | Facility: CLINIC | Age: 82
End: 2022-03-28
Payer: MEDICARE

## 2022-03-28 VITALS
WEIGHT: 172.6 LBS | HEART RATE: 78 BPM | SYSTOLIC BLOOD PRESSURE: 160 MMHG | HEIGHT: 72 IN | BODY MASS INDEX: 23.38 KG/M2 | DIASTOLIC BLOOD PRESSURE: 74 MMHG

## 2022-03-28 DIAGNOSIS — E11.42 TYPE 2 DIABETES MELLITUS WITH DIABETIC POLYNEUROPATHY, WITHOUT LONG-TERM CURRENT USE OF INSULIN (HCC): Primary | ICD-10-CM

## 2022-03-28 DIAGNOSIS — I10 BENIGN ESSENTIAL HYPERTENSION: ICD-10-CM

## 2022-03-28 DIAGNOSIS — R79.89 ELEVATED TSH: ICD-10-CM

## 2022-03-28 PROCEDURE — 99214 OFFICE O/P EST MOD 30 MIN: CPT | Performed by: PHYSICIAN ASSISTANT

## 2022-03-28 NOTE — PATIENT INSTRUCTIONS
Glimepiride has a risk of hypoglycemia  If you would like to try a different medication below that does not have this risk, let us know  Don't skip meals, carry glucose at all times  Let us know if lows below 80 between visits  1-DPP 4 inhibitor pills-- Januvia or tradjenta  2- GLP-1 agonists (daily pill Rybelsus, weekly injection trulicity or Ozempic, daily injection victoza)  3- SGLT2 inhibitor pills (Jardiance, invokana, farxiga)      Hypoglycemia instructions   David Sims  3/28/2022  [id number]    Low Blood Sugar    Steps to treat low blood sugar  1  Test blood sugar if you have symptoms of low blood sugar:   Low Blood Sugar Symptoms:  o Sweaty  o Dizzy  o Rapid heartbeat  o Shaky    o Bad mood  o Hungry      2  Treat blood sugar less than 70 with 15 grams of fast-acting carbohydrate:   Examples of 15 grams Fast-Acting Carbohydrate:  o 4 oz juice  o 4 oz regular soda  o 3-4 glucose tablets (chew)  o 3-4 hard candies (chew)              3    Wait 15 minutes and test your blood sugar again           4   If blood sugar is less than 100, repeat steps 2-3       5  When your blood sugar is 100 or more, eat a snack if it will be longer than one hour until your next meal  The snack should be 15 grams of carbohydrate and a protein:   Examples of snacks:  o ½ sandwich  o 6 crackers with cheese  o Piece of fruit with cheese or peanut butter  o 6 crackers with peanut butter

## 2022-03-28 NOTE — ASSESSMENT & PLAN NOTE
Diabetes control is stable  He denies hypoglycemia  Discussed risks of hypoglycemia on glimepiride  Discussed changing glimepiride to another medication that does not have risk of hypoglycemia such as GLP-1 agonist, DPP4 inhibitor, or SGLT2 inhibitor  He would like to discuss this with Dr Es Walker and let us know if he would like to change meds between visits  He will continue to monitor blood sugars regularly and avoid skipping meals  If he has any blood sugars less than 80 between visits he will let us know so glimepiride dose can be reduced     Lab Results   Component Value Date    HGBA1C 7 6 (H) 03/23/2022

## 2022-03-28 NOTE — ASSESSMENT & PLAN NOTE
Not at goal today  He reports his home readings are much lower  He will be seeing Dr Autumn Valdez next week  For now, continue current regimen

## 2022-03-28 NOTE — PROGRESS NOTES
Established Patient Progress Note      Chief Complaint   Patient presents with    Diabetes Type 2        History of Present Illness:   Siri Jones is a [de-identified] y o  male with a history of type 2 diabetes without long term use of insulin since about 20 years ago  Reports complications of neuropathy and microalbuminuria  Recent urine microalbumin normal  Denies recent illness or hospitalizations  Denies recent severe hypoglycemic or severe hyperglycemic episodes  Denies any issues with his current regimen  home glucose monitoring: are performed regularly 1x per day  Eats on a regular basis, no skipped meals  Home blood glucose readings:   Before breakfast:  Usually 90s by report  Rest of day 130s if checks  Current regimen:   Glimepiride 1mg twice per day   Metformin XR 1000mg twice per day     Last Eye Exam: UTD, has yearly exam, no retinopathy  Last Foot Exam: UTD, Seeing Podiatry this week  Was on cymbalta, stopped due to side effects  (Follows with pain management due to chronic back pain/radiculopathy)    Has hypertension: Taking amlodipine and lisinopril  Home BP readings around 140/51 when last checked       Has hyperlipidemia: Taking simvastatin        Patient Active Problem List   Diagnosis    Benign essential hypertension    Benign prostatic hyperplasia with lower urinary tract symptoms    Esophageal dysphagia    Herniated lumbar intervertebral disc    Hyperlipidemia    Vasomotor rhinitis    Elevated TSH    Type 2 diabetes mellitus with diabetic polyneuropathy (HCC)    Type 2 diabetes mellitus without complication, without long-term current use of insulin ([address] )    Spinal stenosis, lumbar region, with neurogenic claudication    Radiculopathy    Chronic pain syndrome    Lumbar spondylosis    Chronic right-sided low back pain without sciatica      Past Medical History:   Diagnosis Date    Abnormal blood chemistry     Abnormal glucose     Last assessed - 2/20/14    Benign essential hypertension     Last assessed - 5/15/17    Chronic allergic rhinitis     Last assessed - 16    Dermatitis     Last assessed - 6/16/15    High risk medication use     Last assessed - 16    Sciatica     Last assessed - 12    Septic bursitis     Last assessed - 9/3/13    Tick bite     Last assessed - 16      Past Surgical History:   Procedure Laterality Date    CATARACT EXTRACTION      COLONOSCOPY      Fiberoptic    PILONIDAL CYST EXCISION        Family History   Problem Relation Age of Onset    Hypertension Mother     Osteoporosis Mother     Diabetes type I Paternal Aunt     Colon cancer Family      Social History     Tobacco Use    Smoking status: Former Smoker     Quit date:      Years since quittin 2    Smokeless tobacco: Never Used   Substance Use Topics    Alcohol use:  Yes     Alcohol/week: 1 0 standard drink     Types: 1 Cans of beer per week     Comment: social usage     No Known Allergies      Current Outpatient Medications:     amLODIPine (NORVASC) 5 mg tablet, TAKE ONE TABLET BY MOUTH EVERY DAY, Disp: 90 tablet, Rfl: 2    B Complex-Biotin-FA (B COMPLETE) TABS, Take by mouth, Disp: , Rfl:     Blood Glucose Monitoring Suppl (CONTOUR NEXT MONITOR) w/Device KIT, 1 Device by Does not apply route 2 (two) times a day, Disp: 1 kit, Rfl: 0    Coenzyme Q10 (COQ10 PO), Take by mouth, Disp: , Rfl:     Contour Next Test test strip, TEST BLOOD SUGAR TWICE DAILY, Disp: 100 each, Rfl: 2    docusate sodium (COLACE) 100 mg capsule, Take 1 capsule by mouth as needed, Disp: , Rfl:     fexofenadine (ALLEGRA) 180 MG tablet, Take by mouth as needed  , Disp: , Rfl:     fluticasone (FLONASE) 50 mcg/act nasal spray, APPLY ONE SPRAY IN EACH NOSTRIL TWICE A DAY (Patient taking differently: as needed  ), Disp: 1 Bottle, Rfl: 1    glimepiride (AMARYL) 1 mg tablet, Take 1 tablet (1 mg total) by mouth 2 (two) times a day, Disp: 180 tablet, Rfl: 2    ipratropium (ATROVENT) 0 06 % nasal spray, USE 1 SPRAY INTO EACH NOSTRIL THREE TIMES A DAY (Patient taking differently: as needed  ), Disp: 15 mL, Rfl: 3    lisinopril (ZESTRIL) 20 mg tablet, TAKE ONE TABLET BY MOUTH EVERY DAY, Disp: 90 tablet, Rfl: 3    metFORMIN (GLUCOPHAGE-XR) 500 mg 24 hr tablet, TAKE TWO TABLETS BY MOUTH TWICE A DAY WITH MEALS, Disp: 360 tablet, Rfl: 0    Microlet Lancets MISC, Test blood sugar  2 x daily, Disp: 100 each, Rfl: 3    Multiple Vitamin (multivitamin) tablet, Take 1 tablet by mouth daily, Disp: , Rfl:     simvastatin (ZOCOR) 40 mg tablet, TAKE 1 TABLET BY MOUTH DAILY, Disp: 90 tablet, Rfl: 0    Specialty Vitamins Products (MAGNESIUM, AMINO ACID CHELATE,) 133 MG tablet, Take 1 tablet by mouth 2 (two) times a day, Disp: , Rfl:     Thiamine 50 MG CAPS, Take 1 capsule by mouth daily, Disp: , Rfl:     Review of Systems   Constitutional: Negative for activity change, appetite change and fatigue  HENT: Negative for sore throat, trouble swallowing and voice change  Eyes: Negative for visual disturbance  Respiratory: Negative for choking, chest tightness and shortness of breath  Cardiovascular: Negative for chest pain, palpitations and leg swelling  Gastrointestinal: Negative for abdominal pain, constipation and diarrhea  Endocrine: Negative for cold intolerance, heat intolerance, polydipsia, polyphagia and polyuria  Genitourinary: Negative for frequency  Musculoskeletal: Positive for back pain  Negative for arthralgias and myalgias  Skin: Negative for rash  Neurological: Positive for numbness  Negative for dizziness and syncope  Hematological: Negative for adenopathy  Psychiatric/Behavioral: Negative for sleep disturbance  All other systems reviewed and are negative  Physical Exam:  Body mass index is 23 41 kg/m²    /74   Pulse 78   Ht 6' (1 829 m)   Wt 78 3 kg (172 lb 9 6 oz)   BMI 23 41 kg/m²    Wt Readings from Last 3 Encounters:   03/28/22 78 3 kg (172 lb 9 6 oz) 01/24/22 78 5 kg (173 lb)   11/23/21 76 7 kg (169 lb)       Physical Exam  Vitals reviewed  Constitutional:       General: He is not in acute distress  Appearance: He is well-developed  HENT:      Head: Normocephalic and atraumatic  Eyes:      Conjunctiva/sclera: Conjunctivae normal       Pupils: Pupils are equal, round, and reactive to light  Neck:      Thyroid: No thyromegaly  Cardiovascular:      Rate and Rhythm: Normal rate and regular rhythm  Heart sounds: Normal heart sounds  No murmur heard  Pulmonary:      Effort: Pulmonary effort is normal  No respiratory distress  Breath sounds: Normal breath sounds  No wheezing or rales  Abdominal:      General: Bowel sounds are normal  There is no distension  Palpations: Abdomen is soft  Tenderness: There is no abdominal tenderness  Musculoskeletal:         General: Normal range of motion  Cervical back: Normal range of motion and neck supple  Lymphadenopathy:      Cervical: No cervical adenopathy  Skin:     General: Skin is warm and dry  Neurological:      Mental Status: He is alert and oriented to person, place, and time         Labs:   Lab Results   Component Value Date    HGBA1C 7 6 (H) 03/23/2022    HGBA1C 7 2 (H) 10/28/2021    HGBA1C 7 2 (H) 04/21/2021     Lab Results   Component Value Date    CREATININE 0 92 03/23/2022    CREATININE 1 04 10/28/2021    CREATININE 0 92 04/21/2021    BUN 16 03/23/2022     10/20/2015    K 4 4 03/23/2022     03/23/2022    CO2 27 03/23/2022     eGFR   Date Value Ref Range Status   03/23/2022 78 ml/min/1 73sq m Final     Lab Results   Component Value Date    CHOL 114 04/23/2015    HDL 37 (L) 03/23/2022    TRIG 86 03/23/2022     Lab Results   Component Value Date    ALT 28 03/23/2022    AST 25 03/23/2022    ALKPHOS 89 03/23/2022    BILITOT 0 54 04/23/2015     Lab Results   Component Value Date    LZH3AGSEMGDE 3 700 10/28/2021    ZVP5HTUPOYWE 3 960 (H) 02/20/2020 UCV2SDXKPHXZ 3 700 10/23/2019     Lab Results   Component Value Date    FREET4 1 17 10/28/2021       Impression & Plan:    Problem List Items Addressed This Visit        Endocrine    Type 2 diabetes mellitus with diabetic polyneuropathy ([facility] [address] ) - Primary     Diabetes control is stable  He denies hypoglycemia  Discussed risks of hypoglycemia on glimepiride  Discussed changing glimepiride to another medication that does not have risk of hypoglycemia such as GLP-1 agonist, DPP4 inhibitor, or SGLT2 inhibitor  He would like to discuss this with Dr Skye Quispe and let us know if he would like to change meds between visits  He will continue to monitor blood sugars regularly and avoid skipping meals  If he has any blood sugars less than 80 between visits he will let us know so glimepiride dose can be reduced  Lab Results   Component Value Date    HGBA1C 7 6 (H) 03/23/2022            Relevant Orders    Hemoglobin F2B    Basic metabolic panel       Cardiovascular and Mediastinum    Benign essential hypertension     Not at goal today  He reports his home readings are much lower  He will be seeing Dr Skye Quispe next week  For now, continue current regimen  Other    Elevated TSH     Most recent TSH has normalized  Orders Placed This Encounter   Procedures    Hemoglobin A1C     Standing Status:   Future     Standing Expiration Date:   3/28/2023    Basic metabolic panel     This is a patient instruction: Patient fasting for 8 hours or longer recommended  Standing Status:   Future     Standing Expiration Date:   3/28/2023       Patient Instructions   Glimepiride has a risk of hypoglycemia  If you would like to try a different medication below that does not have this risk, let us know  Don't skip meals, carry glucose at all times  Let us know if lows below 80 between visits       1-DPP 4 inhibitor pills-- Januvia or tradjenta  2- GLP-1 agonists (daily pill Rybelsus, weekly injection trulicity or Ozempic, daily injection victoza)  3- SGLT2 inhibitor pills (Jardiance, invokana, farxiga)      Hypoglycemia instructions   Debbie Lopez  3/28/2022  [id number]    Low Blood Sugar    Steps to treat low blood sugar  1  Test blood sugar if you have symptoms of low blood sugar:   Low Blood Sugar Symptoms:  o Sweaty  o Dizzy  o Rapid heartbeat  o Shaky    o Bad mood  o Hungry      2  Treat blood sugar less than 70 with 15 grams of fast-acting carbohydrate:   Examples of 15 grams Fast-Acting Carbohydrate:  o 4 oz juice  o 4 oz regular soda  o 3-4 glucose tablets (chew)  o 3-4 hard candies (chew)              3    Wait 15 minutes and test your blood sugar again           4  If blood sugar is less than 100, repeat steps 2-3       5  When your blood sugar is 100 or more, eat a snack if it will be longer than one hour until your next meal  The snack should be 15 grams of carbohydrate and a protein:   Examples of snacks:  o ½ sandwich  o 6 crackers with cheese  o Piece of fruit with cheese or peanut butter  o 6 crackers with peanut butter          Discussed with the patient and all questioned fully answered  He will call me if any problems arise  Follow-up appointment in 4 months       Counseled patient on diagnostic results, prognosis, risk and benefit of treatment options, instruction for management, importance of treatment compliance, Risk  factor reduction and impressions    Lisa Cobb PA-C

## 2022-04-05 ENCOUNTER — OFFICE VISIT (OUTPATIENT)
Dept: FAMILY MEDICINE CLINIC | Facility: CLINIC | Age: 82
End: 2022-04-05
Payer: MEDICARE

## 2022-04-05 VITALS
BODY MASS INDEX: 22.89 KG/M2 | HEIGHT: 72 IN | SYSTOLIC BLOOD PRESSURE: 162 MMHG | WEIGHT: 169 LBS | DIASTOLIC BLOOD PRESSURE: 72 MMHG | TEMPERATURE: 97.4 F

## 2022-04-05 DIAGNOSIS — Z00.00 MEDICARE ANNUAL WELLNESS VISIT, SUBSEQUENT: Primary | ICD-10-CM

## 2022-04-05 DIAGNOSIS — I10 ESSENTIAL HYPERTENSION: ICD-10-CM

## 2022-04-05 PROCEDURE — 1123F ACP DISCUSS/DSCN MKR DOCD: CPT | Performed by: FAMILY MEDICINE

## 2022-04-05 PROCEDURE — G0439 PPPS, SUBSEQ VISIT: HCPCS | Performed by: FAMILY MEDICINE

## 2022-04-05 RX ORDER — HYDROCHLOROTHIAZIDE 12.5 MG/1
CAPSULE, GELATIN COATED ORAL
Qty: 45 CAPSULE | Refills: 2 | Status: SHIPPED | OUTPATIENT
Start: 2022-04-05

## 2022-04-05 NOTE — PROGRESS NOTES
Assessment and Plan:     Problem List Items Addressed This Visit     None          Depression Screening and Follow-up Plan: Patient was screened for depression during today's encounter  They screened negative with a PHQ-2 score of 0  Preventive health issues were discussed with patient, and age appropriate screening tests were ordered as noted in patient's After Visit Summary  Personalized health advice and appropriate referrals for health education or preventive services given if needed, as noted in patient's After Visit Summary  History of Present Illness:     Patient presents for Welcome to Medicare visit  Patient Care Team:  Maggie Cannon DO as PCP - General  DO David Dempsey MD Arland Havens, MD (Endocrinology)     Review of Systems:     Review of Systems   Constitutional: Positive for fatigue  Negative for activity change, appetite change, diaphoresis and fever  HENT: Positive for hearing loss  Negative for dental problem  Eyes: Positive for visual disturbance  Patient will be going to his ophthalmologist in November   Respiratory: Negative for apnea, cough, chest tightness, shortness of breath and wheezing  Cardiovascular: Negative for chest pain, palpitations and leg swelling  Gastrointestinal: Negative for abdominal distention, abdominal pain, anal bleeding, constipation, diarrhea, nausea and vomiting  Endocrine: Negative for cold intolerance, heat intolerance, polydipsia, polyphagia and polyuria  Genitourinary: Negative for difficulty urinating, dysuria, flank pain, hematuria and urgency  Musculoskeletal: Positive for arthralgias and back pain  Negative for gait problem, joint swelling and myalgias  Skin: Negative for color change, rash and wound  Allergic/Immunologic: Negative for environmental allergies, food allergies and immunocompromised state  Neurological: Positive for numbness   Negative for dizziness, seizures, syncope, speech difficulty and headaches  Neuropathy both feet   Hematological: Negative for adenopathy  Does not bruise/bleed easily  Psychiatric/Behavioral: Negative for agitation, behavioral problems, hallucinations, sleep disturbance and suicidal ideas        Problem List:     Patient Active Problem List   Diagnosis    Benign essential hypertension    Benign prostatic hyperplasia with lower urinary tract symptoms    Esophageal dysphagia    Herniated lumbar intervertebral disc    Hyperlipidemia    Vasomotor rhinitis    Elevated TSH    Type 2 diabetes mellitus with diabetic polyneuropathy (HCC)    Type 2 diabetes mellitus without complication, without long-term current use of insulin ([facility] [address] )    Spinal stenosis, lumbar region, with neurogenic claudication    Radiculopathy    Chronic pain syndrome    Lumbar spondylosis    Chronic right-sided low back pain without sciatica      Past Medical and Surgical History:     Past Medical History:   Diagnosis Date    Abnormal blood chemistry     Abnormal glucose     Last assessed - 2/20/14    Benign essential hypertension     Last assessed - 5/15/17    Chronic allergic rhinitis     Last assessed - 8/1/16    Dermatitis     Last assessed - 6/16/15    High risk medication use     Last assessed - 5/2/16    Sciatica     Last assessed - 12/18/12    Septic bursitis     Last assessed - 9/3/13    Tick bite     Last assessed - 8/8/16     Past Surgical History:   Procedure Laterality Date    CATARACT EXTRACTION      COLONOSCOPY      Fiberoptic    PILONIDAL CYST EXCISION        Family History:     Family History   Problem Relation Age of Onset    Hypertension Mother     Osteoporosis Mother     Diabetes type I Paternal Aunt     Colon cancer Family       Social History:     Social History     Socioeconomic History    Marital status: /Civil Union     Spouse name: None    Number of children: None    Years of education: None    Highest education level: None Occupational History    None   Tobacco Use    Smoking status: Former Smoker     Quit date:      Years since quittin 2    Smokeless tobacco: Never Used   Substance and Sexual Activity    Alcohol use:  Yes     Alcohol/week: 1 0 standard drink     Types: 1 Cans of beer per week     Comment: social usage    Drug use: Never    Sexual activity: None   Other Topics Concern    None   Social History Narrative    Advance directive on file     Social Determinants of Health     Financial Resource Strain: Not on file   Food Insecurity: Not on file   Transportation Needs: Not on file   Physical Activity: Not on file   Stress: Not on file   Social Connections: Not on file   Intimate Partner Violence: Not on file   Housing Stability: Not on file      Medications and Allergies:     Current Outpatient Medications   Medication Sig Dispense Refill    amLODIPine (NORVASC) 5 mg tablet TAKE ONE TABLET BY MOUTH EVERY DAY 90 tablet 2    B Complex-Biotin-FA (B COMPLETE) TABS Take by mouth      Blood Glucose Monitoring Suppl (CONTOUR NEXT MONITOR) w/Device KIT 1 Device by Does not apply route 2 (two) times a day 1 kit 0    Coenzyme Q10 (COQ10 PO) Take by mouth      Contour Next Test test strip TEST BLOOD SUGAR TWICE DAILY (Patient taking differently: 1 each in the morning  ) 100 each 2    docusate sodium (COLACE) 100 mg capsule Take 1 capsule by mouth as needed      fexofenadine (ALLEGRA) 180 MG tablet Take by mouth as needed        fluticasone (FLONASE) 50 mcg/act nasal spray APPLY ONE SPRAY IN EACH NOSTRIL TWICE A DAY (Patient taking differently: as needed  ) 1 Bottle 1    glimepiride (AMARYL) 1 mg tablet Take 1 tablet (1 mg total) by mouth 2 (two) times a day 180 tablet 2    ipratropium (ATROVENT) 0 06 % nasal spray USE 1 SPRAY INTO EACH NOSTRIL THREE TIMES A DAY (Patient taking differently: as needed  ) 15 mL 3    lisinopril (ZESTRIL) 20 mg tablet TAKE ONE TABLET BY MOUTH EVERY DAY 90 tablet 3    metFORMIN (GLUCOPHAGE-XR) 500 mg 24 hr tablet TAKE TWO TABLETS BY MOUTH TWICE A DAY WITH MEALS 360 tablet 0    Microlet Lancets MISC Test blood sugar  2 x daily (Patient taking differently: 1 each in the morning  ) 100 each 3    Multiple Vitamin (multivitamin) tablet Take 1 tablet by mouth daily      simvastatin (ZOCOR) 40 mg tablet TAKE 1 TABLET BY MOUTH DAILY 90 tablet 0    Specialty Vitamins Products (MAGNESIUM, AMINO ACID CHELATE,) 133 MG tablet Take 1 tablet by mouth 2 (two) times a day      Thiamine 50 MG CAPS Take 1 capsule by mouth daily       No current facility-administered medications for this visit  No Known Allergies   Immunizations:     Immunization History   Administered Date(s) Administered    COVID-19 MODERNA VACC 0 5 ML IM 01/28/2021, 02/26/2021    Influenza Quadrivalent Preservative Free 3 years and older IM 09/11/2014    Influenza Split High Dose Preservative Free IM 09/13/2012, 09/03/2013, 10/20/2015, 10/06/2016, 10/12/2017    Influenza, high dose seasonal 0 7 mL 10/15/2018, 10/08/2019, 10/13/2020, 10/20/2021    Influenza, seasonal, injectable 1940    Pneumococcal Conjugate 13-Valent 11/10/2015    Pneumococcal Polysaccharide PPV23 10/01/2002    Tdap 11/02/2010    Zoster 1940      Health Maintenance: There are no preventive care reminders to display for this patient  Topic Date Due    DTaP,Tdap,and Td Vaccines (2 - Td or Tdap) 11/02/2020    Pneumococcal Vaccine: 65+ Years (2 of 2 - PPSV23) 11/10/2020    COVID-19 Vaccine (3 - Booster for Moderna series) 07/26/2021      Medicare Screening Tests and Risk Assessments:     Edward Burgos is here for his Subsequent Wellness visit  Health Risk Assessment:   Patient rates overall health as good  Patient feels that their physical health rating is same  Patient is satisfied with their life  Eyesight was rated as same  Hearing was rated as same  Patient feels that their emotional and mental health rating is same   Patients states they are never, rarely angry  Patient states they are never, rarely unusually tired/fatigued  Pain experienced in the last 7 days has been some  Patient's pain rating has been 4/10  Patient states that he has experienced no weight loss or gain in last 6 months  Depression Screening:   PHQ-2 Score: 0      Fall Risk Screening: In the past year, patient has experienced: no history of falling in past year      Home Safety:  Patient has trouble with stairs inside or outside of their home  Patient has working smoke alarms and has no working carbon monoxide detector  Home safety hazards include: none  Nutrition:   Current diet is Diabetic and Limited junk food  Medications:   Patient is currently taking over-the-counter supplements  OTC medications include: see medication list  Patient is able to manage medications  Activities of Daily Living (ADLs)/Instrumental Activities of Daily Living (IADLs):   Walk and transfer into and out of bed and chair?: Yes  Dress and groom yourself?: Yes    Bathe or shower yourself?: Yes    Feed yourself? Yes  Do your laundry/housekeeping?: Yes  Manage your money, pay your bills and track your expenses?: Yes  Make your own meals?: Yes    Do your own shopping?: Yes    Previous Hospitalizations:   Any hospitalizations or ED visits within the last 12 months?: No      Advance Care Planning:   Living will: Yes    Durable POA for healthcare:  Yes    Advanced directive: Yes    Advanced directive counseling given: Yes    Five wishes given: No    Patient declined ACP directive: No    End of Life Decisions reviewed with patient: Yes    Provider agrees with end of life decisions: Yes      Cognitive Screening:   Provider or family/friend/caregiver concerned regarding cognition?: No    PREVENTIVE SCREENINGS      Cardiovascular Screening:    General: Screening Not Indicated and History Lipid Disorder      Diabetes Screening:     General: Screening Not Indicated and History Diabetes Prostate Cancer Screening:    General: Screening Not Indicated      Abdominal Aortic Aneurysm (AAA) Screening:    Risk factors include: tobacco use        Lung Cancer Screening:     General: Screening Not Indicated    Screening, Brief Intervention, and Referral to Treatment (SBIRT)    Screening  Typical number of drinks in a day: 1  Typical number of drinks in a week: 6  Interpretation: Low risk drinking behavior  Single Item Drug Screening:  How often have you used an illegal drug (including marijuana) or a prescription medication for non-medical reasons in the past year? never    Single Item Drug Screen Score: 0  Interpretation: Negative screen for possible drug use disorder    No exam data present     Physical Exam:     /72 (BP Location: Left arm, Patient Position: Sitting, Cuff Size: Standard)   Temp (!) 97 4 °F (36 3 °C) (Temporal)   Ht 6' (1 829 m)   Wt 76 7 kg (169 lb)   BMI 22 92 kg/m²     Physical Exam  Constitutional:       Appearance: He is well-developed  HENT:      Head: Normocephalic and atraumatic  Right Ear: External ear normal       Left Ear: External ear normal       Nose: Nose normal    Eyes:      Conjunctiva/sclera: Conjunctivae normal       Pupils: Pupils are equal, round, and reactive to light  Cardiovascular:      Rate and Rhythm: Normal rate and regular rhythm  Heart sounds: Normal heart sounds  No murmur heard  No friction rub  Pulmonary:      Effort: Pulmonary effort is normal  No respiratory distress  Breath sounds: Normal breath sounds  No wheezing or rales  Chest:      Chest wall: No tenderness  Abdominal:      General: Bowel sounds are normal       Palpations: Abdomen is soft  Musculoskeletal:         General: Normal range of motion  Cervical back: Normal range of motion and neck supple  Skin:     General: Skin is warm and dry  Capillary Refill: Capillary refill takes 2 to 3 seconds     Neurological:      Mental Status: He is alert and oriented to person, place, and time  Psychiatric:         Behavior: Behavior normal          Thought Content:  Thought content normal          Judgment: Judgment normal           Riana Hunter DO

## 2022-05-12 DIAGNOSIS — E78.2 COMBINED HYPERLIPIDEMIA: ICD-10-CM

## 2022-05-12 DIAGNOSIS — E11.9 TYPE 2 DIABETES MELLITUS WITHOUT COMPLICATION, WITHOUT LONG-TERM CURRENT USE OF INSULIN (HCC): ICD-10-CM

## 2022-05-12 RX ORDER — GLIMEPIRIDE 1 MG/1
TABLET ORAL
Qty: 180 TABLET | Refills: 2 | Status: SHIPPED | OUTPATIENT
Start: 2022-05-12 | End: 2022-07-28 | Stop reason: ALTCHOICE

## 2022-05-12 RX ORDER — SIMVASTATIN 40 MG
TABLET ORAL
Qty: 90 TABLET | Refills: 0 | Status: SHIPPED | OUTPATIENT
Start: 2022-05-12 | End: 2022-08-10

## 2022-05-20 DIAGNOSIS — E11.42 TYPE 2 DIABETES MELLITUS WITH DIABETIC POLYNEUROPATHY, WITHOUT LONG-TERM CURRENT USE OF INSULIN (HCC): ICD-10-CM

## 2022-05-20 RX ORDER — METFORMIN HYDROCHLORIDE 500 MG/1
1000 TABLET, EXTENDED RELEASE ORAL 2 TIMES DAILY WITH MEALS
Qty: 360 TABLET | Refills: 1 | Status: SHIPPED | OUTPATIENT
Start: 2022-05-20

## 2022-05-27 DIAGNOSIS — J30.0 VASOMOTOR RHINITIS: ICD-10-CM

## 2022-05-27 RX ORDER — IPRATROPIUM BROMIDE 42 UG/1
1 SPRAY, METERED NASAL AS NEEDED
Qty: 15 ML | Refills: 2 | Status: SHIPPED | OUTPATIENT
Start: 2022-05-27

## 2022-07-07 ENCOUNTER — APPOINTMENT (OUTPATIENT)
Dept: LAB | Facility: CLINIC | Age: 82
End: 2022-07-07
Payer: MEDICARE

## 2022-07-07 DIAGNOSIS — E11.42 TYPE 2 DIABETES MELLITUS WITH DIABETIC POLYNEUROPATHY, WITHOUT LONG-TERM CURRENT USE OF INSULIN (HCC): ICD-10-CM

## 2022-07-07 LAB
ANION GAP SERPL CALCULATED.3IONS-SCNC: 5 MMOL/L (ref 4–13)
BUN SERPL-MCNC: 20 MG/DL (ref 5–25)
CALCIUM SERPL-MCNC: 9.8 MG/DL (ref 8.3–10.1)
CHLORIDE SERPL-SCNC: 101 MMOL/L (ref 100–108)
CO2 SERPL-SCNC: 27 MMOL/L (ref 21–32)
CREAT SERPL-MCNC: 1.06 MG/DL (ref 0.6–1.3)
EST. AVERAGE GLUCOSE BLD GHB EST-MCNC: 157 MG/DL
GFR SERPL CREATININE-BSD FRML MDRD: 65 ML/MIN/1.73SQ M
GLUCOSE P FAST SERPL-MCNC: 92 MG/DL (ref 65–99)
HBA1C MFR BLD: 7.1 %
POTASSIUM SERPL-SCNC: 4.8 MMOL/L (ref 3.5–5.3)
SODIUM SERPL-SCNC: 133 MMOL/L (ref 136–145)

## 2022-07-07 PROCEDURE — 80048 BASIC METABOLIC PNL TOTAL CA: CPT

## 2022-07-07 PROCEDURE — 36415 COLL VENOUS BLD VENIPUNCTURE: CPT

## 2022-07-07 PROCEDURE — 83036 HEMOGLOBIN GLYCOSYLATED A1C: CPT

## 2022-07-08 ENCOUNTER — RA CDI HCC (OUTPATIENT)
Dept: OTHER | Facility: HOSPITAL | Age: 82
End: 2022-07-08

## 2022-07-08 NOTE — PROGRESS NOTES
[id number]  [facility]  coding opportunities          Chart Reviewed number of suggestions sent to Provider: 1     Patients Insurance     Medicare Insurance: [employer]

## 2022-07-15 DIAGNOSIS — E11.9 TYPE 2 DIABETES MELLITUS WITHOUT COMPLICATION, WITHOUT LONG-TERM CURRENT USE OF INSULIN (HCC): ICD-10-CM

## 2022-07-15 RX ORDER — PERPHENAZINE 16 MG/1
1 TABLET, FILM COATED ORAL DAILY
Qty: 50 STRIP | Refills: 6 | Status: SHIPPED | OUTPATIENT
Start: 2022-07-15

## 2022-07-28 ENCOUNTER — OFFICE VISIT (OUTPATIENT)
Dept: ENDOCRINOLOGY | Facility: CLINIC | Age: 82
End: 2022-07-28
Payer: MEDICARE

## 2022-07-28 VITALS
DIASTOLIC BLOOD PRESSURE: 60 MMHG | HEART RATE: 70 BPM | HEIGHT: 72 IN | BODY MASS INDEX: 21.98 KG/M2 | SYSTOLIC BLOOD PRESSURE: 140 MMHG | WEIGHT: 162.3 LBS

## 2022-07-28 DIAGNOSIS — E11.42 TYPE 2 DIABETES MELLITUS WITH DIABETIC POLYNEUROPATHY, WITHOUT LONG-TERM CURRENT USE OF INSULIN (HCC): Primary | ICD-10-CM

## 2022-07-28 DIAGNOSIS — E78.2 MIXED HYPERLIPIDEMIA: ICD-10-CM

## 2022-07-28 PROCEDURE — 99214 OFFICE O/P EST MOD 30 MIN: CPT | Performed by: PHYSICIAN ASSISTANT

## 2022-07-28 NOTE — PROGRESS NOTES
Established Patient Progress Note      Chief Complaint   Patient presents with    Diabetes Type 2        History of Present Illness:   Geo Mcdonald is a [de-identified] y o  male with a history of type 2 diabetes without long term use of insulin since 20 years ago  Reports complications of neuropathy and microalbuminuria  Denies recent illness or hospitalizations  Denies recent severe hypoglycemic or severe hyperglycemic episodes  Denies any issues with his current regimen  home glucose monitoring: are performed sporadically less than 1x per day and average glucose is 84 over the past 2 weeks with readings ranging from 75 to 90  Claude Machado Recently has been very active outside [employer]  Cost of Yoandy Pruitt was too high many years ago  Current regimen:   Metformin ER 500mg-- 2 tabs twice per day (Has been cutting back to 1 in the mornings since blood sugars are running lower)  Glimepiride 1mg twice per day     Last Eye Exam: UTD, yearly in December     Last Foot Exam: UTD, seeing podiatry in august    Has hypertension: Taking HCTZ and lisinopril and amlodipine  Has hyperlipidemia: Taking simvastatin    Thyroid disorders: hx mild TSH elevation, improved     Patient Active Problem List   Diagnosis    Benign essential hypertension    Benign prostatic hyperplasia with lower urinary tract symptoms    Esophageal dysphagia    Herniated lumbar intervertebral disc    Hyperlipidemia    Vasomotor rhinitis    Elevated TSH    Type 2 diabetes mellitus with diabetic polyneuropathy (HCC)    Type 2 diabetes mellitus without complication, without long-term current use of insulin ([address] )    Spinal stenosis, lumbar region, with neurogenic claudication    Radiculopathy    Chronic pain syndrome    Lumbar spondylosis    Chronic right-sided low back pain without sciatica      Past Medical History:   Diagnosis Date    Abnormal blood chemistry     Abnormal glucose     Last assessed - 2/20/14    Benign essential hypertension     Last assessed - 5/15/17    Chronic allergic rhinitis     Last assessed - 16    Dermatitis     Last assessed - 6/16/15    High risk medication use     Last assessed - 16    Sciatica     Last assessed - 12    Septic bursitis     Last assessed - 9/3/13    Tick bite     Last assessed - 16      Past Surgical History:   Procedure Laterality Date    CATARACT EXTRACTION      COLONOSCOPY      Fiberoptic    PILONIDAL CYST EXCISION        Family History   Problem Relation Age of Onset    Hypertension Mother     Osteoporosis Mother     Diabetes type I Paternal Aunt     Colon cancer Family      Social History     Tobacco Use    Smoking status: Former Smoker     Quit date:      Years since quittin 6    Smokeless tobacco: Never Used   Substance Use Topics    Alcohol use: Yes     Alcohol/week: 1 0 standard drink     Types: 1 Cans of beer per week     Comment: social usage     No Known Allergies      Current Outpatient Medications:     amLODIPine (NORVASC) 5 mg tablet, TAKE ONE TABLET BY MOUTH EVERY DAY, Disp: 90 tablet, Rfl: 2    B Complex-Biotin-FA (B COMPLETE) TABS, Take by mouth in the morning, Disp: , Rfl:     Blood Glucose Monitoring Suppl (CONTOUR NEXT MONITOR) w/Device KIT, 1 Device by Does not apply route 2 (two) times a day, Disp: 1 kit, Rfl: 0    Coenzyme Q10 (COQ10 PO), Take by mouth in the morning, Disp: , Rfl:     glucose blood (Contour Next Test) test strip, Use 1 each in the morning, Disp: 50 strip, Rfl: 6    hydrochlorothiazide (MICROZIDE) 12 5 mg capsule, 1 capsule in a m   Even days of the month, Disp: 45 capsule, Rfl: 2    ipratropium (ATROVENT) 0 06 % nasal spray, 1 spray into each nostril as needed for rhinitis, Disp: 15 mL, Rfl: 2    lisinopril (ZESTRIL) 20 mg tablet, TAKE ONE TABLET BY MOUTH EVERY DAY, Disp: 90 tablet, Rfl: 3    metFORMIN (GLUCOPHAGE-XR) 500 mg 24 hr tablet, Take 2 tablets (1,000 mg total) by mouth in the morning and 2 tablets (1,000 mg total) in the evening  Take with meals  , Disp: 360 tablet, Rfl: 1    Microlet Lancets MISC, Test blood sugar  2 x daily (Patient taking differently: 1 each in the morning), Disp: 100 each, Rfl: 3    Multiple Vitamin (multivitamin) tablet, Take 1 tablet by mouth daily, Disp: , Rfl:     simvastatin (ZOCOR) 40 mg tablet, TAKE ONE TABLET BY MOUTH EVERY DAY, Disp: 90 tablet, Rfl: 0    Specialty Vitamins Products (MAGNESIUM, AMINO ACID CHELATE,) 133 MG tablet, Take 1 tablet by mouth in the morning, Disp: , Rfl:     Thiamine 50 MG CAPS, Take 1 capsule by mouth daily, Disp: , Rfl:     docusate sodium (COLACE) 100 mg capsule, Take 1 capsule by mouth as needed, Disp: , Rfl:     fexofenadine (ALLEGRA) 180 MG tablet, Take by mouth as needed   (Patient not taking: Reported on 7/28/2022), Disp: , Rfl:     fluticasone (FLONASE) 50 mcg/act nasal spray, APPLY ONE SPRAY IN EACH NOSTRIL TWICE A DAY (Patient not taking: Reported on 7/28/2022), Disp: 1 Bottle, Rfl: 1    Review of Systems   Constitutional: Negative for activity change, appetite change and fatigue  HENT: Negative for sore throat, trouble swallowing and voice change  Eyes: Negative for visual disturbance  Respiratory: Negative for choking, chest tightness and shortness of breath  Cardiovascular: Negative for chest pain, palpitations and leg swelling  Gastrointestinal: Negative for abdominal pain, constipation and diarrhea  Endocrine: Negative for cold intolerance, heat intolerance, polydipsia, polyphagia and polyuria  Genitourinary: Negative for frequency  Musculoskeletal: Negative for arthralgias and myalgias  Skin: Negative for rash  Neurological: Negative for dizziness and syncope  Hematological: Negative for adenopathy  Psychiatric/Behavioral: Negative for sleep disturbance  All other systems reviewed and are negative  Physical Exam:  Body mass index is 22 01 kg/m²    /60   Pulse 70   Ht 6' (1 829 m)   Wt 73 6 kg (162 lb 4 8 oz) BMI 22 01 kg/m²    Wt Readings from Last 3 Encounters:   07/28/22 73 6 kg (162 lb 4 8 oz)   04/05/22 76 7 kg (169 lb)   03/28/22 78 3 kg (172 lb 9 6 oz)       Physical Exam  Vitals reviewed  Constitutional:       General: He is not in acute distress  Appearance: He is well-developed  HENT:      Head: Normocephalic and atraumatic  Eyes:      Conjunctiva/sclera: Conjunctivae normal       Pupils: Pupils are equal, round, and reactive to light  Neck:      Thyroid: No thyromegaly  Cardiovascular:      Rate and Rhythm: Normal rate and regular rhythm  Heart sounds: Normal heart sounds  No murmur heard  Pulmonary:      Effort: Pulmonary effort is normal  No respiratory distress  Breath sounds: Normal breath sounds  No wheezing or rales  Abdominal:      General: Bowel sounds are normal  There is no distension  Palpations: Abdomen is soft  Tenderness: There is no abdominal tenderness  Musculoskeletal:         General: Normal range of motion  Cervical back: Normal range of motion and neck supple  Lymphadenopathy:      Cervical: No cervical adenopathy  Skin:     General: Skin is warm and dry  Neurological:      Mental Status: He is alert and oriented to person, place, and time             Labs:   Component      Latest Ref Rng & Units 10/28/2021 10/28/2021           9:27 AM  9:27 AM   Sodium      136 - 145 mmol/L 132 (L)    Potassium      3 5 - 5 3 mmol/L 4 7    Chloride      100 - 108 mmol/L 99 (L)    CO2      21 - 32 mmol/L 29    Anion Gap      4 - 13 mmol/L 4    BUN      5 - 25 mg/dL 17    Creatinine      0 60 - 1 30 mg/dL 1 04    GLUCOSE FASTING      65 - 99 mg/dL 89    Calcium      8 3 - 10 1 mg/dL 9 7    AST      5 - 45 U/L 28    ALT      12 - 78 U/L 37    Alkaline Phosphatase      46 - 116 U/L 97    Total Protein      6 4 - 8 2 g/dL 7 1    Albumin      3 5 - 5 0 g/dL 4 1    TOTAL BILIRUBIN      0 20 - 1 00 mg/dL 0 42    eGFR      ml/min/1 73sq m 68    Cholesterol      See Comment mg/dL     Triglycerides      See Comment mg/dL     HDL      >=40 mg/dL     LDL Calculated      0 - 100 mg/dL     EXT Creatinine Urine      mg/dL     MICROALBUM ,U,RANDOM      0 0 - 20 0 mg/L     MICROALBUMIN/CREATININE RATIO      0 - 30 mg/g creatinine     Hemoglobin A1C      Normal 3 8-5 6%; PreDiabetic 5 7-6 4%; Diabetic >=6 5%; Glycemic control for adults with diabetes <7 0% %     eAG, EST AVG Glucose      mg/dl     Free T4      0 76 - 1 46 ng/dL  1 17   TSH 3RD GENERATON      0 358 - 3 740 uIU/mL       Component      Latest Ref Rng & Units 10/28/2021 10/28/2021           9:27 AM  9:27 AM   Sodium      136 - 145 mmol/L     Potassium      3 5 - 5 3 mmol/L     Chloride      100 - 108 mmol/L     CO2      21 - 32 mmol/L     Anion Gap      4 - 13 mmol/L     BUN      5 - 25 mg/dL     Creatinine      0 60 - 1 30 mg/dL     GLUCOSE FASTING      65 - 99 mg/dL     Calcium      8 3 - 10 1 mg/dL     AST      5 - 45 U/L     ALT      12 - 78 U/L     Alkaline Phosphatase      46 - 116 U/L     Total Protein      6 4 - 8 2 g/dL     Albumin      3 5 - 5 0 g/dL     TOTAL BILIRUBIN      0 20 - 1 00 mg/dL     eGFR      ml/min/1 73sq m     Cholesterol      See Comment mg/dL     Triglycerides      See Comment mg/dL     HDL      >=40 mg/dL     LDL Calculated      0 - 100 mg/dL     EXT Creatinine Urine      mg/dL     MICROALBUM ,U,RANDOM      0 0 - 20 0 mg/L     MICROALBUMIN/CREATININE RATIO      0 - 30 mg/g creatinine     Hemoglobin A1C      Normal 3 8-5 6%; PreDiabetic 5 7-6 4%;  Diabetic >=6 5%; Glycemic control for adults with diabetes <7 0% %  7 2 (H)   eAG, EST AVG Glucose      mg/dl  160   Free T4      0 76 - 1 46 ng/dL     TSH 3RD GENERATON      0 358 - 3 740 uIU/mL 3 700      Component      Latest Ref Rng & Units 10/28/2021 3/23/2022 3/23/2022          11:20 AM  9:34 AM  9:34 AM   Sodium      136 - 145 mmol/L  138    Potassium      3 5 - 5 3 mmol/L  4 4    Chloride      100 - 108 mmol/L  107    CO2      21 - 32 mmol/L  27 Anion Gap      4 - 13 mmol/L  4    BUN      5 - 25 mg/dL  16    Creatinine      0 60 - 1 30 mg/dL  0 92    GLUCOSE FASTING      65 - 99 mg/dL  118 (H)    Calcium      8 3 - 10 1 mg/dL  9 2    AST      5 - 45 U/L  25    ALT      12 - 78 U/L  28    Alkaline Phosphatase      46 - 116 U/L  89    Total Protein      6 4 - 8 2 g/dL  6 6    Albumin      3 5 - 5 0 g/dL  3 7    TOTAL BILIRUBIN      0 20 - 1 00 mg/dL  0 36    eGFR      ml/min/1 73sq m  78    Cholesterol      See Comment mg/dL   101   Triglycerides      See Comment mg/dL   86   HDL      >=40 mg/dL   37 (L)   LDL Calculated      0 - 100 mg/dL   47   EXT Creatinine Urine      mg/dL 102 0     MICROALBUM ,U,RANDOM      0 0 - 20 0 mg/L 25 1 (H)     MICROALBUMIN/CREATININE RATIO      0 - 30 mg/g creatinine 25     Hemoglobin A1C      Normal 3 8-5 6%; PreDiabetic 5 7-6 4%;  Diabetic >=6 5%; Glycemic control for adults with diabetes <7 0% %      eAG, EST AVG Glucose      mg/dl      Free T4      0 76 - 1 46 ng/dL      TSH 3RD GENERATON      0 358 - 3 740 uIU/mL        Component      Latest Ref Rng & Units 3/23/2022 7/7/2022 7/7/2022           9:34 AM 10:42 AM 10:42 AM   Sodium      136 - 145 mmol/L   133 (L)   Potassium      3 5 - 5 3 mmol/L   4 8   Chloride      100 - 108 mmol/L   101   CO2      21 - 32 mmol/L   27   Anion Gap      4 - 13 mmol/L   5   BUN      5 - 25 mg/dL   20   Creatinine      0 60 - 1 30 mg/dL   1 06   GLUCOSE FASTING      65 - 99 mg/dL   92   Calcium      8 3 - 10 1 mg/dL   9 8   AST      5 - 45 U/L      ALT      12 - 78 U/L      Alkaline Phosphatase      46 - 116 U/L      Total Protein      6 4 - 8 2 g/dL      Albumin      3 5 - 5 0 g/dL      TOTAL BILIRUBIN      0 20 - 1 00 mg/dL      eGFR      ml/min/1 73sq m   65   Cholesterol      See Comment mg/dL      Triglycerides      See Comment mg/dL      HDL      >=40 mg/dL      LDL Calculated      0 - 100 mg/dL      EXT Creatinine Urine      mg/dL      MICROALBUM ,U,RANDOM      0 0 - 20 0 mg/L MICROALBUMIN/CREATININE RATIO      0 - 30 mg/g creatinine      Hemoglobin A1C      Normal 3 8-5 6%; PreDiabetic 5 7-6 4%; Diabetic >=6 5%; Glycemic control for adults with diabetes <7 0% % 7 6 (H) 7 1 (H)    eAG, EST AVG Glucose      mg/dl 171 157    Free T4      0 76 - 1 46 ng/dL      TSH 3RD GENERATON      0 358 - 3 740 uIU/mL            Impression & Plan:    Problem List Items Addressed This Visit        Endocrine    Type 2 diabetes mellitus with diabetic polyneuropathy ([facility] ) - Primary     Diabetes under good control but blood sugar readings have been in the 70-90 range and concerned about hypoglycemia due to his age and high level of physical activity  Suggest change glimepiride to DPP4 inhibitor but this was prescribed years ago and found cost to be too high  Advised him to stop glimepiride for now and send BG log in two weeks  Take full dose metformin  If blood sugars are consistently elevated we can try adding Tradjenta 5mg daily or if this is too expensive resume glimepiride at a lower dose  Lab Results   Component Value Date    HGBA1C 7 1 (H) 07/07/2022            Relevant Orders    Hemoglobin A1C    Comprehensive metabolic panel    Microalbumin / creatinine urine ratio       Other    Hyperlipidemia     Continue simvastatin  Relevant Orders    TSH, 3rd generation    T4, free          Orders Placed This Encounter   Procedures    Hemoglobin A1C     Standing Status:   Future     Standing Expiration Date:   7/28/2023    Comprehensive metabolic panel     This is a patient instruction: Patient fasting for 8 hours or longer recommended  Standing Status:   Future     Standing Expiration Date:   7/28/2023    Microalbumin / creatinine urine ratio     Standing Status:   Future     Standing Expiration Date:   7/28/2023    TSH, 3rd generation     This is a patient instruction: This test is non-fasting  Please drink two glasses of water morning of bloodwork          Standing Status:   Future Standing Expiration Date:   7/28/2023    T4, free     Standing Status:   Future     Standing Expiration Date:   7/28/2023       There are no Patient Instructions on file for this visit  Discussed with the patient and all questioned fully answered  He will call me if any problems arise  Follow-up appointment in 3-4 months       Counseled patient on diagnostic results, prognosis, risk and benefit of treatment options, instruction for management, importance of treatment compliance, Risk  factor reduction and impressions    Mike Mcgowan PA-C

## 2022-07-28 NOTE — ASSESSMENT & PLAN NOTE
Diabetes under good control but blood sugar readings have been in the 70-90 range and concerned about hypoglycemia due to his age and high level of physical activity  Suggest change glimepiride to DPP4 inhibitor but this was prescribed years ago and found cost to be too high  Advised him to stop glimepiride for now and send BG log in two weeks  Take full dose metformin  If blood sugars are consistently elevated we can try adding Tradjenta 5mg daily or if this is too expensive resume glimepiride at a lower dose     Lab Results   Component Value Date    HGBA1C 7 1 (H) 07/07/2022

## 2022-08-08 DIAGNOSIS — I10 ESSENTIAL HYPERTENSION: ICD-10-CM

## 2022-08-08 RX ORDER — AMLODIPINE BESYLATE 5 MG/1
TABLET ORAL
Qty: 90 TABLET | Refills: 2 | Status: SHIPPED | OUTPATIENT
Start: 2022-08-08

## 2022-08-10 DIAGNOSIS — E78.2 COMBINED HYPERLIPIDEMIA: ICD-10-CM

## 2022-08-10 RX ORDER — SIMVASTATIN 40 MG
TABLET ORAL
Qty: 90 TABLET | Refills: 0 | Status: SHIPPED | OUTPATIENT
Start: 2022-08-10

## 2022-08-11 ENCOUNTER — OFFICE VISIT (OUTPATIENT)
Dept: FAMILY MEDICINE CLINIC | Facility: CLINIC | Age: 82
End: 2022-08-11
Payer: MEDICARE

## 2022-08-11 VITALS
BODY MASS INDEX: 21.94 KG/M2 | HEIGHT: 72 IN | HEART RATE: 86 BPM | TEMPERATURE: 98 F | DIASTOLIC BLOOD PRESSURE: 64 MMHG | SYSTOLIC BLOOD PRESSURE: 146 MMHG | OXYGEN SATURATION: 97 % | WEIGHT: 162 LBS

## 2022-08-11 DIAGNOSIS — M54.16 LUMBAR RADICULOPATHY: ICD-10-CM

## 2022-08-11 DIAGNOSIS — M51.26 HERNIATED LUMBAR INTERVERTEBRAL DISC: ICD-10-CM

## 2022-08-11 DIAGNOSIS — M48.062 SPINAL STENOSIS, LUMBAR REGION, WITH NEUROGENIC CLAUDICATION: ICD-10-CM

## 2022-08-11 DIAGNOSIS — I10 BENIGN ESSENTIAL HYPERTENSION: ICD-10-CM

## 2022-08-11 DIAGNOSIS — E11.9 TYPE 2 DIABETES MELLITUS WITHOUT COMPLICATION, WITHOUT LONG-TERM CURRENT USE OF INSULIN (HCC): Primary | ICD-10-CM

## 2022-08-11 PROCEDURE — 99214 OFFICE O/P EST MOD 30 MIN: CPT | Performed by: FAMILY MEDICINE

## 2022-08-11 NOTE — PROGRESS NOTES
Assessment/Plan:referal will be made to physiatrist when they provide me with a name    Problem List Items Addressed This Visit        Endocrine    Type 2 diabetes mellitus without complication, without long-term current use of insulin ([address] ) - Primary       Cardiovascular and Mediastinum    Benign essential hypertension       Nervous and Auditory    Radiculopathy       Musculoskeletal and Integument    Herniated lumbar intervertebral disc       Other    Spinal stenosis, lumbar region, with neurogenic claudication           Diagnoses and all orders for this visit:    Type 2 diabetes mellitus without complication, without long-term current use of insulin ([facility])    Benign essential hypertension    Lumbar radiculopathy    Herniated lumbar intervertebral disc    Spinal stenosis, lumbar region, with neurogenic claudication        No problem-specific Assessment & Plan notes found for this encounter  Subjective:      Patient ID: Gavi Garcia is a [de-identified] y o  male      Dictation on Mr Dempsey Fabry who is here for a follow-up regarding his diabetes and also more importantly his low back pain from a standpoint of his diabetes he saw Dr Samir Shin there or working on maybe working him off of Glucotrol and on to 1 of the new SGLT to medications if his insurance will allow him to afford it I did look through his chart II tried to do a test prescription for Keisha Alberto and for Caroline Guerra looks like Yous Charlie would be a tear to but again that would have to be determined by the pharmacy a bigger problem for him right now is his low back he just gets this pain right across the lumbar spine and with some radicular radiation down the right leg he has found that he can do less and less and he loves to garden but that becomes problematic in aggravates his back pain to been swimming in a pool and that does not really help that much on he has had 3 epidurals or transforaminal nerve blocks without any relief long-term he is going to speak with his daughter and his wife try to find a physiatrist near his home that is convenient and then he will let us know who that is so we can generate a referral there      The following portions of the patient's history were reviewed and updated as appropriate:   He has a past medical history of Abnormal blood chemistry, Abnormal glucose, Benign essential hypertension, Chronic allergic rhinitis, Dermatitis, High risk medication use, Sciatica, Septic bursitis, and Tick bite ,  does not have any pertinent problems on file  ,   has a past surgical history that includes Cataract extraction; Colonoscopy; and PILONIDAL CYST EXCISION  ,  family history includes Colon cancer in his family; Diabetes type I in his paternal aunt; Hypertension in his mother; Osteoporosis in his mother  ,   reports that he quit smoking about 42 years ago  He has never used smokeless tobacco  He reports current alcohol use of about 1 0 standard drink of alcohol per week  He reports that he does not use drugs  ,  has No Known Allergies     Current Outpatient Medications   Medication Sig Dispense Refill    amLODIPine (NORVASC) 5 mg tablet TAKE 1 TABLET BY MOUTH DAILY 90 tablet 2    B Complex-Biotin-FA (B COMPLETE) TABS Take by mouth in the morning      Blood Glucose Monitoring Suppl (CONTOUR NEXT MONITOR) w/Device KIT 1 Device by Does not apply route 2 (two) times a day 1 kit 0    Coenzyme Q10 (COQ10 PO) Take by mouth in the morning      docusate sodium (COLACE) 100 mg capsule Take 1 capsule by mouth as needed      glucose blood (Contour Next Test) test strip Use 1 each in the morning 50 strip 6    hydrochlorothiazide (MICROZIDE) 12 5 mg capsule 1 capsule in a m  Even days of the month 45 capsule 2    lisinopril (ZESTRIL) 20 mg tablet TAKE ONE TABLET BY MOUTH EVERY DAY 90 tablet 3    metFORMIN (GLUCOPHAGE-XR) 500 mg 24 hr tablet Take 2 tablets (1,000 mg total) by mouth in the morning and 2 tablets (1,000 mg total) in the evening  Take with meals  360 tablet 1    Multiple Vitamin (multivitamin) tablet Take 1 tablet by mouth daily      simvastatin (ZOCOR) 40 mg tablet TAKE 1 TABLET BY MOUTH DAILY 90 tablet 0    Specialty Vitamins Products (MAGNESIUM, AMINO ACID CHELATE,) 133 MG tablet Take 1 tablet by mouth in the morning      Thiamine 50 MG CAPS Take 1 capsule by mouth daily      Microlet Lancets MISC Test blood sugar  2 x daily (Patient taking differently: 1 each in the morning) 100 each 3     No current facility-administered medications for this visit  Review of Systems   Constitutional: Negative for activity change, appetite change, diaphoresis, fatigue and fever  HENT: Negative  Eyes: Negative  Respiratory: Negative for apnea, cough, chest tightness, shortness of breath and wheezing  Cardiovascular: Negative for chest pain, palpitations and leg swelling  Gastrointestinal: Negative for abdominal distention, abdominal pain, anal bleeding, constipation, diarrhea, nausea and vomiting  Endocrine: Negative for cold intolerance, heat intolerance, polydipsia, polyphagia and polyuria  Type 2 diabetes   Genitourinary: Negative for difficulty urinating, dysuria, flank pain, hematuria and urgency  Musculoskeletal: Positive for back pain  Negative for arthralgias, gait problem, joint swelling and myalgias  Skin: Negative for color change, rash and wound  Allergic/Immunologic: Negative for environmental allergies, food allergies and immunocompromised state  Neurological: Negative for dizziness, seizures, syncope, speech difficulty, numbness and headaches  Hematological: Negative for adenopathy  Does not bruise/bleed easily  Psychiatric/Behavioral: Negative for agitation, behavioral problems, hallucinations, sleep disturbance and suicidal ideas           Objective:  Vitals:    08/11/22 1245   BP: 146/64   BP Location: Left arm   Patient Position: Sitting   Cuff Size: Standard   Pulse: 86   Temp: 98 °F (36 7 °C)   TempSrc: Temporal   SpO2: 97%   Weight: 73 5 kg (162 lb)   Height: 6' (1 829 m)     Body mass index is 21 97 kg/m²  Physical Exam  Constitutional:       General: He is not in acute distress  Appearance: He is well-developed  He is not diaphoretic  HENT:      Head: Normocephalic  Right Ear: External ear normal       Left Ear: External ear normal       Nose: Nose normal    Eyes:      General: No scleral icterus  Right eye: No discharge  Left eye: No discharge  Conjunctiva/sclera: Conjunctivae normal       Pupils: Pupils are equal, round, and reactive to light  Neck:      Thyroid: No thyromegaly  Trachea: No tracheal deviation  Cardiovascular:      Rate and Rhythm: Normal rate and regular rhythm  Pulses: no weak pulses          Dorsalis pedis pulses are 2+ on the right side and 2+ on the left side  Posterior tibial pulses are 2+ on the right side and 2+ on the left side  Heart sounds: Normal heart sounds  No murmur heard  No friction rub  No gallop  Pulmonary:      Effort: Pulmonary effort is normal  No respiratory distress  Breath sounds: Normal breath sounds  No wheezing  Abdominal:      General: Bowel sounds are normal       Palpations: Abdomen is soft  There is no mass  Tenderness: There is no abdominal tenderness  There is no guarding  Musculoskeletal:         General: No deformity  Cervical back: Normal range of motion  Feet:      Right foot:      Skin integrity: No ulcer, skin breakdown, erythema, warmth, callus or dry skin  Left foot:      Skin integrity: No ulcer, skin breakdown, erythema, warmth, callus or dry skin  Lymphadenopathy:      Cervical: No cervical adenopathy  Skin:     General: Skin is warm and dry  Findings: No erythema or rash  Neurological:      Mental Status: He is alert and oriented to person, place, and time  Cranial Nerves: No cranial nerve deficit     Psychiatric:         Thought Content: Thought content normal        Patient's shoes and socks removed  Right Foot/Ankle   Right Foot Inspection  Skin Exam: skin normal and skin intact  No dry skin, no warmth, no callus, no erythema, no maceration, no abnormal color, no pre-ulcer, no ulcer and no callus  Toe Exam: ROM and strength within normal limits  Sensory   Vibration: intact  Proprioception: intact  Monofilament testing: intact    Vascular  Capillary refills: < 3 seconds  The right DP pulse is 2+  The right PT pulse is 2+  Left Foot/Ankle  Left Foot Inspection  Skin Exam: skin normal and skin intact  No dry skin, no warmth, no erythema, no maceration, normal color, no pre-ulcer, no ulcer and no callus  Toe Exam: ROM and strength within normal limits  Sensory   Vibration: intact  Proprioception: intact  Monofilament testing: intact    Vascular  Capillary refills: < 3 seconds  The left DP pulse is 2+  The left PT pulse is 2+       Assign Risk Category  No deformity present  No loss of protective sensation  No weak pulses  Risk: 0

## 2022-08-12 ENCOUNTER — TELEPHONE (OUTPATIENT)
Dept: ADMINISTRATIVE | Facility: OTHER | Age: 82
End: 2022-08-12

## 2022-08-12 ENCOUNTER — TELEPHONE (OUTPATIENT)
Dept: ENDOCRINOLOGY | Facility: CLINIC | Age: 82
End: 2022-08-12

## 2022-08-12 NOTE — TELEPHONE ENCOUNTER
Upon review of the In Basket request and the patient's chart, initial outreach has been made via fax, please see Contacts section for details       Thank you  Aayush Beltre MA

## 2022-08-12 NOTE — TELEPHONE ENCOUNTER
Pt called left a message requesting a call back, He did sent BS log and no one call him back  Called pt  Left message  Requesting a call back, I don't see any BS log under Media  I did check Min daniellek and she has his BS log , pending to be review  Please advise

## 2022-08-12 NOTE — LETTER
Diabetic Foot Exam Form    Date Requested: 22  Patient: Sulma Bravo  Patient : 1940   Referring Provider: Ed Murray DO    Diabetic Foot Exam Performed with shoes and socks removed        Yes         No     Date of Diabetic Foot Exam ______________________________  Risk Score ____________________________________________    Left Foot       Visual Inspection         Monofilament Testing Sensory Exam        Pedal Pulses         Additional Comments         Right Foot      Visual Inspection         Monofilament Testing Sensory Exam       Pedal Pulses         Additional Comments         Comments __________________________________________________________    Practice Providing Exam ______________________________________________    Exam Performed By (print name) _______________________________________      Provider Signature ___________________________________________________      These reports are needed for  compliance  Please fax this completed form and a copy of the Diabetic Foot Exam report to our office located at [place] [address] as soon as possible via [phone] attention Marisa: Phone [phone]    We thank you for your assistance in treating our mutual patient

## 2022-08-12 NOTE — TELEPHONE ENCOUNTER
----- Message from Eder Salas sent at 8/11/2022 12:43 PM EDT -----  Regarding: daibetic foot exam  08/11/22 12:44 PM    Hello, our patient Brett Sultana has had Diabetic Foot Exam completed/performed  Please assist in updating the patient chart by making an External outreach to Dr Brenna Harmon facility located in [place]  The date of service is within the last 6 months      Thank you,  Mikaela Julio   [facility]

## 2022-08-15 ENCOUNTER — TELEPHONE (OUTPATIENT)
Dept: FAMILY MEDICINE CLINIC | Facility: CLINIC | Age: 82
End: 2022-08-15

## 2022-08-15 DIAGNOSIS — M48.062 SPINAL STENOSIS, LUMBAR REGION, WITH NEUROGENIC CLAUDICATION: ICD-10-CM

## 2022-08-15 DIAGNOSIS — M47.816 LUMBAR SPONDYLOSIS: ICD-10-CM

## 2022-08-15 DIAGNOSIS — M54.16 LUMBAR RADICULOPATHY: Primary | ICD-10-CM

## 2022-08-15 NOTE — TELEPHONE ENCOUNTER
Sorry for delay  Reviewed blood sugar readings and most look good, a few look high  As long as most readings remain less than 150 I think its best to remain off glimepiride for now  Send another log if blood sugars trending higher

## 2022-08-16 NOTE — TELEPHONE ENCOUNTER
Upon review of the In Basket request we were able to locate, review, and update the patient chart as requested for Diabetic Foot Exam     Any additional questions or concerns should be emailed to the Practice Liaisons via [email or web address] email, please do not reply via In Basket      Thank you  Eden Chatman MA

## 2022-09-29 ENCOUNTER — TELEPHONE (OUTPATIENT)
Dept: ENDOCRINOLOGY | Facility: CLINIC | Age: 82
End: 2022-09-29

## 2022-09-29 NOTE — TELEPHONE ENCOUNTER
Last OV note faxed to Rhea Delarosa documentation department at [facility]

## 2022-10-31 ENCOUNTER — APPOINTMENT (OUTPATIENT)
Dept: LAB | Facility: HOSPITAL | Age: 82
End: 2022-10-31

## 2022-10-31 DIAGNOSIS — E11.42 TYPE 2 DIABETES MELLITUS WITH DIABETIC POLYNEUROPATHY, WITHOUT LONG-TERM CURRENT USE OF INSULIN (HCC): ICD-10-CM

## 2022-10-31 DIAGNOSIS — E78.2 MIXED HYPERLIPIDEMIA: ICD-10-CM

## 2022-10-31 LAB
ALBUMIN SERPL BCP-MCNC: 4 G/DL (ref 3.5–5)
ALP SERPL-CCNC: 90 U/L (ref 46–116)
ALT SERPL W P-5'-P-CCNC: 29 U/L (ref 12–78)
ANION GAP SERPL CALCULATED.3IONS-SCNC: 4 MMOL/L (ref 4–13)
AST SERPL W P-5'-P-CCNC: 23 U/L (ref 5–45)
BILIRUB SERPL-MCNC: 0.47 MG/DL (ref 0.2–1)
BUN SERPL-MCNC: 17 MG/DL (ref 5–25)
CALCIUM SERPL-MCNC: 9.5 MG/DL (ref 8.3–10.1)
CHLORIDE SERPL-SCNC: 98 MMOL/L (ref 96–108)
CO2 SERPL-SCNC: 29 MMOL/L (ref 21–32)
CREAT SERPL-MCNC: 0.99 MG/DL (ref 0.6–1.3)
CREAT UR-MCNC: 81.4 MG/DL
EST. AVERAGE GLUCOSE BLD GHB EST-MCNC: 171 MG/DL
GFR SERPL CREATININE-BSD FRML MDRD: 71 ML/MIN/1.73SQ M
GLUCOSE P FAST SERPL-MCNC: 141 MG/DL (ref 65–99)
HBA1C MFR BLD: 7.6 %
MICROALBUMIN UR-MCNC: 25.3 MG/L (ref 0–20)
MICROALBUMIN/CREAT 24H UR: 31 MG/G CREATININE (ref 0–30)
POTASSIUM SERPL-SCNC: 4.3 MMOL/L (ref 3.5–5.3)
PROT SERPL-MCNC: 7.1 G/DL (ref 6.4–8.4)
SODIUM SERPL-SCNC: 131 MMOL/L (ref 135–147)
T4 FREE SERPL-MCNC: 1.23 NG/DL (ref 0.76–1.46)
TSH SERPL DL<=0.05 MIU/L-ACNC: 5.28 UIU/ML (ref 0.45–4.5)

## 2022-11-03 ENCOUNTER — OFFICE VISIT (OUTPATIENT)
Dept: ENDOCRINOLOGY | Facility: CLINIC | Age: 82
End: 2022-11-03

## 2022-11-03 VITALS
WEIGHT: 161 LBS | SYSTOLIC BLOOD PRESSURE: 120 MMHG | BODY MASS INDEX: 21.81 KG/M2 | HEART RATE: 74 BPM | DIASTOLIC BLOOD PRESSURE: 60 MMHG | HEIGHT: 72 IN

## 2022-11-03 DIAGNOSIS — E11.42 TYPE 2 DIABETES MELLITUS WITH DIABETIC POLYNEUROPATHY, WITHOUT LONG-TERM CURRENT USE OF INSULIN (HCC): Primary | ICD-10-CM

## 2022-11-03 NOTE — ASSESSMENT & PLAN NOTE
Diabetes continues to be under decent control, considering he is no longer experiencing lower blood sugars  Off of Glimiperide which reduces risk of hypoglycemia  Did not bring blood sugars to appointment today, asked to send them in for review  A1c increased to 7 6% but due to cost is unable to start SGLT2 or DDP4  Would like to restart glimiperide, will discuss after review of blood sugar logs    Lab Results   Component Value Date    HGBA1C 7 6 (H) 10/31/2022

## 2022-11-03 NOTE — PROGRESS NOTES
Established Patient Progress Note      CC: Diabetes Mellitus, Type 2     History of Present Illness:   Rock Mejia is a [de-identified] y o  male with a history of type 2 diabetes without long term use of insulin for approximately 20 years  Reports complications of neuropathy and microalbuminuria  Denies recent illness or hospitalizations  Denies recent severe hypoglycemic or severe hyperglycemic episodes  Denies any issues with his current regimen  Home glucose monitoring: are performed regularly (111mg/dL-160 mg/dL)  Cost of Griffin Estelle was too high previously  Cost of jardiance was too high when checked with insurance  Previously on glimepiride which was stopped due to concern for hypoglycemia with patterns of low normal glucose levels      Current regimen:   Metformin ER 1000 mg BID, last GFR 71 from 10/31/2022    Last Eye Exam: referral placed  Last Foot Exam: 8/11/2022    Has hypertension: Taking lisinopril, HCTZ   Has hyperlipidemia: Taking simvastatin     Patient Active Problem List   Diagnosis   • Benign essential hypertension   • Benign prostatic hyperplasia with lower urinary tract symptoms   • Esophageal dysphagia   • Herniated lumbar intervertebral disc   • Hyperlipidemia   • Vasomotor rhinitis   • Elevated TSH   • Type 2 diabetes mellitus with diabetic polyneuropathy (HCC)   • Type 2 diabetes mellitus without complication, without long-term current use of insulin ([facility] [address] )   • Spinal stenosis, lumbar region, with neurogenic claudication   • Radiculopathy   • Chronic pain syndrome   • Lumbar spondylosis   • Chronic right-sided low back pain without sciatica      Past Medical History:   Diagnosis Date   • Abnormal blood chemistry    • Abnormal glucose     Last assessed - 2/20/14   • Benign essential hypertension     Last assessed - 5/15/17   • Chronic allergic rhinitis     Last assessed - 8/1/16   • Dermatitis     Last assessed - 6/16/15   • High risk medication use     Last assessed - 5/2/16   • Sciatica Last assessed - 12   • Septic bursitis     Last assessed - 9/3/13   • Tick bite     Last assessed - 16      Past Surgical History:   Procedure Laterality Date   • CATARACT EXTRACTION     • COLONOSCOPY      Fiberoptic   • PILONIDAL CYST EXCISION        Family History   Problem Relation Age of Onset   • Hypertension Mother    • Osteoporosis Mother    • Diabetes type I Paternal Aunt    • Colon cancer Family      Social History     Tobacco Use   • Smoking status: Former Smoker     Packs/day: 1      Years: 10      Pack years: 10      Quit date: 1987     Years since quittin 3   • Smokeless tobacco: Never Used   Substance Use Topics   • Alcohol use: Yes     Alcohol/week: 6 0 standard drinks     Types: 1 Glasses of wine, 5 Cans of beer per week     Comment: social usage     No Known Allergies      Current Outpatient Medications:   •  amLODIPine (NORVASC) 5 mg tablet, TAKE 1 TABLET BY MOUTH DAILY, Disp: 90 tablet, Rfl: 2  •  B Complex-Biotin-FA (B COMPLETE) TABS, Take by mouth in the morning, Disp: , Rfl:   •  Blood Glucose Monitoring Suppl (CONTOUR NEXT MONITOR) w/Device KIT, 1 Device by Does not apply route 2 (two) times a day, Disp: 1 kit, Rfl: 0  •  Coenzyme Q10 (COQ10 PO), Take by mouth in the morning, Disp: , Rfl:   •  docusate sodium (COLACE) 100 mg capsule, Take 1 capsule by mouth as needed, Disp: , Rfl:   •  glucose blood (Contour Next Test) test strip, Use 1 each in the morning, Disp: 50 strip, Rfl: 6  •  hydrochlorothiazide (MICROZIDE) 12 5 mg capsule, 1 capsule in a m  Even days of the month, Disp: 45 capsule, Rfl: 2  •  lisinopril (ZESTRIL) 20 mg tablet, TAKE ONE TABLET BY MOUTH EVERY DAY, Disp: 90 tablet, Rfl: 3  •  metFORMIN (GLUCOPHAGE-XR) 500 mg 24 hr tablet, Take 2 tablets (1,000 mg total) by mouth in the morning and 2 tablets (1,000 mg total) in the evening  Take with meals  , Disp: 360 tablet, Rfl: 1  •  Multiple Vitamin (multivitamin) tablet, Take 1 tablet by mouth daily, Disp: , Rfl:   •  simvastatin (ZOCOR) 40 mg tablet, TAKE 1 TABLET BY MOUTH DAILY, Disp: 90 tablet, Rfl: 0  •  Specialty Vitamins Products (MAGNESIUM, AMINO ACID CHELATE,) 133 MG tablet, Take 1 tablet by mouth in the morning, Disp: , Rfl:   •  Thiamine 50 MG CAPS, Take 1 capsule by mouth daily, Disp: , Rfl:   •  Microlet Lancets MISC, Test blood sugar  2 x daily (Patient not taking: Reported on 11/3/2022), Disp: 100 each, Rfl: 3    Review of Systems   Constitutional: Negative for activity change, appetite change, fatigue and unexpected weight change  HENT: Negative for sore throat, tinnitus and voice change  Eyes: Negative for visual disturbance  Respiratory: Negative for cough and shortness of breath  Cardiovascular: Negative for chest pain and palpitations  Gastrointestinal: Negative for abdominal distention, diarrhea, nausea and vomiting  Endocrine: Negative for cold intolerance, heat intolerance, polydipsia and polyuria  Skin: Negative for color change, pallor and rash  Neurological: Negative for dizziness, light-headedness and headaches  Physical Exam:  Body mass index is 21 84 kg/m²  /60 (BP Location: Left arm, Patient Position: Sitting, Cuff Size: Standard)   Pulse 74   Ht 6' (1 829 m)   Wt 73 kg (161 lb)   BMI 21 84 kg/m²    Wt Readings from Last 3 Encounters:   11/03/22 73 kg (161 lb)   08/11/22 73 5 kg (162 lb)   07/28/22 73 6 kg (162 lb 4 8 oz)       Physical Exam  Vitals reviewed  Constitutional:       Appearance: Normal appearance  Cardiovascular:      Rate and Rhythm: Normal rate and regular rhythm  Pulses: Normal pulses  Heart sounds: Normal heart sounds  Pulmonary:      Effort: Pulmonary effort is normal       Breath sounds: Normal breath sounds  Musculoskeletal:         General: Normal range of motion  Cervical back: Normal range of motion and neck supple  Skin:     General: Skin is warm and dry        Capillary Refill: Capillary refill takes less than 2 seconds  Neurological:      General: No focal deficit present  Mental Status: He is alert and oriented to person, place, and time  Psychiatric:         Mood and Affect: Mood normal          Behavior: Behavior normal          Labs:   Component      Latest Ref Rng & Units 7/7/2022 10/31/2022   Sodium      135 - 147 mmol/L 133 (L) 131 (L)   Potassium      3 5 - 5 3 mmol/L 4 8 4 3   Chloride      96 - 108 mmol/L 101 98   CO2      21 - 32 mmol/L 27 29   Anion Gap      4 - 13 mmol/L 5 4   BUN      5 - 25 mg/dL 20 17   Creatinine      0 60 - 1 30 mg/dL 1 06 0 99   GLUCOSE FASTING      65 - 99 mg/dL 92 141 (H)   Calcium      8 3 - 10 1 mg/dL 9 8 9 5   AST      5 - 45 U/L  23   ALT      12 - 78 U/L  29   Alkaline Phosphatase      46 - 116 U/L  90   Total Protein      6 4 - 8 4 g/dL  7 1   Albumin      3 5 - 5 0 g/dL  4 0   TOTAL BILIRUBIN      0 20 - 1 00 mg/dL  0 47   eGFR      ml/min/1 73sq m 65 71   EXT Creatinine Urine      mg/dL  81 4   MICROALBUM ,U,RANDOM      0 0 - 20 0 mg/L  25 3 (H)   MICROALBUMIN/CREATININE RATIO      0 - 30 mg/g creatinine  31 (H)   Hemoglobin A1C      Normal 3 8-5 6%; PreDiabetic 5 7-6 4%; Diabetic >=6 5%; Glycemic control for adults with diabetes <7 0% % 7 1 (H) 7 6 (H)   eAG, EST AVG Glucose      mg/dl 157 171   TSH 3RD GENERATON      0 450 - 4 500 uIU/mL  5 280 (H)   Free T4      0 76 - 1 46 ng/dL  1 23     Impression & Plan:    Problem List Items Addressed This Visit        Endocrine    Type 2 diabetes mellitus with diabetic polyneuropathy ([facility] [address] ) - Primary     Diabetes continues to be under decent control, considering he is no longer experiencing lower blood sugars  Off of Glimiperide which reduces risk of hypoglycemia  Did not bring blood sugars to appointment today, asked to send them in for review  A1c increased to 7 6% but due to cost is unable to start SGLT2 or DDP4  Would like to restart glimiperide, will discuss after review of blood sugar logs    Lab Results Component Value Date    HGBA1C 7 6 (H) 10/31/2022            Relevant Orders    Ambulatory referral to Ophthalmology    HEMOGLOBIN A1C W/ EAG ESTIMATION Lab Collect    Basic metabolic panel Lab Collect        Discussed with the patient and all questioned fully answered  He will call me if any problems arise  Follow-up appointment in 3 months       Counseled patient on diagnostic results, prognosis, risk and benefit of treatment options, instruction for management, importance of treatment compliance, Risk  factor reduction and impressions    DANIEL Santa

## 2022-11-08 DIAGNOSIS — E78.2 COMBINED HYPERLIPIDEMIA: ICD-10-CM

## 2022-11-08 DIAGNOSIS — E11.42 TYPE 2 DIABETES MELLITUS WITH DIABETIC POLYNEUROPATHY, WITHOUT LONG-TERM CURRENT USE OF INSULIN (HCC): ICD-10-CM

## 2022-11-08 DIAGNOSIS — I10 ESSENTIAL HYPERTENSION: ICD-10-CM

## 2022-11-08 RX ORDER — METFORMIN HYDROCHLORIDE 500 MG/1
TABLET, EXTENDED RELEASE ORAL
Qty: 360 TABLET | Refills: 1 | Status: SHIPPED | OUTPATIENT
Start: 2022-11-08

## 2022-11-08 RX ORDER — SIMVASTATIN 40 MG
TABLET ORAL
Qty: 90 TABLET | Refills: 0 | Status: SHIPPED | OUTPATIENT
Start: 2022-11-08

## 2022-11-08 RX ORDER — LISINOPRIL 20 MG/1
TABLET ORAL
Qty: 90 TABLET | Refills: 3 | Status: SHIPPED | OUTPATIENT
Start: 2022-11-08

## 2022-11-10 ENCOUNTER — TELEPHONE (OUTPATIENT)
Dept: ENDOCRINOLOGY | Facility: CLINIC | Age: 82
End: 2022-11-10

## 2022-11-10 NOTE — TELEPHONE ENCOUNTER
Pt left a message requesting a call back he has questing about the referral for the ophthalmologist , call pt, no answer left message requesting a call back

## 2022-11-11 NOTE — TELEPHONE ENCOUNTER
Spoke to pt this morning, told pt that the referral  was order you to remind him to make an appointment for next year

## 2022-11-18 ENCOUNTER — TELEPHONE (OUTPATIENT)
Dept: ENDOCRINOLOGY | Facility: CLINIC | Age: 82
End: 2022-11-18

## 2022-11-18 NOTE — TELEPHONE ENCOUNTER
Please call Kedar Stark and let him know I reviewed his blood sugar logs and overall they look good  No changes recommended

## 2022-12-02 ENCOUNTER — TELEPHONE (OUTPATIENT)
Dept: ENDOCRINOLOGY | Facility: CLINIC | Age: 82
End: 2022-12-02

## 2022-12-02 NOTE — TELEPHONE ENCOUNTER
[facility] @ [phone]

## 2022-12-05 ENCOUNTER — RA CDI HCC (OUTPATIENT)
Dept: OTHER | Facility: HOSPITAL | Age: 82
End: 2022-12-05

## 2022-12-05 NOTE — PROGRESS NOTES
[id number] 65  [facility]  coding opportunities          Chart Reviewed number of suggestions sent to Provider: 1     Patients Insurance     Medicare Insurance: [employer]

## 2022-12-13 ENCOUNTER — OFFICE VISIT (OUTPATIENT)
Dept: FAMILY MEDICINE CLINIC | Facility: CLINIC | Age: 82
End: 2022-12-13

## 2022-12-13 VITALS
DIASTOLIC BLOOD PRESSURE: 62 MMHG | HEART RATE: 91 BPM | SYSTOLIC BLOOD PRESSURE: 154 MMHG | TEMPERATURE: 96.9 F | HEIGHT: 72 IN | WEIGHT: 159 LBS | OXYGEN SATURATION: 99 % | BODY MASS INDEX: 21.54 KG/M2

## 2022-12-13 DIAGNOSIS — J30.0 NON-ALLERGIC VASOMOTOR RHINITIS: Primary | ICD-10-CM

## 2022-12-13 DIAGNOSIS — I10 BENIGN ESSENTIAL HYPERTENSION: ICD-10-CM

## 2022-12-13 DIAGNOSIS — E11.42 TYPE 2 DIABETES MELLITUS WITH DIABETIC POLYNEUROPATHY, WITHOUT LONG-TERM CURRENT USE OF INSULIN (HCC): ICD-10-CM

## 2022-12-13 RX ORDER — IPRATROPIUM BROMIDE 21 UG/1
2 SPRAY, METERED NASAL 3 TIMES DAILY
Qty: 30 ML | Refills: 1 | Status: SHIPPED | OUTPATIENT
Start: 2022-12-13

## 2022-12-13 NOTE — PROGRESS NOTES
Depression Screening and Follow-up Plan: Patient was screened for depression during today's encounter  They screened negative with a PHQ-2 score of 0  Assessment/Plan:trial of atrovent    Problem List Items Addressed This Visit        Endocrine    Type 2 diabetes mellitus with diabetic polyneuropathy ([facility] [address] )       Cardiovascular and Mediastinum    Benign essential hypertension   Other Visit Diagnoses     Non-allergic vasomotor rhinitis    -  Primary           Diagnoses and all orders for this visit:    Non-allergic vasomotor rhinitis    Type 2 diabetes mellitus with diabetic polyneuropathy, without long-term current use of insulin ([facility] [address] )    Benign essential hypertension        No problem-specific Assessment & Plan notes found for this encounter  Subjective:      Patient ID: Samir Choi is a [de-identified] y o  male  Mr Angie Muhammad is here today he is a little bit concerned about his diabetes his most recent A1c was 7 6 and recently his endocrinologist took him off of his glimepiride he is still on his metformin his sugar his sugars are running in the 150s consistently the other issue is he has vasomotor rhinitis when he goes out of doors in the cold weather his nose is constantly running and he has to blow it frequently not so much when he eats definitely with changes of ambient room temperature he will be seeing his podiatrist next week for his diabetic foot care      The following portions of the patient's history were reviewed and updated as appropriate:   He has a past medical history of Abnormal blood chemistry, Abnormal glucose, Benign essential hypertension, Chronic allergic rhinitis, Dermatitis, High risk medication use, Sciatica, Septic bursitis, and Tick bite ,  does not have any pertinent problems on file  ,   has a past surgical history that includes Cataract extraction; Colonoscopy; and PILONIDAL CYST EXCISION  ,  family history includes Colon cancer in his family; Diabetes type I in his paternal aunt; Hypertension in his mother; Osteoporosis in his mother  ,   reports that he quit smoking about 35 years ago  His smoking use included cigarettes  He has a 10 00 pack-year smoking history  He has never used smokeless tobacco  He reports current alcohol use of about 6 0 standard drinks per week  He reports that he does not use drugs  ,  has No Known Allergies     Current Outpatient Medications   Medication Sig Dispense Refill   • amLODIPine (NORVASC) 5 mg tablet TAKE 1 TABLET BY MOUTH DAILY 90 tablet 2   • B Complex-Biotin-FA (B COMPLETE) TABS Take by mouth in the morning     • Blood Glucose Monitoring Suppl (CONTOUR NEXT MONITOR) w/Device KIT 1 Device by Does not apply route 2 (two) times a day 1 kit 0   • Coenzyme Q10 (COQ10 PO) Take by mouth in the morning     • docusate sodium (COLACE) 100 mg capsule Take 1 capsule by mouth as needed     • glucose blood (Contour Next Test) test strip Use 1 each in the morning 50 strip 6   • hydrochlorothiazide (MICROZIDE) 12 5 mg capsule 1 capsule in a m  Even days of the month 45 capsule 2   • lisinopril (ZESTRIL) 20 mg tablet TAKE 1 TABLET BY MOUTH DAILY 90 tablet 3   • metFORMIN (GLUCOPHAGE-XR) 500 mg 24 hr tablet TAKE TWO TABLETS BY MOUTH TWICE A DAY IN THE MORNING AND EVENING WITH MEALS 360 tablet 1   • Microlet Lancets MISC Test blood sugar  2 x daily 100 each 3   • Multiple Vitamin (multivitamin) tablet Take 1 tablet by mouth daily     • simvastatin (ZOCOR) 40 mg tablet TAKE ONE TABLET BY MOUTH EVERY DAY 90 tablet 0   • Specialty Vitamins Products (MAGNESIUM, AMINO ACID CHELATE,) 133 MG tablet Take 1 tablet by mouth in the morning     • Thiamine 50 MG CAPS Take 1 capsule by mouth daily       No current facility-administered medications for this visit  Review of Systems   Constitutional: Negative for activity change, appetite change, diaphoresis, fatigue and fever  HENT: Negative  Negative for dental problem  Eyes: Negative  Negative for visual disturbance  Respiratory: Negative for apnea, cough, chest tightness, shortness of breath and wheezing  Cardiovascular: Negative for chest pain, palpitations and leg swelling  Gastrointestinal: Negative for abdominal distention, abdominal pain, anal bleeding, constipation, diarrhea, nausea and vomiting  Endocrine: Negative for cold intolerance, heat intolerance, polydipsia, polyphagia and polyuria  Genitourinary: Negative for difficulty urinating, dysuria, flank pain, hematuria and urgency  Musculoskeletal: Negative for arthralgias, back pain, gait problem, joint swelling and myalgias  Skin: Negative for color change, rash and wound  Allergic/Immunologic: Negative for environmental allergies, food allergies and immunocompromised state  Neurological: Negative for dizziness, seizures, syncope, speech difficulty, numbness and headaches  Hematological: Negative for adenopathy  Does not bruise/bleed easily  Psychiatric/Behavioral: Negative for agitation, behavioral problems, hallucinations, sleep disturbance and suicidal ideas  Objective:  Vitals:    12/13/22 1255   BP: 154/62   BP Location: Left arm   Patient Position: Sitting   Cuff Size: Standard   Pulse: 91   Temp: (!) 96 9 °F (36 1 °C)   TempSrc: Temporal   SpO2: 99%   Weight: 72 1 kg (159 lb)   Height: 6' (1 829 m)     Body mass index is 21 56 kg/m²  Physical Exam  Constitutional:       General: He is not in acute distress  Appearance: He is well-developed  He is not diaphoretic  HENT:      Head: Normocephalic  Right Ear: External ear normal       Left Ear: External ear normal       Nose: Nose normal    Eyes:      General: No scleral icterus  Right eye: No discharge  Left eye: No discharge  Conjunctiva/sclera: Conjunctivae normal       Pupils: Pupils are equal, round, and reactive to light  Neck:      Thyroid: No thyromegaly  Trachea: No tracheal deviation     Cardiovascular:      Rate and Rhythm: Normal rate and regular rhythm  Heart sounds: Normal heart sounds  No murmur heard  No friction rub  No gallop  Pulmonary:      Effort: Pulmonary effort is normal  No respiratory distress  Breath sounds: Normal breath sounds  No wheezing  Abdominal:      General: Bowel sounds are normal       Palpations: Abdomen is soft  There is no mass  Tenderness: There is no abdominal tenderness  There is no guarding  Musculoskeletal:         General: No deformity  Cervical back: Normal range of motion  Lymphadenopathy:      Cervical: No cervical adenopathy  Skin:     General: Skin is warm and dry  Findings: No erythema or rash  Neurological:      Mental Status: He is alert and oriented to person, place, and time  Cranial Nerves: No cranial nerve deficit  Psychiatric:         Thought Content:  Thought content normal

## 2022-12-16 DIAGNOSIS — I10 ESSENTIAL HYPERTENSION: ICD-10-CM

## 2022-12-16 RX ORDER — HYDROCHLOROTHIAZIDE 12.5 MG/1
CAPSULE, GELATIN COATED ORAL
Qty: 45 CAPSULE | Refills: 2 | Status: SHIPPED | OUTPATIENT
Start: 2022-12-16

## 2022-12-31 ENCOUNTER — APPOINTMENT (EMERGENCY)
Dept: RADIOLOGY | Facility: HOSPITAL | Age: 82
End: 2022-12-31

## 2022-12-31 ENCOUNTER — HOSPITAL ENCOUNTER (EMERGENCY)
Facility: HOSPITAL | Age: 82
Discharge: HOME | End: 2022-12-31
Attending: EMERGENCY MEDICINE

## 2022-12-31 VITALS
BODY MASS INDEX: 21.67 KG/M2 | OXYGEN SATURATION: 98 % | TEMPERATURE: 97.7 F | WEIGHT: 160 LBS | DIASTOLIC BLOOD PRESSURE: 70 MMHG | RESPIRATION RATE: 17 BRPM | HEIGHT: 72 IN | SYSTOLIC BLOOD PRESSURE: 153 MMHG | HEART RATE: 94 BPM

## 2022-12-31 DIAGNOSIS — R53.83 FATIGUE: ICD-10-CM

## 2022-12-31 DIAGNOSIS — R73.9 HYPERGLYCEMIA: Primary | ICD-10-CM

## 2022-12-31 DIAGNOSIS — J06.9 URI (UPPER RESPIRATORY INFECTION): ICD-10-CM

## 2022-12-31 DIAGNOSIS — G47.00 INSOMNIA: ICD-10-CM

## 2022-12-31 LAB
ALBUMIN SERPL BCP-MCNC: 3.1 G/DL (ref 3.5–5)
ALP SERPL-CCNC: 131 U/L (ref 46–116)
ALT SERPL W P-5'-P-CCNC: 41 U/L (ref 12–78)
ANION GAP SERPL CALCULATED.3IONS-SCNC: 11 MMOL/L (ref 4–13)
AST SERPL W P-5'-P-CCNC: 26 U/L (ref 5–45)
BASE EXCESS BLDA CALC-SCNC: 0 MMOL/L (ref -2–3)
BASOPHILS # BLD AUTO: 0.02 THOUSANDS/ÂΜL (ref 0–0.1)
BASOPHILS NFR BLD AUTO: 0 % (ref 0–1)
BILIRUB SERPL-MCNC: 0.5 MG/DL (ref 0.2–1)
BUN SERPL-MCNC: 15 MG/DL (ref 5–25)
CA-I BLD-SCNC: 1.13 MMOL/L (ref 1.12–1.32)
CALCIUM ALBUM COR SERPL-MCNC: 9.6 MG/DL (ref 8.3–10.1)
CALCIUM SERPL-MCNC: 8.9 MG/DL (ref 8.3–10.1)
CHLORIDE SERPL-SCNC: 94 MMOL/L (ref 96–108)
CO2 SERPL-SCNC: 24 MMOL/L (ref 21–32)
CREAT SERPL-MCNC: 1.12 MG/DL (ref 0.6–1.3)
EOSINOPHIL # BLD AUTO: 0.01 THOUSAND/ÂΜL (ref 0–0.61)
EOSINOPHIL NFR BLD AUTO: 0 % (ref 0–6)
ERYTHROCYTE [DISTWIDTH] IN BLOOD BY AUTOMATED COUNT: 12.4 % (ref 11.6–15.1)
FLUAV RNA RESP QL NAA+PROBE: NEGATIVE
FLUBV RNA RESP QL NAA+PROBE: NEGATIVE
GFR SERPL CREATININE-BSD FRML MDRD: 61 ML/MIN/1.73SQ M
GLUCOSE SERPL-MCNC: 255 MG/DL (ref 65–140)
GLUCOSE SERPL-MCNC: 265 MG/DL (ref 65–140)
GLUCOSE SERPL-MCNC: 290 MG/DL (ref 65–140)
HCO3 BLDA-SCNC: 23.2 MMOL/L (ref 24–30)
HCT VFR BLD AUTO: 32.3 % (ref 36.5–49.3)
HCT VFR BLD CALC: 31 % (ref 36.5–49.3)
HGB BLD-MCNC: 10.5 G/DL (ref 12–17)
HGB BLDA-MCNC: 10.5 G/DL (ref 12–17)
IMM GRANULOCYTES # BLD AUTO: 0.06 THOUSAND/UL (ref 0–0.2)
IMM GRANULOCYTES NFR BLD AUTO: 1 % (ref 0–2)
LYMPHOCYTES # BLD AUTO: 0.98 THOUSANDS/ÂΜL (ref 0.6–4.47)
LYMPHOCYTES NFR BLD AUTO: 11 % (ref 14–44)
MCH RBC QN AUTO: 28.9 PG (ref 26.8–34.3)
MCHC RBC AUTO-ENTMCNC: 32.5 G/DL (ref 31.4–37.4)
MCV RBC AUTO: 89 FL (ref 82–98)
MONOCYTES # BLD AUTO: 0.94 THOUSAND/ÂΜL (ref 0.17–1.22)
MONOCYTES NFR BLD AUTO: 11 % (ref 4–12)
NEUTROPHILS # BLD AUTO: 6.88 THOUSANDS/ÂΜL (ref 1.85–7.62)
NEUTS SEG NFR BLD AUTO: 77 % (ref 43–75)
NRBC BLD AUTO-RTO: 0 /100 WBCS
PCO2 BLD: 24 MMOL/L (ref 21–32)
PCO2 BLD: 32.5 MM HG (ref 42–50)
PH BLD: 7.46 [PH] (ref 7.3–7.4)
PLATELET # BLD AUTO: 455 THOUSANDS/UL (ref 149–390)
PMV BLD AUTO: 9.3 FL (ref 8.9–12.7)
PO2 BLD: 18 MM HG (ref 35–45)
POTASSIUM BLD-SCNC: 3.8 MMOL/L (ref 3.5–5.3)
POTASSIUM SERPL-SCNC: 3.8 MMOL/L (ref 3.5–5.3)
PROT SERPL-MCNC: 6.8 G/DL (ref 6.4–8.4)
RBC # BLD AUTO: 3.63 MILLION/UL (ref 3.88–5.62)
RSV RNA RESP QL NAA+PROBE: NEGATIVE
SAO2 % BLD FROM PO2: 30 % (ref 60–85)
SARS-COV-2 RNA RESP QL NAA+PROBE: NEGATIVE
SODIUM BLD-SCNC: 128 MMOL/L (ref 136–145)
SODIUM SERPL-SCNC: 129 MMOL/L (ref 135–147)
SPECIMEN SOURCE: ABNORMAL
WBC # BLD AUTO: 8.89 THOUSAND/UL (ref 4.31–10.16)

## 2022-12-31 RX ADMIN — SODIUM CHLORIDE 1000 ML: 0.9 INJECTION, SOLUTION INTRAVENOUS at 16:21

## 2022-12-31 NOTE — ED PROVIDER NOTES
History  Chief Complaint   Patient presents with   • Weakness - Generalized     Patient presents to the ED with c/o cough and weakness x1 week, states BS >300 x2 days      [de-identified]year old male presents for evaluation of shortness of breath, poor appetite, lightheadedness and fatigue associated with flu-like illness for the past week  Family members with flu-like illness as well  Patient's family checked his blood glucose level and found that it was elevated > 300 for the past 2 days  He had previously been prescribed glimepiride in addition to his metformin; however, it had been discontinued approximately 2 months ago  He attempted to improve his blood glucose levels by resuming the prescription with a dose taken yesterday and a partial dose taken this morning  History provided by:  Patient  URI  Presenting symptoms: congestion, cough and fatigue    Presenting symptoms: no fever    Duration:  1 week  Timing:  Constant  Progression:  Worsening  Chronicity:  New  Relieved by:  Nothing  Worsened by:  Nothing  Ineffective treatments: fluids, adding glipizide  Associated symptoms: headaches and myalgias    Risk factors: diabetes mellitus and sick contacts        Prior to Admission Medications   Prescriptions Last Dose Informant Patient Reported? Taking?    B Complex-Biotin-FA (B COMPLETE) TABS   Yes No   Sig: Take by mouth in the morning   Blood Glucose Monitoring Suppl (CONTOUR NEXT MONITOR) w/Device KIT   No No   Si Device by Does not apply route 2 (two) times a day   Coenzyme Q10 (COQ10 PO)   Yes No   Sig: Take by mouth in the morning   Microlet Lancets MISC   No No   Sig: Test blood sugar  2 x daily   Multiple Vitamin (multivitamin) tablet   Yes No   Sig: Take 1 tablet by mouth daily   Specialty Vitamins Products (MAGNESIUM, AMINO ACID CHELATE,) 133 MG tablet   Yes No   Sig: Take 1 tablet by mouth in the morning   Thiamine 50 MG CAPS   Yes No   Sig: Take 1 capsule by mouth daily   amLODIPine (NORVASC) 5 mg tablet   No No   Sig: TAKE 1 TABLET BY MOUTH DAILY   docusate sodium (COLACE) 100 mg capsule   Yes No   Sig: Take 1 capsule by mouth as needed   glucose blood (Contour Next Test) test strip   No No   Sig: Use 1 each in the morning   hydrochlorothiazide (MICROZIDE) 12 5 mg capsule   No No   Sig: TAKE 1 CAPSULE IN THE MORNING ON EVEN DAYS OF THE MONTH   ipratropium (ATROVENT) 0 03 % nasal spray   No No   Si sprays into each nostril 3 (three) times a day If needed for runny nose   lisinopril (ZESTRIL) 20 mg tablet   No No   Sig: TAKE 1 TABLET BY MOUTH DAILY   metFORMIN (GLUCOPHAGE-XR) 500 mg 24 hr tablet   No No   Sig: TAKE TWO TABLETS BY MOUTH TWICE A DAY IN THE MORNING AND EVENING WITH MEALS   simvastatin (ZOCOR) 40 mg tablet   No No   Sig: TAKE ONE TABLET BY MOUTH EVERY DAY      Facility-Administered Medications: None       Past Medical History:   Diagnosis Date   • Abnormal blood chemistry    • Abnormal glucose     Last assessed - 14   • Benign essential hypertension     Last assessed - 5/15/17   • Chronic allergic rhinitis     Last assessed - 16   • Dermatitis     Last assessed - 6/16/15   • High risk medication use     Last assessed - 16   • Sciatica     Last assessed - 12   • Septic bursitis     Last assessed - 9/3/13   • Tick bite     Last assessed - 16       Past Surgical History:   Procedure Laterality Date   • CATARACT EXTRACTION     • COLONOSCOPY      Fiberoptic   • PILONIDAL CYST EXCISION         Family History   Problem Relation Age of Onset   • Hypertension Mother    • Osteoporosis Mother    • Diabetes type I Paternal Aunt    • Colon cancer Family      I have reviewed and agree with the history as documented      E-Cigarette/Vaping     E-Cigarette/Vaping Substances     Social History     Tobacco Use   • Smoking status: Former     Packs/day: 1 00     Years: 10 00     Pack years: 10 00     Types: Cigarettes     Quit date: 1987     Years since quittin 5   • Smokeless tobacco: Never   Substance Use Topics   • Alcohol use: Yes     Alcohol/week: 6 0 standard drinks     Types: 1 Glasses of wine, 5 Cans of beer per week     Comment: social usage   • Drug use: No       Review of Systems   Constitutional: Positive for appetite change and fatigue  Negative for chills and fever  HENT: Positive for congestion  Respiratory: Positive for cough and shortness of breath  Cardiovascular: Negative for chest pain  Gastrointestinal: Positive for abdominal pain (generalized cramping) and diarrhea (2 loose stools today)  Negative for constipation, nausea and vomiting  Musculoskeletal: Positive for myalgias  Skin: Negative for rash and wound  Neurological: Positive for light-headedness and headaches  Negative for syncope  Psychiatric/Behavioral: Positive for sleep disturbance (insomnia)  All other systems reviewed and are negative  Physical Exam  Physical Exam  Vitals and nursing note reviewed  Constitutional:       General: He is not in acute distress  Appearance: He is well-developed  He is ill-appearing  He is not diaphoretic  HENT:      Head: Normocephalic and atraumatic  Right Ear: External ear normal       Left Ear: External ear normal       Nose: Nose normal    Eyes:      General: No scleral icterus  Cardiovascular:      Rate and Rhythm: Normal rate and regular rhythm  Heart sounds: Normal heart sounds  Pulmonary:      Effort: Pulmonary effort is normal  No respiratory distress  Breath sounds: Rhonchi (bilateral lower lung fields) present  Abdominal:      General: There is no distension  Palpations: Abdomen is soft  Tenderness: There is no abdominal tenderness  Musculoskeletal:         General: No deformity  Normal range of motion  Skin:     Findings: No rash  Neurological:      General: No focal deficit present  Mental Status: He is alert and oriented to person, place, and time     Psychiatric:         Mood and Affect: Mood normal          Vital Signs  ED Triage Vitals   Temperature Pulse Respirations Blood Pressure SpO2   12/31/22 1554 12/31/22 1550 12/31/22 1550 12/31/22 1550 12/31/22 1550   97 7 °F (36 5 °C) 82 18 170/72 99 %      Temp Source Heart Rate Source Patient Position - Orthostatic VS BP Location FiO2 (%)   12/31/22 1554 12/31/22 1550 12/31/22 1550 12/31/22 1550 --   Oral Monitor Sitting Right arm       Pain Score       12/31/22 1550       No Pain           Vitals:    12/31/22 1550 12/31/22 1755 12/31/22 1758 12/31/22 1759   BP: 170/72 161/70 158/67 153/70   Pulse: 82 76 90 94   Patient Position - Orthostatic VS: Sitting Lying - Orthostatic VS Sitting - Orthostatic VS Standing - Orthostatic VS         Visual Acuity      ED Medications  Medications   sodium chloride 0 9 % bolus 1,000 mL (0 mL Intravenous Stopped 12/31/22 1757)       Diagnostic Studies  Results Reviewed     Procedure Component Value Units Date/Time    FLU/RSV/COVID - if FLU/RSV clinically relevant [[id number]]  (Normal) Collected: 12/31/22 1615    Lab Status: Final result Specimen: Nares from Nose Updated: 12/31/22 1710     SARS-CoV-2 Negative     INFLUENZA A PCR Negative     INFLUENZA B PCR Negative     RSV PCR Negative    Narrative:      FOR PEDIATRIC PATIENTS - copy/paste COVID Guidelines URL to browser: [email or web address]  [email or web address]    SARS-CoV-2 assay is a Nucleic Acid Amplification assay intended for the  qualitative detection of nucleic acid from SARS-CoV-2 in nasopharyngeal  swabs  Results are for the presumptive identification of SARS-CoV-2 RNA  Positive results are indicative of infection with SARS-CoV-2, the virus  causing COVID-19, but do not rule out bacterial infection or co-infection  with other viruses  Laboratories within the [place] and its  territories are required to report all positive results to the appropriate  public health authorities   Negative results do not preclude SARS-CoV-2  infection and should not be used as the sole basis for treatment or other  patient management decisions  Negative results must be combined with  clinical observations, patient history, and epidemiological information  This test has not been FDA cleared or approved  This test has been authorized by FDA under an Emergency Use Authorization  (EUA)  This test is only authorized for the duration of time the  declaration that circumstances exist justifying the authorization of the  emergency use of an in vitro diagnostic tests for detection of SARS-CoV-2  virus and/or diagnosis of COVID-19 infection under section 564(b)(1) of  the Act, 21 U  S C  462BDP-4(H)(7), unless the authorization is terminated  or revoked sooner  The test has been validated but independent review by FDA  and CLIA is pending  Test performed using [employer] GeneXpert: This RT-PCR assay targets N2,  a region unique to SARS-CoV-2  A conserved region in the E-gene was chosen  for pan-Sarbecovirus detection which includes SARS-CoV-2  According to CMS-2020-01-R, this platform meets the definition of high-throughput technology      Comprehensive metabolic panel [[id number]]  (Abnormal) Collected: 12/31/22 1615    Lab Status: Final result Specimen: Blood from Arm, Right Updated: 12/31/22 1701     Sodium 129 mmol/L      Potassium 3 8 mmol/L      Chloride 94 mmol/L      CO2 24 mmol/L      ANION GAP 11 mmol/L      BUN 15 mg/dL      Creatinine 1 12 mg/dL      Glucose 265 mg/dL      Calcium 8 9 mg/dL      Corrected Calcium 9 6 mg/dL      AST 26 U/L      ALT 41 U/L      Alkaline Phosphatase 131 U/L      Total Protein 6 8 g/dL      Albumin 3 1 g/dL      Total Bilirubin 0 50 mg/dL      eGFR 61 ml/min/1 73sq m     Narrative:      [place] guidelines for Chronic Kidney Disease (CKD):   •  Stage 1 with normal or high GFR (GFR > 90 mL/min/1 73 square meters)  •  Stage 2 Mild CKD (GFR = 60-89 mL/min/1 73 square meters)  • Stage 3A Moderate CKD (GFR = 45-59 mL/min/1 73 square meters)  •  Stage 3B Moderate CKD (GFR = 30-44 mL/min/1 73 square meters)  •  Stage 4 Severe CKD (GFR = 15-29 mL/min/1 73 square meters)  •  Stage 5 End Stage CKD (GFR <15 mL/min/1 73 square meters)  Note: GFR calculation is accurate only with a steady state creatinine    CBC and differential [[id number]]  (Abnormal) Collected: 12/31/22 1615    Lab Status: Final result Specimen: Blood from Arm, Right Updated: 12/31/22 1629     WBC 8 89 Thousand/uL      RBC 3 63 Million/uL      Hemoglobin 10 5 g/dL      Hematocrit 32 3 %      MCV 89 fL      MCH 28 9 pg      MCHC 32 5 g/dL      RDW 12 4 %      MPV 9 3 fL      Platelets 293 Thousands/uL      nRBC 0 /100 WBCs      Neutrophils Relative 77 %      Immat GRANS % 1 %      Lymphocytes Relative 11 %      Monocytes Relative 11 %      Eosinophils Relative 0 %      Basophils Relative 0 %      Neutrophils Absolute 6 88 Thousands/µL      Immature Grans Absolute 0 06 Thousand/uL      Lymphocytes Absolute 0 98 Thousands/µL      Monocytes Absolute 0 94 Thousand/µL      Eosinophils Absolute 0 01 Thousand/µL      Basophils Absolute 0 02 Thousands/µL     POCT Blood Gas (CG8+) [[id number]]  (Abnormal) Collected: 12/31/22 1625    Lab Status: Final result Specimen: Venous Updated: 12/31/22 1629     ph, Devon ISTAT 7 461     pCO2, Devon i-STAT 32 5 mm HG      pO2, [place] i-STAT 18 0 mm HG      BE, i-STAT 0 mmol/L      HCO3, Devon i-STAT 23 2 mmol/L      CO2, i-STAT 24 mmol/L      O2 Sat, i-STAT 30 %      SODIUM, I-STAT 128 mmol/l      Potassium, i-STAT 3 8 mmol/L      Calcium, Ionized i-STAT 1 13 mmol/L      Hct, i-STAT 31 %      Hgb, i-STAT 10 5 g/dl      Glucose, i-STAT 255 mg/dl      Specimen Type VENOUS    Fingerstick Glucose (POCT) [[id number]]  (Abnormal) Collected: 12/31/22 1552    Lab Status: Final result Updated: 12/31/22 1603     POC Glucose 290 mg/dl                  XR chest 1 view portable   ED Interpretation by Eloina Valle, MD (12/31 1631)   No acute pulmonary pathology                 Procedures  Procedures         ED Course  ED Course as of 12/31/22 1804   Sat Dec 31, 2022   1635 Hemoglobin(!): 10 5  10 8 two years ago   1703 Sodium(!): 129  132 when corrected for hyperglycemia, 131 two months ago                               SBIRT 20yo+    Flowsheet Row Most Recent Value   SBIRT (25 yo +)    In order to provide better care to our patients, we are screening all of our patients for alcohol and drug use  Would it be okay to ask you these screening questions? No Filed at: 12/31/2022 1613                    MDM  Number of Diagnoses or Management Options  Fatigue: new and requires workup  Hyperglycemia: new and requires workup  Insomnia: new and requires workup  URI (upper respiratory infection): new and requires workup  Diagnosis management comments: [de-identified]year old male presents for evaluation of flu-like illness associated with shortness of breath and lightheadedness  Hyperglycemia on labs  Mild chronic hyponatremia  1 L NS given  Negative orthostatic vital signs  Patient advised to avoid sugary beverages and increase water intake for hyperglycemia  Melatonin for insomnia  PCP and endocrinology follow up         Amount and/or Complexity of Data Reviewed  Clinical lab tests: ordered and reviewed  Tests in the radiology section of CPT®: ordered  Independent visualization of images, tracings, or specimens: yes        Disposition  Final diagnoses:   Hyperglycemia   Fatigue   URI (upper respiratory infection)   Insomnia     Time reflects when diagnosis was documented in both MDM as applicable and the Disposition within this note     Time User Action Codes Description Comment    12/31/2022  5:17 PM Cesar Onofre Add [R73 9] Hyperglycemia     12/31/2022  6:01 PM Cesar Onofre Add [R53 83] Fatigue     12/31/2022  6:01 PM Rosie Cash Add [J06 9] URI (upper respiratory infection)     12/31/2022  6:02 PM Sharita GONZALES Add [G47 00] Insomnia       ED Disposition     ED Disposition   Discharge    Condition   Stable    Date/Time   Sat Dec 31, 2022  6:02 PM    [address] discharge to home/self care  Follow-up Information     Follow up With Specialties Details Why Contact Info Additional [address] [facility] Endocrinology Call in 2 days to discuss your recent high blood glucose levels [address] [facility] [place]  [address] [address], [address] [facility], [address] [facility], [address], [address]     [address] Emergency Department Emergency Medicine Go to  If symptoms worsen [address] [place],[address] [address]  [address] [facility] Emergency Department, [address] [place], [facility], [address]    Kym Ramos,  Family Medicine Schedule an appointment as soon as possible for a visit in 3 days for re-evaluation if you continue having difficulty sleeping [address] [place] [id number] [phone]             Patient's Medications   Discharge Prescriptions    No medications on file       No discharge procedures on file      PDMP Review     None          ED Provider  Electronically Signed by           Landon Murray MD  12/31/22 8777

## 2023-01-03 ENCOUNTER — TELEPHONE (OUTPATIENT)
Dept: FAMILY MEDICINE CLINIC | Facility: CLINIC | Age: 83
End: 2023-01-03

## 2023-01-03 DIAGNOSIS — E11.42 TYPE 2 DIABETES MELLITUS WITH DIABETIC POLYNEUROPATHY, WITHOUT LONG-TERM CURRENT USE OF INSULIN (HCC): Primary | ICD-10-CM

## 2023-01-03 RX ORDER — GLIMEPIRIDE 1 MG/1
1 TABLET ORAL 2 TIMES DAILY
Qty: 60 TABLET | Refills: 0 | Status: SHIPPED | OUTPATIENT
Start: 2023-01-03 | End: 2023-02-02

## 2023-01-03 NOTE — TELEPHONE ENCOUNTER
Patient was in the ER for hypoglycemia, he seems to be doing better  Has stomach pains with diarrhea but seems to be slightly better  Just wanted to give you a heads up and see if you wanted to see him at all  Wanted to see if he can take some pepto or what else he can do for the stomach ache

## 2023-01-03 NOTE — TELEPHONE ENCOUNTER
I would not to take my diabetic medications tonight or tomorrow morning until we see if he is going to be able to eat and not get abdominal cramping though remind him to not taking his diabetic meds tonight or tomorrow its probably a virus so what we will see how he does within a day or 2

## 2023-01-03 NOTE — TELEPHONE ENCOUNTER
Called Pt with message - Pt said that he got a good nights sleep & feeling better - Dr Dick Rosas' office called & said that Pt should start his Metformin & other one again - since Pt is feeling better he will do as recommended by Endo

## 2023-01-13 ENCOUNTER — OFFICE VISIT (OUTPATIENT)
Dept: FAMILY MEDICINE CLINIC | Facility: HOSPITAL | Age: 83
End: 2023-01-13

## 2023-01-13 VITALS
BODY MASS INDEX: 20.1 KG/M2 | HEIGHT: 72 IN | TEMPERATURE: 97.6 F | HEART RATE: 80 BPM | SYSTOLIC BLOOD PRESSURE: 128 MMHG | WEIGHT: 148.4 LBS | DIASTOLIC BLOOD PRESSURE: 56 MMHG

## 2023-01-13 DIAGNOSIS — E11.9 TYPE 2 DIABETES MELLITUS WITHOUT COMPLICATION, WITHOUT LONG-TERM CURRENT USE OF INSULIN (HCC): Primary | ICD-10-CM

## 2023-01-13 DIAGNOSIS — M51.26 HERNIATED LUMBAR INTERVERTEBRAL DISC: ICD-10-CM

## 2023-01-13 DIAGNOSIS — R35.1 BENIGN PROSTATIC HYPERPLASIA WITH NOCTURIA: ICD-10-CM

## 2023-01-13 DIAGNOSIS — E78.2 COMBINED HYPERLIPIDEMIA: ICD-10-CM

## 2023-01-13 DIAGNOSIS — N40.1 BENIGN PROSTATIC HYPERPLASIA WITH NOCTURIA: ICD-10-CM

## 2023-01-13 DIAGNOSIS — I10 BENIGN ESSENTIAL HYPERTENSION: ICD-10-CM

## 2023-01-13 NOTE — PROGRESS NOTES
Name: Arlyn Cowden      : 1940      MRN: [id number]  Encounter Provider: Kris Low MD  Encounter Date: 2023   Encounter department: [facility]ent\[facility] [place] [facility]\""      1  Type 2 diabetes mellitus without complication, without long-term current use of insulin ([facility] )    2  Benign essential hypertension    3  Combined hyperlipidemia  -     Lipid Panel with Direct LDL reflex; Future    4  Benign prostatic hyperplasia with nocturia    5  Herniated lumbar intervertebral disc      Overall Ida Wong is doing well  Diabetes  Stable  Follows with Endo  Discussed being cautious about use of glimeperide due to risk for hypoglycemia  Continue to monitor  HTN  Has had low diastolic pressure  Dc hctz for now  Monitor  May need to increase either amlodipine of lisinopril if Bp rises  He will monitor Bp at home and will let me know if consistently high  HLD  Has had low LDL on simvastatin 40  Somewhat worried about se/ar from simvastatin as had occasional muscle cramping/leg pain  Will udpate labs -lipid panel  If ldl continues to be low will highly consider lowering simvastatin to 20 mg daily  Low back pain  Has been doing relatively well  Does home exercises and this has been helpful  Subjective      Pt here to establish care  Reviewed previous notes  Reviewed labs  Known dm, htn, low back pain, hld  Overall doing well  Sees Endo for Diabetes  Medications restarted recetnly  Back on glimeperide  No episodes of hypoglycemia  However he did have viral infection  Non covid, non  Influenza but it did raise his blood sugars  He has regained a lot of his strength and appetitte back since then  Noting a lower Bp for diastolic  Occasionally feeling some lightheadedness  Review of Systems   Constitutional: Negative  Negative for activity change, appetite change, chills and diaphoresis     HENT: Negative for congestion and dental problem  Respiratory: Negative  Negative for apnea, chest tightness, shortness of breath and wheezing  Cardiovascular: Negative  Negative for chest pain, palpitations and leg swelling  Gastrointestinal: Negative  Negative for abdominal distention, abdominal pain, constipation, diarrhea and nausea  Genitourinary: Negative  Negative for difficulty urinating, dysuria and frequency  Neurological: Positive for light-headedness         Current Outpatient Medications on File Prior to Visit   Medication Sig   • amLODIPine (NORVASC) 5 mg tablet TAKE 1 TABLET BY MOUTH DAILY   • B Complex-Biotin-FA (B COMPLETE) TABS Take by mouth in the morning   • Blood Glucose Monitoring Suppl (CONTOUR NEXT MONITOR) w/Device KIT 1 Device by Does not apply route 2 (two) times a day   • Coenzyme Q10 (COQ10 PO) Take by mouth in the morning   • docusate sodium (COLACE) 100 mg capsule Take 1 capsule by mouth as needed   • glimepiride (AMARYL) 1 mg tablet Take 1 tablet (1 mg total) by mouth 2 (two) times a day   • glucose blood (Contour Next Test) test strip Use 1 each in the morning   • ipratropium (ATROVENT) 0 03 % nasal spray 2 sprays into each nostril 3 (three) times a day If needed for runny nose   • lisinopril (ZESTRIL) 20 mg tablet TAKE 1 TABLET BY MOUTH DAILY   • metFORMIN (GLUCOPHAGE-XR) 500 mg 24 hr tablet TAKE TWO TABLETS BY MOUTH TWICE A DAY IN THE MORNING AND EVENING WITH MEALS   • Microlet Lancets MISC Test blood sugar  2 x daily   • Multiple Vitamin (multivitamin) tablet Take 1 tablet by mouth daily   • simvastatin (ZOCOR) 40 mg tablet TAKE ONE TABLET BY MOUTH EVERY DAY   • Specialty Vitamins Products (MAGNESIUM, AMINO ACID CHELATE,) 133 MG tablet Take 1 tablet by mouth in the morning   • Thiamine 50 MG CAPS Take 1 capsule by mouth daily       Objective     /56 (Patient Position: Sitting, Cuff Size: Standard)   Pulse 80   Temp 97 6 °F (36 4 °C) (Tympanic)   Ht 6' (1 829 m)   Wt 67 3 kg (148 lb 6 4 oz) BMI 20 13 kg/m²     Physical Exam  Vitals and nursing note reviewed  Constitutional:       General: He is not in acute distress  Appearance: He is well-developed  He is not ill-appearing  HENT:      Head: Normocephalic and atraumatic  Right Ear: Tympanic membrane, ear canal and external ear normal       Left Ear: Tympanic membrane, ear canal and external ear normal       Nose: Nose normal  No congestion or rhinorrhea  Mouth/Throat:      Mouth: Mucous membranes are moist       Pharynx: No oropharyngeal exudate or posterior oropharyngeal erythema  Eyes:      Extraocular Movements: Extraocular movements intact  Conjunctiva/sclera: Conjunctivae normal       Pupils: Pupils are equal, round, and reactive to light  Cardiovascular:      Rate and Rhythm: Normal rate and regular rhythm  Heart sounds: Normal heart sounds  No murmur heard  No friction rub  No gallop  Pulmonary:      Effort: Pulmonary effort is normal  No respiratory distress  Breath sounds: Normal breath sounds  No wheezing or rales  Chest:      Chest wall: No tenderness  Abdominal:      General: Bowel sounds are normal  There is no distension  Palpations: Abdomen is soft  There is no mass  Tenderness: There is no abdominal tenderness  There is no guarding or rebound  Musculoskeletal:         General: Normal range of motion  Cervical back: Normal range of motion and neck supple  Skin:     General: Skin is warm  Capillary Refill: Capillary refill takes less than 2 seconds  Neurological:      Mental Status: He is alert and oriented to person, place, and time     Psychiatric:         Mood and Affect: Mood normal          Behavior: Behavior normal        Marcie Severs, MD

## 2023-01-24 ENCOUNTER — APPOINTMENT (OUTPATIENT)
Dept: LAB | Facility: HOSPITAL | Age: 83
End: 2023-01-24

## 2023-01-24 DIAGNOSIS — E78.2 COMBINED HYPERLIPIDEMIA: ICD-10-CM

## 2023-01-24 DIAGNOSIS — E11.42 TYPE 2 DIABETES MELLITUS WITH DIABETIC POLYNEUROPATHY, WITHOUT LONG-TERM CURRENT USE OF INSULIN (HCC): ICD-10-CM

## 2023-01-24 LAB
ANION GAP SERPL CALCULATED.3IONS-SCNC: 6 MMOL/L (ref 4–13)
BUN SERPL-MCNC: 13 MG/DL (ref 5–25)
CALCIUM SERPL-MCNC: 9.5 MG/DL (ref 8.3–10.1)
CHLORIDE SERPL-SCNC: 103 MMOL/L (ref 96–108)
CHOLEST SERPL-MCNC: 95 MG/DL
CO2 SERPL-SCNC: 27 MMOL/L (ref 21–32)
CREAT SERPL-MCNC: 0.9 MG/DL (ref 0.6–1.3)
GFR SERPL CREATININE-BSD FRML MDRD: 80 ML/MIN/1.73SQ M
GLUCOSE P FAST SERPL-MCNC: 135 MG/DL (ref 65–99)
HDLC SERPL-MCNC: 34 MG/DL
LDLC SERPL CALC-MCNC: 45 MG/DL (ref 0–100)
POTASSIUM SERPL-SCNC: 4.1 MMOL/L (ref 3.5–5.3)
SODIUM SERPL-SCNC: 136 MMOL/L (ref 135–147)
TRIGL SERPL-MCNC: 80 MG/DL

## 2023-01-25 LAB
EST. AVERAGE GLUCOSE BLD GHB EST-MCNC: 206 MG/DL
HBA1C MFR BLD: 8.8 %

## 2023-01-27 ENCOUNTER — RA CDI HCC (OUTPATIENT)
Dept: OTHER | Facility: HOSPITAL | Age: 83
End: 2023-01-27

## 2023-01-27 NOTE — PROGRESS NOTES
[address]  coding opportunities       Chart reviewed, no opportunity found: CHART REVIEWED, NO OPPORTUNITY FOUND        Patients Insurance     Medicare Insurance: Medicare

## 2023-02-02 DIAGNOSIS — E78.2 COMBINED HYPERLIPIDEMIA: ICD-10-CM

## 2023-02-02 DIAGNOSIS — E11.42 TYPE 2 DIABETES MELLITUS WITH DIABETIC POLYNEUROPATHY, WITHOUT LONG-TERM CURRENT USE OF INSULIN (HCC): ICD-10-CM

## 2023-02-02 RX ORDER — GLIMEPIRIDE 1 MG/1
TABLET ORAL
Qty: 60 TABLET | Refills: 0 | Status: SHIPPED | OUTPATIENT
Start: 2023-02-02

## 2023-02-02 RX ORDER — SIMVASTATIN 40 MG
TABLET ORAL
Qty: 90 TABLET | Refills: 0 | Status: SHIPPED | OUTPATIENT
Start: 2023-02-02 | End: 2023-02-03

## 2023-02-03 ENCOUNTER — OFFICE VISIT (OUTPATIENT)
Dept: FAMILY MEDICINE CLINIC | Facility: HOSPITAL | Age: 83
End: 2023-02-03

## 2023-02-03 ENCOUNTER — OFFICE VISIT (OUTPATIENT)
Dept: ENDOCRINOLOGY | Facility: CLINIC | Age: 83
End: 2023-02-03

## 2023-02-03 VITALS
TEMPERATURE: 96.7 F | HEIGHT: 72 IN | DIASTOLIC BLOOD PRESSURE: 60 MMHG | BODY MASS INDEX: 20.83 KG/M2 | SYSTOLIC BLOOD PRESSURE: 130 MMHG | WEIGHT: 153.8 LBS | HEART RATE: 80 BPM

## 2023-02-03 VITALS
DIASTOLIC BLOOD PRESSURE: 60 MMHG | SYSTOLIC BLOOD PRESSURE: 140 MMHG | WEIGHT: 153.8 LBS | HEART RATE: 79 BPM | BODY MASS INDEX: 20.83 KG/M2 | HEIGHT: 72 IN

## 2023-02-03 DIAGNOSIS — R79.89 ABNORMAL TSH: ICD-10-CM

## 2023-02-03 DIAGNOSIS — E78.2 MIXED HYPERLIPIDEMIA: ICD-10-CM

## 2023-02-03 DIAGNOSIS — Z23 ENCOUNTER FOR IMMUNIZATION: ICD-10-CM

## 2023-02-03 DIAGNOSIS — R79.89 ELEVATED TSH: ICD-10-CM

## 2023-02-03 DIAGNOSIS — E11.42 TYPE 2 DIABETES MELLITUS WITH DIABETIC POLYNEUROPATHY, WITHOUT LONG-TERM CURRENT USE OF INSULIN (HCC): Primary | ICD-10-CM

## 2023-02-03 DIAGNOSIS — E11.9 TYPE 2 DIABETES MELLITUS WITHOUT COMPLICATION, WITHOUT LONG-TERM CURRENT USE OF INSULIN (HCC): ICD-10-CM

## 2023-02-03 DIAGNOSIS — R89.9 ABNORMAL LABORATORY TEST: ICD-10-CM

## 2023-02-03 LAB
LEFT EYE DIABETIC RETINOPATHY: NORMAL
LEFT EYE IMAGE QUALITY: NORMAL
LEFT EYE MACULAR EDEMA: NORMAL
LEFT EYE OTHER RETINOPATHY: NORMAL
RIGHT EYE DIABETIC RETINOPATHY: NORMAL
RIGHT EYE IMAGE QUALITY: NORMAL
RIGHT EYE MACULAR EDEMA: NORMAL
RIGHT EYE OTHER RETINOPATHY: NORMAL
SEVERITY (EYE EXAM): NORMAL

## 2023-02-03 RX ORDER — SIMVASTATIN 20 MG
20 TABLET ORAL
Qty: 90 TABLET | Refills: 3 | Status: SHIPPED | OUTPATIENT
Start: 2023-02-03

## 2023-02-03 RX ORDER — PERPHENAZINE 16 MG/1
TABLET, FILM COATED ORAL
Qty: 100 STRIP | Refills: 3 | Status: SHIPPED | OUTPATIENT
Start: 2023-02-03

## 2023-02-03 NOTE — ASSESSMENT & PLAN NOTE
Currently tightly controlled on simvastatin  Following with primary care today and already planned to discuss

## 2023-02-03 NOTE — PROGRESS NOTES
Established Patient Progress Note      CC: Diabetes Mellitus, Type 2     History of Present Illness:   Dusty Anderson is a [de-identified] y o  male with a history of type 2 diabetes without long term use of insulin for approximately 20 years  Reports complications of neuropathy and microalbuminuria  Denies recent illness or hospitalizations  Denies recent severe hypoglycemic or severe hyperglycemic episodes  Denies any issues with his current regimen  Home glucose monitoring: are performed regularly twice daily  Hemoglobin A1c was 8 8% in 1/24/2023, had several weeks of elevated glucose levels mid-end of December  Blood sugars ranging  mg/dL     Cost of januvia was too high previously    Cost of jardiance was too high when checked with insurance       Current regimen:   Metformin ER 1000 mg BID, last GFR 71 from 10/31/2022  Glimepiride 1 mg twice daily as needed for BGL >180 mg/dL, has not been using regularly since recovering from viral illness       Last Eye Exam: scheduled for February 28, 2023  Last Foot Exam: 8/11/2022     Has hypertension: Taking lisinopril, HCTZ   Has hyperlipidemia: Taking simvastatin                Patient Active Problem List   Diagnosis   • Benign essential hypertension   • Benign prostatic hyperplasia with lower urinary tract symptoms   • Esophageal dysphagia   • Herniated lumbar intervertebral disc   • Hyperlipidemia   • Vasomotor rhinitis   • Elevated TSH   • Type 2 diabetes mellitus with diabetic polyneuropathy ([facility] [address] )   • Type 2 diabetes mellitus without complication, without long-term current use of insulin ([facility] [address] )   • Spinal stenosis, lumbar region, with neurogenic claudication   • Radiculopathy   • Chronic pain syndrome   • Lumbar spondylosis   • Chronic right-sided low back pain without sciatica      Past Medical History:   Diagnosis Date   • Abnormal blood chemistry    • Abnormal glucose     Last assessed - 2/20/14   • Benign essential hypertension     Last assessed - 5/15/17   • Cancer [facility]) 2007    melanoma   • Chronic allergic rhinitis     Last assessed - 16   • Dermatitis     Last assessed - 6/16/15   • Diabetes mellitus ([address] )     type 11   • High risk medication use     Last assessed - 16   • Hypertension    • Sciatica     Last assessed - 12   • Septic bursitis     Last assessed - 9/3/13   • Tick bite     Last assessed - 16      Past Surgical History:   Procedure Laterality Date   • CATARACT EXTRACTION     • COLONOSCOPY      Fiberoptic   • PILONIDAL CYST EXCISION        Family History   Problem Relation Age of Onset   • Hypertension Mother    • Osteoporosis Mother    • Diabetes type I Paternal Aunt    • Colon cancer Family      Social History     Tobacco Use   • Smoking status: Former     Packs/day:      Years: 10 00     Pack years: 10 00     Types: Cigarettes     Quit date: 1987     Years since quittin 6   • Smokeless tobacco: Never   Substance Use Topics   • Alcohol use:  Yes     Alcohol/week: 6 0 standard drinks     Types: 1 Glasses of wine, 5 Cans of beer per week     Comment: social usage     No Known Allergies      Current Outpatient Medications:   •  B Complex-Biotin-FA (B COMPLETE) TABS, Take by mouth in the morning, Disp: , Rfl:   •  Blood Glucose Monitoring Suppl (CONTOUR NEXT MONITOR) w/Device KIT, 1 Device by Does not apply route 2 (two) times a day, Disp: 1 kit, Rfl: 0  •  Coenzyme Q10 (COQ10 PO), Take by mouth in the morning, Disp: , Rfl:   •  docusate sodium (COLACE) 100 mg capsule, Take 1 capsule by mouth as needed, Disp: , Rfl:   •  glimepiride (AMARYL) 1 mg tablet, TAKE 1 TABLET BY MOUTH TWO TIMES A DAY (Patient taking differently: Take 1 mg by mouth 2 (two) times a day as needed (if BG is above >180)), Disp: 60 tablet, Rfl: 0  •  glucose blood (Contour Next Test) test strip, Use as directed 2-3 times per day , Disp: 100 strip, Rfl: 3  •  ipratropium (ATROVENT) 0 03 % nasal spray, 2 sprays into each nostril 3 (three) times a day If needed for runny nose, Disp: 30 mL, Rfl: 1  •  lisinopril (ZESTRIL) 20 mg tablet, TAKE 1 TABLET BY MOUTH DAILY, Disp: 90 tablet, Rfl: 3  •  metFORMIN (GLUCOPHAGE-XR) 500 mg 24 hr tablet, TAKE TWO TABLETS BY MOUTH TWICE A DAY IN THE MORNING AND EVENING WITH MEALS, Disp: 360 tablet, Rfl: 1  •  Microlet Lancets MISC, Test blood sugar  2 x daily, Disp: 100 each, Rfl: 3  •  Multiple Vitamin (multivitamin) tablet, Take 1 tablet by mouth daily, Disp: , Rfl:   •  Specialty Vitamins Products (MAGNESIUM, AMINO ACID CHELATE,) 133 MG tablet, Take 1 tablet by mouth in the morning, Disp: , Rfl:   •  Thiamine 50 MG CAPS, Take 1 capsule by mouth daily, Disp: , Rfl:   •  simvastatin (ZOCOR) 20 mg tablet, Take 1 tablet (20 mg total) by mouth daily at bedtime, Disp: 90 tablet, Rfl: 3    Review of Systems   Constitutional: Negative for activity change, appetite change, fatigue and unexpected weight change  HENT: Negative for sore throat, tinnitus and voice change  Eyes: Negative for visual disturbance  Respiratory: Negative for cough and shortness of breath  Cardiovascular: Negative for chest pain and palpitations  Gastrointestinal: Negative for abdominal distention, diarrhea, nausea and vomiting  Endocrine: Negative for cold intolerance, heat intolerance, polydipsia and polyuria  Skin: Negative for color change, pallor and rash  Neurological: Negative for dizziness, light-headedness and headaches  Physical Exam:  Body mass index is 20 86 kg/m²  /60 (BP Location: Left arm, Patient Position: Sitting, Cuff Size: Standard)   Pulse 79   Ht 6' (1 829 m)   Wt 69 8 kg (153 lb 12 8 oz)   BMI 20 86 kg/m²    Wt Readings from Last 3 Encounters:   02/03/23 69 8 kg (153 lb 12 8 oz)   02/03/23 69 8 kg (153 lb 12 8 oz)   01/13/23 67 3 kg (148 lb 6 4 oz)       Physical Exam   Vitals reviewed  Constitutional:       Appearance: Normal appearance     Cardiovascular:      Rate and Rhythm: Normal rate and regular rhythm  Pulses: Normal pulses  Heart sounds: Normal heart sounds  Pulmonary:      Effort: Pulmonary effort is normal       Breath sounds: Normal breath sounds  Musculoskeletal:         General: Normal range of motion  Cervical back: Normal range of motion and neck supple  Skin:     General: Skin is warm and dry  Capillary Refill: Capillary refill takes less than 2 seconds  Neurological:      General: No focal deficit present  Mental Status: He is alert and oriented to person, place, and time     Psychiatric:         Mood and Affect: Mood normal          Behavior: Behavior normal         Labs:   Lab Results   Component Value Date    HGBA1C 8 8 (H) 01/24/2023    HGBA1C 7 6 (H) 10/31/2022    HGBA1C 7 1 (H) 07/07/2022     Lab Results   Component Value Date    CREATININE 0 90 01/24/2023    CREATININE 1 12 12/31/2022    CREATININE 0 99 10/31/2022    BUN 13 01/24/2023     10/20/2015    K 4 1 01/24/2023     01/24/2023    CO2 27 01/24/2023     eGFR   Date Value Ref Range Status   01/24/2023 80 ml/min/1 73sq m Final     Lab Results   Component Value Date    CHOL 114 04/23/2015    HDL 34 (L) 01/24/2023    TRIG 80 01/24/2023     Lab Results   Component Value Date    ALT 41 12/31/2022    AST 26 12/31/2022    ALKPHOS 131 (H) 12/31/2022    BILITOT 0 54 04/23/2015     Lab Results   Component Value Date    SBH6TKRPGJJF 5 280 (H) 10/31/2022    MKQ8IEMVPOSL 3 700 10/28/2021    MIQ1VUNTBHKD 3 960 (H) 02/20/2020     Lab Results   Component Value Date    FREET4 1 23 10/31/2022       Impression & Plan:    Problem List Items Addressed This Visit        Endocrine    Type 2 diabetes mellitus with diabetic polyneuropathy ([facility] [address] ) - Primary    Relevant Orders    Comprehensive metabolic panel    HEMOGLOBIN A1C W/ EAG ESTIMATION Lab Collect    Type 2 diabetes mellitus without complication, without long-term current use of insulin ([facility])       Lab Results   Component Value Date    HGBA1C 8 8 (H) 01/24/2023 HGA1C elevated, Elan experienced several weeks of hyperglycemia with viral illness at the end of December/Early January  Was temporarily back on Glimepiride while hyperglycemic  BGL Reviewed: Improved since recovered from illness over the past two weeks  Treatment regimen: Continue metformin BID  Discussed risks/complications associated with uncontrolled diabetes including organ involvement, heart attack, stroke, death  Recommended referral to diabetes education  Advised lifestyle modifications including attention to diet including the amount and types of carbohydrates consumed and regular activity  Call for blood sugars less than 70 mg/dl or over 300 mg/dl  Monitor blood glucose levels at least 2 times a day  Discussed symptoms and treatment of hypoglycemia  Routine follow up for diabetic eye and foot exams  Ordered blood work to complete prior to next visit  Follow up in 3 months           Relevant Medications    glucose blood (Contour Next Test) test strip       Other    Hyperlipidemia     Currently tightly controlled on simvastatin  Following with primary care today and already planned to discuss  Elevated TSH     Repeat thyroid function test due to mild elevation in TSH        Other Visit Diagnoses     Abnormal TSH        Abnormal laboratory test              Orders Placed This Encounter   Procedures   • Comprehensive metabolic panel     This is a patient instruction: Patient fasting for 8 hours or longer recommended  Standing Status:   Future     Standing Expiration Date:   2/3/2024   • HEMOGLOBIN A1C W/ EAG ESTIMATION Lab Collect     Standing Status:   Future     Standing Expiration Date:   2/3/2024     Discussed with the patient and all questioned fully answered  He will call me if any problems arise  Follow-up appointment in 3 months       Counseled patient on diagnostic results, prognosis, risk and benefit of treatment options, instruction for management, importance of treatment compliance, Risk  factor reduction and impressions    DANIEL Palacios

## 2023-02-03 NOTE — PROGRESS NOTES
Name: Nita Abbott      : 1940      MRN: [id number]  Encounter Provider: Una Ibarra MD  Encounter Date: 2/3/2023   [facility] [address]     1  Type 2 diabetes mellitus with diabetic polyneuropathy, without long-term current use of insulin ([address] )  -     IRIS Diabetic eye exam    2  Type 2 diabetes mellitus without complication, without long-term current use of insulin ([address] )    3  Mixed hyperlipidemia  -     simvastatin (ZOCOR) 20 mg tablet; Take 1 tablet (20 mg total) by mouth daily at bedtime    4  Encounter for immunization  -     Pneumococcal Conjugate Vaccine 20-valent (Pcv20)       Diabetes  Stable  Blood sugars at home are in the low 100s  Continue with prn amaryl  Continue with metformin  Continue with Endo fu  HLD  At goal  Trial of lowering simvastatin to 20 mg daily  prevnar 20 today  Subjective      Pt here for fu  Overall feeling well  Has fully recovered from illness about 2 months ago  Energy level, appetite is back  Remains active  Blood sugars are in the low 100s  Reviewed last endo consutlation  Reviewed labs   LDL is at goal and is in the 40s  With desire to lower medications  Review of Systems   Constitutional: Negative  Negative for activity change, appetite change, chills and diaphoresis  HENT: Negative for congestion and dental problem  Respiratory: Negative  Negative for apnea, chest tightness, shortness of breath and wheezing  Cardiovascular: Negative  Negative for chest pain, palpitations and leg swelling  Gastrointestinal: Negative  Negative for abdominal distention, abdominal pain, constipation, diarrhea and nausea  Genitourinary: Negative  Negative for difficulty urinating, dysuria and frequency         Current Outpatient Medications on File Prior to Visit   Medication Sig   • B Complex-Biotin-FA (B COMPLETE) TABS Take by mouth in the morning • Blood Glucose Monitoring Suppl (CONTOUR NEXT MONITOR) w/Device KIT 1 Device by Does not apply route 2 (two) times a day   • Coenzyme Q10 (COQ10 PO) Take by mouth in the morning   • docusate sodium (COLACE) 100 mg capsule Take 1 capsule by mouth as needed   • glimepiride (AMARYL) 1 mg tablet TAKE 1 TABLET BY MOUTH TWO TIMES A DAY (Patient taking differently: Take 1 mg by mouth 2 (two) times a day as needed (if BG is above >180))   • glucose blood (Contour Next Test) test strip Use as directed 2-3 times per day  • ipratropium (ATROVENT) 0 03 % nasal spray 2 sprays into each nostril 3 (three) times a day If needed for runny nose   • lisinopril (ZESTRIL) 20 mg tablet TAKE 1 TABLET BY MOUTH DAILY   • metFORMIN (GLUCOPHAGE-XR) 500 mg 24 hr tablet TAKE TWO TABLETS BY MOUTH TWICE A DAY IN THE MORNING AND EVENING WITH MEALS   • Microlet Lancets MISC Test blood sugar  2 x daily   • Multiple Vitamin (multivitamin) tablet Take 1 tablet by mouth daily   • Specialty Vitamins Products (MAGNESIUM, AMINO ACID CHELATE,) 133 MG tablet Take 1 tablet by mouth in the morning   • Thiamine 50 MG CAPS Take 1 capsule by mouth daily       Objective     /60   Pulse 80   Temp (!) 96 7 °F (35 9 °C)   Ht 6' (1 829 m)   Wt 69 8 kg (153 lb 12 8 oz)   BMI 20 86 kg/m²     Physical Exam  Vitals and nursing note reviewed  Constitutional:       General: He is not in acute distress  Appearance: Normal appearance  He is well-developed  He is not ill-appearing  HENT:      Head: Normocephalic and atraumatic  Right Ear: External ear normal       Left Ear: External ear normal       Nose: Nose normal  No congestion or rhinorrhea  Mouth/Throat:      Mouth: Mucous membranes are moist       Pharynx: No oropharyngeal exudate or posterior oropharyngeal erythema  Eyes:      Extraocular Movements: Extraocular movements intact        Conjunctiva/sclera: Conjunctivae normal       Pupils: Pupils are equal, round, and reactive to light  Cardiovascular:      Rate and Rhythm: Normal rate and regular rhythm  Heart sounds: Normal heart sounds  No murmur heard  No friction rub  No gallop  Pulmonary:      Effort: Pulmonary effort is normal  No respiratory distress  Breath sounds: Normal breath sounds  No wheezing or rales  Chest:      Chest wall: No tenderness  Abdominal:      General: Bowel sounds are normal  There is no distension  Palpations: Abdomen is soft  There is no mass  Tenderness: There is no abdominal tenderness  There is no guarding or rebound  Musculoskeletal:         General: Normal range of motion  Cervical back: Normal range of motion and neck supple  Skin:     General: Skin is warm  Capillary Refill: Capillary refill takes less than 2 seconds  Neurological:      Mental Status: He is alert and oriented to person, place, and time     Psychiatric:         Mood and Affect: Mood normal          Behavior: Behavior normal        Darshan Lucia MD

## 2023-02-03 NOTE — ASSESSMENT & PLAN NOTE
Lab Results   Component Value Date    HGBA1C 8 8 (H) 01/24/2023     HGA1C elevated, Jamil Dunn experienced several weeks of hyperglycemia with viral illness at the end of December/Early January  Was temporarily back on Glimepiride while hyperglycemic  BGL Reviewed: Improved since recovered from illness over the past two weeks  Treatment regimen: Continue metformin BID  Discussed risks/complications associated with uncontrolled diabetes including organ involvement, heart attack, stroke, death  Recommended referral to diabetes education  Advised lifestyle modifications including attention to diet including the amount and types of carbohydrates consumed and regular activity  Call for blood sugars less than 70 mg/dl or over 300 mg/dl  Monitor blood glucose levels at least 2 times a day  Discussed symptoms and treatment of hypoglycemia  Routine follow up for diabetic eye and foot exams  Ordered blood work to complete prior to next visit    Follow up in 3 months

## 2023-02-28 ENCOUNTER — TELEPHONE (OUTPATIENT)
Dept: FAMILY MEDICINE CLINIC | Facility: HOSPITAL | Age: 83
End: 2023-02-28

## 2023-03-01 ENCOUNTER — TELEPHONE (OUTPATIENT)
Dept: FAMILY MEDICINE CLINIC | Facility: HOSPITAL | Age: 83
End: 2023-03-01

## 2023-03-01 DIAGNOSIS — I10 ESSENTIAL HYPERTENSION: ICD-10-CM

## 2023-03-01 RX ORDER — AMLODIPINE BESYLATE 5 MG/1
5 TABLET ORAL DAILY
Qty: 90 TABLET | Refills: 2 | Status: SHIPPED | OUTPATIENT
Start: 2023-03-01

## 2023-03-14 DIAGNOSIS — E11.9 TYPE 2 DIABETES MELLITUS WITHOUT COMPLICATION, WITHOUT LONG-TERM CURRENT USE OF INSULIN (HCC): ICD-10-CM

## 2023-03-14 RX ORDER — LANCETS
EACH MISCELLANEOUS
Qty: 100 EACH | Refills: 1 | Status: SHIPPED | OUTPATIENT
Start: 2023-03-14

## 2023-04-04 DIAGNOSIS — E11.42 TYPE 2 DIABETES MELLITUS WITH DIABETIC POLYNEUROPATHY, WITHOUT LONG-TERM CURRENT USE OF INSULIN (HCC): ICD-10-CM

## 2023-04-04 RX ORDER — GLIMEPIRIDE 1 MG/1
TABLET ORAL
Qty: 60 TABLET | Refills: 0 | Status: SHIPPED | OUTPATIENT
Start: 2023-04-04

## 2023-04-04 NOTE — TELEPHONE ENCOUNTER
Requested medication(s) are due for refill today: Yes  Patient has already received a courtesy refill: No  Other reason request has been forwarded to provider: Per Epic not recommended for Geriatric patients

## 2023-05-02 ENCOUNTER — APPOINTMENT (OUTPATIENT)
Dept: LAB | Facility: HOSPITAL | Age: 83
End: 2023-05-02

## 2023-05-02 DIAGNOSIS — E11.42 TYPE 2 DIABETES MELLITUS WITH DIABETIC POLYNEUROPATHY, WITHOUT LONG-TERM CURRENT USE OF INSULIN (HCC): ICD-10-CM

## 2023-05-03 LAB
ALBUMIN SERPL BCP-MCNC: 3.7 G/DL (ref 3.5–5)
ALP SERPL-CCNC: 105 U/L (ref 46–116)
ALT SERPL W P-5'-P-CCNC: 27 U/L (ref 12–78)
ANION GAP SERPL CALCULATED.3IONS-SCNC: 4 MMOL/L (ref 4–13)
AST SERPL W P-5'-P-CCNC: 26 U/L (ref 5–45)
BILIRUB SERPL-MCNC: 0.32 MG/DL (ref 0.2–1)
BUN SERPL-MCNC: 15 MG/DL (ref 5–25)
CALCIUM SERPL-MCNC: 9.9 MG/DL (ref 8.3–10.1)
CHLORIDE SERPL-SCNC: 103 MMOL/L (ref 96–108)
CO2 SERPL-SCNC: 27 MMOL/L (ref 21–32)
CREAT SERPL-MCNC: 0.92 MG/DL (ref 0.6–1.3)
GFR SERPL CREATININE-BSD FRML MDRD: 77 ML/MIN/1.73SQ M
GLUCOSE P FAST SERPL-MCNC: 118 MG/DL (ref 65–99)
POTASSIUM SERPL-SCNC: 4.7 MMOL/L (ref 3.5–5.3)
PROT SERPL-MCNC: 7.2 G/DL (ref 6.4–8.4)
SODIUM SERPL-SCNC: 134 MMOL/L (ref 135–147)

## 2023-05-04 LAB
EST. AVERAGE GLUCOSE BLD GHB EST-MCNC: 166 MG/DL
HBA1C MFR BLD: 7.4 %

## 2023-05-05 DIAGNOSIS — E11.42 TYPE 2 DIABETES MELLITUS WITH DIABETIC POLYNEUROPATHY, WITHOUT LONG-TERM CURRENT USE OF INSULIN (HCC): ICD-10-CM

## 2023-05-06 DIAGNOSIS — E11.42 TYPE 2 DIABETES MELLITUS WITH DIABETIC POLYNEUROPATHY, WITHOUT LONG-TERM CURRENT USE OF INSULIN (HCC): ICD-10-CM

## 2023-05-08 RX ORDER — GLIMEPIRIDE 1 MG/1
TABLET ORAL
Qty: 60 TABLET | Refills: 0 | Status: SHIPPED | OUTPATIENT
Start: 2023-05-08

## 2023-05-08 RX ORDER — METFORMIN HYDROCHLORIDE 500 MG/1
TABLET, EXTENDED RELEASE ORAL
Qty: 360 TABLET | Refills: 0 | Status: SHIPPED | OUTPATIENT
Start: 2023-05-08

## 2023-05-09 ENCOUNTER — OFFICE VISIT (OUTPATIENT)
Dept: ENDOCRINOLOGY | Facility: CLINIC | Age: 83
End: 2023-05-09

## 2023-05-09 VITALS
OXYGEN SATURATION: 99 % | HEART RATE: 93 BPM | WEIGHT: 156.4 LBS | HEIGHT: 72 IN | SYSTOLIC BLOOD PRESSURE: 130 MMHG | DIASTOLIC BLOOD PRESSURE: 52 MMHG | BODY MASS INDEX: 21.18 KG/M2

## 2023-05-09 DIAGNOSIS — I10 BENIGN ESSENTIAL HYPERTENSION: ICD-10-CM

## 2023-05-09 DIAGNOSIS — E11.42 TYPE 2 DIABETES MELLITUS WITH DIABETIC POLYNEUROPATHY, WITHOUT LONG-TERM CURRENT USE OF INSULIN (HCC): Primary | ICD-10-CM

## 2023-05-09 DIAGNOSIS — R79.89 ELEVATED TSH: ICD-10-CM

## 2023-05-09 DIAGNOSIS — E78.2 MIXED HYPERLIPIDEMIA: ICD-10-CM

## 2023-05-09 NOTE — ASSESSMENT & PLAN NOTE
Diabetes under good control  Glucometer reviewed and blood sugars have been very stable with no hypoglycemia and no hyperglycemia  He is only using glimepiride 0 5mg occasionally for blood sugars over 130  Suggest discontinuing Glimepiride overall but he would to have this medication on hand if needed  For now, advised him to only take glimepiride only if pre-meal blood sugar is over 180 to reduce risk of hypoglycemia     Lab Results   Component Value Date    HGBA1C 7 4 (H) 05/02/2023

## 2023-05-09 NOTE — PROGRESS NOTES
Established Patient Progress Note      Chief Complaint   Patient presents with   • Diabetes Type 2        Impression & Plan:    Problem List Items Addressed This Visit        Endocrine    Type 2 diabetes mellitus with diabetic polyneuropathy ([address] ) - Primary     Diabetes under good control  Glucometer reviewed and blood sugars have been very stable with no hypoglycemia and no hyperglycemia  He is only using glimepiride 0 5mg occasionally for blood sugars over 130  Suggest discontinuing Glimepiride overall but he would to have this medication on hand if needed  For now, advised him to only take glimepiride only if pre-meal blood sugar is over 180 to reduce risk of hypoglycemia  Lab Results   Component Value Date    HGBA1C 7 4 (H) 05/02/2023            Relevant Orders    Hemoglobin A1C    Albumin / creatinine urine ratio       Cardiovascular and Mediastinum    Benign essential hypertension     Well controlled on current regimen  Other    Hyperlipidemia     Continue simvastatin  Relevant Orders    Comprehensive metabolic panel    TSH, 3rd generation    T4, free    Elevated TSH     Mild/intermittent  Will continue to monitor  Orders Placed This Encounter   Procedures   • Hemoglobin A1C     Standing Status:   Future     Standing Expiration Date:   5/9/2024   • Comprehensive metabolic panel     This is a patient instruction: Patient fasting for 8 hours or longer recommended  Standing Status:   Future     Standing Expiration Date:   5/9/2024   • TSH, 3rd generation     This is a patient instruction: This test is non-fasting  Please drink two glasses of water morning of bloodwork          Standing Status:   Future     Standing Expiration Date:   5/9/2024   • T4, free     Standing Status:   Future     Standing Expiration Date:   5/9/2024   • Albumin / creatinine urine ratio     Standing Status:   Future     Standing Expiration Date:   5/9/2024       History of Present Illness: Artemus Boast is a 82 y o  male with a history of type 2 diabetes without long term use of insulin since about 20 years ago  Reports complications of microalbuminuria and neuropathy  Denies recent illness or hospitalizations  Denies recent severe hypoglycemic or severe hyperglycemic episodes  Denies any issues with his current regimen  home glucose monitoring: are performed regularly 1-2x per day and reports blood sugars have improved and most readings low 100s  Glucometer reviewed at visit  No hypoglycemia  Med options have been limited due to cost (Januvia and jardiance are too expensive)  Blood sugars were high when sick prior to last visit but this has improved  Current regimen:   Glimepiride 1mg-- takes 1/2 tab twice per day if needed  Will only take when blood sugar is above 130  Reports rarely using glimepiride lately     Metformin ER 500mg- 2 tabs twice per day     Last Eye Exam: UTD  Last Foot Exam: UTD, seeing podiatry in [place]    Has hypertension: Taking amlodipine and lisinopril  Has hyperlipidemia: Taking simvastatin        Patient Active Problem List   Diagnosis   • Benign essential hypertension   • Benign prostatic hyperplasia with lower urinary tract symptoms   • Esophageal dysphagia   • Herniated lumbar intervertebral disc   • Hyperlipidemia   • Vasomotor rhinitis   • Elevated TSH   • Type 2 diabetes mellitus with diabetic polyneuropathy ([facility] [address] )   • Type 2 diabetes mellitus without complication, without long-term current use of insulin ([facility] [address] )   • Spinal stenosis, lumbar region, with neurogenic claudication   • Radiculopathy   • Chronic pain syndrome   • Lumbar spondylosis   • Chronic right-sided low back pain without sciatica      Past Medical History:   Diagnosis Date   • Abnormal blood chemistry    • Abnormal glucose     Last assessed - 2/20/14   • Benign essential hypertension     Last assessed - 5/15/17   • Cancer ([facility] [address] ) 2007    melanoma   • Chronic allergic rhinitis     Last assessed - 16   • Dermatitis     Last assessed - 6/16/15   • Diabetes mellitus ([facility] [address] )     type 11   • High risk medication use     Last assessed - 16   • Hypertension    • Sciatica     Last assessed - 12   • Septic bursitis     Last assessed - 9/3/13   • Tick bite     Last assessed - 16      Past Surgical History:   Procedure Laterality Date   • CATARACT EXTRACTION     • COLONOSCOPY      Fiberoptic   • PILONIDAL CYST EXCISION        Family History   Problem Relation Age of Onset   • Hypertension Mother    • Osteoporosis Mother    • Diabetes type I Paternal Aunt    • Colon cancer Family      Social History     Tobacco Use   • Smoking status: Former     Packs/day: 1      Years: 10 00     Pack years: 10 00     Types: Cigarettes     Start date: 1958     Quit date: 1987     Years since quittin 8   • Smokeless tobacco: Never   Substance Use Topics   • Alcohol use:  Yes     Alcohol/week: 5 0 standard drinks     Types: 5 Glasses of wine per week     Comment: social usage     No Known Allergies      Current Outpatient Medications:   •  amLODIPine (NORVASC) 5 mg tablet, Take 1 tablet (5 mg total) by mouth daily, Disp: 90 tablet, Rfl: 2  •  B Complex-Biotin-FA (B COMPLETE) TABS, Take by mouth in the morning, Disp: , Rfl:   •  Blood Glucose Monitoring Suppl (CONTOUR NEXT MONITOR) w/Device KIT, 1 Device by Does not apply route 2 (two) times a day, Disp: 1 kit, Rfl: 0  •  Coenzyme Q10 (COQ10 PO), Take by mouth in the morning, Disp: , Rfl:   •  docusate sodium (COLACE) 100 mg capsule, Take 1 capsule by mouth as needed, Disp: , Rfl:   •  glimepiride (AMARYL) 1 mg tablet, TAKE ONE TABLET BY MOUTH TWICE A DAY (Patient taking differently: 1/2 tab as needed), Disp: 60 tablet, Rfl: 0  •  glucose blood (Contour Next Test) test strip, Use as directed 2-3 times per day , Disp: 100 strip, Rfl: 3  •  ipratropium (ATROVENT) 0 03 % nasal spray, 2 sprays into each nostril 3 (three) times a day If needed for runny nose, Disp: 30 mL, Rfl: 1  •  lisinopril (ZESTRIL) 20 mg tablet, TAKE 1 TABLET BY MOUTH DAILY, Disp: 90 tablet, Rfl: 3  •  metFORMIN (GLUCOPHAGE-XR) 500 mg 24 hr tablet, TAKE TWO TABLETS BY MOUTH TWICE A DAY IN THE MORNING AND EVENING WITH MEALS, Disp: 360 tablet, Rfl: 0  •  Microlet Lancets MISC, TEST BLOOD SUGAR 2 TIMES DAILY, Disp: 100 each, Rfl: 1  •  Multiple Vitamin (multivitamin) tablet, Take 1 tablet by mouth daily, Disp: , Rfl:   •  Specialty Vitamins Products (MAGNESIUM, AMINO ACID CHELATE,) 133 MG tablet, Take 1 tablet by mouth in the morning, Disp: , Rfl:   •  Thiamine 50 MG CAPS, Take 1 capsule by mouth daily, Disp: , Rfl:   •  simvastatin (ZOCOR) 20 mg tablet, Take 1 tablet (20 mg total) by mouth daily at bedtime, Disp: 90 tablet, Rfl: 3    Review of Systems   Constitutional: Negative for activity change, appetite change and fatigue  HENT: Negative for sore throat, trouble swallowing and voice change  Eyes: Negative for visual disturbance  Respiratory: Negative for choking, chest tightness and shortness of breath  Cardiovascular: Negative for chest pain, palpitations and leg swelling  Gastrointestinal: Negative for abdominal pain, constipation and diarrhea  Endocrine: Negative for cold intolerance, heat intolerance, polydipsia, polyphagia and polyuria  Genitourinary: Negative for frequency  Musculoskeletal: Negative for arthralgias and myalgias  Skin: Negative for rash  Neurological: Negative for dizziness and syncope  Hematological: Negative for adenopathy  Psychiatric/Behavioral: Negative for sleep disturbance  All other systems reviewed and are negative  Physical Exam:  Body mass index is 21 21 kg/m²    /52   Pulse 93   Ht 6' (1 829 m)   Wt 70 9 kg (156 lb 6 4 oz)   SpO2 99%   BMI 21 21 kg/m²    Wt Readings from Last 3 Encounters:   05/09/23 70 9 kg (156 lb 6 4 oz)   02/03/23 69 8 kg (153 lb 12 8 oz)   02/03/23 69 8 kg (153 lb 12 8 oz)       Physical Exam  Vitals reviewed  Constitutional:       General: He is not in acute distress  Appearance: He is well-developed  HENT:      Head: Normocephalic and atraumatic  Eyes:      Conjunctiva/sclera: Conjunctivae normal       Pupils: Pupils are equal, round, and reactive to light  Neck:      Thyroid: No thyromegaly  Cardiovascular:      Rate and Rhythm: Normal rate and regular rhythm  Heart sounds: Normal heart sounds  No murmur heard  Pulmonary:      Effort: Pulmonary effort is normal  No respiratory distress  Breath sounds: Normal breath sounds  No wheezing or rales  Abdominal:      General: Bowel sounds are normal  There is no distension  Palpations: Abdomen is soft  Tenderness: There is no abdominal tenderness  Musculoskeletal:         General: Normal range of motion  Cervical back: Normal range of motion and neck supple  Lymphadenopathy:      Cervical: No cervical adenopathy  Skin:     General: Skin is warm and dry  Neurological:      Mental Status: He is alert and oriented to person, place, and time             Labs:   Lab Results   Component Value Date    HGBA1C 7 4 (H) 05/02/2023    HGBA1C 8 8 (H) 01/24/2023    HGBA1C 7 6 (H) 10/31/2022     Lab Results   Component Value Date    CREATININE 0 92 05/02/2023    CREATININE 0 90 01/24/2023    CREATININE 1 12 12/31/2022    BUN 15 05/02/2023     10/20/2015    K 4 7 05/02/2023     05/02/2023    CO2 27 05/02/2023     eGFR   Date Value Ref Range Status   05/02/2023 77 ml/min/1 73sq m Final     Lab Results   Component Value Date    CHOL 114 04/23/2015    HDL 34 (L) 01/24/2023    TRIG 80 01/24/2023     Lab Results   Component Value Date    ALT 27 05/02/2023    AST 26 05/02/2023    ALKPHOS 105 05/02/2023    BILITOT 0 54 04/23/2015     Lab Results   Component Value Date    ZYF5NFIFFPDO 5 280 (H) 10/31/2022    SDI2RXLPIQVZ 3 700 10/28/2021    RHE2SZRCFXZV 3 960 (H) 02/20/2020     Lab Results   Component Value Date FREET4 1 23 10/31/2022             Discussed with the patient and all questioned fully answered  He will call me if any problems arise  Follow-up appointment in 6 months       Counseled patient on diagnostic results, prognosis, risk and benefit of treatment options, instruction for management, importance of treatment compliance, Risk  factor reduction and impressions    Patient Instructions   Take Glimepiride 1/2 tablet twice per day if needed ONLY if pre-meal blood sugar above 180        Min Serrato PA-C

## 2023-06-02 DIAGNOSIS — E11.42 TYPE 2 DIABETES MELLITUS WITH DIABETIC POLYNEUROPATHY, WITHOUT LONG-TERM CURRENT USE OF INSULIN (HCC): ICD-10-CM

## 2023-06-05 RX ORDER — GLIMEPIRIDE 1 MG/1
TABLET ORAL
Qty: 30 TABLET | Refills: 2 | Status: SHIPPED | OUTPATIENT
Start: 2023-06-05

## 2023-06-07 ENCOUNTER — OFFICE VISIT (OUTPATIENT)
Dept: FAMILY MEDICINE CLINIC | Facility: HOSPITAL | Age: 83
End: 2023-06-07
Payer: MEDICARE

## 2023-06-07 VITALS
BODY MASS INDEX: 21.29 KG/M2 | WEIGHT: 157.2 LBS | TEMPERATURE: 97.7 F | HEIGHT: 72 IN | DIASTOLIC BLOOD PRESSURE: 68 MMHG | HEART RATE: 79 BPM | SYSTOLIC BLOOD PRESSURE: 138 MMHG

## 2023-06-07 DIAGNOSIS — Z00.00 MEDICARE ANNUAL WELLNESS VISIT, SUBSEQUENT: Primary | ICD-10-CM

## 2023-06-07 DIAGNOSIS — I10 BENIGN ESSENTIAL HYPERTENSION: ICD-10-CM

## 2023-06-07 DIAGNOSIS — E11.42 TYPE 2 DIABETES MELLITUS WITH DIABETIC POLYNEUROPATHY, WITHOUT LONG-TERM CURRENT USE OF INSULIN (HCC): ICD-10-CM

## 2023-06-07 DIAGNOSIS — I10 ESSENTIAL HYPERTENSION: ICD-10-CM

## 2023-06-07 DIAGNOSIS — E78.2 MIXED HYPERLIPIDEMIA: ICD-10-CM

## 2023-06-07 PROCEDURE — G0439 PPPS, SUBSEQ VISIT: HCPCS | Performed by: FAMILY MEDICINE

## 2023-06-07 PROCEDURE — 99214 OFFICE O/P EST MOD 30 MIN: CPT | Performed by: FAMILY MEDICINE

## 2023-06-07 NOTE — PROGRESS NOTES
Assessment and Plan:     Problem List Items Addressed This Visit        Endocrine    Type 2 diabetes mellitus with diabetic polyneuropathy ([facility], [address] )       Cardiovascular and Mediastinum    Benign essential hypertension       Other    Hyperlipidemia   Other Visit Diagnoses     Medicare annual wellness visit, subsequent    -  Primary    Essential hypertension          chronic conditions stable  Diabetes improved and A1c acceptable  \  Ongoing fu with   Endo,  Fu in 6 months  Preventive health issues were discussed with patient, and age appropriate screening tests were ordered as noted in patient's After Visit Summary  Personalized health advice and appropriate referrals for health education or preventive services given if needed, as noted in patient's After Visit Summary  History of Present Illness:     Patient presents for a Medicare Wellness Visit    Here for awv and fu of chronic conditions  Overall doing well  Blood sugars are good/controlled  Recently seen by endo  No new concerns  Doing well with medications  Patient Care Team:  Yumi Gale MD as PCP - General (Family Medicine)  Fredi Martin MD as PCP - Endocrinology (Endocrinology)  DO Andre Richmond MD Alaina Rout, PA-C as Physician Assistant (Endocrinology)     Review of Systems:     Review of Systems   Constitutional: Negative  Negative for activity change, appetite change, chills and diaphoresis  HENT: Negative for congestion and dental problem  Respiratory: Negative  Negative for apnea, chest tightness, shortness of breath and wheezing  Cardiovascular: Negative  Negative for chest pain, palpitations and leg swelling  Gastrointestinal: Negative  Negative for abdominal distention, abdominal pain, constipation, diarrhea and nausea  Genitourinary: Negative  Negative for difficulty urinating, dysuria and frequency          Problem List:     Patient Active Problem List Diagnosis   • Benign essential hypertension   • Benign prostatic hyperplasia with lower urinary tract symptoms   • Esophageal dysphagia   • Herniated lumbar intervertebral disc   • Hyperlipidemia   • Vasomotor rhinitis   • Elevated TSH   • Type 2 diabetes mellitus with diabetic polyneuropathy (HCC)   • Type 2 diabetes mellitus without complication, without long-term current use of insulin ([place] [id number] )   • Spinal stenosis, lumbar region, with neurogenic claudication   • Radiculopathy   • Chronic pain syndrome   • Lumbar spondylosis   • Chronic right-sided low back pain without sciatica      Past Medical and Surgical History:     Past Medical History:   Diagnosis Date   • Abnormal blood chemistry    • Abnormal glucose     Last assessed - 2/20/14   • Allergic    • Arthritis    • Benign essential hypertension     Last assessed - 5/15/17   • Cancer ([place] [id number] ) 2007    melanoma   • Chronic allergic rhinitis     Last assessed - 8/1/16   • Dermatitis     Last assessed - 6/16/15   • Diabetes mellitus ([place] [address] ) 1998    type 11   • High risk medication use     Last assessed - 5/2/16   • Hypertension 1998   • Sciatica     Last assessed - 12/18/12   • Septic bursitis     Last assessed - 9/3/13   • Tick bite     Last assessed - 8/8/16     Past Surgical History:   Procedure Laterality Date   • CATARACT EXTRACTION     • COLONOSCOPY      Fiberoptic   • PILONIDAL CYST EXCISION        Family History:     Family History   Problem Relation Age of Onset   • Hypertension Mother    • Osteoporosis Mother    • Diabetes type I Paternal Aunt    • Colon cancer Family       Social History:     Social History     Socioeconomic History   • Marital status: /Civil Union     Spouse name: None   • Number of children: None   • Years of education: None   • Highest education level: None   Occupational History   • None   Tobacco Use   • Smoking status: Former     Packs/day: 1 00     Years: 10 00     Total pack years: 10 00     Types: Cigarettes     Start date: 1958     Quit date: 1987     Years since quittin 9   • Smokeless tobacco: Never   Vaping Use   • Vaping Use: Never used   Substance and Sexual Activity   • Alcohol use: Yes     Alcohol/week: 5 0 standard drinks of alcohol     Types: 5 Glasses of wine per week     Comment: social usage   • Drug use: No   • Sexual activity: Not Currently     Partners: Female   Other Topics Concern   • None   Social History Narrative    Advance directive on file     Social Determinants of Health     Financial Resource Strain: Low Risk  (2023)    Overall Financial Resource Strain (CARDIA)    • Difficulty of Paying Living Expenses: Not hard at all   Food Insecurity: Not on file   Transportation Needs: No Transportation Needs (2023)    PRAPARE - Transportation    • Lack of Transportation (Medical): No    • Lack of Transportation (Non-Medical): No   Physical Activity: Not on file   Stress: Not on file   Social Connections: Not on file   Intimate Partner Violence: Not on file   Housing Stability: Not on file      Medications and Allergies:     Current Outpatient Medications   Medication Sig Dispense Refill   • amLODIPine (NORVASC) 5 mg tablet Take 1 tablet (5 mg total) by mouth daily 90 tablet 2   • B Complex-Biotin-FA (B COMPLETE) TABS Take by mouth in the morning     • Blood Glucose Monitoring Suppl (CONTOUR NEXT MONITOR) w/Device KIT 1 Device by Does not apply route 2 (two) times a day 1 kit 0   • Coenzyme Q10 (COQ10 PO) Take by mouth in the morning     • docusate sodium (COLACE) 100 mg capsule Take 1 capsule by mouth as needed     • glimepiride (AMARYL) 1 mg tablet 1/2 tab as needed 30 tablet 2   • glucose blood (Contour Next Test) test strip Use as directed 2-3 times per day   100 strip 3   • ipratropium (ATROVENT) 0 03 % nasal spray 2 sprays into each nostril 3 (three) times a day If needed for runny nose 30 mL 1   • lisinopril (ZESTRIL) 20 mg tablet TAKE 1 TABLET BY MOUTH DAILY 90 tablet 3   • metFORMIN (GLUCOPHAGE-XR) 500 mg 24 hr tablet TAKE TWO TABLETS BY MOUTH TWICE A DAY IN THE MORNING AND EVENING WITH MEALS 360 tablet 0   • Microlet Lancets MISC TEST BLOOD SUGAR 2 TIMES DAILY 100 each 1   • Multiple Vitamin (multivitamin) tablet Take 1 tablet by mouth daily     • simvastatin (ZOCOR) 20 mg tablet Take 1 tablet (20 mg total) by mouth daily at bedtime 90 tablet 3   • Specialty Vitamins Products (MAGNESIUM, AMINO ACID CHELATE,) 133 MG tablet Take 1 tablet by mouth in the morning     • Thiamine 50 MG CAPS Take 1 capsule by mouth daily       No current facility-administered medications for this visit  No Known Allergies   Immunizations:     Immunization History   Administered Date(s) Administered   • COVID-19 MODERNA VACC 0 5 ML IM 01/28/2021, 02/26/2021, 12/06/2021   • Influenza Quadrivalent Preservative Free 3 years and older IM 09/11/2014   • Influenza Split High Dose Preservative Free IM 09/13/2012, 09/03/2013, 10/20/2015, 10/06/2016, 10/12/2017   • Influenza, high dose seasonal 0 7 mL 10/15/2018, 10/08/2019, 10/13/2020, 10/20/2021, 09/16/2022   • Influenza, seasonal, injectable 1940   • Pneumococcal Conjugate 13-Valent 11/10/2015   • Pneumococcal Conjugate Vaccine 20-valent (Pcv20), Polysace 02/03/2023   • Pneumococcal Polysaccharide PPV23 10/01/2002   • Tdap 11/02/2010   • Zoster 1940      Health Maintenance: There are no preventive care reminders to display for this patient  Topic Date Due   • COVID-19 Vaccine (4 - Moderna series) 01/31/2022      Medicare Screening Tests and Risk Assessments:     Clary Hall is here for his Subsequent Wellness visit  Health Risk Assessment:   Patient rates overall health as good  Patient feels that their physical health rating is slightly worse  Patient is satisfied with their life  Eyesight was rated as same  Hearing was rated as same  Patient feels that their emotional and mental health rating is same  Patients states they are never, rarely angry  Patient states they are never, rarely unusually tired/fatigued  Pain experienced in the last 7 days has been some  Patient's pain rating has been 8/10  Patient states that he has experienced no weight loss or gain in last 6 months  Depression Screening:   PHQ-2 Score: 0      Fall Risk Screening: In the past year, patient has experienced: no history of falling in past year      Home Safety:  Patient has trouble with stairs inside or outside of their home  Patient has working smoke alarms and has no working carbon monoxide detector  Home safety hazards include: none  Nutrition:   Current diet is Regular  Medications:   Patient is currently taking over-the-counter supplements  OTC medications include: see medication list  Patient is able to manage medications  Activities of Daily Living (ADLs)/Instrumental Activities of Daily Living (IADLs):   Walk and transfer into and out of bed and chair?: Yes  Dress and groom yourself?: Yes    Bathe or shower yourself?: Yes    Feed yourself? Yes  Do your laundry/housekeeping?: Yes  Manage your money, pay your bills and track your expenses?: Yes  Make your own meals?: Yes    Do your own shopping?: Yes    Previous Hospitalizations:   Any hospitalizations or ED visits within the last 12 months?: Yes    How many hospitalizations have you had in the last year?: 1-2    Advance Care Planning:   Living will: Yes    Durable POA for healthcare:  Yes    Advanced directive: Yes      Cognitive Screening:   Provider or family/friend/caregiver concerned regarding cognition?: No    PREVENTIVE SCREENINGS      Cardiovascular Screening:    General: Screening Not Indicated and History Lipid Disorder      Diabetes Screening:     General: Screening Not Indicated and History Diabetes      Colorectal Cancer Screening:     General: Screening Not Indicated      Prostate Cancer Screening:    General: Screening Not Indicated      Osteoporosis Screening:    General: Screening Not Indicated Abdominal Aortic Aneurysm (AAA) Screening:    Risk factors include: tobacco use        General: Screening Not Indicated      Lung Cancer Screening:     General: Screening Not Indicated      Hepatitis C Screening:    General: Screening Not Indicated    Screening, Brief Intervention, and Referral to Treatment (SBIRT)    Screening  Typical number of drinks in a day: 1  Typical number of drinks in a week: 7  Interpretation: Low risk drinking behavior  AUDIT-C Screenin) How often did you have a drink containing alcohol in the past year? 4 or more times a week  2) How many drinks did you have on a typical day when you were drinking in the past year? 1 to 2  3) How often did you have 6 or more drinks on one occasion in the past year? never    AUDIT-C Score: 4  Interpretation: Score 4-12 (male): POSITIVE screen for alcohol misuse    AUDIT Screenin) How often during the last year have you found that you were not able to stop drinking once you had started? 0 - never  5) How often during the last year have you failed to do what was normally expected from you because of drinking? 0 - never  6) How often during the last year have you needed a first drink in the morning to get yourself going after a heavy drinking session? 0 - never  7) How often during the last year have you had a feeling of guilt or remorse after drinking? 0 - never  8) How often during the last year have you been unable to remember what happened the night before because you had been drinking? 0 - never  9) Have you or someone else been injured as a result of your drinking? 0 - no  10) Has a relative or friend or a doctor or another health worker been concerned about your drinking or suggested you cut down? 0 - no    AUDIT Score: 4  Interpretation: Low risk alcohol consumption    Single Item Drug Screening:  How often have you used an illegal drug (including marijuana) or a prescription medication for non-medical reasons in the past year? never    Single Item Drug Screen Score: 0  Interpretation: Negative screen for possible drug use disorder    Vision Screening    Right eye Left eye Both eyes   Without correction      With correction 20/20 20/20 20/20        Physical Exam:     /68   Pulse 79   Temp 97 7 °F (36 5 °C)   Ht 6' (1 829 m)   Wt 71 3 kg (157 lb 3 2 oz)   BMI 21 32 kg/m²     Physical Exam  Vitals and nursing note reviewed  Constitutional:       General: He is not in acute distress  Appearance: Normal appearance  He is well-developed and normal weight  He is not ill-appearing  HENT:      Head: Normocephalic and atraumatic  Right Ear: Tympanic membrane, ear canal and external ear normal       Left Ear: Tympanic membrane, ear canal and external ear normal       Mouth/Throat:      Mouth: Mucous membranes are moist       Pharynx: Oropharynx is clear  Eyes:      Extraocular Movements: Extraocular movements intact  Conjunctiva/sclera: Conjunctivae normal       Pupils: Pupils are equal, round, and reactive to light  Cardiovascular:      Rate and Rhythm: Normal rate and regular rhythm  Pulses: no weak pulses          Dorsalis pedis pulses are 2+ on the right side and 2+ on the left side  Posterior tibial pulses are 2+ on the right side and 2+ on the left side  Heart sounds: Normal heart sounds  No murmur heard  Pulmonary:      Effort: Pulmonary effort is normal  No respiratory distress  Breath sounds: Normal breath sounds  Abdominal:      General: Bowel sounds are normal  There is no distension  Palpations: Abdomen is soft  There is no mass  Tenderness: There is no abdominal tenderness  There is no guarding  Hernia: No hernia is present  Genitourinary:     Penis: Normal     Musculoskeletal:         General: No swelling  Normal range of motion  Cervical back: Normal range of motion and neck supple     Feet:      Right foot:      Skin integrity: No ulcer, skin breakdown, erythema, warmth, callus or dry skin  Toenail Condition: Right toenails are abnormally thick  Left foot:      Skin integrity: No ulcer, skin breakdown, erythema, warmth, callus or dry skin  Toenail Condition: Left toenails are abnormally thick  Skin:     General: Skin is warm and dry  Capillary Refill: Capillary refill takes less than 2 seconds  Neurological:      General: No focal deficit present  Mental Status: He is alert and oriented to person, place, and time  Psychiatric:         Mood and Affect: Mood normal          Behavior: Behavior normal         Diabetic Foot Exam    Patient's shoes and socks removed  Right Foot/Ankle   Right Foot Inspection  Skin Exam: skin normal and skin intact  No dry skin, no warmth, no callus, no erythema, no maceration, no abnormal color, no pre-ulcer, no ulcer and no callus  Toe Exam: ROM and strength within normal limits  Sensory   Vibration: intact  Proprioception: intact  Monofilament testing: intact    Vascular  Capillary refills: < 3 seconds  The right DP pulse is 2+  The right PT pulse is 2+  Left Foot/Ankle  Left Foot Inspection  Skin Exam: skin normal and skin intact  No dry skin, no warmth, no erythema, no maceration, normal color, no pre-ulcer, no ulcer and no callus  Toe Exam: ROM and strength within normal limits  Sensory   Vibration: intact  Proprioception: intact  Monofilament testing: intact    Vascular  Capillary refills: < 3 seconds  The left DP pulse is 2+  The left PT pulse is 2+       Assign Risk Category  No deformity present  No loss of protective sensation  No weak pulses  Risk: 0    Rashmi Johnson MD

## 2023-06-07 NOTE — PATIENT INSTRUCTIONS
Medicare Preventive Visit Patient Instructions  Thank you for completing your Welcome to Medicare Visit or Medicare Annual Wellness Visit today  Your next wellness visit will be due in one year (6/7/2024)  The screening/preventive services that you may require over the next 5-10 years are detailed below  Some tests may not apply to you based off risk factors and/or age  Screening tests ordered at today's visit but not completed yet may show as past due  Also, please note that scanned in results may not display below  Preventive Screenings:  Service Recommendations Previous Testing/Comments   Colorectal Cancer Screening  · Colonoscopy    · Fecal Occult Blood Test (FOBT)/Fecal Immunochemical Test (FIT)  · Fecal DNA/Cologuard Test  · Flexible Sigmoidoscopy Age: 39-70 years old   Colonoscopy: every 10 years (May be performed more frequently if at higher risk)  OR  FOBT/FIT: every 1 year  OR  Cologuard: every 3 years  OR  Sigmoidoscopy: every 5 years  Screening may be recommended earlier than age 39 if at higher risk for colorectal cancer  Also, an individualized decision between you and your healthcare provider will decide whether screening between the ages of 74-80 would be appropriate   Colonoscopy: Not on file  FOBT/FIT: 03/16/2021  Cologuard: Not on file  Sigmoidoscopy: Not on file          Prostate Cancer Screening Individualized decision between patient and health care provider in men between ages of 53-78   Medicare will cover every 12 months beginning on the day after your 50th birthday PSA: No results in last 5 years     Screening Not Indicated     Hepatitis C Screening Once for adults born between 1945 and 1965  More frequently in patients at high risk for Hepatitis C Hep C Antibody: Not on file        Diabetes Screening 1-2 times per year if you're at risk for diabetes or have pre-diabetes Fasting glucose: 118 mg/dL (5/2/2023)  A1C: 7 4 % (5/2/2023)  Screening Not Indicated  History Diabetes   Cholesterol Screening Once every 5 years if you don't have a lipid disorder  May order more often based on risk factors  Lipid panel: 01/24/2023  Screening Not Indicated  History Lipid Disorder      Other Preventive Screenings Covered by Medicare:  1  Abdominal Aortic Aneurysm (AAA) Screening: covered once if your at risk  You're considered to be at risk if you have a family history of AAA or a male between the age of 73-68 who smoking at least 100 cigarettes in your lifetime  2  Lung Cancer Screening: covers low dose CT scan once per year if you meet all of the following conditions: (1) Age 50-69; (2) No signs or symptoms of lung cancer; (3) Current smoker or have quit smoking within the last 15 years; (4) You have a tobacco smoking history of at least 20 pack years (packs per day x number of years you smoked); (5) You get a written order from a healthcare provider  3  Glaucoma Screening: covered annually if you're considered high risk: (1) You have diabetes OR (2) Family history of glaucoma OR (3)  aged 48 and older OR (3)  American aged 72 and older  3  Osteoporosis Screening: covered every 2 years if you meet one of the following conditions: (1) Have a vertebral abnormality; (2) On glucocorticoid therapy for more than 3 months; (3) Have primary hyperparathyroidism; (4) On osteoporosis medications and need to assess response to drug therapy  5  HIV Screening: covered annually if you're between the age of 12-76  Also covered annually if you are younger than 13 and older than 72 with risk factors for HIV infection  For pregnant patients, it is covered up to 3 times per pregnancy      Immunizations:  Immunization Recommendations   Influenza Vaccine Annual influenza vaccination during flu season is recommended for all persons aged >= 6 months who do not have contraindications   Pneumococcal Vaccine   * Pneumococcal conjugate vaccine = PCV13 (Prevnar 13), PCV15 (Vaxneuvance), PCV20 (Prevnar 20)  * Pneumococcal polysaccharide vaccine = PPSV23 (Pneumovax) Adults 2364 years old: 1-3 doses may be recommended based on certain risk factors  Adults 72 years old: 1-2 doses may be recommended based off what pneumonia vaccine you previously received   Hepatitis B Vaccine 3 dose series if at intermediate or high risk (ex: diabetes, end stage renal disease, liver disease)   Tetanus (Td) Vaccine - COST NOT COVERED BY MEDICARE PART B Following completion of primary series, a booster dose should be given every 10 years to maintain immunity against tetanus  Td may also be given as tetanus wound prophylaxis  Tdap Vaccine - COST NOT COVERED BY MEDICARE PART B Recommended at least once for all adults  For pregnant patients, recommended with each pregnancy  Shingles Vaccine (Shingrix) - COST NOT COVERED BY MEDICARE PART B  2 shot series recommended in those aged 48 and above     Health Maintenance Due:  There are no preventive care reminders to display for this patient  Immunizations Due:      Topic Date Due   • COVID-19 Vaccine (4 - Moderna series) 01/31/2022     Advance Directives   What are advance directives? Advance directives are legal documents that state your wishes and plans for medical care  These plans are made ahead of time in case you lose your ability to make decisions for yourself  Advance directives can apply to any medical decision, such as the treatments you want, and if you want to donate organs  What are the types of advance directives? There are many types of advance directives, and each state has rules about how to use them  You may choose a combination of any of the following:  · Living will: This is a written record of the treatment you want  You can also choose which treatments you do not want, which to limit, and which to stop at a certain time  This includes surgery, medicine, IV fluid, and tube feedings  · Durable power of  for healthcare [facility]):   This is a written record that states who you want to make healthcare choices for you when you are unable to make them for yourself  This person, called a proxy, is usually a family member or a friend  You may choose more than 1 proxy  · Do not resuscitate (DNR) order:  A DNR order is used in case your heart stops beating or you stop breathing  It is a request not to have certain forms of treatment, such as CPR  A DNR order may be included in other types of advance directives  · Medical directive: This covers the care that you want if you are in a coma, near death, or unable to make decisions for yourself  You can list the treatments you want for each condition  Treatment may include pain medicine, surgery, blood transfusions, dialysis, IV or tube feedings, and a ventilator (breathing machine)  · Values history: This document has questions about your views, beliefs, and how you feel and think about life  This information can help others choose the care that you would choose  Why are advance directives important? An advance directive helps you control your care  Although spoken wishes may be used, it is better to have your wishes written down  Spoken wishes can be misunderstood, or not followed  Treatments may be given even if you do not want them  An advance directive may make it easier for your family to make difficult choices about your care  Alcohol Use and Your Health    Drinking too much can harm your health  Excessive alcohol use leads to about 88,000 death in the [place] each year, and shortens the life of those who diet by almost 30 years  Further, excessive drinking cost the economy $249 billion in 2010  Most excessive drinkers are not alcohol dependent  Excessive alcohol use has immediate effects that increase the risk of many harmful health conditions  These are most often the result of binge drinking    Over time, excessive alcohol use can lead to the development of chronic diseases and other series health "problems  What is considered a \"drink\"? Excessive alcohol use includes:  · Binge Drinking: For women, 4 or more drinks consumed on one occasion  For men, 5 or more drinks consumed on one occasion  · Heavy Drinking: For women, 8 or more drinks per week  For men, 15 or more drinks per week  · Any alcohol used by pregnant women  · Any alcohol used by those under the age of 21 years    If you choose to drink, do so in moderation:  · Do not drink at all if you are under the age of 24, or if you are or may be pregnant, or have health problems that could be made worse by drinking  · For women, up to 1 drink per day  · For men, up to 2 drinks a day    No one should begin drinking or drink more frequently based on potential health benefits    Short-Term Health Risks:  · Injuries: motor vehicle crashes, falls, drownings, burns  · Violence: homicide, suicide, sexual assault, intimate partner violence  · Alcohol poisoning  · Reproductive health: risky sexual behaviors, unintended prengnacy, sexually transmitted diseases, miscarriage, stillbirth, fetal alcohol syndrome    Long-Term Health Risks:  · Chronic diseases: high blood pressure, heart disease, stroke, liver disease, digestive problems  · Cancers: breast, mouth and throat, liver, colon  · Learning and memory problems: dementia, poor school performance  · Mental health: depression, anxiety, insomnia  · Social problems: lost productivity, family problems, unemployment  · Alcohol dependence    For support and more information:  · Substance Abuse and [facility] [address] [place]  Web Address: https://[place]/    · Alcoholics Anonymous        Web Address: http://www levy info/    https://www cdc gov/alcohol/fact-sheets/alcohol-use htm     © Copyright [employer] 2018 Information is for End User's use only and may not be sold, redistributed or otherwise used for commercial purposes   All illustrations and images " included in [facility] are the copyrighted property of A D A M , Inc  or [facility] [place]

## 2023-06-08 ENCOUNTER — TELEPHONE (OUTPATIENT)
Dept: ENDOCRINOLOGY | Facility: CLINIC | Age: 83
End: 2023-06-08

## 2023-06-27 NOTE — DISCHARGE INSTR - LAB
Epidural Steroid Injection   WHAT YOU NEED TO KNOW:   An epidural steroid injection (EMELY) is a procedure to inject steroid medicine into the epidural space  The epidural space is between your spinal cord and vertebrae  Steroids reduce inflammation and fluid buildup in your spine that may be causing pain  You may be given pain medicine along with the steroids  ACTIVITY  · Do not drive or operate machinery today  · No strenuous activity today - bending, lifting, etc   · You may resume normal activites starting tomorrow - start slowly and as tolerated  · You may shower today, but no tub baths or hot tubs  · You may have numbness for several hours from the local anesthetic  Please use caution and common sense, especially with weight-bearing activities  CARE OF THE INJECTION SITE  · If you have soreness or pain, apply ice to the area today (20 minutes on/20 minutes off)  · Starting tomorrow, you may use warm, moist heat or ice if needed  · You may have an increase or change in your discomfort for 36-48 hours after your treatment  · Apply ice and continue with any pain medication you have been prescribed  · Notify [facility] if you have any of the following: redness, drainage, swelling, headache, stiff neck or fever above 100°F     SPECIAL INSTRUCTIONS  · Our office will contact you in approximately 7 days for a progress report  MEDICATIONS  · Continue to take all routine medications  · Our office may have instructed you to hold some medications  As no general anesthesia was used in today's procedure, you should not experience any side effects related to anesthesia  If you have a problem specifically related to your procedure, please call our office at [phone]  Problems not related to your procedure should be directed to your primary care physician 
Detail Level: Detailed

## 2023-08-03 DIAGNOSIS — E11.42 TYPE 2 DIABETES MELLITUS WITH DIABETIC POLYNEUROPATHY, WITHOUT LONG-TERM CURRENT USE OF INSULIN (HCC): ICD-10-CM

## 2023-08-04 RX ORDER — METFORMIN HYDROCHLORIDE 500 MG/1
TABLET, EXTENDED RELEASE ORAL
Qty: 360 TABLET | Refills: 0 | Status: SHIPPED | OUTPATIENT
Start: 2023-08-04

## 2023-08-31 DIAGNOSIS — E11.9 TYPE 2 DIABETES MELLITUS WITHOUT COMPLICATION, WITHOUT LONG-TERM CURRENT USE OF INSULIN (HCC): ICD-10-CM

## 2023-08-31 RX ORDER — LANCETS
EACH MISCELLANEOUS
Qty: 200 EACH | Refills: 3 | Status: SHIPPED | OUTPATIENT
Start: 2023-08-31

## 2023-10-18 ENCOUNTER — OFFICE VISIT (OUTPATIENT)
Dept: PAIN MEDICINE | Facility: CLINIC | Age: 83
End: 2023-10-18
Payer: MEDICARE

## 2023-10-18 VITALS
SYSTOLIC BLOOD PRESSURE: 162 MMHG | HEIGHT: 72 IN | HEART RATE: 73 BPM | BODY MASS INDEX: 21.54 KG/M2 | DIASTOLIC BLOOD PRESSURE: 66 MMHG | TEMPERATURE: 96.5 F | WEIGHT: 159 LBS

## 2023-10-18 DIAGNOSIS — M54.16 LUMBAR RADICULOPATHY: Primary | ICD-10-CM

## 2023-10-18 DIAGNOSIS — M47.816 LUMBAR SPONDYLOSIS: ICD-10-CM

## 2023-10-18 PROCEDURE — 99214 OFFICE O/P EST MOD 30 MIN: CPT | Performed by: ANESTHESIOLOGY

## 2023-10-18 RX ORDER — CELECOXIB 100 MG/1
100 CAPSULE ORAL 2 TIMES DAILY
Qty: 39 CAPSULE | Refills: 1 | Status: SHIPPED | OUTPATIENT
Start: 2023-10-18

## 2023-10-18 NOTE — PROGRESS NOTES
Assessment  1. Lumbar radiculopathy - Right    2. Lumbar spondylosis        Plan    At this point time will obtain lumbar radiographs to check for stability as well as to rule any significant pathology that may be contribute to his symptoms. He has taken Celebrex in the past without any issue I did prescribe Celebrex 100 mg once daily as needed to take for pain. I did review the potential side effects of medication if he has any problems or questions he will stop the medication and give our office a call. We will follow-up once we obtain the results and to see how he is feeling. He did physical therapy last year and has been doing home exercises since. If his pain persists may consider further imaging. My impressions and treatment recommendations were discussed in detail with the patient who verbalized understanding and had no further questions. Discharge instructions were provided. I personally saw and examined the patient and I agree with the above discussed plan of care. This note is created using dictation transcription. It may contain typographical errors, grammatical errors, improperly dictated words, background noise and other errors. Orders Placed This Encounter   Procedures    XR spine lumbar complete w bending minimum 6 views     Standing Status:   Future     Standing Expiration Date:   10/18/2027     Scheduling Instructions:      Bring along any outside films relating to this procedure. New Medications Ordered This Visit   Medications    celecoxib (CeleBREX) 100 mg capsule     Sig: Take 1 capsule (100 mg total) by mouth 2 (two) times a day     Dispense:  39 capsule     Refill:  1       History of Present Illness    Miller Newman is a 82 y.o. male with 2-week history of right-sided low back and lower extremity pain. He is unaware of any clear present event denies any trauma or injury.   Rates his pain is 5 out of 10 on the visual analog scale is worse in the morning scribes intermittent burning achy denies any bowel bladder dysfunction or motor weakness. His pain is worse with standing and walking somewhat relieved with rest and relaxation and in fact over the past 5 days it slowly resolving. I have personally reviewed and/or updated the patient's past medical history, past surgical history, family history, social history, current medications, allergies, and vital signs today. Review of Systems   Constitutional:  Negative for fever and unexpected weight change. HENT:  Negative for trouble swallowing. Eyes:  Negative for visual disturbance. Respiratory:  Negative for shortness of breath and wheezing. Cardiovascular:  Negative for chest pain and palpitations. Gastrointestinal:  Negative for constipation, diarrhea, nausea and vomiting. Endocrine: Negative for cold intolerance, heat intolerance and polydipsia. Genitourinary:  Negative for difficulty urinating and frequency. Musculoskeletal:  Positive for gait problem. Negative for arthralgias, joint swelling and myalgias. Skin:  Negative for rash. Neurological:  Negative for dizziness, seizures, syncope, weakness (Muscle Weakness) and headaches. Hematological:  Does not bruise/bleed easily. Psychiatric/Behavioral:  Negative for dysphoric mood. All other systems reviewed and are negative.       Patient Active Problem List   Diagnosis    Benign essential hypertension    Benign prostatic hyperplasia with lower urinary tract symptoms    Esophageal dysphagia    Herniated lumbar intervertebral disc    Hyperlipidemia    Vasomotor rhinitis    Elevated TSH    Type 2 diabetes mellitus with diabetic polyneuropathy (HCC)    Type 2 diabetes mellitus without complication, without long-term current use of insulin ([address])    Spinal stenosis, lumbar region, with neurogenic claudication    Radiculopathy    Chronic pain syndrome    Lumbar spondylosis    Chronic right-sided low back pain without sciatica       Past Medical History:   Diagnosis Date    Abnormal blood chemistry     Abnormal glucose     Last assessed - 14    Allergic     Arthritis     Benign essential hypertension     Last assessed - 5/15/17    Cancer ([facility])     melanoma    Chronic allergic rhinitis     Last assessed - 16    Dermatitis     Last assessed - 6/16/15    Diabetes mellitus ([facility])     type 11    High risk medication use     Last assessed - 16    Hypertension     Sciatica     Last assessed - 12    Septic bursitis     Last assessed - 9/3/13    Tick bite     Last assessed - 16       Past Surgical History:   Procedure Laterality Date    CATARACT EXTRACTION      COLONOSCOPY      Fiberoptic    PILONIDAL CYST EXCISION         Family History   Problem Relation Age of Onset    Hypertension Mother     Osteoporosis Mother     Diabetes type I Paternal Aunt     Colon cancer Family        Social History     Occupational History    Not on file   Tobacco Use    Smoking status: Former     Packs/day: 1.00     Years: 10.00     Total pack years: 10.00     Types: Cigarettes     Start date: 1958     Quit date: 1987     Years since quittin.3    Smokeless tobacco: Never   Vaping Use    Vaping Use: Never used   Substance and Sexual Activity    Alcohol use:  Yes     Alcohol/week: 5.0 standard drinks of alcohol     Types: 5 Glasses of wine per week     Comment: social usage    Drug use: No    Sexual activity: Not Currently     Partners: Female       Current Outpatient Medications on File Prior to Visit   Medication Sig    amLODIPine (NORVASC) 5 mg tablet Take 1 tablet (5 mg total) by mouth daily    B Complex-Biotin-FA (B COMPLETE) TABS Take by mouth in the morning    Blood Glucose Monitoring Suppl (CONTOUR NEXT MONITOR) w/Device KIT 1 Device by Does not apply route 2 (two) times a day    Coenzyme Q10 (COQ10 PO) Take by mouth in the morning    docusate sodium (COLACE) 100 mg capsule Take 1 capsule by mouth as needed    glimepiride (AMARYL) 1 mg tablet 1/2 tab as needed    glucose blood (Contour Next Test) test strip Use as directed 2-3 times per day. ipratropium (ATROVENT) 0.03 % nasal spray 2 sprays into each nostril 3 (three) times a day If needed for runny nose    lisinopril (ZESTRIL) 20 mg tablet TAKE 1 TABLET BY MOUTH DAILY    metFORMIN (GLUCOPHAGE-XR) 500 mg 24 hr tablet TAKE TWO TABLETS BY MOUTH TWICE A DAY, IN THE MORNING AND IN THE EVENING WITH MEALS    Microlet Lancets MISC TEST BLOOD SUGAR 2 TIMES DAILY    Multiple Vitamin (multivitamin) tablet Take 1 tablet by mouth daily    simvastatin (ZOCOR) 20 mg tablet Take 1 tablet (20 mg total) by mouth daily at bedtime    Specialty Vitamins Products (MAGNESIUM, AMINO ACID CHELATE,) 133 MG tablet Take 1 tablet by mouth in the morning    Thiamine 50 MG CAPS Take 1 capsule by mouth daily     No current facility-administered medications on file prior to visit. No Known Allergies    Physical Exam    /66 (BP Location: Left arm, Patient Position: Sitting, Cuff Size: Standard)   Pulse 73   Temp (!) 96.5 °F (35.8 °C)   Ht 6' (1.829 m)   Wt 72.1 kg (159 lb)   BMI 21.56 kg/m²     Constitutional: normal, well developed, well nourished, alert, in no distress and non-toxic and no overt pain behavior. Eyes: anicteric  HEENT: grossly intact  Neck: supple, symmetric, trachea midline and no masses   Pulmonary:even and unlabored  Cardiovascular:No edema or pitting edema present  Skin:Normal without rashes or lesions and well hydrated  Psychiatric:Mood and affect appropriate  Neurologic:Cranial Nerves II-XII grossly intact  Musculoskeletal:normal,  Inspection: Normal station and gait. Normal lumbar lordotic curve with no significant scoliosis or spinal step-off. Skin intact without erythema. No gross mass or muscle atrophy.    Palpation: There is no tenderness to palpation overlying the sacroiliac joint as well as the ischial bursa bilateral. No significant tenderness over the greater trochanteric bursa bilaterally. Motor/Strength: 5/5 strength in the bilateral lower limbs. The patient is able to heel and/toe walk. Tandem gait is intact. Reflexes: Deep tendon reflex are 2+ and symmetrical bilateral patellar and Achilles  Sensation: Sensation intact to light touch and pinprick in the bilateral lower limbs. Proprioception is intact at bilateral hallux. Maneuvers: Negative bilateral straight leg raising. Negative Can's maneuver. Imaging  MRI LUMBAR SPINE WITHOUT CONTRAST @  3-26-20     INDICATION: M48.061: Spinal stenosis, lumbar region without neurogenic claudication. COMPARISON:  3/6/2020     TECHNIQUE:  Sagittal T1, sagittal T2, sagittal inversion recovery, axial T1 and axial T2, coronal T2.    IMAGE QUALITY:  Diagnostic     FINDINGS:     VERTEBRAL BODIES:  There are 5 lumbar type vertebral bodies. There is trace retrolisthesis of L1-L2, L2-L3 and L5-S1. There is no suspicious marrow signal normality identified. There is Modic type I endplate degenerative change at L5-S1 with ventral   osteophytosis at this level. SACRUM:  Normal signal within the sacrum. No evidence of insufficiency or stress fracture. DISTAL CORD AND CONUS:  Normal size and signal within the distal cord and conus. PARASPINAL SOFT TISSUES:  There is a partially imaged left-sided renal cyst, incompletely evaluated. There is no significant prevertebral or paravertebral soft tissue swelling. Is a probable sebaceous cyst posteriorly at L1-L2. LOWER THORACIC DISC SPACES:  There are degenerative changes in the lower thoracic spine. There is partially imaged annular fissure at T12-L1. LUMBAR DISC SPACES:     L1-L2:  There is a bulging annulus. There is ventral osteophytosis. There is facet arthrosis with ligament sprain thickening. There is no significant foraminal narrowing. L2-L3:  There is a bulging annulus. There is facet arthrosis with ligamentum flavum thickening.   There is mild mass effect on the thecal sac. There is mild to moderate right greater than left foraminal narrowing with near abutment of the exiting nerve   roots. L3-L4:  There is a bulging annulus. There is facet arthrosis with ligamentum flavum thickening. There is severe canal stenosis with near complete obliteration of the thecal sac and crowding and redundancy of the cauda equina nerve roots. There is   endplate spurring. There is moderate to severe foraminal narrowing bilaterally with contact of the exiting nerve roots. L4-L5:  There is a bulging annulus. There is facet arthrosis with ligament sprain thickening. There is moderate mass effect on the thecal sac, predominantly in the transverse dimension. There is marginal osteophytosis. There is mild to moderate left   foraminal narrowing. There is moderate to severe right foraminal narrowing. L5-S1:  There is a bulging annulus. There is a posterior annular fissure. There is a superimposed left paracentral disc protrusion. There is effacement of the left lateral recess with impingement of the exiting left descending S1 nerve root. There is   severe left foraminal narrowing with impingement of the exiting left L5 nerve root. There is moderate to severe right foraminal narrowing with contact of the exiting right L5 nerve root. Marginal osteophytosis is present. IMPRESSION:     Multilevel degenerative changes of the lumbar spine, as described above. Multifactorial disease results in overall severe canal stenosis with crowding and redundancy of the cauda equina nerve roots at L3-L4. Moderate to severe foraminal narrowing bilaterally is present at this level with abutment of the exiting nerve roots. Bulging annulus with a superimposed left paracentral disc protrusion contacts the descending left S1 nerve root within the left lateral recess. Severe left foraminal narrowing results in impingement the exiting left L5 nerve root at this level.      I have personally reviewed pertinent films in PACS and my interpretation is multilevel spondylosis disc protrusion with foraminal stenosis.

## 2023-10-23 ENCOUNTER — TELEPHONE (OUTPATIENT)
Dept: PAIN MEDICINE | Facility: CLINIC | Age: 83
End: 2023-10-23

## 2023-10-23 ENCOUNTER — APPOINTMENT (OUTPATIENT)
Dept: RADIOLOGY | Facility: CLINIC | Age: 83
End: 2023-10-23
Payer: MEDICARE

## 2023-10-23 DIAGNOSIS — M54.16 LUMBAR RADICULOPATHY: ICD-10-CM

## 2023-10-23 PROCEDURE — 72114 X-RAY EXAM L-S SPINE BENDING: CPT

## 2023-10-30 ENCOUNTER — TELEPHONE (OUTPATIENT)
Age: 83
End: 2023-10-30

## 2023-10-30 NOTE — TELEPHONE ENCOUNTER
Reason for call:   [x] Prior Auth  [] Other:     Caller:  [x] Patient  [] Pharmacy  [facility]  [facility]: [facility]  PharmacyNumber: [id number]    Medication: celebrex    Dose/Frequency: 100mg twice a day    Quantity:     Ordering Provider:   [] PCP/Provider -   [x] Speciality/Provider - spine and pain- Dr Oscar Max    Has the patient tried other medications and failed? If failed, which medications did they fail? [x] No   [] Yes -     Is the patient's insurance updated in EPIC? [x] Yes   [] No     Is a copy of the patient's insurance scanned in EPIC? [x] Yes   [] No       Prior auth from insurance is scanned in media.     Pt's number [phone]

## 2023-10-31 NOTE — TELEPHONE ENCOUNTER
CINDY NDIAYE Case ID: [id number]  Need help?  Call us at [phone]  Outcome   Approvedtoday  Your request has been approved  Drug    Celecoxib 100MG capsules   Form    [employer] Electronic PA Form ([id number] NCPDP)

## 2023-11-03 DIAGNOSIS — I10 ESSENTIAL HYPERTENSION: ICD-10-CM

## 2023-11-03 RX ORDER — AMLODIPINE BESYLATE 5 MG/1
5 TABLET ORAL DAILY
Qty: 90 TABLET | Refills: 2 | Status: SHIPPED | OUTPATIENT
Start: 2023-11-03

## 2023-11-04 DIAGNOSIS — E11.42 TYPE 2 DIABETES MELLITUS WITH DIABETIC POLYNEUROPATHY, WITHOUT LONG-TERM CURRENT USE OF INSULIN (HCC): ICD-10-CM

## 2023-11-06 RX ORDER — LISINOPRIL 20 MG/1
TABLET ORAL
Qty: 90 TABLET | Refills: 3 | Status: SHIPPED | OUTPATIENT
Start: 2023-11-06

## 2023-11-06 RX ORDER — METFORMIN HYDROCHLORIDE 500 MG/1
TABLET, EXTENDED RELEASE ORAL
Qty: 360 TABLET | Refills: 0 | Status: SHIPPED | OUTPATIENT
Start: 2023-11-06

## 2023-11-08 ENCOUNTER — APPOINTMENT (OUTPATIENT)
Dept: LAB | Facility: HOSPITAL | Age: 83
End: 2023-11-08
Payer: MEDICARE

## 2023-11-08 DIAGNOSIS — E78.2 MIXED HYPERLIPIDEMIA: ICD-10-CM

## 2023-11-08 DIAGNOSIS — E11.42 TYPE 2 DIABETES MELLITUS WITH DIABETIC POLYNEUROPATHY, WITHOUT LONG-TERM CURRENT USE OF INSULIN (HCC): ICD-10-CM

## 2023-11-08 LAB
ALBUMIN SERPL BCP-MCNC: 4.2 G/DL (ref 3.5–5)
ALP SERPL-CCNC: 87 U/L (ref 34–104)
ALT SERPL W P-5'-P-CCNC: 18 U/L (ref 7–52)
ANION GAP SERPL CALCULATED.3IONS-SCNC: 6 MMOL/L
AST SERPL W P-5'-P-CCNC: 23 U/L (ref 13–39)
BILIRUB SERPL-MCNC: 0.5 MG/DL (ref 0.2–1)
BUN SERPL-MCNC: 16 MG/DL (ref 5–25)
CALCIUM SERPL-MCNC: 9.3 MG/DL (ref 8.4–10.2)
CHLORIDE SERPL-SCNC: 98 MMOL/L (ref 96–108)
CO2 SERPL-SCNC: 28 MMOL/L (ref 21–32)
CREAT SERPL-MCNC: 1 MG/DL (ref 0.6–1.3)
CREAT UR-MCNC: 87 MG/DL
EST. AVERAGE GLUCOSE BLD GHB EST-MCNC: 186 MG/DL
GFR SERPL CREATININE-BSD FRML MDRD: 70 ML/MIN/1.73SQ M
GLUCOSE P FAST SERPL-MCNC: 130 MG/DL (ref 65–99)
HBA1C MFR BLD: 8.1 %
MICROALBUMIN UR-MCNC: 31.7 MG/L
MICROALBUMIN/CREAT 24H UR: 36 MG/G CREATININE (ref 0–30)
POTASSIUM SERPL-SCNC: 4.8 MMOL/L (ref 3.5–5.3)
PROT SERPL-MCNC: 6.6 G/DL (ref 6.4–8.4)
SODIUM SERPL-SCNC: 132 MMOL/L (ref 135–147)
T4 FREE SERPL-MCNC: 1.09 NG/DL (ref 0.61–1.12)
TSH SERPL DL<=0.05 MIU/L-ACNC: 6.55 UIU/ML (ref 0.45–4.5)

## 2023-11-08 PROCEDURE — 83036 HEMOGLOBIN GLYCOSYLATED A1C: CPT

## 2023-11-08 PROCEDURE — 82043 UR ALBUMIN QUANTITATIVE: CPT

## 2023-11-08 PROCEDURE — 80053 COMPREHEN METABOLIC PANEL: CPT

## 2023-11-08 PROCEDURE — 84439 ASSAY OF FREE THYROXINE: CPT

## 2023-11-08 PROCEDURE — 36415 COLL VENOUS BLD VENIPUNCTURE: CPT

## 2023-11-08 PROCEDURE — 84443 ASSAY THYROID STIM HORMONE: CPT

## 2023-11-08 PROCEDURE — 82570 ASSAY OF URINE CREATININE: CPT

## 2023-11-15 ENCOUNTER — OFFICE VISIT (OUTPATIENT)
Dept: ENDOCRINOLOGY | Facility: CLINIC | Age: 83
End: 2023-11-15
Payer: MEDICARE

## 2023-11-15 VITALS
BODY MASS INDEX: 21.56 KG/M2 | HEART RATE: 79 BPM | HEIGHT: 72 IN | WEIGHT: 159.2 LBS | SYSTOLIC BLOOD PRESSURE: 152 MMHG | DIASTOLIC BLOOD PRESSURE: 66 MMHG

## 2023-11-15 DIAGNOSIS — E78.2 MIXED HYPERLIPIDEMIA: ICD-10-CM

## 2023-11-15 DIAGNOSIS — I10 BENIGN ESSENTIAL HYPERTENSION: ICD-10-CM

## 2023-11-15 DIAGNOSIS — E11.42 TYPE 2 DIABETES MELLITUS WITH DIABETIC POLYNEUROPATHY, WITHOUT LONG-TERM CURRENT USE OF INSULIN (HCC): Primary | ICD-10-CM

## 2023-11-15 DIAGNOSIS — R79.89 ELEVATED TSH: ICD-10-CM

## 2023-11-15 PROCEDURE — 99214 OFFICE O/P EST MOD 30 MIN: CPT | Performed by: INTERNAL MEDICINE

## 2023-11-15 PROCEDURE — 95250 CONT GLUC MNTR PHYS/QHP EQP: CPT

## 2023-11-15 NOTE — PROGRESS NOTES
Tiana Mckinney 82 y.o. male MRN: [id number]    Encounter: [id number]      Assessment/Plan     Assessment: This is a 82y.o.-year-old male with diabetes with hyperglycemia, hypertension, hyperlipidemia and elevated TSH. Plan:  1. Type 2 diabetes with hyperglycemia-his diabetes has worsened based on an increase in A1c to 8.2%. I suspect this may be due to decreased activity. In order to make adjustments to his regimen, I like to understand his blood sugars little bit better by doing a professional continuous glucose monitor which she is agreeable to. Based on the results, I will make changes to his regimen. 2.  Hypertension-blood pressure is slightly elevated. We will need to monitor this over time. 3.  Hyperlipidemia-continue statin. 4.  Elevated TSH-if his TSH increases to greater than 10, he will likely need thyroid hormone supplementation. 82-year-old man with type 2 diabetes presents for follow-up. CC: Diabetes    History of Present Illness     HPI:  82-year-old man with type 2 diabetes presents for follow-up. He is currently on glimepiride half a milligram twice a day as needed. There has been an increase in his A1c. He denies any hypoglycemia. He denies any polyuria or polydipsia. Review of Systems   Constitutional:  Negative for chills and fever. Respiratory:  Negative for shortness of breath. Cardiovascular:  Negative for chest pain. Gastrointestinal:  Negative for constipation, diarrhea, nausea and vomiting. All other systems reviewed and are negative.       Historical Information   Past Medical History:   Diagnosis Date    Abnormal blood chemistry     Abnormal glucose     Last assessed - 2/20/14    Allergic     Arthritis     Benign essential hypertension     Last assessed - 5/15/17    Cancer ([facility]) 2007    melanoma    Chronic allergic rhinitis     Last assessed - 8/1/16    Dermatitis     Last assessed - 6/16/15    Diabetes mellitus ([facility]) 1998    type 11    High risk medication use     Last assessed - 16    Hypertension     Sciatica     Last assessed - 12    Septic bursitis     Last assessed - 9/3/13    Tick bite     Last assessed - 16     Past Surgical History:   Procedure Laterality Date    CATARACT EXTRACTION      COLONOSCOPY      Fiberoptic    PILONIDAL CYST EXCISION       Social History   Social History     Substance and Sexual Activity   Alcohol Use Yes    Alcohol/week: 5.0 standard drinks of alcohol    Types: 5 Glasses of wine per week    Comment: social usage     Social History     Substance and Sexual Activity   Drug Use No     Social History     Tobacco Use   Smoking Status Former    Packs/day: 1.00    Years: 10.00    Total pack years: 10.00    Types: Cigarettes    Start date: 1958    Quit date: 1987    Years since quittin.4   Smokeless Tobacco Never     Family History:   Family History   Problem Relation Age of Onset    Hypertension Mother     Osteoporosis Mother     Diabetes type I Paternal Aunt     Colon cancer Family        Meds/Allergies   Current Outpatient Medications   Medication Sig Dispense Refill    amLODIPine (NORVASC) 5 mg tablet TAKE 1 TABLET BY MOUTH DAILY 90 tablet 2    B Complex-Biotin-FA (B COMPLETE) TABS Take by mouth in the morning      Blood Glucose Monitoring Suppl (CONTOUR NEXT MONITOR) w/Device KIT 1 Device by Does not apply route 2 (two) times a day 1 kit 0    Coenzyme Q10 (COQ10 PO) Take by mouth in the morning      docusate sodium (COLACE) 100 mg capsule Take 1 capsule by mouth as needed      glimepiride (AMARYL) 1 mg tablet 1/2 tab as needed 30 tablet 2    glucose blood (Contour Next Test) test strip Use as directed 2-3 times per day.  100 strip 3    ipratropium (ATROVENT) 0.03 % nasal spray 2 sprays into each nostril 3 (three) times a day If needed for runny nose 30 mL 1    lisinopril (ZESTRIL) 20 mg tablet TAKE 1 TABLET BY MOUTH DAILY 90 tablet 3    metFORMIN (GLUCOPHAGE-XR) 500 mg 24 hr tablet TAKE TWO TABLETS BY MOUTH TWICE A DAY - MORNING AND EVENING WITH MEALS 360 tablet 0    Microlet Lancets MISC TEST BLOOD SUGAR 2 TIMES DAILY 200 each 3    Multiple Vitamin (multivitamin) tablet Take 1 tablet by mouth daily      simvastatin (ZOCOR) 20 mg tablet Take 1 tablet (20 mg total) by mouth daily at bedtime 90 tablet 3    Specialty Vitamins Products (MAGNESIUM, AMINO ACID CHELATE,) 133 MG tablet Take 1 tablet by mouth in the morning      Thiamine 50 MG CAPS Take 1 capsule by mouth daily      celecoxib (CeleBREX) 100 mg capsule Take 1 capsule (100 mg total) by mouth 2 (two) times a day (Patient not taking: Reported on 11/15/2023) 39 capsule 1     No current facility-administered medications for this visit. No Known Allergies    Objective   Vitals: Blood pressure 152/66, pulse 79, height 6' (1.829 m), weight 72.2 kg (159 lb 3.2 oz). Physical Exam  Vitals reviewed. Constitutional:       General: He is not in acute distress. Appearance: He is well-developed. He is not diaphoretic. HENT:      Head: Normocephalic and atraumatic. Mouth/Throat:      Pharynx: No oropharyngeal exudate. Eyes:      General: Lids are normal. No scleral icterus. Right eye: No discharge. Left eye: No discharge. Conjunctiva/sclera: Conjunctivae normal.   Neck:      Thyroid: No thyromegaly. Cardiovascular:      Rate and Rhythm: Normal rate and regular rhythm. Heart sounds: Normal heart sounds. No murmur heard. No friction rub. No gallop. Pulmonary:      Effort: Pulmonary effort is normal. No respiratory distress. Breath sounds: Normal breath sounds. Abdominal:      General: Bowel sounds are normal. There is no distension. Palpations: Abdomen is soft. Musculoskeletal:         General: No tenderness or deformity. Normal range of motion. Cervical back: Neck supple. Lymphadenopathy:      Head:      Right side of head: No occipital adenopathy.       Left side of head: No occipital adenopathy. Upper Body:      Right upper body: No supraclavicular adenopathy. Left upper body: No supraclavicular adenopathy. Skin:     General: Skin is warm. Findings: No erythema or rash. Neurological:      Mental Status: He is alert and oriented to person, place, and time. Cranial Nerves: No cranial nerve deficit. Coordination: Coordination normal.   Psychiatric:         Mood and Affect: Mood normal.         Behavior: Behavior normal.         The history was obtained from the review of the chart, patient.     Lab Results:   Lab Results   Component Value Date/Time    Hemoglobin A1C 8.1 (H) 11/08/2023 09:11 AM    Hemoglobin A1C 7.4 (H) 05/02/2023 08:52 AM    Hemoglobin A1C 8.8 (H) 01/24/2023 09:39 AM    WBC 8.89 12/31/2022 04:15 PM    Hemoglobin 10.5 (L) 12/31/2022 04:15 PM    Hgb, i-STAT 10.5 (L) 12/31/2022 04:25 PM    Hematocrit 32.3 (L) 12/31/2022 04:15 PM    Hct, i-STAT 31 (L) 12/31/2022 04:25 PM    MCV 89 12/31/2022 04:15 PM    Platelets 658 (H) 49/25/6978 04:15 PM    BUN 16 11/08/2023 09:11 AM    BUN 15 05/02/2023 08:52 AM    BUN 13 01/24/2023 09:39 AM    Potassium 4.8 11/08/2023 09:11 AM    Potassium 4.7 05/02/2023 08:52 AM    Potassium 4.1 01/24/2023 09:39 AM    Chloride 98 11/08/2023 09:11 AM    Chloride 103 05/02/2023 08:52 AM    Chloride 103 01/24/2023 09:39 AM    CO2 28 11/08/2023 09:11 AM    CO2 27 05/02/2023 08:52 AM    CO2 27 01/24/2023 09:39 AM    CO2, i-STAT 24 12/31/2022 04:25 PM    Creatinine 1.00 11/08/2023 09:11 AM    Creatinine 0.92 05/02/2023 08:52 AM    Creatinine 0.90 01/24/2023 09:39 AM    AST 23 11/08/2023 09:11 AM    AST 26 05/02/2023 08:52 AM    AST 26 12/31/2022 04:15 PM    ALT 18 11/08/2023 09:11 AM    ALT 27 05/02/2023 08:52 AM    ALT 41 12/31/2022 04:15 PM    Total Protein 6.6 11/08/2023 09:11 AM    Total Protein 7.2 05/02/2023 08:52 AM    Total Protein 6.8 12/31/2022 04:15 PM    Albumin 4.2 11/08/2023 09:11 AM    Albumin 3.7 05/02/2023 08:52 AM Albumin 3.1 (L) 12/31/2022 04:15 PM    HDL, Direct 34 (L) 01/24/2023 09:39 AM    Triglycerides 80 01/24/2023 09:39 AM           Imaging Studies: I have personally reviewed pertinent reports. Portions of the record may have been created with voice recognition software. Occasional wrong word or "sound a like" substitutions may have occurred due to the inherent limitations of voice recognition software. Read the chart carefully and recognize, using context, where substitutions have occurred.

## 2023-11-15 NOTE — PROGRESS NOTES
Continous glucose monitoring parvin placement     Date/Time  11/15/2023 12:50 PM     Performed by  Castro Hutchins MA   Authorized by  Scout Tong MD     Universal Protocol   Consent: Verbal consent obtained. Written consent obtained. Consent given by: patient  Patient understanding: patient states understanding of the procedure being performed  Patient consent: the patient's understanding of the procedure matches consent given  Procedure consent: procedure consent matches procedure scheduled  Relevant documents present and verified: n/a. Test results available and properly labeled: n/a. Site marked: n/a. Imaging studies available: n/a. Patient identity confirmed: verbally with patient      Local anesthesia used: no     Anesthesia   Local anesthesia used: no     Sedation   Patient sedated: no        Specimen: no    Culture: no   Procedure Details   Procedure Notes: Parvin Pro placed on patient's right arm. Pt instructed to continue to monitor blood sugar using finger sticks while wearing CGM trial. Advised pt is able to shower, swim, exercise, etc while wearing. He should return in 2 week for removal and download. If sensor comes off before the 2 weeks, he should place it in a plastic bag and bring to office for download. If less than 5 days of information has been obtain, a new sensor will be placed. Pt verbalized understanding.   Patient tolerance: patient tolerated the procedure well with no immediate complications

## 2023-11-16 ENCOUNTER — OFFICE VISIT (OUTPATIENT)
Dept: PODIATRY | Facility: CLINIC | Age: 83
End: 2023-11-16
Payer: MEDICARE

## 2023-11-16 VITALS
HEIGHT: 72 IN | HEART RATE: 83 BPM | WEIGHT: 159 LBS | BODY MASS INDEX: 21.54 KG/M2 | DIASTOLIC BLOOD PRESSURE: 72 MMHG | SYSTOLIC BLOOD PRESSURE: 130 MMHG

## 2023-11-16 DIAGNOSIS — E11.40 TYPE 2 DIABETES MELLITUS WITH DIABETIC NEUROPATHY, WITHOUT LONG-TERM CURRENT USE OF INSULIN (HCC): ICD-10-CM

## 2023-11-16 DIAGNOSIS — B35.1 ONYCHOMYCOSIS: Primary | ICD-10-CM

## 2023-11-16 PROCEDURE — 11721 DEBRIDE NAIL 6 OR MORE: CPT | Performed by: PODIATRIST

## 2023-11-16 PROCEDURE — 99202 OFFICE O/P NEW SF 15 MIN: CPT | Performed by: PODIATRIST

## 2023-11-19 NOTE — PROGRESS NOTES
PATIENT:  Del Parker  1940    ASSESSMENT:  1. Onychomycosis        2. Type 2 diabetes mellitus with diabetic neuropathy, without long-term current use of insulin (HCC)              No orders of the defined types were placed in this encounter. PLAN:  Disease prevention and related risk factors of diabetes were identified and discussed. The patient was educated in proper foot wear for diabetics. Educated in daily foot assessment and routine diabetic foot care. Discussed the importance of controlling BS through diet and exercise. The patient will follow up in 12 weeks for further diabetic foot exam and care. Procedures: 09714  All mycotic toenails were reduced and debrided in length, width, and girth using a nail nipper and electric dremel. All hyperkeratotic skin lesion(s) if present were sharply pared with a #10 scalpel with no evidence of ulceration/abscess. Patient tolerated procedure(s) well without complications. Procedures     HPI:  Del Parker is a 82 y. o.year old male seen for initial diabetic foot exam referred by his endocrinologist.  Patient has class findings with type II DM. BS is not so well under control with an HbA1c of 8.1.. Patient concerned of thick toenails. The patient denied any acute pedal disorder, redness, acute swelling, or recent injury.       The following portions of the patient's history were reviewed and updated as appropriate: allergies, current medications, past family history, past medical history, past social history, past surgical history, and problem list.    REVIEW OF SYSTEMS:  GENERAL: No fever or chills  HEART: No chest pain, or palpitation  RESPIRATORY:  No acute SOB or cough  GI: No Nausea, vomit or diarrhea  NEUROLOGIC: No syncope or acute weakness    PHYSICAL EXAM:    /72   Pulse 83   Ht 6' (1.829 m)   Wt 72.1 kg (159 lb)   BMI 21.56 kg/m²     VASCULAR EXAM:  Posterior tibial artery absent bilateral  Dorsalis pedis artery +1 bilateral  The patient has moderate skin atrophy, color changes, lack of digital hair, and nail dystrophy. There is trace lower extremity edema bilaterally. NEUROLOGIC EXAM:  Sensation is intact to light touch. Yes   Sensation is grossly intact to 10gm monofilament. Vibratory sensation slightly diminished. No paresthesias noted bilateral.         DERMATOLOGIC EXAM:   Texture, Tone and Turgor are diminished bilateral.    The patient has dystrophic/hypertrophic toenails with yellow/white discoloration, onycholysis, and subungal debris. Fungal odor noted. Brittle nature noted. Right foot nails severely dystrophic x5 with 0.4 cm ave thickness (1-5)  Left foot nails severely dystrophic x5 with 0.4 cm ave thickness (1-5)    Patient has hyperkeratotic lesions noted:  Right foot located at none. Left foot located at none  No notable suspicious skin lesions. MUSCULOSKELETAL EXAM:   No acute joint pain, edema, or redness. Denies any acute musculoskeletal problem. Patient has no gross pedal deformities. Patient has no ambulation limitations. Patient wears DM shoes? No    Risk Category/Class Findings:  Q8(B1, B2 ABC)  0 = No loss of protective sensation    A1)  Has the patient had a previous amputation of the foot or integral skeletal portion thereof? No  B1)  Does the patient have absent posterior tibial pulse? Yes  B3)  Does the patient have absent dorsalis pedis? No  B2)  Does the patient have three of the following? Yes           1. Hair growth (increased or decreased), 2.  Nail changes (thickening), and 3. Pigmentary changes (discoloring)  C)  Does the patient have two of the following and one above?  No

## 2023-11-29 ENCOUNTER — CLINICAL SUPPORT (OUTPATIENT)
Dept: ENDOCRINOLOGY | Facility: CLINIC | Age: 83
End: 2023-11-29
Payer: MEDICARE

## 2023-11-29 ENCOUNTER — TELEPHONE (OUTPATIENT)
Dept: ENDOCRINOLOGY | Facility: CLINIC | Age: 83
End: 2023-11-29

## 2023-11-29 DIAGNOSIS — E11.9 TYPE 2 DIABETES MELLITUS WITHOUT COMPLICATION, WITHOUT LONG-TERM CURRENT USE OF INSULIN (HCC): Primary | ICD-10-CM

## 2023-11-29 DIAGNOSIS — E11.42 TYPE 2 DIABETES MELLITUS WITH DIABETIC POLYNEUROPATHY, WITHOUT LONG-TERM CURRENT USE OF INSULIN (HCC): ICD-10-CM

## 2023-11-29 DIAGNOSIS — E11.9 TYPE 2 DIABETES MELLITUS WITHOUT COMPLICATION, WITHOUT LONG-TERM CURRENT USE OF INSULIN (HCC): ICD-10-CM

## 2023-11-29 PROCEDURE — 95251 CONT GLUC MNTR ANALYSIS I&R: CPT | Performed by: INTERNAL MEDICINE

## 2023-11-29 RX ORDER — PERPHENAZINE 16 MG/1
1 TABLET, FILM COATED ORAL 2 TIMES DAILY
Qty: 200 STRIP | Refills: 1 | Status: SHIPPED | OUTPATIENT
Start: 2023-11-29

## 2023-11-29 RX ORDER — LANCETS
EACH MISCELLANEOUS 2 TIMES DAILY
Qty: 200 EACH | Refills: 1 | Status: SHIPPED | OUTPATIENT
Start: 2023-11-29

## 2023-11-29 NOTE — PROGRESS NOTES
Continous glucose monitoring parvin intrepretation     Date/Time  11/29/2023 1:15 PM     Performed by  Aldo Butcher   Authorized by  Randee Prakash MD     Universal Protocol   Consent: Verbal consent obtained. Consent given by: patient  Timeout called at: 11/29/2023 1:40 PM.  Patient understanding: patient states understanding of the procedure being performed  Patient consent: the patient's understanding of the procedure matches consent given  Procedure consent: procedure consent matches procedure scheduled  Relevant documents: relevant documents present and verified  Test results: test results available and properly labeled  Site marked: the operative site was marked  Patient identity confirmed: verbally with patient      Local anesthesia used: no     Anesthesia   Local anesthesia used: no     Sedation   Patient sedated: no        Specimen: no    Culture: no   Procedure Details   Procedure Notes: Patient had Parvin Pro sensor removed.   Patient tolerance: patient tolerated the procedure well with no immediate complications

## 2023-11-30 ENCOUNTER — DOCUMENTATION (OUTPATIENT)
Dept: ENDOCRINOLOGY | Facility: CLINIC | Age: 83
End: 2023-11-30
Payer: MEDICARE

## 2023-11-30 ENCOUNTER — RA CDI HCC (OUTPATIENT)
Dept: OTHER | Facility: HOSPITAL | Age: 83
End: 2023-11-30

## 2023-11-30 DIAGNOSIS — E11.42 TYPE 2 DIABETES MELLITUS WITH DIABETIC POLYNEUROPATHY, WITHOUT LONG-TERM CURRENT USE OF INSULIN (HCC): Primary | ICD-10-CM

## 2023-11-30 PROCEDURE — 95251 CONT GLUC MNTR ANALYSIS I&R: CPT | Performed by: INTERNAL MEDICINE

## 2023-11-30 NOTE — PROGRESS NOTES
[address] use       Indication     Type 2 Diabetes    More than 72 hours of data was reviewed. Report to be scanned to chart. Date Range: November 15, 2023 to November 29, 2023    Analysis of data:   % time CGM used:100%  Average Glucose: 130 mg/dL  Coefficient of Variation: 34.8%   Time in Target Range: 79%   Time Above Range: 17%   Time Below Range: 4%     Interpretation of data: High blood sugar is happening after breakfast and having low blood sugar overnight/early morning. When do you take the glimepiride?   It may be worthwhile to take the 1/2 tablet of glimepiride before breakfast.

## 2023-11-30 NOTE — PROGRESS NOTES
[address] [facility] coding opportunities       Chart reviewed, no opportunity found: CHART REVIEWED, NO OPPORTUNITY FOUND        Patients Insurance     Medicare Insurance: Medicare

## 2023-12-06 NOTE — PROGRESS NOTES
Patient verbally understood message, he was taking Glimepiride 1/2 tab daily in the evening and he will now switch to am per Dr. Yolie Omalley recommendation.

## 2023-12-08 ENCOUNTER — OFFICE VISIT (OUTPATIENT)
Dept: FAMILY MEDICINE CLINIC | Facility: HOSPITAL | Age: 83
End: 2023-12-08
Payer: MEDICARE

## 2023-12-08 VITALS
HEIGHT: 72 IN | DIASTOLIC BLOOD PRESSURE: 70 MMHG | TEMPERATURE: 96.9 F | HEART RATE: 78 BPM | BODY MASS INDEX: 20.8 KG/M2 | WEIGHT: 153.6 LBS | SYSTOLIC BLOOD PRESSURE: 140 MMHG

## 2023-12-08 DIAGNOSIS — E78.2 MIXED HYPERLIPIDEMIA: ICD-10-CM

## 2023-12-08 DIAGNOSIS — E11.42 TYPE 2 DIABETES MELLITUS WITH DIABETIC POLYNEUROPATHY, WITHOUT LONG-TERM CURRENT USE OF INSULIN (HCC): ICD-10-CM

## 2023-12-08 DIAGNOSIS — N40.1 BENIGN PROSTATIC HYPERPLASIA WITH NOCTURIA: ICD-10-CM

## 2023-12-08 DIAGNOSIS — R35.1 BENIGN PROSTATIC HYPERPLASIA WITH NOCTURIA: ICD-10-CM

## 2023-12-08 DIAGNOSIS — I10 BENIGN ESSENTIAL HYPERTENSION: Primary | ICD-10-CM

## 2023-12-08 DIAGNOSIS — I10 ESSENTIAL HYPERTENSION: ICD-10-CM

## 2023-12-08 PROCEDURE — 99214 OFFICE O/P EST MOD 30 MIN: CPT | Performed by: FAMILY MEDICINE

## 2023-12-08 RX ORDER — LISINOPRIL 20 MG/1
20 TABLET ORAL DAILY
Qty: 90 TABLET | Refills: 3 | Status: SHIPPED | OUTPATIENT
Start: 2023-12-08

## 2023-12-18 NOTE — PROGRESS NOTES
Name: Elan Morrison      : 1940      MRN: [id number]  Encounter Provider: Yovani Ying MD  Encounter Date: 2023   Encounter department: [facility] [address]     Assessment & Plan     1. Benign essential hypertension    2. Benign prostatic hyperplasia with nocturia    3. Mixed hyperlipidemia    4. Type 2 diabetes mellitus with diabetic polyneuropathy, without long-term current use of insulin ([facility])    5. Essential hypertension  -     lisinopril (ZESTRIL) 20 mg tablet; Take 1 tablet (20 mg total) by mouth daily    Uncontrolled DM. Working on improving diabetic control. No medication changes made today.  Trget A1c <7.5    Fu in 3 months.     Htn. Stable. Monitor.     Hld . Stable. On statin.        Subjective      Elan is here for fu of chronic conditions.   Overall feeling well.   Reports although A1c is high he is working on his diabetic control.   No issues with medications.       Review of Systems   Constitutional: Negative.  Negative for activity change, appetite change, chills and diaphoresis.   HENT:  Negative for congestion and dental problem.    Respiratory: Negative.  Negative for apnea, chest tightness, shortness of breath and wheezing.    Cardiovascular: Negative.  Negative for chest pain, palpitations and leg swelling.   Gastrointestinal: Negative.  Negative for abdominal distention, abdominal pain, constipation, diarrhea and nausea.   Genitourinary: Negative.  Negative for difficulty urinating, dysuria and frequency.       Current Outpatient Medications on File Prior to Visit   Medication Sig   • amLODIPine (NORVASC) 5 mg tablet TAKE 1 TABLET BY MOUTH DAILY   • B Complex-Biotin-FA (B COMPLETE) TABS Take by mouth in the morning   • Blood Glucose Monitoring Suppl (CONTOUR NEXT MONITOR) w/Device KIT 1 Device by Does not apply route 2 (two) times a day   • Coenzyme Q10 (COQ10 PO) Take by mouth in the morning   • docusate sodium (COLACE) 100 mg capsule Take 1  capsule by mouth as needed   • glimepiride (AMARYL) 1 mg tablet TAKE ONE-HALF TABLET BY MOUTH EVERY DAY AS NEEDED   • glucose blood (Contour Next Test) test strip Use 1 each 2 (two) times a day   • ipratropium (ATROVENT) 0.03 % nasal spray 2 sprays into each nostril 3 (three) times a day If needed for runny nose   • metFORMIN (GLUCOPHAGE-XR) 500 mg 24 hr tablet TAKE TWO TABLETS BY MOUTH TWICE A DAY - MORNING AND EVENING WITH MEALS   • Microlet Lancets MISC Use 2 (two) times a day   • Multiple Vitamin (multivitamin) tablet Take 1 tablet by mouth daily   • simvastatin (ZOCOR) 20 mg tablet Take 1 tablet (20 mg total) by mouth daily at bedtime   • Specialty Vitamins Products (MAGNESIUM, AMINO ACID CHELATE,) 133 MG tablet Take 1 tablet by mouth in the morning   • Thiamine 50 MG CAPS Take 1 capsule by mouth daily       Objective     /70   Pulse 78   Temp (!) 96.9 °F (36.1 °C) (Tympanic)   Ht 6' (1.829 m)   Wt 69.7 kg (153 lb 9.6 oz)   BMI 20.83 kg/m²     Physical Exam  Constitutional:       General: He is not in acute distress.     Appearance: Normal appearance. He is well-developed.   HENT:      Head: Normocephalic and atraumatic.      Right Ear: External ear normal.      Left Ear: External ear normal.      Nose: Nose normal.      Mouth/Throat:      Mouth: Mucous membranes are moist.   Eyes:      Conjunctiva/sclera: Conjunctivae normal.      Pupils: Pupils are equal, round, and reactive to light.   Cardiovascular:      Rate and Rhythm: Normal rate and regular rhythm.      Heart sounds: Normal heart sounds. No murmur heard.     No friction rub. No gallop.   Pulmonary:      Effort: Pulmonary effort is normal. No respiratory distress.      Breath sounds: Normal breath sounds. No wheezing or rales.   Chest:      Chest wall: No tenderness.   Abdominal:      General: Bowel sounds are normal. There is no distension.      Palpations: Abdomen is soft. There is no mass.      Tenderness: There is no abdominal tenderness.  There is no guarding or rebound.   Musculoskeletal:         General: Normal range of motion.      Cervical back: Normal range of motion and neck supple.   Skin:     General: Skin is warm.   Neurological:      Mental Status: He is alert and oriented to person, place, and time.   Psychiatric:         Mood and Affect: Mood normal.         Behavior: Behavior normal.         Thought Content: Thought content normal.         Judgment: Judgment normal.       Yovani Ying MD

## 2024-01-28 DIAGNOSIS — E78.2 MIXED HYPERLIPIDEMIA: ICD-10-CM

## 2024-01-30 DIAGNOSIS — E11.42 TYPE 2 DIABETES MELLITUS WITH DIABETIC POLYNEUROPATHY, WITHOUT LONG-TERM CURRENT USE OF INSULIN (HCC): ICD-10-CM

## 2024-01-31 RX ORDER — SIMVASTATIN 20 MG
20 TABLET ORAL
Qty: 90 TABLET | Refills: 3 | Status: SHIPPED | OUTPATIENT
Start: 2024-01-31

## 2024-01-31 RX ORDER — METFORMIN HYDROCHLORIDE 500 MG/1
TABLET, EXTENDED RELEASE ORAL
Qty: 360 TABLET | Refills: 0 | Status: SHIPPED | OUTPATIENT
Start: 2024-01-31 | End: 2024-02-02

## 2024-02-02 DIAGNOSIS — E11.42 TYPE 2 DIABETES MELLITUS WITH DIABETIC POLYNEUROPATHY, WITHOUT LONG-TERM CURRENT USE OF INSULIN (HCC): ICD-10-CM

## 2024-02-02 RX ORDER — METFORMIN HYDROCHLORIDE 500 MG/1
TABLET, EXTENDED RELEASE ORAL
Qty: 360 TABLET | Refills: 0 | Status: SHIPPED | OUTPATIENT
Start: 2024-02-02

## 2024-02-19 ENCOUNTER — OFFICE VISIT (OUTPATIENT)
Dept: PODIATRY | Facility: CLINIC | Age: 84
End: 2024-02-19
Payer: MEDICARE

## 2024-02-19 VITALS
SYSTOLIC BLOOD PRESSURE: 158 MMHG | WEIGHT: 153 LBS | DIASTOLIC BLOOD PRESSURE: 60 MMHG | BODY MASS INDEX: 20.72 KG/M2 | HEIGHT: 72 IN

## 2024-02-19 DIAGNOSIS — E11.40 TYPE 2 DIABETES MELLITUS WITH DIABETIC NEUROPATHY, WITHOUT LONG-TERM CURRENT USE OF INSULIN (HCC): ICD-10-CM

## 2024-02-19 DIAGNOSIS — B35.1 ONYCHOMYCOSIS: Primary | ICD-10-CM

## 2024-02-19 PROCEDURE — 11721 DEBRIDE NAIL 6 OR MORE: CPT | Performed by: PODIATRIST

## 2024-02-21 ENCOUNTER — OFFICE VISIT (OUTPATIENT)
Dept: ENDOCRINOLOGY | Facility: CLINIC | Age: 84
End: 2024-02-21
Payer: MEDICARE

## 2024-02-21 VITALS
HEART RATE: 97 BPM | SYSTOLIC BLOOD PRESSURE: 130 MMHG | BODY MASS INDEX: 21.21 KG/M2 | HEIGHT: 72 IN | DIASTOLIC BLOOD PRESSURE: 60 MMHG | OXYGEN SATURATION: 97 % | WEIGHT: 156.6 LBS

## 2024-02-21 DIAGNOSIS — E78.2 MIXED HYPERLIPIDEMIA: ICD-10-CM

## 2024-02-21 DIAGNOSIS — E11.649 TYPE 2 DIABETES MELLITUS WITH HYPOGLYCEMIA WITHOUT COMA, WITHOUT LONG-TERM CURRENT USE OF INSULIN (HCC): ICD-10-CM

## 2024-02-21 DIAGNOSIS — I10 BENIGN ESSENTIAL HYPERTENSION: ICD-10-CM

## 2024-02-21 DIAGNOSIS — E03.8 SUBCLINICAL HYPOTHYROIDISM: ICD-10-CM

## 2024-02-21 DIAGNOSIS — E11.42 TYPE 2 DIABETES MELLITUS WITH DIABETIC POLYNEUROPATHY, WITHOUT LONG-TERM CURRENT USE OF INSULIN (HCC): Primary | ICD-10-CM

## 2024-02-21 LAB — SL AMB POCT HEMOGLOBIN AIC: 6.8 (ref ?–6.5)

## 2024-02-21 PROCEDURE — 83036 HEMOGLOBIN GLYCOSYLATED A1C: CPT

## 2024-02-21 PROCEDURE — 99214 OFFICE O/P EST MOD 30 MIN: CPT

## 2024-02-21 NOTE — ASSESSMENT & PLAN NOTE
Repeat thyroid function testing. If TSH goes above 10 will consider starting on medication therapy.

## 2024-02-21 NOTE — ASSESSMENT & PLAN NOTE
He had hypoglycemia on diagnostic CGM and would benefit from personal CGM use to notify him if he becomes hypoglycemic.   Lab Results   Component Value Date    HGBA1C 6.8 (A) 02/21/2024

## 2024-02-21 NOTE — ASSESSMENT & PLAN NOTE
HGA1C and BGs well controlled. He did have some hypoglycemia on diagnostic CGM over night. He was not aware of this hypoglycemia.   Treatment regimen:   - I would recommend to take 0.5 mg glimepiride with breakfast as needed but hold off on taking at night with dinner due to hypoglycemia over night.   - Continue with Metformin at current dose.   - Continue to monitor BGs at least once daily. He would benefit from CGM due to hypoglycemic unawareness over night.   Discussed risks/complications associated with uncontrolled diabetes.    Advised to adhere to diabetic diet, and recommended staying active/exercising routinely.  Keep carbohydrates consistent to limit blood glucose fluctuations.  Advised to call if blood sugars less than 70 mg/dl or over 300 mg/dl.   Discussed symptoms and treatment of hypoglycemia.    Recommended routine follow-up with podiatry and ophthalmology.   Ordered blood work to complete prior to next visit.   Lab Results   Component Value Date    HGBA1C 6.8 (A) 02/21/2024

## 2024-02-21 NOTE — PROGRESS NOTES
Established Patient Progress Note      Chief Complaint   Patient presents with    Diabetes Type 2        Impression & Plan:    Problem List Items Addressed This Visit          Endocrine    Type 2 diabetes mellitus with diabetic polyneuropathy (HCC) - Primary     HGA1C and BGs well controlled. He did have some hypoglycemia on diagnostic CGM over night. He was not aware of this hypoglycemia.   Treatment regimen:   - I would recommend to take 0.5 mg glimepiride with breakfast as needed but hold off on taking at night with dinner due to hypoglycemia over night.   - Continue with Metformin at current dose.   - Continue to monitor BGs at least once daily. He would benefit from CGM due to hypoglycemic unawareness over night.   Discussed risks/complications associated with uncontrolled diabetes.    Advised to adhere to diabetic diet, and recommended staying active/exercising routinely.  Keep carbohydrates consistent to limit blood glucose fluctuations.  Advised to call if blood sugars less than 70 mg/dl or over 300 mg/dl.   Discussed symptoms and treatment of hypoglycemia.    Recommended routine follow-up with podiatry and ophthalmology.   Ordered blood work to complete prior to next visit.   Lab Results   Component Value Date    HGBA1C 6.8 (A) 02/21/2024            Relevant Orders    POCT hemoglobin A1c (Completed)    Hemoglobin A1C    Comprehensive metabolic panel    Subclinical hypothyroidism     Repeat thyroid function testing. If TSH goes above 10 will consider starting on medication therapy.          Relevant Orders    TSH, 3rd generation    T4, free    Type 2 diabetes mellitus with hypoglycemia without coma, without long-term current use of insulin (HCC)     He had hypoglycemia on diagnostic CGM and would benefit from personal CGM use to notify him if he becomes hypoglycemic.   Lab Results   Component Value Date    HGBA1C 6.8 (A) 02/21/2024               Cardiovascular and Mediastinum    Benign essential hypertension      BP at goal. Continue current medication regimen.              Other    Hyperlipidemia     Continue statin, check lipid panel.          Relevant Orders    Lipid panel       History of Present Illness:   Elan Morrison is a 83 y.o. male with type 2 diabetes seen in follow up. Reports complications of microalbuminuria and neuropathy. Denies recent severe hypoglycemic or severe hyperglycemic episodes. Denies any issues with his current regimen. POCT A1C was 6.8. Home glucose monitoring: are performed regularly.     Med options have been limited due to cost (Januvia and jardiance are too expensive)     Home blood glucose readings:   Before breakfast:   Before dinner: 100-140     Current regimen:   Glimepiride  0.5 mg as needed   Metformin XR 1,000 mg twice daily     Last Eye Exam: UTD  Last Foot Exam: UTD    Has hypertension: Taking amlodipine and lisinopril  Has hyperlipidemia: Taking simvastatin     Thyroid disorders: elevated TSH - not currently on medication therapy    Patient Active Problem List   Diagnosis    Benign essential hypertension    Benign prostatic hyperplasia with lower urinary tract symptoms    Esophageal dysphagia    Herniated lumbar intervertebral disc    Hyperlipidemia    Vasomotor rhinitis    Elevated TSH    Type 2 diabetes mellitus with diabetic polyneuropathy (HCC)    Type 2 diabetes mellitus without complication, without long-term current use of insulin (HCC)    Spinal stenosis, lumbar region, with neurogenic claudication    Radiculopathy    Chronic pain syndrome    Lumbar spondylosis    Chronic right-sided low back pain without sciatica    Subclinical hypothyroidism    Type 2 diabetes mellitus with hypoglycemia without coma, without long-term current use of insulin (HCC)      Past Medical History:   Diagnosis Date    Abnormal blood chemistry     Abnormal glucose     Last assessed - 2/20/14    Allergic     Arthritis     Benign essential hypertension     Last assessed - 5/15/17     Cancer ([facility]) 2007    melanoma    Chronic allergic rhinitis     Last assessed - 16    Dermatitis     Last assessed - 6/16/15    Diabetes mellitus ([facility])     type 11    Difficulty walking     High risk medication use     Last assessed - 16    Hypertension 1998    Neuropathy in diabetes ([facility])     Sciatica     Last assessed - 12    Septic bursitis     Last assessed - 9/3/13    Tick bite     Last assessed - 16      Past Surgical History:   Procedure Laterality Date    CATARACT EXTRACTION      COLONOSCOPY      Fiberoptic    PILONIDAL CYST EXCISION        Social History     Tobacco Use    Smoking status: Former     Current packs/day: 0.00     Average packs/day: 1 pack/day for 28.8 years (28.8 ttl pk-yrs)     Types: Cigarettes     Start date: 1958     Quit date: 1987     Years since quittin.6    Smokeless tobacco: Never   Substance Use Topics    Alcohol use: Yes     Alcohol/week: 5.0 standard drinks of alcohol     Types: 5 Glasses of wine per week     Comment: social usage     No Known Allergies      Current Outpatient Medications:     amLODIPine (NORVASC) 5 mg tablet, TAKE 1 TABLET BY MOUTH DAILY, Disp: 90 tablet, Rfl: 2    B Complex-Biotin-FA (B COMPLETE) TABS, Take by mouth in the morning, Disp: , Rfl:     Blood Glucose Monitoring Suppl (CONTOUR NEXT MONITOR) w/Device KIT, 1 Device by Does not apply route 2 (two) times a day, Disp: 1 kit, Rfl: 0    Coenzyme Q10 (COQ10 PO), Take by mouth in the morning, Disp: , Rfl:     docusate sodium (COLACE) 100 mg capsule, Take 1 capsule by mouth as needed, Disp: , Rfl:     glimepiride (AMARYL) 1 mg tablet, TAKE ONE-HALF TABLET BY MOUTH EVERY DAY AS NEEDED, Disp: 30 tablet, Rfl: 2    glucose blood (Contour Next Test) test strip, Use 1 each 2 (two) times a day, Disp: 200 strip, Rfl: 1    ipratropium (ATROVENT) 0.03 % nasal spray, 2 sprays into each nostril 3 (three) times a day If needed for runny nose, Disp: 30 mL, Rfl: 1    lisinopril (ZESTRIL) 20  mg tablet, Take 1 tablet (20 mg total) by mouth daily, Disp: 90 tablet, Rfl: 3    metFORMIN (GLUCOPHAGE-XR) 500 mg 24 hr tablet, TAKE TWO TABLETS BY MOUTH TWICE A DAY; IN THE MORNING AND EVENING WITH MEALS, Disp: 360 tablet, Rfl: 0    Microlet Lancets MISC, Use 2 (two) times a day, Disp: 200 each, Rfl: 1    Multiple Vitamin (multivitamin) tablet, Take 1 tablet by mouth daily, Disp: , Rfl:     simvastatin (ZOCOR) 20 mg tablet, TAKE ONE TABLET BY MOUTH AT BEDTIME, Disp: 90 tablet, Rfl: 3    Thiamine 50 MG CAPS, Take 1 capsule by mouth daily, Disp: , Rfl:     Specialty Vitamins Products (MAGNESIUM, AMINO ACID CHELATE,) 133 MG tablet, Take 1 tablet by mouth in the morning (Patient not taking: Reported on 2/21/2024), Disp: , Rfl:     Review of Systems    Physical Exam:  Body mass index is 21.24 kg/m².  /60   Pulse 97   Ht 6' (1.829 m)   Wt 71 kg (156 lb 9.6 oz)   SpO2 97%   BMI 21.24 kg/m²    Wt Readings from Last 3 Encounters:   02/21/24 71 kg (156 lb 9.6 oz)   02/19/24 69.4 kg (153 lb)   12/08/23 69.7 kg (153 lb 9.6 oz)       Physical Exam  Diabetic Foot Exam    Labs:   Lab Results   Component Value Date    HGBA1C 6.8 (A) 02/21/2024    HGBA1C 8.1 (H) 11/08/2023    HGBA1C 7.4 (H) 05/02/2023     Lab Results   Component Value Date    CREATININE 1.00 11/08/2023    CREATININE 0.92 05/02/2023    CREATININE 0.90 01/24/2023    BUN 16 11/08/2023     10/20/2015    K 4.8 11/08/2023    CL 98 11/08/2023    CO2 28 11/08/2023     eGFR   Date Value Ref Range Status   11/08/2023 70 ml/min/1.73sq m Final     Lab Results   Component Value Date    CHOL 114 04/23/2015    HDL 34 (L) 01/24/2023    TRIG 80 01/24/2023     Lab Results   Component Value Date    ALT 18 11/08/2023    AST 23 11/08/2023    ALKPHOS 87 11/08/2023    BILITOT 0.54 04/23/2015     Lab Results   Component Value Date    MDM8JVFEVBJO 6.549 (H) 11/08/2023    NNI2ZBMUUSLW 5.280 (H) 10/31/2022    [id number] 3.700 10/28/2021       There are no Patient  Instructions on file for this visit.    Discussed with the patient and all questioned fully answered. He will call me if any problems arise.    DANIEL Concepcion

## 2024-02-22 LAB
DME PARACHUTE DELIVERY DATE ACTUAL: NORMAL
DME PARACHUTE DELIVERY DATE REQUESTED: NORMAL
DME PARACHUTE ITEM DESCRIPTION: NORMAL
DME PARACHUTE ITEM DESCRIPTION: NORMAL
DME PARACHUTE ORDER STATUS: NORMAL
DME PARACHUTE SUPPLIER NAME: NORMAL
DME PARACHUTE SUPPLIER PHONE: NORMAL

## 2024-02-26 NOTE — PROGRESS NOTES
PATIENT:  Elan Morrison  1940    ASSESSMENT:  1. Onychomycosis        2. Type 2 diabetes mellitus with diabetic neuropathy, without long-term current use of insulin (HCC)              No orders of the defined types were placed in this encounter.       PLAN:  Disease prevention and related risk factors of diabetes were identified and discussed.    The patient was educated in proper foot wear for diabetics.    Educated in daily foot assessment and routine diabetic foot care.    Discussed the importance of controlling BS through diet and exercise.    The patient will follow up in 12 weeks for further diabetic foot exam and care.      Procedures: [id number]  All mycotic toenails were reduced and debrided in length, width, and girth using a nail nipper and electric dremel.    All hyperkeratotic skin lesion(s) if present were sharply pared with a #10 scalpel with no evidence of ulceration/abscess.    Patient tolerated procedure(s) well without complications.    Procedures     HPI:  Elan Morrison is a 83 y.o.year old male seen for initial diabetic foot exam referred by his endocrinologist.  Patient has class findings with type II DM.    BS is not so well under control with an HbA1c of 8.1..  Patient concerned of thick toenails.  The patient denied any acute pedal disorder, redness, acute swelling, or recent injury.      The following portions of the patient's history were reviewed and updated as appropriate: allergies, current medications, past family history, past medical history, past social history, past surgical history, and problem list.    REVIEW OF SYSTEMS:  GENERAL: No fever or chills  HEART: No chest pain, or palpitation  RESPIRATORY:  No acute SOB or cough  GI: No Nausea, vomit or diarrhea  NEUROLOGIC: No syncope or acute weakness    PHYSICAL EXAM:    /60 (BP Location: Right arm, Patient Position: Sitting, Cuff Size: Adult)   Ht 6' (1.829 m)   Wt 69.4 kg (153 lb)   BMI 20.75 kg/m²     VASCULAR  EXAM:  Posterior tibial artery absent bilateral  Dorsalis pedis artery +1 bilateral  The patient has moderate skin atrophy, color changes, lack of digital hair, and nail dystrophy.    There is trace lower extremity edema bilaterally.      NEUROLOGIC EXAM:  Sensation is intact to light touch. Yes   Sensation is grossly intact to 10gm monofilament.    Vibratory sensation slightly diminished.     No paresthesias noted bilateral.         DERMATOLOGIC EXAM:   Texture, Tone and Turgor are diminished bilateral.    The patient has dystrophic/hypertrophic toenails with yellow/white discoloration, onycholysis, and subungal debris.   Fungal odor noted.  Brittle nature noted.  Right foot nails severely dystrophic x5 with 0.4 cm ave thickness (1-5)  Left foot nails severely dystrophic x5 with 0.4 cm ave thickness (1-5)    Patient has hyperkeratotic lesions noted:  Right foot located at none.  Left foot located at none  No notable suspicious skin lesions.      MUSCULOSKELETAL EXAM:   No acute joint pain, edema, or redness.  Denies any acute musculoskeletal problem.    Patient has no gross pedal deformities. Patient has no ambulation limitations.      Patient wears DM shoes? No    Risk Category/Class Findings:  Q8(B1, B2 ABC)  0 = No loss of protective sensation    A1)  Has the patient had a previous amputation of the foot or integral skeletal portion thereof? No  B1)  Does the patient have absent posterior tibial pulse? Yes  B3)  Does the patient have absent dorsalis pedis? No  B2)  Does the patient have three of the following? Yes           1.  Hair growth (increased or decreased), 2.  Nail changes (thickening), and 3.  Pigmentary changes (discoloring)  C)  Does the patient have two of the following and one above? No

## 2024-02-27 ENCOUNTER — OFFICE VISIT (OUTPATIENT)
Dept: PLASTIC SURGERY | Facility: CLINIC | Age: 84
End: 2024-02-27

## 2024-02-27 DIAGNOSIS — E11.42 TYPE 2 DIABETES MELLITUS WITH DIABETIC POLYNEUROPATHY, WITHOUT LONG-TERM CURRENT USE OF INSULIN (HCC): Primary | ICD-10-CM

## 2024-02-27 DIAGNOSIS — L98.9 SCALP LESION: Primary | ICD-10-CM

## 2024-02-27 NOTE — PROGRESS NOTES
Assessment/Plan:     Diagnoses and all orders for this visit:    Scalp lesion  Pt presents with a right scalp lesion. Shave biopsy was performed. We will see him back in 2 weeks to discuss pathology.   -     Biopsy      Subjective:      Patient ID: Elan Morrison is a 83 y.o. male.    HPI    Pt presents with a right scalp lesion. He states it has been there for a few months. He denies bleeding or increasing in size. He does have a history of melanoma approx 17 years ago. He does have a dermatologist through [facility] but has not seen them in a year. He has a PMH of diabetes & HTN. He is not on blood thinners.     Patient Active Problem List   Diagnosis    Benign essential hypertension    Benign prostatic hyperplasia with lower urinary tract symptoms    Esophageal dysphagia    Herniated lumbar intervertebral disc    Hyperlipidemia    Vasomotor rhinitis    Elevated TSH    Type 2 diabetes mellitus with diabetic polyneuropathy (HCC)    Type 2 diabetes mellitus without complication, without long-term current use of insulin (HCC)    Spinal stenosis, lumbar region, with neurogenic claudication    Radiculopathy    Chronic pain syndrome    Lumbar spondylosis    Chronic right-sided low back pain without sciatica    Subclinical hypothyroidism    Type 2 diabetes mellitus with hypoglycemia without coma, without long-term current use of insulin ([facility])     No Known Allergies  Current Outpatient Medications on File Prior to Visit   Medication Sig    amLODIPine (NORVASC) 5 mg tablet TAKE 1 TABLET BY MOUTH DAILY    B Complex-Biotin-FA (B COMPLETE) TABS Take by mouth in the morning    Blood Glucose Monitoring Suppl (CONTOUR NEXT MONITOR) w/Device KIT 1 Device by Does not apply route 2 (two) times a day    Coenzyme Q10 (COQ10 PO) Take by mouth in the morning    docusate sodium (COLACE) 100 mg capsule Take 1 capsule by mouth as needed    glimepiride (AMARYL) 1 mg tablet TAKE ONE-HALF TABLET BY MOUTH EVERY DAY AS NEEDED    glucose blood  (Contour Next Test) test strip Use 1 each 2 (two) times a day    ipratropium (ATROVENT) 0.03 % nasal spray 2 sprays into each nostril 3 (three) times a day If needed for runny nose    lisinopril (ZESTRIL) 20 mg tablet Take 1 tablet (20 mg total) by mouth daily    metFORMIN (GLUCOPHAGE-XR) 500 mg 24 hr tablet TAKE TWO TABLETS BY MOUTH TWICE A DAY; IN THE MORNING AND EVENING WITH MEALS    Microlet Lancets MISC Use 2 (two) times a day    Multiple Vitamin (multivitamin) tablet Take 1 tablet by mouth daily    simvastatin (ZOCOR) 20 mg tablet TAKE ONE TABLET BY MOUTH AT BEDTIME    Specialty Vitamins Products (MAGNESIUM, AMINO ACID CHELATE,) 133 MG tablet Take 1 tablet by mouth in the morning (Patient not taking: Reported on 2/21/2024)    Thiamine 50 MG CAPS Take 1 capsule by mouth daily     No current facility-administered medications on file prior to visit.     Family History   Problem Relation Age of Onset    Hypertension Mother     Osteoporosis Mother     Diabetes type I Paternal Aunt     Colon cancer Family      Past Medical History:   Diagnosis Date    Abnormal blood chemistry     Abnormal glucose     Last assessed - 2/20/14    Allergic     Arthritis     Benign essential hypertension     Last assessed - 5/15/17    Cancer ([facility]) 2007    melanoma    Chronic allergic rhinitis     Last assessed - 8/1/16    Dermatitis     Last assessed - 6/16/15    Diabetes mellitus ([facility]) 1998    type 11    Difficulty walking     High risk medication use     Last assessed - 5/2/16    Hypertension 1998    Neuropathy in diabetes ([facility])     Sciatica     Last assessed - 12/18/12    Septic bursitis     Last assessed - 9/3/13    Tick bite     Last assessed - 8/8/16     Social History     Socioeconomic History    Marital status: /Civil Union     Spouse name: Not on file    Number of children: Not on file    Years of education: Not on file    Highest education level: Not on file   Occupational History    Not on file   Tobacco Use    Smoking  status: Former     Current packs/day: 0.00     Average packs/day: 1 pack/day for 28.8 years (28.8 ttl pk-yrs)     Types: Cigarettes     Start date: 1958     Quit date: 1987     Years since quittin.6    Smokeless tobacco: Never   Vaping Use    Vaping status: Never Used   Substance and Sexual Activity    Alcohol use: Yes     Alcohol/week: 5.0 standard drinks of alcohol     Types: 5 Glasses of wine per week     Comment: social usage    Drug use: No    Sexual activity: Not Currently     Partners: Female   Other Topics Concern    Not on file   Social History Narrative    Advance directive on file     Social Determinants of Health     Financial Resource Strain: Low Risk  (2023)    Overall Financial Resource Strain (CARDIA)     Difficulty of Paying Living Expenses: Not hard at all   Food Insecurity: Not on file   Transportation Needs: No Transportation Needs (2023)    PRAPARE - Transportation     Lack of Transportation (Medical): No     Lack of Transportation (Non-Medical): No   Physical Activity: Not on file   Stress: Not on file   Social Connections: Not on file   Intimate Partner Violence: Not on file   Housing Stability: Not on file     Past Surgical History:   Procedure Laterality Date    CATARACT EXTRACTION      COLONOSCOPY      Fiberoptic    PILONIDAL CYST EXCISION           Review of Systems   All other systems reviewed and are negative.        Objective:      There were no vitals taken for this visit.         Physical Exam  Constitutional:       Appearance: Normal appearance. He is well-developed.   HENT:      Head: Normocephalic and atraumatic.      Comments: Right scalp crusted papule, see picture in media.   Eyes:      Conjunctiva/sclera: Conjunctivae normal.   Pulmonary:      Effort: Pulmonary effort is normal.   Musculoskeletal:         General: Normal range of motion.      Cervical back: Normal range of motion.   Skin:     General: Skin is warm and dry.   Neurological:      Mental  Status: He is alert and oriented to person, place, and time.   Psychiatric:         Mood and Affect: Mood normal.         Behavior: Behavior normal.         Biopsy    Date/Time: 2/27/2024 1:30 PM    Performed by: Racheal Varner PA-C  Authorized by: Racheal Varner PA-C  Universal Protocol:  Consent: Verbal consent obtained.  Consent given by: patient    Procedure Details - Lesion Biopsy:     Body area:  Head/neck    Head/neck location:  Scalp    Biopsy method: shave biopsy      Biopsy tissue type: skin

## 2024-03-06 ENCOUNTER — TELEPHONE (OUTPATIENT)
Age: 84
End: 2024-03-06

## 2024-03-06 NOTE — TELEPHONE ENCOUNTER
Patient called stating that the [place] location is an inconvenience for him to travel to.     Patient would like to see Racheal at the [place] instead.    Patient was resh 03/29 at 11am

## 2024-03-08 ENCOUNTER — OFFICE VISIT (OUTPATIENT)
Dept: DIABETES SERVICES | Facility: CLINIC | Age: 84
End: 2024-03-08
Payer: MEDICARE

## 2024-03-08 DIAGNOSIS — E11.42 TYPE 2 DIABETES MELLITUS WITH DIABETIC POLYNEUROPATHY, WITHOUT LONG-TERM CURRENT USE OF INSULIN (HCC): Primary | ICD-10-CM

## 2024-03-08 PROCEDURE — 95249 CONT GLUC MNTR PT PROV EQP: CPT

## 2024-03-08 NOTE — PROGRESS NOTES
Dexcom G7 Personal Training    Met with Elan Morrison for Dexcom G7 personal training. Patient comes in today with there own unit to be trained on. Completed all aspects of training, including site selection on rotation, not infusing insulin near the sensor site, proper insertion technique, inserting codes into the , charging, waterproof sensor, range of 20ft, setting high low alarms that can be adjusted based on their preferences. They put on their first sensor by themselves with no issue.  Left my office today with sensor on and in 30 min warm up mode was over with BG reading of 206 mg/dl.     Elan Morrison will be running the dexcom through their .    Discussed creating a Clarity account to be able to link and upload from home or auto upload from their phone. Patient was added to our clarity account today while in office and invited to link to us if using their phone. Patient understands that reciever will be downloaded while in office at time of visit and/or cell phone will automatically upload to our clarity for them. They understand that their blood sugars are not monitored by us on a regular basis, but that we can access them as needed or desired by the patient and provider.     Dex[employer]'s phone number is in their paperwork, encouraged Elan to reach out to [employer] if they have any issues after hours, 24/7. Training completed, will call with questions.     Lab Results   Component Value Date    HGBA1C 6.8 (A) 02/21/2024       Lab Results   Component Value Date     10/20/2015    SODIUM 132 (L) 11/08/2023    K 4.8 11/08/2023    CL 98 11/08/2023    CO2 28 11/08/2023    ANIONGAP 8 10/20/2015    AGAP 6 11/08/2023    BUN 16 11/08/2023    CREATININE 1.00 11/08/2023    GLUC 265 (H) 12/31/2022    GLUF 130 (H) 11/08/2023    CALCIUM 9.3 11/08/2023    AST 23 11/08/2023    ALT 18 11/08/2023    ALKPHOS 87 11/08/2023    PROT 7.0 04/23/2015    TP 6.6 11/08/2023    BILITOT 0.54 04/23/2015    TBILI 0.50  11/08/2023    EGFR 70 11/08/2023           Patient response to instruction    Comprehension: good  Motivation: good  Expected Compliance: good  Response to Teachback: 100%, demonstrated understanding    Start- Stop: 10:59-11:37  Total Minutes: 38 Minutes  Group or Individual Instruction: MINH  Other: DANIEL Clark    Thank you for referring your patient to [facility] [place], it was a pleasure working with them today. Please feel free to call with any questions or concerns.

## 2024-03-29 ENCOUNTER — OFFICE VISIT (OUTPATIENT)
Dept: PLASTIC SURGERY | Facility: HOSPITAL | Age: 84
End: 2024-03-29
Payer: MEDICARE

## 2024-03-29 DIAGNOSIS — L57.0 ACTINIC KERATOSIS OF SCALP: Primary | ICD-10-CM

## 2024-03-29 PROCEDURE — 99213 OFFICE O/P EST LOW 20 MIN: CPT | Performed by: PHYSICIAN ASSISTANT

## 2024-03-29 NOTE — PROGRESS NOTES
Assessment/Plan:     Diagnoses and all orders for this visit:    Actinic keratosis of scalp  He presented with a scalp lesion. A shave biopsy was performed at his last office visit. Pathology is consistent with actinic keratosis. No residual lesion seen. Pt will follow up with dermatology.         Subjective:      Patient ID: Elan Morrison is a 84 y.o. male.    HPI    Pt is here to discuss his pathology report. He presented with a scalp lesion. A shave biopsy was performed at his last office visit. Pathology is consistent with actinic keratosis.     Patient Active Problem List   Diagnosis    Benign essential hypertension    Benign prostatic hyperplasia with lower urinary tract symptoms    Esophageal dysphagia    Herniated lumbar intervertebral disc    Hyperlipidemia    Vasomotor rhinitis    Elevated TSH    Type 2 diabetes mellitus with diabetic polyneuropathy (HCC)    Type 2 diabetes mellitus without complication, without long-term current use of insulin (HCC)    Spinal stenosis, lumbar region, with neurogenic claudication    Radiculopathy    Chronic pain syndrome    Lumbar spondylosis    Chronic right-sided low back pain without sciatica    Subclinical hypothyroidism    Type 2 diabetes mellitus with hypoglycemia without coma, without long-term current use of insulin ([facility])    Scalp lesion     No Known Allergies  Current Outpatient Medications on File Prior to Visit   Medication Sig    amLODIPine (NORVASC) 5 mg tablet TAKE 1 TABLET BY MOUTH DAILY    B Complex-Biotin-FA (B COMPLETE) TABS Take by mouth in the morning    Blood Glucose Monitoring Suppl (CONTOUR NEXT MONITOR) w/Device KIT 1 Device by Does not apply route 2 (two) times a day    Coenzyme Q10 (COQ10 PO) Take by mouth in the morning    docusate sodium (COLACE) 100 mg capsule Take 1 capsule by mouth as needed    glimepiride (AMARYL) 1 mg tablet TAKE ONE-HALF TABLET BY MOUTH EVERY DAY AS NEEDED    glucose blood (Contour Next Test) test strip Use 1 each 2  (two) times a day    ipratropium (ATROVENT) 0.03 % nasal spray 2 sprays into each nostril 3 (three) times a day If needed for runny nose    lisinopril (ZESTRIL) 20 mg tablet Take 1 tablet (20 mg total) by mouth daily    metFORMIN (GLUCOPHAGE-XR) 500 mg 24 hr tablet TAKE TWO TABLETS BY MOUTH TWICE A DAY; IN THE MORNING AND EVENING WITH MEALS    Microlet Lancets MISC Use 2 (two) times a day    Multiple Vitamin (multivitamin) tablet Take 1 tablet by mouth daily    simvastatin (ZOCOR) 20 mg tablet TAKE ONE TABLET BY MOUTH AT BEDTIME    Specialty Vitamins Products (MAGNESIUM, AMINO ACID CHELATE,) 133 MG tablet Take 1 tablet by mouth in the morning (Patient not taking: Reported on 2/21/2024)    Thiamine 50 MG CAPS Take 1 capsule by mouth daily     No current facility-administered medications on file prior to visit.     Family History   Problem Relation Age of Onset    Hypertension Mother     Osteoporosis Mother     Diabetes type I Paternal Aunt     Colon cancer Family      Past Medical History:   Diagnosis Date    Abnormal blood chemistry     Abnormal glucose     Last assessed - 2/20/14    Allergic     Arthritis     Benign essential hypertension     Last assessed - 5/15/17    Cancer ([facility]) 2007    melanoma    Chronic allergic rhinitis     Last assessed - 8/1/16    Dermatitis     Last assessed - 6/16/15    Diabetes mellitus ([facility]) 1998    type 11    Difficulty walking     High risk medication use     Last assessed - 5/2/16    Hypertension 1998    Neuropathy in diabetes ([facility])     Sciatica     Last assessed - 12/18/12    Septic bursitis     Last assessed - 9/3/13    Tick bite     Last assessed - 8/8/16     Social History     Socioeconomic History    Marital status: /Civil Union     Spouse name: Not on file    Number of children: Not on file    Years of education: Not on file    Highest education level: Not on file   Occupational History    Not on file   Tobacco Use    Smoking status: Former     Current packs/day: 0.00      Average packs/day: 1 pack/day for 28.8 years (28.8 ttl pk-yrs)     Types: Cigarettes     Start date: 1958     Quit date: 1987     Years since quittin.7    Smokeless tobacco: Never   Vaping Use    Vaping status: Never Used   Substance and Sexual Activity    Alcohol use: Yes     Alcohol/week: 5.0 standard drinks of alcohol     Types: 5 Glasses of wine per week     Comment: social usage    Drug use: No    Sexual activity: Not Currently     Partners: Female   Other Topics Concern    Not on file   Social History Narrative    Advance directive on file     Social Determinants of Health     Financial Resource Strain: Low Risk  (2023)    Overall Financial Resource Strain (CARDIA)     Difficulty of Paying Living Expenses: Not hard at all   Food Insecurity: Not on file   Transportation Needs: No Transportation Needs (2023)    PRAPARE - Transportation     Lack of Transportation (Medical): No     Lack of Transportation (Non-Medical): No   Physical Activity: Not on file   Stress: Not on file   Social Connections: Not on file   Intimate Partner Violence: Not on file   Housing Stability: Not on file     Past Surgical History:   Procedure Laterality Date    CATARACT EXTRACTION      COLONOSCOPY      Fiberoptic    PILONIDAL CYST EXCISION           Review of Systems   All other systems reviewed and are negative.        Objective:      There were no vitals taken for this visit.         Physical Exam  Constitutional:       Appearance: Normal appearance. He is well-developed.   HENT:      Head: Normocephalic and atraumatic.      Comments: No residual lesion seen  Eyes:      Conjunctiva/sclera: Conjunctivae normal.   Pulmonary:      Effort: Pulmonary effort is normal.   Musculoskeletal:         General: Normal range of motion.      Cervical back: Normal range of motion.   Skin:     General: Skin is warm and dry.   Neurological:      Mental Status: He is alert and oriented to person, place, and time.    Psychiatric:         Mood and Affect: Mood normal.         Behavior: Behavior normal.

## 2024-05-10 ENCOUNTER — TELEPHONE (OUTPATIENT)
Age: 84
End: 2024-05-10

## 2024-05-10 NOTE — TELEPHONE ENCOUNTER
Patient calling as he has the Dexcom G7 and is in need of sensors. Patient is unsure who he is to go through to get the sensors.

## 2024-05-21 ENCOUNTER — OFFICE VISIT (OUTPATIENT)
Dept: PODIATRY | Facility: CLINIC | Age: 84
End: 2024-05-21
Payer: MEDICARE

## 2024-05-21 VITALS
WEIGHT: 150.4 LBS | SYSTOLIC BLOOD PRESSURE: 159 MMHG | HEART RATE: 76 BPM | HEIGHT: 72 IN | DIASTOLIC BLOOD PRESSURE: 61 MMHG | BODY MASS INDEX: 20.37 KG/M2

## 2024-05-21 DIAGNOSIS — E11.40 TYPE 2 DIABETES MELLITUS WITH DIABETIC NEUROPATHY, WITHOUT LONG-TERM CURRENT USE OF INSULIN (HCC): ICD-10-CM

## 2024-05-21 DIAGNOSIS — B35.1 ONYCHOMYCOSIS: Primary | ICD-10-CM

## 2024-05-21 PROCEDURE — 11721 DEBRIDE NAIL 6 OR MORE: CPT | Performed by: PODIATRIST

## 2024-05-30 DIAGNOSIS — E11.42 TYPE 2 DIABETES MELLITUS WITH DIABETIC POLYNEUROPATHY, WITHOUT LONG-TERM CURRENT USE OF INSULIN (HCC): ICD-10-CM

## 2024-05-31 RX ORDER — GLIMEPIRIDE 1 MG/1
TABLET ORAL
Qty: 30 TABLET | Refills: 5 | Status: SHIPPED | OUTPATIENT
Start: 2024-05-31

## 2024-06-12 ENCOUNTER — RA CDI HCC (OUTPATIENT)
Dept: OTHER | Facility: HOSPITAL | Age: 84
End: 2024-06-12

## 2024-06-19 ENCOUNTER — OFFICE VISIT (OUTPATIENT)
Dept: FAMILY MEDICINE CLINIC | Facility: HOSPITAL | Age: 84
End: 2024-06-19
Payer: MEDICARE

## 2024-06-19 VITALS
SYSTOLIC BLOOD PRESSURE: 140 MMHG | DIASTOLIC BLOOD PRESSURE: 50 MMHG | BODY MASS INDEX: 21.62 KG/M2 | WEIGHT: 151 LBS | TEMPERATURE: 97 F | HEIGHT: 70 IN | OXYGEN SATURATION: 99 % | HEART RATE: 83 BPM

## 2024-06-19 DIAGNOSIS — R35.1 BENIGN PROSTATIC HYPERPLASIA WITH NOCTURIA: ICD-10-CM

## 2024-06-19 DIAGNOSIS — E78.2 MIXED HYPERLIPIDEMIA: ICD-10-CM

## 2024-06-19 DIAGNOSIS — E11.9 TYPE 2 DIABETES MELLITUS WITHOUT COMPLICATION, WITHOUT LONG-TERM CURRENT USE OF INSULIN (HCC): ICD-10-CM

## 2024-06-19 DIAGNOSIS — E11.42 TYPE 2 DIABETES MELLITUS WITH DIABETIC POLYNEUROPATHY, WITHOUT LONG-TERM CURRENT USE OF INSULIN (HCC): ICD-10-CM

## 2024-06-19 DIAGNOSIS — I10 BENIGN ESSENTIAL HYPERTENSION: Primary | ICD-10-CM

## 2024-06-19 DIAGNOSIS — N40.1 BENIGN PROSTATIC HYPERPLASIA WITH NOCTURIA: ICD-10-CM

## 2024-06-19 LAB — SL AMB POCT HEMOGLOBIN AIC: 6.5 (ref ?–6.5)

## 2024-06-19 PROCEDURE — 99214 OFFICE O/P EST MOD 30 MIN: CPT | Performed by: FAMILY MEDICINE

## 2024-06-19 PROCEDURE — G0439 PPPS, SUBSEQ VISIT: HCPCS | Performed by: FAMILY MEDICINE

## 2024-06-19 PROCEDURE — 83036 HEMOGLOBIN GLYCOSYLATED A1C: CPT | Performed by: FAMILY MEDICINE

## 2024-06-19 NOTE — PATIENT INSTRUCTIONS
Medicare Preventive Visit Patient Instructions  Thank you for completing your Welcome to Medicare Visit or Medicare Annual Wellness Visit today. Your next wellness visit will be due in one year (6/20/2025).  The screening/preventive services that you may require over the next 5-10 years are detailed below. Some tests may not apply to you based off risk factors and/or age. Screening tests ordered at today's visit but not completed yet may show as past due. Also, please note that scanned in results may not display below.  Preventive Screenings:  Service Recommendations Previous Testing/Comments   Colorectal Cancer Screening  Colonoscopy    Fecal Occult Blood Test (FOBT)/Fecal Immunochemical Test (FIT)  Fecal DNA/Cologuard Test  Flexible Sigmoidoscopy Age: 45-75 years old   Colonoscopy: every 10 years (May be performed more frequently if at higher risk)  OR  FOBT/FIT: every 1 year  OR  Cologuard: every 3 years  OR  Sigmoidoscopy: every 5 years  Screening may be recommended earlier than age 45 if at higher risk for colorectal cancer. Also, an individualized decision between you and your healthcare provider will decide whether screening between the ages of 76-85 would be appropriate. Colonoscopy: Not on file  FOBT/FIT: Not on file  Cologuard: Not on file  Sigmoidoscopy: Not on file          Prostate Cancer Screening Individualized decision between patient and health care provider in men between ages of 55-69   Medicare will cover every 12 months beginning on the day after your 50th birthday PSA: No results in last 5 years     Screening Not Indicated     Hepatitis C Screening Once for adults born between 1945 and 1965  More frequently in patients at high risk for Hepatitis C Hep C Antibody: Not on file        Diabetes Screening 1-2 times per year if you're at risk for diabetes or have pre-diabetes Fasting glucose: 130 mg/dL (11/8/2023)  A1C: 6.8 (2/21/2024)  Screening Not Indicated  History Diabetes   Cholesterol  Screening Once every 5 years if you don't have a lipid disorder. May order more often based on risk factors. Lipid panel: 01/24/2023  Screening Not Indicated  History Lipid Disorder      Other Preventive Screenings Covered by Medicare:  Abdominal Aortic Aneurysm (AAA) Screening: covered once if your at risk. You're considered to be at risk if you have a family history of AAA or a male between the age of 65-75 who smoking at least 100 cigarettes in your lifetime.  Lung Cancer Screening: covers low dose CT scan once per year if you meet all of the following conditions: (1) Age 55-77; (2) No signs or symptoms of lung cancer; (3) Current smoker or have quit smoking within the last 15 years; (4) You have a tobacco smoking history of at least 20 pack years (packs per day x number of years you smoked); (5) You get a written order from a healthcare provider.  Glaucoma Screening: covered annually if you're considered high risk: (1) You have diabetes OR (2) Family history of glaucoma OR (3)  aged 50 and older OR (4)  American aged 65 and older  Osteoporosis Screening: covered every 2 years if you meet one of the following conditions: (1) Have a vertebral abnormality; (2) On glucocorticoid therapy for more than 3 months; (3) Have primary hyperparathyroidism; (4) On osteoporosis medications and need to assess response to drug therapy.  HIV Screening: covered annually if you're between the age of 15-65. Also covered annually if you are younger than 15 and older than 65 with risk factors for HIV infection. For pregnant patients, it is covered up to 3 times per pregnancy.    Immunizations:  Immunization Recommendations   Influenza Vaccine Annual influenza vaccination during flu season is recommended for all persons aged >= 6 months who do not have contraindications   Pneumococcal Vaccine   * Pneumococcal conjugate vaccine = PCV13 (Prevnar 13), PCV15 (Vaxneuvance), PCV20 (Prevnar 20)  * Pneumococcal  polysaccharide vaccine = PPSV23 (Pneumovax) Adults 19-63 yo with certain risk factors or if 65+ yo  If never received any pneumonia vaccine: recommend Prevnar 20 (PCV20)  Give PCV20 if previously received 1 dose of PCV13 or PPSV23   Hepatitis B Vaccine 3 dose series if at intermediate or high risk (ex: diabetes, end stage renal disease, liver disease)   Respiratory syncytial virus (RSV) Vaccine - COVERED BY MEDICARE PART D  * RSVPreF3 (Arexvy) CDC recommends that adults 60 years of age and older may receive a single dose of RSV vaccine using shared clinical decision-making (SCDM)   Tetanus (Td) Vaccine - COST NOT COVERED BY MEDICARE PART B Following completion of primary series, a booster dose should be given every 10 years to maintain immunity against tetanus. Td may also be given as tetanus wound prophylaxis.   Tdap Vaccine - COST NOT COVERED BY MEDICARE PART B Recommended at least once for all adults. For pregnant patients, recommended with each pregnancy.   Shingles Vaccine (Shingrix) - COST NOT COVERED BY MEDICARE PART B  2 shot series recommended in those 19 years and older who have or will have weakened immune systems or those 50 years and older     Health Maintenance Due:  There are no preventive care reminders to display for this patient.  Immunizations Due:  There are no preventive care reminders to display for this patient.  Advance Directives   What are advance directives?  Advance directives are legal documents that state your wishes and plans for medical care. These plans are made ahead of time in case you lose your ability to make decisions for yourself. Advance directives can apply to any medical decision, such as the treatments you want, and if you want to donate organs.   What are the types of advance directives?  There are many types of advance directives, and each state has rules about how to use them. You may choose a combination of any of the following:  Living will:  This is a written record  of the treatment you want. You can also choose which treatments you do not want, which to limit, and which to stop at a certain time. This includes surgery, medicine, IV fluid, and tube feedings.   Durable power of  for healthcare (DPAHC):  This is a written record that states who you want to make healthcare choices for you when you are unable to make them for yourself. This person, called a proxy, is usually a family member or a friend. You may choose more than 1 proxy.  Do not resuscitate (DNR) order:  A DNR order is used in case your heart stops beating or you stop breathing. It is a request not to have certain forms of treatment, such as CPR. A DNR order may be included in other types of advance directives.  Medical directive:  This covers the care that you want if you are in a coma, near death, or unable to make decisions for yourself. You can list the treatments you want for each condition. Treatment may include pain medicine, surgery, blood transfusions, dialysis, IV or tube feedings, and a ventilator (breathing machine).  Values history:  This document has questions about your views, beliefs, and how you feel and think about life. This information can help others choose the care that you would choose.  Why are advance directives important?  An advance directive helps you control your care. Although spoken wishes may be used, it is better to have your wishes written down. Spoken wishes can be misunderstood, or not followed. Treatments may be given even if you do not want them. An advance directive may make it easier for your family to make difficult choices about your care.   Alcohol Use and Your Health    Drinking too much can harm your health.  Excessive alcohol use leads to about 88,000 death in the [place] each year, and shortens the life of those who diet by almost 30 years.  Further, excessive drinking cost the economy $249 billion in 2010.  Most excessive drinkers are not alcohol  "dependent.    Excessive alcohol use has immediate effects that increase the risk of many harmful health conditions.  These are most often the result of binge drinking.  Over time, excessive alcohol use can lead to the development of chronic diseases and other series health problems.    What is considered a \"drink\"?        Excessive alcohol use includes:  Binge Drinking: For women, 4 or more drinks consumed on one occasion. For men, 5 or more drinks consumed on one occasion.  Heavy Drinking: For women, 8 or more drinks per week. For men, 15 or more drinks per week  Any alcohol used by pregnant women  Any alcohol used by those under the age of 21 years    If you choose to drink, do so in moderation:  Do not drink at all if you are under the age of 21, or if you are or may be pregnant, or have health problems that could be made worse by drinking.  For women, up to 1 drink per day  For men, up to 2 drinks a day    No one should begin drinking or drink more frequently based on potential health benefits    Short-Term Health Risks:  Injuries: motor vehicle crashes, falls, drownings, burns  Violence: homicide, suicide, sexual assault, intimate partner violence  Alcohol poisoning  Reproductive health: risky sexual behaviors, unintended prengnacy, sexually transmitted diseases, miscarriage, stillbirth, fetal alcohol syndrome    Long-Term Health Risks:  Chronic diseases: high blood pressure, heart disease, stroke, liver disease, digestive problems  Cancers: breast, mouth and throat, liver, colon  Learning and memory problems: dementia, poor school performance  Mental health: depression, anxiety, insomnia  Social problems: lost productivity, family problems, unemployment  Alcohol dependence    For support and more information:  Substance Abuse and Mental Health Services Administration  [address]  [place] [address]  Web Address: http://www.samhsa.gov    Alcoholics Anonymous        Web Address: " http://www.aa.org    https://www.cdc.gov/alcohol/fact-sheets/alcohol-use.htm     © Copyright [employer] 2018 Information is for End User's use only and may not be sold, redistributed or otherwise used for commercial purposes. All illustrations and images included in CareNotes® are the copyrighted property of [employer] Inc[employer] or [employer]

## 2024-06-19 NOTE — ASSESSMENT & PLAN NOTE
Lab Results   Component Value Date    HGBA1C 6.5 06/19/2024     Well controlled.   Caution with hypoglycemia and tight control.   AGS recommendations of < 7.5.   Consider dc of amaryl.   Fu with Endo

## 2024-06-19 NOTE — PROGRESS NOTES
Ambulatory Visit  Name: Elan Morrison      : 1940      MRN: [id number]  Encounter Provider: Yovani Ying MD  Encounter Date: 2024   Encounter department: [facility] [address]     Assessment & Plan   1. Benign essential hypertension  Assessment & Plan:  Stable. Continue with the same.   2. Type 2 diabetes mellitus with diabetic polyneuropathy, without long-term current use of insulin (HCC)  Assessment & Plan:    Lab Results   Component Value Date    HGBA1C 6.5 2024     A1c shows good control.   Caution with amaryl use and hypoglycemia which he may not always be aware of . He does have a cgm.   Sees Sam. Consider lowering/dc of amaryl.   Orders:  -     POCT hemoglobin A1c  3. Type 2 diabetes mellitus without complication, without long-term current use of insulin (HCC)  Assessment & Plan:    Lab Results   Component Value Date    HGBA1C 6.5 2024     Well controlled.   Caution with hypoglycemia and tight control.   AGS recommendations of < 7.5.   Consider dc of amaryl.   Fu with Endo  4. Benign prostatic hyperplasia with nocturia  Assessment & Plan:  Doing well.   No current medications.   5. Mixed hyperlipidemia  Assessment & Plan:  Stable. Doing well.   Continue with simvastatin       Preventive health issues were discussed with patient, and age appropriate screening tests were ordered as noted in patient's After Visit Summary. Personalized health advice and appropriate referrals for health education or preventive services given if needed, as noted in patient's After Visit Summary.    History of Present Illness     Here for fu of chronic conditions and awv.   Does see dr. Kristie lucas.   Has had some lower blood sugar readings.   He monitors his blood sugar with a cgm.            Patient Care Team:  Yovani Ying MD as PCP - General (Family Medicine)  Luis Antonio Flowers MD as PCP - Endocrinology (Endocrinology)  DO Carl Barber  MD Min Serrato PA-C as Physician Assistant (Endocrinology)  DANIEL Clark as Nurse Practitioner (Endocrinology)    Review of Systems   Constitutional: Negative.  Negative for activity change, appetite change, chills and diaphoresis.   HENT:  Negative for congestion and dental problem.    Respiratory: Negative.  Negative for apnea, chest tightness, shortness of breath and wheezing.    Cardiovascular: Negative.  Negative for chest pain, palpitations and leg swelling.   Gastrointestinal: Negative.  Negative for abdominal distention, abdominal pain, constipation, diarrhea and nausea.   Genitourinary: Negative.  Negative for difficulty urinating, dysuria and frequency.     Medical History Reviewed by provider this encounter:  Problems       Annual Wellness Visit Questionnaire   Elan is here for his Subsequent Wellness visit.     Health Risk Assessment:   Patient rates overall health as good. Patient feels that their physical health rating is same. Patient is very satisfied with their life. Eyesight was rated as same. Hearing was rated as same. Patient feels that their emotional and mental health rating is same. Patients states they are never, rarely angry. Patient states they are sometimes unusually tired/fatigued. Pain experienced in the last 7 days has been some. Patient's pain rating has been 6/10. Patient states that he has experienced no weight loss or gain in last 6 months.     Depression Screening:   PHQ-2 Score: 0      Fall Risk Screening:   In the past year, patient has experienced: no history of falling in past year      Home Safety:  Patient does not have trouble with stairs inside or outside of their home. Patient has working smoke alarms and has working carbon monoxide detector. Home safety hazards include: none.     Nutrition:   Current diet is Diabetic, Low Cholesterol and Low Saturated Fat.     Medications:   Patient is not currently taking any over-the-counter supplements.  Patient is able to manage medications.     Activities of Daily Living (ADLs)/Instrumental Activities of Daily Living (IADLs):   Walk and transfer into and out of bed and chair?: Yes  Dress and groom yourself?: Yes    Bathe or shower yourself?: Yes    Feed yourself? Yes  Do your laundry/housekeeping?: Yes  Manage your money, pay your bills and track your expenses?: Yes  Make your own meals?: Yes    Do your own shopping?: Yes    Previous Hospitalizations:   Any hospitalizations or ED visits within the last 12 months?: No      Advance Care Planning:   Living will: Yes    Durable POA for healthcare: Yes    Advanced directive: Yes      Cognitive Screening:   Provider or family/friend/caregiver concerned regarding cognition?: No    PREVENTIVE SCREENINGS      Cardiovascular Screening:    General: Screening Not Indicated and History Lipid Disorder      Diabetes Screening:     General: Screening Not Indicated and History Diabetes      Colorectal Cancer Screening:     General: Screening Not Indicated      Prostate Cancer Screening:    General: Screening Not Indicated      Osteoporosis Screening:    General: Screening Not Indicated      Abdominal Aortic Aneurysm (AAA) Screening:    Risk factors include: tobacco use        General: Screening Not Indicated      Lung Cancer Screening:     General: Screening Not Indicated      Hepatitis C Screening:    General: Screening Not Indicated    Screening, Brief Intervention, and Referral to Treatment (SBIRT)    Screening  Typical number of drinks in a day: 1  Typical number of drinks in a week: 7  Interpretation: Low risk drinking behavior.    AUDIT-C Screenin) How often did you have a drink containing alcohol in the past year? 4 or more times a week  2) How many drinks did you have on a typical day when you were drinking in the past year? 1 to 2  3) How often did you have 6 or more drinks on one occasion in the past year? never    AUDIT-C Score: 4  Interpretation: Score 4-12  (male): POSITIVE screen for alcohol misuse    AUDIT Screenin) How often during the last year have you found that you were not able to stop drinking once you had started? 0 - never  5) How often during the last year have you failed to do what was normally expected from you because of drinking? 0 - never  6) How often during the last year have you needed a first drink in the morning to get yourself going after a heavy drinking session? 0 - never  7) How often during the last year have you had a feeling of guilt or remorse after drinking? 0 - never  8) How often during the last year have you been unable to remember what happened the night before because you had been drinking? 0 - never  9) Have you or someone else been injured as a result of your drinking? 0 - no  10) Has a relative or friend or a doctor or another health worker been concerned about your drinking or suggested you cut down? 0 - no    AUDIT Score: 4  Interpretation: Low risk alcohol consumption    Single Item Drug Screening:  How often have you used an illegal drug (including marijuana) or a prescription medication for non-medical reasons in the past year? never    Single Item Drug Screen Score: 0  Interpretation: Negative screen for possible drug use disorder    Social Determinants of Health     Financial Resource Strain: Low Risk  (2023)    Overall Financial Resource Strain (CARDIA)    • Difficulty of Paying Living Expenses: Not hard at all   Food Insecurity: No Food Insecurity (2024)    Hunger Vital Sign    • Worried About Running Out of Food in the Last Year: Never true    • Ran Out of Food in the Last Year: Never true   Transportation Needs: No Transportation Needs (2024)    PRAPARE - Transportation    • Lack of Transportation (Medical): No    • Lack of Transportation (Non-Medical): No   Housing Stability: Low Risk  (2024)    Housing Stability Vital Sign    • Unable to Pay for Housing in the Last Year: No    • Number of Times  "Moved in the Last Year: 0    • Homeless in the Last Year: No   Utilities: Not At Risk (6/18/2024)    [facility] Utilities    • Threatened with loss of utilities: No     Vision Screening    Right eye Left eye Both eyes   Without correction      With correction 20/25 20/25 20/20       Objective     /50   Pulse 83   Temp (!) 97 °F (36.1 °C)   Ht 5' 10\" (1.778 m)   Wt 68.5 kg (151 lb)   SpO2 99%   BMI 21.67 kg/m²     Physical Exam  Vitals and nursing note reviewed.   Constitutional:       General: He is not in acute distress.     Appearance: He is well-developed. He is not ill-appearing.   HENT:      Head: Normocephalic and atraumatic.      Right Ear: External ear normal.      Left Ear: External ear normal.      Mouth/Throat:      Mouth: Mucous membranes are moist.      Pharynx: Oropharynx is clear.   Eyes:      Extraocular Movements: Extraocular movements intact.      Conjunctiva/sclera: Conjunctivae normal.      Pupils: Pupils are equal, round, and reactive to light.   Cardiovascular:      Rate and Rhythm: Normal rate and regular rhythm.      Pulses: no weak pulses.           Dorsalis pedis pulses are 2+ on the right side and 2+ on the left side.        Posterior tibial pulses are 2+ on the right side and 2+ on the left side.      Heart sounds: Normal heart sounds. No murmur heard.  Pulmonary:      Effort: Pulmonary effort is normal.      Breath sounds: Normal breath sounds.   Abdominal:      General: Bowel sounds are normal. There is no distension.      Palpations: Abdomen is soft. There is no mass.      Tenderness: There is no abdominal tenderness. There is no guarding.      Hernia: No hernia is present.   Genitourinary:     Penis: Normal.    Musculoskeletal:         General: Normal range of motion.      Cervical back: Normal range of motion and neck supple.   Feet:      Right foot:      Skin integrity: Callus present. No ulcer, skin breakdown, erythema, warmth or dry skin.      Left foot:      Skin integrity: " Callus present. No ulcer, skin breakdown, erythema, warmth or dry skin.   Skin:     General: Skin is warm and dry.      Capillary Refill: Capillary refill takes less than 2 seconds.   Neurological:      General: No focal deficit present.      Mental Status: He is alert and oriented to person, place, and time.   Psychiatric:         Mood and Affect: Mood normal.         Behavior: Behavior normal.     Diabetic Foot Exam    Patient's shoes and socks removed.    Right Foot/Ankle   Right Foot Inspection  Skin Exam: skin normal, skin intact, callus and callus. No dry skin, no warmth, no erythema, no maceration, no abnormal color, no pre-ulcer and no ulcer.     Toe Exam: ROM and strength within normal limits.     Sensory   Vibration: intact  Proprioception: intact  Monofilament testing: intact    Vascular  Capillary refills: < 3 seconds  The right DP pulse is 2+. The right PT pulse is 2+.     Left Foot/Ankle  Left Foot Inspection  Skin Exam: skin normal, skin intact and callus. No dry skin, no warmth, no erythema, no maceration, normal color, no pre-ulcer and no ulcer.     Toe Exam: ROM and strength within normal limits.     Sensory   Vibration: intact  Proprioception: intact  Monofilament testing: intact    Vascular  Capillary refills: < 3 seconds  The left DP pulse is 2+. The left PT pulse is 2+.     Assign Risk Category  No deformity present  No loss of protective sensation  No weak pulses  Risk: 0    Administrative Statements

## 2024-06-24 NOTE — PROGRESS NOTES
PATIENT:  Elan Morrison  1940    ASSESSMENT:  1. Onychomycosis        2. Type 2 diabetes mellitus with diabetic neuropathy, without long-term current use of insulin (HCC)                No orders of the defined types were placed in this encounter.       PLAN:  Disease prevention and related risk factors of diabetes were identified and discussed.    The patient was educated in proper foot wear for diabetics.    Educated in daily foot assessment and routine diabetic foot care.    Discussed the importance of controlling BS through diet and exercise.    The patient will follow up in 12 weeks for further diabetic foot exam and care.      Procedures: [id number]  All mycotic toenails were reduced and debrided in length, width, and girth using a nail nipper and electric dremel.    All hyperkeratotic skin lesion(s) if present were sharply pared with a #10 scalpel with no evidence of ulceration/abscess.    Patient tolerated procedure(s) well without complications.    Procedures     HPI:  Elan Morrison is a 84 y.o.year old male seen for initial diabetic foot exam referred by his endocrinologist.  Patient has class findings with type II DM.    Reports blood sugars are doing better.  Patient concerned of thick toenails.  The patient denied any acute pedal disorder, redness, acute swelling, or recent injury.      The following portions of the patient's history were reviewed and updated as appropriate: allergies, current medications, past family history, past medical history, past social history, past surgical history, and problem list.    REVIEW OF SYSTEMS:  GENERAL: No fever or chills  HEART: No chest pain, or palpitation  RESPIRATORY:  No acute SOB or cough  GI: No Nausea, vomit or diarrhea  NEUROLOGIC: No syncope or acute weakness    PHYSICAL EXAM:    /61 (BP Location: Left arm, Patient Position: Sitting, Cuff Size: Adult)   Pulse 76   Ht 6' (1.829 m) Comment: STATED  Wt 68.2 kg (150 lb 6.4 oz)   BMI 20.40  kg/m²     VASCULAR EXAM:  Posterior tibial artery absent bilateral  Dorsalis pedis artery +1 bilateral  The patient has moderate skin atrophy, color changes, lack of digital hair, and nail dystrophy.    There is trace lower extremity edema bilaterally.      NEUROLOGIC EXAM:  Sensation is intact to light touch. Yes   Sensation is grossly intact to 10gm monofilament.    Vibratory sensation slightly diminished.     No paresthesias noted bilateral.         DERMATOLOGIC EXAM:   Texture, Tone and Turgor are diminished bilateral.    The patient has dystrophic/hypertrophic toenails with yellow/white discoloration, onycholysis, and subungal debris.   Fungal odor noted.  Brittle nature noted.  Right foot nails severely dystrophic x5 with 0.4 cm ave thickness (1-5)  Left foot nails severely dystrophic x5 with 0.4 cm ave thickness (1-5)    Patient has hyperkeratotic lesions noted:  Right foot located at none.  Left foot located at none  No notable suspicious skin lesions.      MUSCULOSKELETAL EXAM:   No acute joint pain, edema, or redness.  Denies any acute musculoskeletal problem.    Patient has no gross pedal deformities. Patient has no ambulation limitations.      Patient wears DM shoes? No    Risk Category/Class Findings:  Q8(B1, B2 ABC)  0 = No loss of protective sensation    A1)  Has the patient had a previous amputation of the foot or integral skeletal portion thereof? No  B1)  Does the patient have absent posterior tibial pulse? Yes  B3)  Does the patient have absent dorsalis pedis? No  B2)  Does the patient have three of the following? Yes           1.  Hair growth (increased or decreased), 2.  Nail changes (thickening), and 3.  Pigmentary changes (discoloring)  C)  Does the patient have two of the following and one above? No

## 2024-07-12 ENCOUNTER — TELEPHONE (OUTPATIENT)
Dept: ENDOCRINOLOGY | Facility: CLINIC | Age: 84
End: 2024-07-12

## 2024-07-28 DIAGNOSIS — I10 ESSENTIAL HYPERTENSION: ICD-10-CM

## 2024-07-28 DIAGNOSIS — E11.42 TYPE 2 DIABETES MELLITUS WITH DIABETIC POLYNEUROPATHY, WITHOUT LONG-TERM CURRENT USE OF INSULIN (HCC): ICD-10-CM

## 2024-07-28 RX ORDER — AMLODIPINE BESYLATE 5 MG/1
5 TABLET ORAL DAILY
Qty: 90 TABLET | Refills: 2 | Status: SHIPPED | OUTPATIENT
Start: 2024-07-28

## 2024-07-29 RX ORDER — METFORMIN HYDROCHLORIDE 500 MG/1
TABLET, EXTENDED RELEASE ORAL
Qty: 360 TABLET | Refills: 1 | Status: SHIPPED | OUTPATIENT
Start: 2024-07-29

## 2024-08-20 ENCOUNTER — APPOINTMENT (OUTPATIENT)
Dept: LAB | Facility: HOSPITAL | Age: 84
End: 2024-08-20
Payer: MEDICARE

## 2024-08-20 DIAGNOSIS — E03.8 SUBCLINICAL HYPOTHYROIDISM: ICD-10-CM

## 2024-08-20 DIAGNOSIS — E78.2 MIXED HYPERLIPIDEMIA: ICD-10-CM

## 2024-08-20 DIAGNOSIS — E11.42 TYPE 2 DIABETES MELLITUS WITH DIABETIC POLYNEUROPATHY, WITHOUT LONG-TERM CURRENT USE OF INSULIN (HCC): ICD-10-CM

## 2024-08-20 LAB
ALBUMIN SERPL BCG-MCNC: 4 G/DL (ref 3.5–5)
ALP SERPL-CCNC: 90 U/L (ref 34–104)
ALT SERPL W P-5'-P-CCNC: 15 U/L (ref 7–52)
ANION GAP SERPL CALCULATED.3IONS-SCNC: 11 MMOL/L (ref 4–13)
AST SERPL W P-5'-P-CCNC: 23 U/L (ref 13–39)
BILIRUB SERPL-MCNC: 0.33 MG/DL (ref 0.2–1)
BUN SERPL-MCNC: 14 MG/DL (ref 5–25)
CALCIUM SERPL-MCNC: 9.2 MG/DL (ref 8.4–10.2)
CHLORIDE SERPL-SCNC: 97 MMOL/L (ref 96–108)
CHOLEST SERPL-MCNC: 96 MG/DL
CO2 SERPL-SCNC: 24 MMOL/L (ref 21–32)
CREAT SERPL-MCNC: 0.83 MG/DL (ref 0.6–1.3)
EST. AVERAGE GLUCOSE BLD GHB EST-MCNC: 171 MG/DL
GFR SERPL CREATININE-BSD FRML MDRD: 81 ML/MIN/1.73SQ M
GLUCOSE P FAST SERPL-MCNC: 123 MG/DL (ref 65–99)
HBA1C MFR BLD: 7.6 %
HDLC SERPL-MCNC: 37 MG/DL
LDLC SERPL CALC-MCNC: 46 MG/DL (ref 0–100)
NONHDLC SERPL-MCNC: 59 MG/DL
POTASSIUM SERPL-SCNC: 4.7 MMOL/L (ref 3.5–5.3)
PROT SERPL-MCNC: 6.7 G/DL (ref 6.4–8.4)
SODIUM SERPL-SCNC: 132 MMOL/L (ref 135–147)
T4 FREE SERPL-MCNC: 0.91 NG/DL (ref 0.61–1.12)
TRIGL SERPL-MCNC: 65 MG/DL
TSH SERPL DL<=0.05 MIU/L-ACNC: 6.2 UIU/ML (ref 0.45–4.5)

## 2024-08-20 PROCEDURE — 80061 LIPID PANEL: CPT

## 2024-08-20 PROCEDURE — 84443 ASSAY THYROID STIM HORMONE: CPT

## 2024-08-20 PROCEDURE — 83036 HEMOGLOBIN GLYCOSYLATED A1C: CPT

## 2024-08-20 PROCEDURE — 80053 COMPREHEN METABOLIC PANEL: CPT

## 2024-08-20 PROCEDURE — 84439 ASSAY OF FREE THYROXINE: CPT

## 2024-08-20 PROCEDURE — 36415 COLL VENOUS BLD VENIPUNCTURE: CPT

## 2024-08-22 ENCOUNTER — OFFICE VISIT (OUTPATIENT)
Dept: PODIATRY | Facility: CLINIC | Age: 84
End: 2024-08-22
Payer: MEDICARE

## 2024-08-22 VITALS
BODY MASS INDEX: 21.19 KG/M2 | HEIGHT: 70 IN | DIASTOLIC BLOOD PRESSURE: 58 MMHG | WEIGHT: 148 LBS | SYSTOLIC BLOOD PRESSURE: 146 MMHG

## 2024-08-22 DIAGNOSIS — R20.2 NUMBNESS AND TINGLING: ICD-10-CM

## 2024-08-22 DIAGNOSIS — E11.40 TYPE 2 DIABETES MELLITUS WITH DIABETIC NEUROPATHY, WITHOUT LONG-TERM CURRENT USE OF INSULIN (HCC): ICD-10-CM

## 2024-08-22 DIAGNOSIS — R20.0 NUMBNESS AND TINGLING: ICD-10-CM

## 2024-08-22 DIAGNOSIS — B35.1 ONYCHOMYCOSIS: Primary | ICD-10-CM

## 2024-08-22 PROCEDURE — 11721 DEBRIDE NAIL 6 OR MORE: CPT | Performed by: PODIATRIST

## 2024-08-22 RX ORDER — AMITRIPTYLINE HYDROCHLORIDE 10 MG/1
10 TABLET ORAL
Qty: 30 TABLET | Refills: 2 | Status: SHIPPED | OUTPATIENT
Start: 2024-08-22 | End: 2024-11-20

## 2024-08-22 NOTE — PROGRESS NOTES
PATIENT:  Elan Morrison  1940    ASSESSMENT:  1. Onychomycosis        2. Type 2 diabetes mellitus with diabetic neuropathy, without long-term current use of insulin ([facility])  Diabetic Shoe      3. Numbness and tingling  amitriptyline (ELAVIL) 10 mg tablet    Diabetic Shoe              Orders Placed This Encounter   Procedures   • Diabetic Shoe          PLAN:  Disease prevention and related risk factors of diabetes were identified and discussed.    The patient was educated in proper foot wear for diabetics.    Educated in daily foot assessment and routine diabetic foot care.    Discussed the importance of controlling BS through diet and exercise.    The patient will follow up in 12 weeks for further diabetic foot exam and care.      Procedures: [id number]  All mycotic toenails were reduced and debrided in length, width, and girth using a nail nipper and electric dremel.    All hyperkeratotic skin lesion(s) if present were sharply pared with a #10 scalpel with no evidence of ulceration/abscess.    Patient tolerated procedure(s) well without complications.    Procedures     HPI:  Elan Morrison is a 84 y.o.year old male seen for initial diabetic foot exam referred by his endocrinologist.  Patient has class findings with type II DM.    Reports blood sugars are doing better.  Patient concerned of thick toenails.  The patient denied any acute pedal disorder, redness, acute swelling, or recent injury.      The following portions of the patient's history were reviewed and updated as appropriate: allergies, current medications, past family history, past medical history, past social history, past surgical history, and problem list.    REVIEW OF SYSTEMS:  GENERAL: No fever or chills  HEART: No chest pain, or palpitation  RESPIRATORY:  No acute SOB or cough  GI: No Nausea, vomit or diarrhea  NEUROLOGIC: No syncope or acute weakness    PHYSICAL EXAM:    /58 (BP Location: Left arm, Patient Position: Sitting, Cuff Size:  "Adult)   Ht 5' 10\" (1.778 m)   Wt 67.1 kg (148 lb)   BMI 21.24 kg/m²     VASCULAR EXAM:  Posterior tibial artery absent bilateral  Dorsalis pedis artery +1 bilateral  The patient has moderate skin atrophy, color changes, lack of digital hair, and nail dystrophy.    There is trace lower extremity edema bilaterally.      NEUROLOGIC EXAM:  Sensation is intact to light touch. Yes   Sensation is grossly intact to 10gm monofilament.    Vibratory sensation slightly diminished.     No paresthesias noted bilateral.         DERMATOLOGIC EXAM:   Texture, Tone and Turgor are diminished bilateral.    The patient has dystrophic/hypertrophic toenails with yellow/white discoloration, onycholysis, and subungal debris.   Fungal odor noted.  Brittle nature noted.  Right foot nails severely dystrophic x5 with 0.4 cm ave thickness (1-5)  Left foot nails severely dystrophic x5 with 0.4 cm ave thickness (1-5)    Patient has hyperkeratotic lesions noted:  Right foot located at none.  Left foot located at none  No notable suspicious skin lesions.      MUSCULOSKELETAL EXAM:   No acute joint pain, edema, or redness.  Denies any acute musculoskeletal problem.    Patient has no gross pedal deformities. Patient has no ambulation limitations.      Patient wears DM shoes? No    Risk Category/Class Findings:  Q8(B1, B2 ABC)  0 = No loss of protective sensation    A1)  Has the patient had a previous amputation of the foot or integral skeletal portion thereof? No  B1)  Does the patient have absent posterior tibial pulse? Yes  B3)  Does the patient have absent dorsalis pedis? No  B2)  Does the patient have three of the following? Yes           1.  Hair growth (increased or decreased), 2.  Nail changes (thickening), and 3.  Pigmentary changes (discoloring)  C)  Does the patient have two of the following and one above? No                       "

## 2024-08-23 ENCOUNTER — TELEPHONE (OUTPATIENT)
Dept: PODIATRY | Facility: CLINIC | Age: 84
End: 2024-08-23

## 2024-08-23 NOTE — TELEPHONE ENCOUNTER
PA for amitriptyline SUBMITTED     via    [x]CMM-KEY: [id number]  []Surescripts-Case ID #   []Faxed to plan   []Other website   []Phone call Case ID #     Office notes sent, clinical questions answered. Awaiting determination    Turnaround time for your insurance to make a decision on your Prior Authorization can take 7-21 business days.

## 2024-08-28 ENCOUNTER — OFFICE VISIT (OUTPATIENT)
Dept: ENDOCRINOLOGY | Facility: CLINIC | Age: 84
End: 2024-08-28
Payer: MEDICARE

## 2024-08-28 VITALS
DIASTOLIC BLOOD PRESSURE: 70 MMHG | HEIGHT: 71 IN | BODY MASS INDEX: 20.75 KG/M2 | WEIGHT: 148.2 LBS | SYSTOLIC BLOOD PRESSURE: 150 MMHG

## 2024-08-28 DIAGNOSIS — E11.40 TYPE 2 DIABETES MELLITUS WITH DIABETIC NEUROPATHY, WITHOUT LONG-TERM CURRENT USE OF INSULIN (HCC): ICD-10-CM

## 2024-08-28 DIAGNOSIS — E11.42 TYPE 2 DIABETES MELLITUS WITH DIABETIC POLYNEUROPATHY, WITHOUT LONG-TERM CURRENT USE OF INSULIN (HCC): ICD-10-CM

## 2024-08-28 DIAGNOSIS — E11.649 TYPE 2 DIABETES MELLITUS WITH HYPOGLYCEMIA WITHOUT COMA, WITHOUT LONG-TERM CURRENT USE OF INSULIN (HCC): Primary | ICD-10-CM

## 2024-08-28 PROCEDURE — 95251 CONT GLUC MNTR ANALYSIS I&R: CPT | Performed by: INTERNAL MEDICINE

## 2024-08-28 PROCEDURE — 99214 OFFICE O/P EST MOD 30 MIN: CPT | Performed by: INTERNAL MEDICINE

## 2024-08-28 NOTE — PROGRESS NOTES
Ambulatory Visit  Name: Elan Morrison      : 1940      MRN: [id number]  Encounter Provider: Luis Antonio Flowers MD  Encounter Date: 2024   Encounter department: [facility] [place]    Assessment & Plan   1. Type 2 diabetes mellitus with hypoglycemia without coma, without long-term current use of insulin ([facility])  Assessment & Plan:  His target hemoglobin A1c should be less than 8% which is where he is now.  Due to the risk of hypoglycemia, discontinue glimepiride.  We did discuss decreasing the amount of carbohydrates that he eats for breakfast and adding protein for breakfast which should lower the spikes in his blood sugar.  He is going to talk to his family and initiate these changes.  If the A1c increases over 8%, would recommend adding Prandin.  Lab Results   Component Value Date    HGBA1C 7.6 (H) 2024     2. Type 2 diabetes mellitus with diabetic neuropathy, without long-term current use of insulin ([facility])  3. Type 2 diabetes mellitus with diabetic polyneuropathy, without long-term current use of insulin ([facility])      History of Present Illness     Elan Morrison is a 84 y.o. male who presents for follow-up of type 2 diabetes.  He is currently not taking glimepiride due to an A1c that was below 7.  This led to a slight increase in his A1c.  There has been no hypoglycemia.    Review of Systems   Constitutional:  Negative for chills and fever.   Respiratory:  Negative for shortness of breath.    Cardiovascular:  Negative for chest pain.   Gastrointestinal:  Negative for constipation, diarrhea, nausea and vomiting.   Endocrine: Negative for polydipsia and polyuria.   All other systems reviewed and are negative.    Current Outpatient Medications on File Prior to Visit   Medication Sig Dispense Refill    amitriptyline (ELAVIL) 10 mg tablet Take 1 tablet (10 mg total) by mouth daily at bedtime 30 tablet 2    amLODIPine (NORVASC) 5 mg tablet TAKE 1 TABLET BY MOUTH  "DAILY 90 tablet 2    B Complex-Biotin-FA (B COMPLETE) TABS Take by mouth in the morning      Blood Glucose Monitoring Suppl (CONTOUR NEXT MONITOR) w/Device KIT 1 Device by Does not apply route 2 (two) times a day 1 kit 0    Coenzyme Q10 (COQ10 PO) Take by mouth in the morning      docusate sodium (COLACE) 100 mg capsule Take 1 capsule by mouth as needed      ipratropium (ATROVENT) 0.03 % nasal spray 2 sprays into each nostril 3 (three) times a day If needed for runny nose 30 mL 1    lisinopril (ZESTRIL) 20 mg tablet Take 1 tablet (20 mg total) by mouth daily 90 tablet 3    metFORMIN (GLUCOPHAGE-XR) 500 mg 24 hr tablet TAKE TWO TABLETS BY MOUTH TWICE A DAY IN THE MORNING AND EVENING WITH MEALS 360 tablet 1    Multiple Vitamin (multivitamin) tablet Take 1 tablet by mouth daily      simvastatin (ZOCOR) 20 mg tablet TAKE ONE TABLET BY MOUTH AT BEDTIME 90 tablet 3    Thiamine 50 MG CAPS Take 1 capsule by mouth daily      [DISCONTINUED] glimepiride (AMARYL) 1 mg tablet TAKE ONE-HALF TABLET BY MOUTH EVERY DAY AS NEEDED 30 tablet 5    glucose blood (Contour Next Test) test strip Use 1 each 2 (two) times a day (Patient not taking: Reported on 8/28/2024) 200 strip 1    Microlet Lancets MISC Use 2 (two) times a day (Patient not taking: Reported on 8/28/2024) 200 each 1    Specialty Vitamins Products (MAGNESIUM, AMINO ACID CHELATE,) 133 MG tablet Take 1 tablet by mouth in the morning (Patient not taking: Reported on 2/21/2024)       No current facility-administered medications on file prior to visit.      Objective     /70 (BP Location: Left arm, Patient Position: Sitting, Cuff Size: Adult)   Ht 5' 11\" (1.803 m)   Wt 67.2 kg (148 lb 3.2 oz)   BMI 20.67 kg/m²     Physical Exam  Constitutional:       General: He is not in acute distress.     Appearance: He is well-developed. He is not diaphoretic.   HENT:      Head: Normocephalic and atraumatic.   Eyes:      General: No scleral icterus.        Right eye: No discharge.    "      Left eye: No discharge.   Pulmonary:      Effort: Pulmonary effort is normal.   Musculoskeletal:         General: Normal range of motion.      Cervical back: Normal range of motion.   Skin:     Coloration: Skin is not jaundiced.   Neurological:      General: No focal deficit present.      Mental Status: He is alert and oriented to person, place, and time.      Cranial Nerves: No cranial nerve deficit.   Psychiatric:         Mood and Affect: Mood normal.         Behavior: Behavior normal.     Elan Morrison   Device used Dexcom  Home use       Indication   August 15, 2024 to  Type 2 Diabetes    More than 72 hours of data was reviewed. Report to be scanned to chart.     Date Range: August 15, 2024 to August 28, 2024    Analysis of data:   % time CGM used: 100%  Average Glucose: 157 mg/dL  Coefficient of Variation: x   SD : 41 mg/dL  Time in Target Range: 72%  Time Above Range: 25% high and 3% very high  Time Below Range: 0%    Interpretation of data: He is having hyperglycemia after breakfast.  We discussed changing breakfast carbohydrate amount and increasing the amount of protein.  Overall, his blood sugars are doing quite well.    Administrative Statements

## 2024-08-28 NOTE — ASSESSMENT & PLAN NOTE
His target hemoglobin A1c should be less than 8% which is where he is now.  Due to the risk of hypoglycemia, discontinue glimepiride.  We did discuss decreasing the amount of carbohydrates that he eats for breakfast and adding protein for breakfast which should lower the spikes in his blood sugar.  He is going to talk to his family and initiate these changes.  If the A1c increases over 8%, would recommend adding Prandin.  Lab Results   Component Value Date    HGBA1C 7.6 (H) 08/20/2024

## 2024-08-29 ENCOUNTER — TELEPHONE (OUTPATIENT)
Dept: PODIATRY | Facility: CLINIC | Age: 84
End: 2024-08-29

## 2024-08-29 NOTE — TELEPHONE ENCOUNTER
i called Elan to make sure he got his perscription of Amitriptyline and he told me [employer] did apporove the perscription

## 2024-09-18 ENCOUNTER — TELEPHONE (OUTPATIENT)
Dept: ENDOCRINOLOGY | Facility: CLINIC | Age: 84
End: 2024-09-18

## 2024-09-18 ENCOUNTER — OFFICE VISIT (OUTPATIENT)
Dept: FAMILY MEDICINE CLINIC | Facility: HOSPITAL | Age: 84
End: 2024-09-18
Payer: MEDICARE

## 2024-09-18 VITALS
HEIGHT: 71 IN | BODY MASS INDEX: 20.83 KG/M2 | WEIGHT: 148.8 LBS | SYSTOLIC BLOOD PRESSURE: 160 MMHG | OXYGEN SATURATION: 99 % | HEART RATE: 72 BPM | TEMPERATURE: 97 F | DIASTOLIC BLOOD PRESSURE: 62 MMHG

## 2024-09-18 DIAGNOSIS — E11.42 TYPE 2 DIABETES MELLITUS WITH DIABETIC POLYNEUROPATHY, WITHOUT LONG-TERM CURRENT USE OF INSULIN (HCC): Primary | ICD-10-CM

## 2024-09-18 DIAGNOSIS — I10 BENIGN ESSENTIAL HYPERTENSION: ICD-10-CM

## 2024-09-18 PROCEDURE — G2211 COMPLEX E/M VISIT ADD ON: HCPCS | Performed by: FAMILY MEDICINE

## 2024-09-18 PROCEDURE — 99214 OFFICE O/P EST MOD 30 MIN: CPT | Performed by: FAMILY MEDICINE

## 2024-09-23 PROBLEM — E11.9 TYPE 2 DIABETES MELLITUS WITHOUT COMPLICATION, WITHOUT LONG-TERM CURRENT USE OF INSULIN (HCC): Status: RESOLVED | Noted: 2019-02-27 | Resolved: 2024-09-23

## 2024-09-23 NOTE — ASSESSMENT & PLAN NOTE
Stable.  Will continue to monitor.  Blood pressure in the office is elevated today.  Blood pressure at home has been good.

## 2024-09-23 NOTE — PROGRESS NOTES
"Ambulatory Visit  Name: Elan Morrison      : 1940      MRN: [id number]  Encounter Provider: Yovani Ying MD  Encounter Date: 2024   Encounter department: [facility] PRIMARY CARE [address]     Assessment & Plan  Type 2 diabetes mellitus with diabetic polyneuropathy, without long-term current use of insulin ([facility])      Discussed diabetes neuropathy.    He has periodic intermittent numbness and occasional pain.    Advised against the use of amitriptyline even at a low dose due to his age and high risk for anticholinergic effects.    If needed can consider low-dose of Cymbalta or lower dose of gabapentin.    He continues on B complex.    For now he would prefer to monitor symptoms and not use any new medication.      Lab Results   Component Value Date    HGBA1C 7.6 (H) 2024            Benign essential hypertension  Stable.  Will continue to monitor.  Blood pressure in the office is elevated today.  Blood pressure at home has been good.          History of Present Illness     Ruben is here for follow-up of diabetic neuropathy.  He has been following with podiatry.  Recent prescription for amitriptyline was given.  He is asking about the amitriptyline today.  I reviewed the potential side effects and high risk issues with amitriptyline.          Review of Systems   Constitutional: Negative.  Negative for activity change, appetite change, chills and diaphoresis.   HENT:  Negative for congestion and dental problem.    Respiratory: Negative.  Negative for apnea, chest tightness, shortness of breath and wheezing.    Cardiovascular: Negative.  Negative for chest pain, palpitations and leg swelling.   Gastrointestinal: Negative.  Negative for abdominal distention, abdominal pain, constipation, diarrhea and nausea.   Genitourinary: Negative.  Negative for difficulty urinating, dysuria and frequency.           Objective     /62   Pulse 72   Temp (!) 97 °F (36.1 °C)   Ht 5' 11\" (1.803 " m)   Wt 67.5 kg (148 lb 12.8 oz)   SpO2 99%   BMI 20.75 kg/m²     Physical Exam  Vitals and nursing note reviewed.   Constitutional:       Appearance: Normal appearance.   Cardiovascular:      Rate and Rhythm: Normal rate and regular rhythm.   Pulmonary:      Effort: Pulmonary effort is normal.      Breath sounds: Normal breath sounds.   Neurological:      General: No focal deficit present.      Mental Status: He is alert and oriented to person, place, and time.   Psychiatric:         Mood and Affect: Mood normal.         Behavior: Behavior normal.

## 2024-09-23 NOTE — ASSESSMENT & PLAN NOTE
Discussed diabetes neuropathy.    He has periodic intermittent numbness and occasional pain.    Advised against the use of amitriptyline even at a low dose due to his age and high risk for anticholinergic effects.    If needed can consider low-dose of Cymbalta or lower dose of gabapentin.    He continues on B complex.    For now he would prefer to monitor symptoms and not use any new medication.      Lab Results   Component Value Date    HGBA1C 7.6 (H) 08/20/2024

## 2024-09-27 ENCOUNTER — TELEPHONE (OUTPATIENT)
Age: 84
End: 2024-09-27

## 2024-09-27 NOTE — TELEPHONE ENCOUNTER
Laura from [facility] called to follow up on faxed request for the patient that was sent yesterday. Advised fax not received and that it may take up to 48 hrs to be received.

## 2024-10-21 ENCOUNTER — TELEPHONE (OUTPATIENT)
Dept: ENDOCRINOLOGY | Facility: CLINIC | Age: 84
End: 2024-10-21

## 2024-10-21 NOTE — TELEPHONE ENCOUNTER
[facility] pharmacy request for chart notes    Faxed 10/21/24  To [phone]

## 2024-10-29 ENCOUNTER — TELEPHONE (OUTPATIENT)
Age: 84
End: 2024-10-29

## 2024-10-29 NOTE — TELEPHONE ENCOUNTER
Pt called and stated he was recommended some medications by Dr. Head at last OV but wanted to monitor symptoms before taking anything. Pt would like to try the low dosage of Cymbalta that was recommended.    Please advise and see if script can be sent to [facility] in [facility].

## 2024-10-30 DIAGNOSIS — E11.42 TYPE 2 DIABETES MELLITUS WITH DIABETIC POLYNEUROPATHY, WITHOUT LONG-TERM CURRENT USE OF INSULIN (HCC): Primary | ICD-10-CM

## 2024-10-30 RX ORDER — DULOXETIN HYDROCHLORIDE 20 MG/1
20 CAPSULE, DELAYED RELEASE ORAL DAILY
Qty: 30 CAPSULE | Refills: 0 | Status: SHIPPED | OUTPATIENT
Start: 2024-10-30

## 2024-11-05 ENCOUNTER — OFFICE VISIT (OUTPATIENT)
Dept: PODIATRY | Facility: CLINIC | Age: 84
End: 2024-11-05
Payer: MEDICARE

## 2024-11-05 VITALS
WEIGHT: 149 LBS | SYSTOLIC BLOOD PRESSURE: 149 MMHG | HEART RATE: 72 BPM | DIASTOLIC BLOOD PRESSURE: 57 MMHG | BODY MASS INDEX: 20.86 KG/M2 | HEIGHT: 71 IN

## 2024-11-05 DIAGNOSIS — B35.1 ONYCHOMYCOSIS: Primary | ICD-10-CM

## 2024-11-05 DIAGNOSIS — E11.40 TYPE 2 DIABETES MELLITUS WITH DIABETIC NEUROPATHY, WITHOUT LONG-TERM CURRENT USE OF INSULIN (HCC): ICD-10-CM

## 2024-11-05 PROCEDURE — 11721 DEBRIDE NAIL 6 OR MORE: CPT | Performed by: PODIATRIST

## 2024-11-07 ENCOUNTER — TELEPHONE (OUTPATIENT)
Age: 84
End: 2024-11-07

## 2024-11-07 NOTE — TELEPHONE ENCOUNTER
Patient called to report adverse effects after taking new medication cymbalta 20 mg dose. He has taken 5 doses so far and is experiencing constipations, weakness, and insomnia. Please advise as to stopping medication if a taper period needs to be completed prior to stopping. Please call patient as soon as possible. Thank you

## 2024-11-10 NOTE — PROGRESS NOTES
"   PATIENT:  Elan Morrison  1940    ASSESSMENT:  No diagnosis found.          No orders of the defined types were placed in this encounter.         PLAN:  Disease prevention and related risk factors of diabetes were identified and discussed.    The patient was educated in proper foot wear for diabetics.    Educated in daily foot assessment and routine diabetic foot care.    Discussed the importance of controlling BS through diet and exercise.    The patient will follow up in 12 weeks for further diabetic foot exam and care.      Procedures: 97729  All mycotic toenails were reduced and debrided in length, width, and girth using a nail nipper and electric dremel.    All hyperkeratotic skin lesion(s) if present were sharply pared with a #10 scalpel with no evidence of ulceration/abscess.    Patient tolerated procedure(s) well without complications.    Procedures     HPI:  Elan Morrison is a 84 y.o.year old male seen for initial diabetic foot exam referred by his endocrinologist.  Patient has class findings with type II DM.    Reports blood sugars are doing better.  Patient concerned of thick toenails.  The patient denied any acute pedal disorder, redness, acute swelling, or recent injury.      The following portions of the patient's history were reviewed and updated as appropriate: allergies, current medications, past family history, past medical history, past social history, past surgical history, and problem list.    REVIEW OF SYSTEMS:  GENERAL: No fever or chills  HEART: No chest pain, or palpitation  RESPIRATORY:  No acute SOB or cough  GI: No Nausea, vomit or diarrhea  NEUROLOGIC: No syncope or acute weakness    PHYSICAL EXAM:    /57 (BP Location: Left arm, Patient Position: Sitting, Cuff Size: Adult)   Pulse 72   Ht 5' 11\" (1.803 m) Comment: stated  Wt 67.6 kg (149 lb)   BMI 20.78 kg/m²     VASCULAR EXAM:  Posterior tibial artery absent bilateral  Dorsalis pedis artery +1 bilateral  The patient " has moderate skin atrophy, color changes, lack of digital hair, and nail dystrophy.    There is trace lower extremity edema bilaterally.      NEUROLOGIC EXAM:  Sensation is intact to light touch. Yes   Sensation is grossly intact to 10gm monofilament.    Vibratory sensation slightly diminished.     No paresthesias noted bilateral.         DERMATOLOGIC EXAM:   Texture, Tone and Turgor are diminished bilateral.    The patient has dystrophic/hypertrophic toenails with yellow/white discoloration, onycholysis, and subungal debris.   Fungal odor noted.  Brittle nature noted.  Right foot nails severely dystrophic x5 with 0.4 cm ave thickness (1-5)  Left foot nails severely dystrophic x5 with 0.4 cm ave thickness (1-5)    Patient has hyperkeratotic lesions noted:  Right foot located at none.  Left foot located at none  No notable suspicious skin lesions.      MUSCULOSKELETAL EXAM:   No acute joint pain, edema, or redness.  Denies any acute musculoskeletal problem.    Patient has no gross pedal deformities. Patient has no ambulation limitations.      Patient wears DM shoes? No    Risk Category/Class Findings:  Q8(B1, B2 ABC)  0 = No loss of protective sensation    A1)  Has the patient had a previous amputation of the foot or integral skeletal portion thereof? No  B1)  Does the patient have absent posterior tibial pulse? Yes  B3)  Does the patient have absent dorsalis pedis? No  B2)  Does the patient have three of the following? Yes           1.  Hair growth (increased or decreased), 2.  Nail changes (thickening), and 3.  Pigmentary changes (discoloring)  C)  Does the patient have two of the following and one above? No

## 2024-11-14 DIAGNOSIS — E11.9 TYPE 2 DIABETES MELLITUS WITHOUT COMPLICATION, WITHOUT LONG-TERM CURRENT USE OF INSULIN (HCC): ICD-10-CM

## 2024-12-20 ENCOUNTER — OFFICE VISIT (OUTPATIENT)
Dept: FAMILY MEDICINE CLINIC | Facility: HOSPITAL | Age: 84
End: 2024-12-20
Payer: MEDICARE

## 2024-12-20 VITALS
WEIGHT: 146.8 LBS | HEART RATE: 86 BPM | OXYGEN SATURATION: 99 % | SYSTOLIC BLOOD PRESSURE: 162 MMHG | BODY MASS INDEX: 20.55 KG/M2 | HEIGHT: 71 IN | DIASTOLIC BLOOD PRESSURE: 79 MMHG | TEMPERATURE: 96.6 F

## 2024-12-20 DIAGNOSIS — I10 BENIGN ESSENTIAL HYPERTENSION: ICD-10-CM

## 2024-12-20 DIAGNOSIS — E78.2 MIXED HYPERLIPIDEMIA: ICD-10-CM

## 2024-12-20 DIAGNOSIS — E11.42 TYPE 2 DIABETES MELLITUS WITH DIABETIC POLYNEUROPATHY, WITHOUT LONG-TERM CURRENT USE OF INSULIN (HCC): Primary | ICD-10-CM

## 2024-12-20 DIAGNOSIS — E11.40 TYPE 2 DIABETES MELLITUS WITH DIABETIC NEUROPATHY, WITHOUT LONG-TERM CURRENT USE OF INSULIN (HCC): ICD-10-CM

## 2024-12-20 PROCEDURE — 99214 OFFICE O/P EST MOD 30 MIN: CPT | Performed by: FAMILY MEDICINE

## 2024-12-20 PROCEDURE — G2211 COMPLEX E/M VISIT ADD ON: HCPCS | Performed by: FAMILY MEDICINE

## 2024-12-20 NOTE — ASSESSMENT & PLAN NOTE
Lab Results   Component Value Date    HGBA1C 7.6 (H) 08/20/2024     Diabetes mellitus.  Work on dietary control.  Continue follow-up with endocrinology.  With target hemoglobin A1c less than 7.5.  He remains very active and has not had any significant issues with hypoglycemia.  Orders:    Albumin / creatinine urine ratio; Future    Comprehensive metabolic panel; Future    Hemoglobin A1C; Future    CBC and Platelet; Future    Lipid Panel with Direct LDL reflex; Future

## 2024-12-20 NOTE — PROGRESS NOTES
Name: Elan Morrison      : 1940      MRN: [id number]  Encounter Provider: Yovani Ying MD  Encounter Date: 2024   Encounter department: [facility] C[facility] [address]   :  Assessment & Plan  Type 2 diabetes mellitus with diabetic polyneuropathy, without long-term current use of insulin ([facility])    Lab Results   Component Value Date    HGBA1C 7.6 (H) 2024     Diabetes mellitus.  Work on dietary control.  Continue follow-up with endocrinology.  With target hemoglobin A1c less than 7.5.  He remains very active and has not had any significant issues with hypoglycemia.  Orders:    Albumin / creatinine urine ratio; Future    Comprehensive metabolic panel; Future    Hemoglobin A1C; Future    CBC and Platelet; Future    Lipid Panel with Direct LDL reflex; Future    Type 2 diabetes mellitus with diabetic neuropathy, without long-term current use of insulin ([facility])    Lab Results   Component Value Date    HGBA1C 7.6 (H) 2024     Stable.  Continues on Cymbalta.  No significant pain.       Mixed hyperlipidemia  Stable.  Continue with simvastatin.       Benign essential hypertension  Today blood pressure in the office is elevated.  Blood pressure at home has been significantly different and under good control.  Has had better blood pressures in other offices as well.              History of Present Illness     Patient is here for follow-up of chronic conditions.  He does see endocrinology regarding his diabetes.  Overall he has been doing well.  No new complaints today.  He reports he is good is an 84-year-old could be.      Review of Systems   Constitutional: Negative.  Negative for activity change, appetite change, chills and diaphoresis.   HENT:  Negative for congestion and dental problem.    Respiratory: Negative.  Negative for apnea, chest tightness, shortness of breath and wheezing.    Cardiovascular: Negative.  Negative for chest pain, palpitations and leg swelling.  "  Gastrointestinal: Negative.  Negative for abdominal distention, abdominal pain, constipation, diarrhea and nausea.   Genitourinary: Negative.  Negative for difficulty urinating, dysuria and frequency.       Objective   /79 (BP Location: Left arm, Patient Position: Sitting, Cuff Size: Standard)   Pulse 86   Temp (!) 96.6 °F (35.9 °C) (Tympanic)   Ht 5' 11\" (1.803 m)   Wt 66.6 kg (146 lb 12.8 oz)   SpO2 99%   BMI 20.47 kg/m²      Physical Exam  Vitals reviewed.   Constitutional:       General: He is not in acute distress.     Appearance: He is well-developed. He is not ill-appearing.   HENT:      Head: Normocephalic and atraumatic.      Right Ear: Tympanic membrane, ear canal and external ear normal.      Left Ear: Tympanic membrane, ear canal and external ear normal.      Mouth/Throat:      Mouth: Mucous membranes are moist.      Pharynx: Oropharynx is clear.   Eyes:      Extraocular Movements: Extraocular movements intact.      Conjunctiva/sclera: Conjunctivae normal.      Pupils: Pupils are equal, round, and reactive to light.   Cardiovascular:      Rate and Rhythm: Normal rate and regular rhythm.      Heart sounds: Normal heart sounds. No murmur heard.  Pulmonary:      Effort: Pulmonary effort is normal.      Breath sounds: Normal breath sounds.   Abdominal:      General: Bowel sounds are normal. There is no distension.      Palpations: Abdomen is soft. There is no mass.      Tenderness: There is no abdominal tenderness. There is no guarding.      Hernia: No hernia is present.   Musculoskeletal:         General: Normal range of motion.      Cervical back: Normal range of motion and neck supple.   Skin:     General: Skin is warm and dry.      Capillary Refill: Capillary refill takes less than 2 seconds.   Neurological:      General: No focal deficit present.      Mental Status: He is alert and oriented to person, place, and time.   Psychiatric:         Mood and Affect: Mood normal.         Behavior: " Behavior normal.

## 2024-12-20 NOTE — ASSESSMENT & PLAN NOTE
Lab Results   Component Value Date    HGBA1C 7.6 (H) 08/20/2024     Stable.  Continues on Cymbalta.  No significant pain.

## 2024-12-23 NOTE — ASSESSMENT & PLAN NOTE
Today blood pressure in the office is elevated.  Blood pressure at home has been significantly different and under good control.  Has had better blood pressures in other offices as well.

## 2024-12-26 ENCOUNTER — TELEPHONE (OUTPATIENT)
Age: 84
End: 2024-12-26

## 2024-12-26 NOTE — TELEPHONE ENCOUNTER
Patient advised that we do not have RSV vax in the office.  Also, due to medicare coverage he will have to have done at pharmacy.

## 2025-01-09 NOTE — TELEPHONE ENCOUNTER
Spoke to pt, offered as soon as next week for LESI but pt requested to wait and is scheduled on Fri 04/23/21 at 10:45am     Jonelle Youngs over pre procedure instructions, no vaccinations 2 weeks prior/post proc, NPO 1 hr prior, if sick or on abx needs to call to rs, wear loose, comf clothing- no buttons/zippers, needs   Pt verbalized understanding  09-Jan-2025 16:38

## 2025-01-23 ENCOUNTER — APPOINTMENT (OUTPATIENT)
Dept: LAB | Facility: HOSPITAL | Age: 85
End: 2025-01-23
Payer: MEDICARE

## 2025-01-23 DIAGNOSIS — E11.42 TYPE 2 DIABETES MELLITUS WITH DIABETIC POLYNEUROPATHY, WITHOUT LONG-TERM CURRENT USE OF INSULIN (HCC): ICD-10-CM

## 2025-01-23 LAB
ALBUMIN SERPL BCG-MCNC: 4.1 G/DL (ref 3.5–5)
ALP SERPL-CCNC: 96 U/L (ref 34–104)
ALT SERPL W P-5'-P-CCNC: 12 U/L (ref 7–52)
ANION GAP SERPL CALCULATED.3IONS-SCNC: 9 MMOL/L (ref 4–13)
AST SERPL W P-5'-P-CCNC: 16 U/L (ref 13–39)
BILIRUB SERPL-MCNC: 0.43 MG/DL (ref 0.2–1)
BUN SERPL-MCNC: 11 MG/DL (ref 5–25)
CALCIUM SERPL-MCNC: 9.3 MG/DL (ref 8.4–10.2)
CHLORIDE SERPL-SCNC: 98 MMOL/L (ref 96–108)
CHOLEST SERPL-MCNC: 118 MG/DL (ref ?–200)
CO2 SERPL-SCNC: 26 MMOL/L (ref 21–32)
CREAT SERPL-MCNC: 0.81 MG/DL (ref 0.6–1.3)
CREAT UR-MCNC: 75 MG/DL
ERYTHROCYTE [DISTWIDTH] IN BLOOD BY AUTOMATED COUNT: 14 % (ref 11.6–15.1)
EST. AVERAGE GLUCOSE BLD GHB EST-MCNC: 197 MG/DL
GFR SERPL CREATININE-BSD FRML MDRD: 82 ML/MIN/1.73SQ M
GLUCOSE P FAST SERPL-MCNC: 161 MG/DL (ref 65–99)
HBA1C MFR BLD: 8.5 %
HCT VFR BLD AUTO: 33.6 % (ref 36.5–49.3)
HDLC SERPL-MCNC: 46 MG/DL
HGB BLD-MCNC: 10.2 G/DL (ref 12–17)
LDLC SERPL CALC-MCNC: 56 MG/DL (ref 0–100)
MCH RBC QN AUTO: 27.8 PG (ref 26.8–34.3)
MCHC RBC AUTO-ENTMCNC: 30.4 G/DL (ref 31.4–37.4)
MCV RBC AUTO: 92 FL (ref 82–98)
MICROALBUMIN UR-MCNC: 58.4 MG/L
MICROALBUMIN/CREAT 24H UR: 78 MG/G CREATININE (ref 0–30)
PLATELET # BLD AUTO: 388 THOUSANDS/UL (ref 149–390)
PMV BLD AUTO: 10.9 FL (ref 8.9–12.7)
POTASSIUM SERPL-SCNC: 4.4 MMOL/L (ref 3.5–5.3)
PROT SERPL-MCNC: 7.2 G/DL (ref 6.4–8.4)
RBC # BLD AUTO: 3.67 MILLION/UL (ref 3.88–5.62)
SODIUM SERPL-SCNC: 133 MMOL/L (ref 135–147)
TRIGL SERPL-MCNC: 81 MG/DL (ref ?–150)
WBC # BLD AUTO: 6.22 THOUSAND/UL (ref 4.31–10.16)

## 2025-01-23 PROCEDURE — 80053 COMPREHEN METABOLIC PANEL: CPT

## 2025-01-23 PROCEDURE — 82570 ASSAY OF URINE CREATININE: CPT

## 2025-01-23 PROCEDURE — 85027 COMPLETE CBC AUTOMATED: CPT

## 2025-01-23 PROCEDURE — 80061 LIPID PANEL: CPT

## 2025-01-23 PROCEDURE — 82043 UR ALBUMIN QUANTITATIVE: CPT

## 2025-01-23 PROCEDURE — 36415 COLL VENOUS BLD VENIPUNCTURE: CPT

## 2025-01-23 PROCEDURE — 83036 HEMOGLOBIN GLYCOSYLATED A1C: CPT

## 2025-01-24 DIAGNOSIS — E78.2 MIXED HYPERLIPIDEMIA: ICD-10-CM

## 2025-01-24 DIAGNOSIS — I10 ESSENTIAL HYPERTENSION: ICD-10-CM

## 2025-01-24 RX ORDER — LISINOPRIL 20 MG/1
20 TABLET ORAL DAILY
Qty: 90 TABLET | Refills: 1 | Status: SHIPPED | OUTPATIENT
Start: 2025-01-24

## 2025-01-24 RX ORDER — SIMVASTATIN 20 MG
20 TABLET ORAL
Qty: 90 TABLET | Refills: 1 | Status: SHIPPED | OUTPATIENT
Start: 2025-01-24

## 2025-01-27 DIAGNOSIS — E11.42 TYPE 2 DIABETES MELLITUS WITH DIABETIC POLYNEUROPATHY, WITHOUT LONG-TERM CURRENT USE OF INSULIN (HCC): ICD-10-CM

## 2025-01-28 ENCOUNTER — OFFICE VISIT (OUTPATIENT)
Dept: FAMILY MEDICINE CLINIC | Facility: HOSPITAL | Age: 85
End: 2025-01-28
Payer: MEDICARE

## 2025-01-28 ENCOUNTER — TELEPHONE (OUTPATIENT)
Age: 85
End: 2025-01-28

## 2025-01-28 VITALS
OXYGEN SATURATION: 99 % | SYSTOLIC BLOOD PRESSURE: 134 MMHG | HEART RATE: 72 BPM | DIASTOLIC BLOOD PRESSURE: 68 MMHG | BODY MASS INDEX: 19.67 KG/M2 | WEIGHT: 141 LBS

## 2025-01-28 DIAGNOSIS — D50.0 IRON DEFICIENCY ANEMIA DUE TO CHRONIC BLOOD LOSS: ICD-10-CM

## 2025-01-28 DIAGNOSIS — I10 BENIGN ESSENTIAL HYPERTENSION: Primary | ICD-10-CM

## 2025-01-28 DIAGNOSIS — Z13.0 SCREENING FOR DEFICIENCY ANEMIA: ICD-10-CM

## 2025-01-28 PROCEDURE — 99213 OFFICE O/P EST LOW 20 MIN: CPT | Performed by: NURSE PRACTITIONER

## 2025-01-28 PROCEDURE — G2211 COMPLEX E/M VISIT ADD ON: HCPCS | Performed by: NURSE PRACTITIONER

## 2025-01-28 RX ORDER — METFORMIN HYDROCHLORIDE 500 MG/1
TABLET, EXTENDED RELEASE ORAL
Qty: 360 TABLET | Refills: 1 | Status: SHIPPED | OUTPATIENT
Start: 2025-01-28

## 2025-01-28 RX ORDER — LISINOPRIL 10 MG/1
10 TABLET ORAL DAILY
Qty: 30 TABLET | Refills: 11 | Status: SHIPPED | OUTPATIENT
Start: 2025-01-28

## 2025-01-28 NOTE — PATIENT INSTRUCTIONS
Hold off on restarting amlodipine at this time.    Restart lisinopril at 10mg daily.  Hold if systolic (top number) is less than 110.    Continue to monitor blood pressure and bring in cuff to next office visit.  If persistent elevation prior to follow up please reach out.    Complete repeat blood work prior to follow up.

## 2025-01-28 NOTE — ASSESSMENT & PLAN NOTE
Was ill with norovirus over the past several week with diminished appetite/dehydration.  Had associated orthostatic hypotension so held blood pressure medication.   Since resolution of illness Bp has been running 112-150/60-70, highest reading in the evening.    Appetite has restored; staying well hydrated.  No further orthostasis.    Checking with upper arm cuff they think that is less than 5 yrs old.   -NAD.  Bp 134/68 REHAN without taking lisinopril 20mg nor norvasc 5mg daily.  Denies chest pressure pain shortness of breath nor palpitations.  Bulimic on exam mucous membranes moist.  Repeat CBC BMP stable on 1/23/2025.  -Will hold off restarting amlodipine at this time.  Will restart lisinopril 10 mg p.o. daily.  He will hold his lisinopril if systolic less than 110.  If persistent elevation prior to follow-up in 4 weeks he will reach out to the office.  -Will recheck electrolytes kidney function as well as CBC and iron panel prior to follow-up.

## 2025-01-28 NOTE — PROGRESS NOTES
[facility]   Chasity SANCHEZ    Assessment/Plan:   1. Benign essential hypertension  Assessment & Plan:  Was ill with norovirus over the past several week with diminished appetite/dehydration.  Had associated orthostatic hypotension so held blood pressure medication.   Since resolution of illness Bp has been running 112-150/60-70, highest reading in the evening.    Appetite has restored; staying well hydrated.  No further orthostasis.    Checking with upper arm cuff they think that is less than 5 yrs old.   -NAD.  Bp 134/68 REHAN without taking lisinopril 20mg nor norvasc 5mg daily.  Denies chest pressure pain shortness of breath nor palpitations.  Bulimic on exam mucous membranes moist.  Repeat CBC BMP stable on 1/23/2025.  -Will hold off restarting amlodipine at this time.  Will restart lisinopril 10 mg p.o. daily.  He will hold his lisinopril if systolic less than 110.  If persistent elevation prior to follow-up in 4 weeks he will reach out to the office.  -Will recheck electrolytes kidney function as well as CBC and iron panel prior to follow-up.    Orders:  -     Basic metabolic panel; Future  -     lisinopril (ZESTRIL) 10 mg tablet; Take 1 tablet (10 mg total) by mouth daily  2. Screening for deficiency anemia  -     CBC and differential; Future  3. Iron deficiency anemia due to chronic blood loss  -     Iron Panel (Includes Ferritin, Iron Sat%, Iron, and TIBC); Future      Hold off on restarting amlodipine at this time.    Restart lisinopril at 10mg daily.  Hold if systolic (top number) is less than 110.    Continue to monitor blood pressure and bring in cuff to next office visit.  If persistent elevation prior to follow up please reach out.    Complete repeat blood work prior to follow up.    Return in about 4 weeks (around 2/25/2025) for Recheck.  Patient may call or return to office with any questions or concerns.      ______________________________________________________________________  Subjective:     Patient ID: Elan Morrison is a 84 y.o. male.  Elan Morrison  Chief Complaint   Patient presents with    Follow-up     BP concerns     Here with spouse who is a former nurse who assists with HPI/assessment                       The following portions of the patient's history were reviewed and updated as appropriate: allergies, current medications, past family history, past medical history, past social history, past surgical history, and problem list.    Review of Systems   Constitutional: Negative.  Negative for activity change, appetite change, chills, fatigue and fever.   HENT: Negative.  Negative for congestion, ear pain, postnasal drip and sinus pain.    Eyes: Negative.    Respiratory: Negative.  Negative for cough and shortness of breath.    Cardiovascular: Negative.  Negative for chest pain and leg swelling.   Gastrointestinal: Negative.  Negative for constipation and diarrhea.   Endocrine: Negative.    Genitourinary: Negative.  Negative for dysuria.   Musculoskeletal: Negative.    Skin: Negative.    Allergic/Immunologic: Negative.  Negative for immunocompromised state.   Neurological: Negative.  Negative for dizziness and light-headedness.   Hematological: Negative.    Psychiatric/Behavioral: Negative.           Objective:      Vitals:    01/28/25 1110   BP: 134/68   Pulse: 72   SpO2: 99%      Physical Exam  Vitals and nursing note reviewed.   Constitutional:       General: He is awake.      Appearance: Normal appearance.      Comments: frail   HENT:      Head: Normocephalic and atraumatic.      Right Ear: Tympanic membrane, ear canal and external ear normal.      Left Ear: Tympanic membrane, ear canal and external ear normal.      Nose: Nose normal.      Mouth/Throat:      Mouth: Mucous membranes are moist.      Pharynx: Oropharynx is clear.   Eyes:      Extraocular Movements: Extraocular movements intact.       "Conjunctiva/sclera: Conjunctivae normal.      Pupils: Pupils are equal, round, and reactive to light.   Cardiovascular:      Rate and Rhythm: Normal rate and regular rhythm.      Pulses: Normal pulses.      Heart sounds: Normal heart sounds.   Pulmonary:      Effort: Pulmonary effort is normal.      Breath sounds: Normal breath sounds.   Abdominal:      General: Bowel sounds are normal.      Palpations: Abdomen is soft.   Musculoskeletal:         General: Normal range of motion.      Cervical back: Normal range of motion and neck supple.      Right lower leg: No edema.      Left lower leg: No edema.   Skin:     General: Skin is warm and dry.   Neurological:      General: No focal deficit present.      Mental Status: He is alert and oriented to person, place, and time.   Psychiatric:         Mood and Affect: Mood normal.         Behavior: Behavior normal. Behavior is cooperative.         Thought Content: Thought content normal.         Judgment: Judgment normal.           Portions of the record may have been created with voice recognition software. Occasional wrong word or \"sound alike\" substitutions may have occurred due to the inherent limitations of voice recognition software. Please review the chart carefully and recognize, using context, where substitutions/typographical errors may have occurred.       "

## 2025-02-17 ENCOUNTER — APPOINTMENT (OUTPATIENT)
Dept: LAB | Facility: HOSPITAL | Age: 85
End: 2025-02-17
Payer: MEDICARE

## 2025-02-17 DIAGNOSIS — I10 BENIGN ESSENTIAL HYPERTENSION: ICD-10-CM

## 2025-02-17 DIAGNOSIS — D50.0 IRON DEFICIENCY ANEMIA DUE TO CHRONIC BLOOD LOSS: ICD-10-CM

## 2025-02-17 DIAGNOSIS — Z13.0 SCREENING FOR DEFICIENCY ANEMIA: ICD-10-CM

## 2025-02-17 LAB
ANION GAP SERPL CALCULATED.3IONS-SCNC: 9 MMOL/L (ref 4–13)
BASOPHILS # BLD AUTO: 0.07 THOUSANDS/ΜL (ref 0–0.1)
BASOPHILS NFR BLD AUTO: 1 % (ref 0–1)
BUN SERPL-MCNC: 16 MG/DL (ref 5–25)
CALCIUM SERPL-MCNC: 9.6 MG/DL (ref 8.4–10.2)
CHLORIDE SERPL-SCNC: 99 MMOL/L (ref 96–108)
CO2 SERPL-SCNC: 27 MMOL/L (ref 21–32)
CREAT SERPL-MCNC: 0.82 MG/DL (ref 0.6–1.3)
EOSINOPHIL # BLD AUTO: 0.33 THOUSAND/ΜL (ref 0–0.61)
EOSINOPHIL NFR BLD AUTO: 5 % (ref 0–6)
ERYTHROCYTE [DISTWIDTH] IN BLOOD BY AUTOMATED COUNT: 14.1 % (ref 11.6–15.1)
FERRITIN SERPL-MCNC: 19 NG/ML (ref 24–336)
GFR SERPL CREATININE-BSD FRML MDRD: 82 ML/MIN/1.73SQ M
GLUCOSE P FAST SERPL-MCNC: 131 MG/DL (ref 65–99)
HCT VFR BLD AUTO: 34.4 % (ref 36.5–49.3)
HGB BLD-MCNC: 10.5 G/DL (ref 12–17)
IMM GRANULOCYTES # BLD AUTO: 0.02 THOUSAND/UL (ref 0–0.2)
IMM GRANULOCYTES NFR BLD AUTO: 0 % (ref 0–2)
IRON SATN MFR SERPL: 8 % (ref 15–50)
IRON SERPL-MCNC: 30 UG/DL (ref 50–212)
LYMPHOCYTES # BLD AUTO: 1.78 THOUSANDS/ΜL (ref 0.6–4.47)
LYMPHOCYTES NFR BLD AUTO: 24 % (ref 14–44)
MCH RBC QN AUTO: 28 PG (ref 26.8–34.3)
MCHC RBC AUTO-ENTMCNC: 30.5 G/DL (ref 31.4–37.4)
MCV RBC AUTO: 92 FL (ref 82–98)
MONOCYTES # BLD AUTO: 0.72 THOUSAND/ΜL (ref 0.17–1.22)
MONOCYTES NFR BLD AUTO: 10 % (ref 4–12)
NEUTROPHILS # BLD AUTO: 4.48 THOUSANDS/ΜL (ref 1.85–7.62)
NEUTS SEG NFR BLD AUTO: 60 % (ref 43–75)
NRBC BLD AUTO-RTO: 0 /100 WBCS
PLATELET # BLD AUTO: 342 THOUSANDS/UL (ref 149–390)
PMV BLD AUTO: 10.7 FL (ref 8.9–12.7)
POTASSIUM SERPL-SCNC: 4.6 MMOL/L (ref 3.5–5.3)
RBC # BLD AUTO: 3.75 MILLION/UL (ref 3.88–5.62)
SODIUM SERPL-SCNC: 135 MMOL/L (ref 135–147)
TIBC SERPL-MCNC: 358.4 UG/DL (ref 250–450)
TRANSFERRIN SERPL-MCNC: 256 MG/DL (ref 203–362)
UIBC SERPL-MCNC: 328 UG/DL (ref 155–355)
WBC # BLD AUTO: 7.4 THOUSAND/UL (ref 4.31–10.16)

## 2025-02-17 PROCEDURE — 80048 BASIC METABOLIC PNL TOTAL CA: CPT

## 2025-02-17 PROCEDURE — 36415 COLL VENOUS BLD VENIPUNCTURE: CPT

## 2025-02-17 PROCEDURE — 83540 ASSAY OF IRON: CPT

## 2025-02-17 PROCEDURE — 82728 ASSAY OF FERRITIN: CPT

## 2025-02-17 PROCEDURE — 85025 COMPLETE CBC W/AUTO DIFF WBC: CPT

## 2025-02-17 PROCEDURE — 83550 IRON BINDING TEST: CPT

## 2025-02-18 ENCOUNTER — RESULTS FOLLOW-UP (OUTPATIENT)
Dept: FAMILY MEDICINE CLINIC | Facility: HOSPITAL | Age: 85
End: 2025-02-18

## 2025-02-18 DIAGNOSIS — D50.9 IRON DEFICIENCY ANEMIA, UNSPECIFIED IRON DEFICIENCY ANEMIA TYPE: Primary | ICD-10-CM

## 2025-02-18 RX ORDER — FERROUS SULFATE 324(65)MG
324 TABLET, DELAYED RELEASE (ENTERIC COATED) ORAL
Qty: 90 TABLET | Refills: 3 | Status: SHIPPED | OUTPATIENT
Start: 2025-02-18

## 2025-02-18 NOTE — TELEPHONE ENCOUNTER
Relayed message to Pt from Chasity Figueroa.     ---- Message from DANIEL Almaguer sent at 2/18/2025  1:32 PM EST -----  Mr. Morrison is iron deficient.  Would strongly encourage starting Fe Sulfate 325mg daily on an empty stomach with either a glass of OJ or vitamin C 500mg daily to assist with absorption.   '    No questions and  told him it's at pharmacy Fe Sulfate

## 2025-02-18 NOTE — TELEPHONE ENCOUNTER
Left message for patient to call for lab results.     Please relay Petey result message when the call is returned.

## 2025-02-18 NOTE — TELEPHONE ENCOUNTER
----- Message from DANIEL Almaguer sent at 2/18/2025  1:32 PM EST -----  Mr. Morrison is iron deficient.  Would strongly encourage starting Fe Sulfate 325mg daily on an empty stomach with either a glass of OJ or vitamin C 500mg daily to assist with absorption.

## 2025-02-25 ENCOUNTER — OFFICE VISIT (OUTPATIENT)
Dept: FAMILY MEDICINE CLINIC | Facility: HOSPITAL | Age: 85
End: 2025-02-25
Payer: MEDICARE

## 2025-02-25 VITALS
SYSTOLIC BLOOD PRESSURE: 138 MMHG | HEART RATE: 77 BPM | DIASTOLIC BLOOD PRESSURE: 60 MMHG | WEIGHT: 142 LBS | BODY MASS INDEX: 19.8 KG/M2 | OXYGEN SATURATION: 97 %

## 2025-02-25 DIAGNOSIS — I10 BENIGN ESSENTIAL HYPERTENSION: Primary | ICD-10-CM

## 2025-02-25 DIAGNOSIS — D50.9 IRON DEFICIENCY ANEMIA, UNSPECIFIED IRON DEFICIENCY ANEMIA TYPE: ICD-10-CM

## 2025-02-25 PROCEDURE — G2211 COMPLEX E/M VISIT ADD ON: HCPCS | Performed by: NURSE PRACTITIONER

## 2025-02-25 PROCEDURE — 99213 OFFICE O/P EST LOW 20 MIN: CPT | Performed by: NURSE PRACTITIONER

## 2025-02-25 RX ORDER — LISINOPRIL 10 MG/1
10 TABLET ORAL DAILY
Qty: 90 TABLET | Refills: 3 | Status: SHIPPED | OUTPATIENT
Start: 2025-02-25

## 2025-02-25 NOTE — ASSESSMENT & PLAN NOTE
History of hypertension-was ill with norovirus last month with associated orthostatic hypotension-has not resumed amlodipine but has restarted lisinopril 10 mg p.o. daily.  Spouse who is a nurse provided blood pressure readings since office visit with range 120-140 over 60s; brought in blood pressure cuff today's office visit with congruent readings in comparison to manual cuff.  Denies any chest pressure pain palpitation shortness of breath nor dizziness.  No further orthostasis.  Appetite and hydration optimized.  -Will keep lisinopril at 10 mg p.o. daily and not resume amlodipine at this time.  Spouse will continue to monitor blood pressure several times a week at different times of the day.  They will reach out if blood pressure consistently above 140/90.  -Follow-up in 6 to 8 weeks.

## 2025-02-25 NOTE — PROGRESS NOTES
[facility]   Chasity SANCHEZ    Assessment/Plan:   1. Benign essential hypertension  Assessment & Plan:  History of hypertension-was ill with norovirus last month with associated orthostatic hypotension-has not resumed amlodipine but has restarted lisinopril 10 mg p.o. daily.  Spouse who is a nurse provided blood pressure readings since office visit with range 120-140 over 60s; brought in blood pressure cuff today's office visit with congruent readings in comparison to manual cuff.  Denies any chest pressure pain palpitation shortness of breath nor dizziness.  No further orthostasis.  Appetite and hydration optimized.  -Will keep lisinopril at 10 mg p.o. daily and not resume amlodipine at this time.  Spouse will continue to monitor blood pressure several times a week at different times of the day.  They will reach out if blood pressure consistently above 140/90.  -Follow-up in 6 to 8 weeks.  Orders:  -     lisinopril (ZESTRIL) 10 mg tablet; Take 1 tablet (10 mg total) by mouth daily  2. Iron deficiency anemia, unspecified iron deficiency anemia type  Assessment & Plan:   Latest Reference Range & Units 02/17/25 10:03   Iron 50 - 212 ug/dL 30 (L)   FERRITIN 24 - 336 ng/mL 19 (L)   Iron Saturation 15 - 50 % 8 (L)   TRANSFERRIN 203 - 362 mg/dL 256   TIBC 250 - 450 ug/dL 358.4   UIBC 155 - 355 ug/dL 328   (L): Data is abnormally low    Iron panel revealing iron deficiency anemia.  Denies chest pressure pain palpitations lightheadedness dizziness nor fatigue.  No hematuria nor melena.  Discussed and agreeable to starting ferrous sulfate every other day with glass of orange juice on an empty stomach.  Will recheck iron panel after 2 months of therapy.  Orders:  -     Iron Panel (Includes Ferritin, Iron Sat%, Iron, and TIBC); Future; Expected date: 04/25/2025      Please start the iron supplementation every other day with glass of OJ.  We will recheck iron panel after 2 months of therapy.    Keep  lisinopril at 10mg daily for now.  Stay off the amlodipine.  Continue to monitor blood pressure a few times a week different times of the day.  Reach out if blood pressure consistently above 140/90  Return in about 2 months (around 4/25/2025).  Patient may call or return to office with any questions or concerns.     ______________________________________________________________________  Subjective:     Patient ID: Elan Morrison is a 84 y.o. male.  Elan Morrison  Chief Complaint   Patient presents with    Follow-up     Here for follow up.  Spouse brought home blood pressure cuff which is congruent with manual cuff reading.  Provided blood pressure readings outside of office with range 120-140 over 60s.  Has not had to hold lisinopril 10 mg daily and has not restarted amlodipine.  Was found to be iron deficient-has not started ferrous sulfate but will do so after today's office visit.               The following portions of the patient's history were reviewed and updated as appropriate: allergies, current medications, past family history, past medical history, past social history, past surgical history, and problem list.    Review of Systems   Constitutional: Negative.  Negative for activity change, appetite change, chills, fatigue and fever.   HENT: Negative.  Negative for congestion, ear pain, postnasal drip and sinus pain.    Eyes: Negative.    Respiratory: Negative.  Negative for cough, chest tightness, shortness of breath and wheezing.    Cardiovascular: Negative.  Negative for chest pain, palpitations and leg swelling.   Gastrointestinal: Negative.  Negative for blood in stool, constipation and diarrhea.   Endocrine: Negative.    Genitourinary: Negative.  Negative for dysuria and hematuria.   Musculoskeletal: Negative.    Skin: Negative.    Allergic/Immunologic: Negative.  Negative for immunocompromised state.   Neurological: Negative.  Negative for dizziness, weakness and light-headedness.   Hematological:  "Negative.    Psychiatric/Behavioral: Negative.  Negative for sleep disturbance.          Objective:      Vitals:    02/25/25 1208   BP: 138/60   Pulse: 77   SpO2: 97%      Physical Exam  Vitals and nursing note reviewed.   Constitutional:       Appearance: Normal appearance.      Comments: frail   HENT:      Head: Normocephalic and atraumatic.      Right Ear: Tympanic membrane, ear canal and external ear normal.      Left Ear: Tympanic membrane, ear canal and external ear normal.      Nose: Nose normal.      Mouth/Throat:      Mouth: Mucous membranes are moist.      Pharynx: Oropharynx is clear.   Eyes:      Extraocular Movements: Extraocular movements intact.      Conjunctiva/sclera: Conjunctivae normal.      Pupils: Pupils are equal, round, and reactive to light.   Cardiovascular:      Rate and Rhythm: Normal rate and regular rhythm.      Pulses: Normal pulses.      Heart sounds: Normal heart sounds.   Pulmonary:      Effort: Pulmonary effort is normal.      Breath sounds: Normal breath sounds.   Abdominal:      General: Bowel sounds are normal.      Palpations: Abdomen is soft.   Musculoskeletal:         General: Normal range of motion.      Cervical back: Normal range of motion and neck supple.      Right lower leg: No edema.      Left lower leg: No edema.   Skin:     General: Skin is warm and dry.      Coloration: Skin is pale.   Neurological:      General: No focal deficit present.      Mental Status: He is alert and oriented to person, place, and time.      Sensory: Sensory deficit present.   Psychiatric:         Mood and Affect: Mood normal.         Behavior: Behavior normal.         Thought Content: Thought content normal.         Judgment: Judgment normal.           Portions of the record may have been created with voice recognition software. Occasional wrong word or \"sound alike\" substitutions may have occurred due to the inherent limitations of voice recognition software. Please review the chart " carefully and recognize, using context, where substitutions/typographical errors may have occurred.

## 2025-02-25 NOTE — ASSESSMENT & PLAN NOTE
Latest Reference Range & Units 02/17/25 10:03   Iron 50 - 212 ug/dL 30 (L)   FERRITIN 24 - 336 ng/mL 19 (L)   Iron Saturation 15 - 50 % 8 (L)   TRANSFERRIN 203 - 362 mg/dL 256   TIBC 250 - 450 ug/dL 358.4   UIBC 155 - 355 ug/dL 328   (L): Data is abnormally low    Iron panel revealing iron deficiency anemia.  Denies chest pressure pain palpitations lightheadedness dizziness nor fatigue.  No hematuria nor melena.  Discussed and agreeable to starting ferrous sulfate every other day with glass of orange juice on an empty stomach.  Will recheck iron panel after 2 months of therapy.

## 2025-02-25 NOTE — PATIENT INSTRUCTIONS
Please start the iron supplementation every other day with glass of LOREN.  We will recheck iron panel after 2 months of therapy.    Keep lisinopril at 10mg daily for now.  Stay off the amlodipine.  Continue to monitor blood pressure a few times a week different times of the day.  Reach out if blood pressure consistently above 140/90

## 2025-02-28 ENCOUNTER — TELEPHONE (OUTPATIENT)
Dept: ENDOCRINOLOGY | Facility: CLINIC | Age: 85
End: 2025-02-28

## 2025-02-28 ENCOUNTER — OFFICE VISIT (OUTPATIENT)
Dept: ENDOCRINOLOGY | Facility: CLINIC | Age: 85
End: 2025-02-28
Payer: MEDICARE

## 2025-02-28 VITALS
WEIGHT: 145 LBS | BODY MASS INDEX: 20.3 KG/M2 | OXYGEN SATURATION: 96 % | DIASTOLIC BLOOD PRESSURE: 60 MMHG | HEIGHT: 71 IN | HEART RATE: 76 BPM | SYSTOLIC BLOOD PRESSURE: 150 MMHG

## 2025-02-28 DIAGNOSIS — I10 BENIGN ESSENTIAL HYPERTENSION: ICD-10-CM

## 2025-02-28 DIAGNOSIS — R79.89 ELEVATED TSH: ICD-10-CM

## 2025-02-28 DIAGNOSIS — E11.42 TYPE 2 DIABETES MELLITUS WITH DIABETIC POLYNEUROPATHY, WITHOUT LONG-TERM CURRENT USE OF INSULIN (HCC): Primary | ICD-10-CM

## 2025-02-28 PROCEDURE — 95251 CONT GLUC MNTR ANALYSIS I&R: CPT

## 2025-02-28 PROCEDURE — 99214 OFFICE O/P EST MOD 30 MIN: CPT

## 2025-02-28 NOTE — ASSESSMENT & PLAN NOTE
BP elevated at visit today. He experienced some orthostatic hypotension while sick last month. His spouse who is a nurse checks BGs regularly at home. Recently correlated cuff with office cuff at PCP office. Plan to restart amlodipine if BP consistently elevated.

## 2025-02-28 NOTE — ASSESSMENT & PLAN NOTE
TSH has been mildly elevated, not currently on thyroid replacement. Will continue to monitor periodically   Orders:    TSH, 3rd generation with Free T4 reflex; Future

## 2025-02-28 NOTE — ASSESSMENT & PLAN NOTE
Diabetes control:  HGA1C elevated. I believe this is falsely elevated due to iron deficiency anemia. Does not correlate with GMI on Dexcom CGM of 7.4%. This is closer to goal range  BGs in range 77% of the time with no hypoglycemia   Treatment regimen:   Will continue Metformin at current dose  Hold off on restarting medication at this time - I believe A1c is falsely high and not correlating with Dexcom CGM. Consider starting Prandin if BGs start to worsen   Continue with CGM at this time due to hx of hypoglycemia and falsely high A1c  Discussed risks/complications associated with uncontrolled diabetes.    Advised to adhere to diabetic diet, and recommended staying active/exercising routinely.  Keep carbohydrates consistent to limit blood glucose fluctuations.  Advised to call if blood sugars less than 70 mg/dl or over 300 mg/dl.   Discussed symptoms and treatment of hypoglycemia.    Recommended routine follow-up with podiatry and ophthalmology.   Ordered blood work to complete prior to next visit.   Lab Results   Component Value Date    HGBA1C 8.5 (H) 01/23/2025       Orders:    Fructosamine; Future    Hemoglobin A1C; Future    Comprehensive metabolic panel; Future    TSH, 3rd generation with Free T4 reflex; Future

## 2025-02-28 NOTE — PROGRESS NOTES
Established Patient Progress Note      Chief Complaint   Patient presents with    Diabetes Type 2        Impression & Plan:    Assessment & Plan  Type 2 diabetes mellitus with diabetic polyneuropathy, without long-term current use of insulin (HCC)  Diabetes control:  HGA1C elevated. I believe this is falsely elevated due to iron deficiency anemia. Does not correlate with GMI on Dexcom CGM of 7.4%. This is closer to goal range  BGs in range 77% of the time with no hypoglycemia   Treatment regimen:   Will continue Metformin at current dose  Hold off on restarting medication at this time - I believe A1c is falsely high and not correlating with Dexcom CGM. Consider starting Prandin if BGs start to worsen   Continue with CGM at this time due to hx of hypoglycemia and falsely high A1c  Discussed risks/complications associated with uncontrolled diabetes.    Advised to adhere to diabetic diet, and recommended staying active/exercising routinely.  Keep carbohydrates consistent to limit blood glucose fluctuations.  Advised to call if blood sugars less than 70 mg/dl or over 300 mg/dl.   Discussed symptoms and treatment of hypoglycemia.    Recommended routine follow-up with podiatry and ophthalmology.   Ordered blood work to complete prior to next visit.   Lab Results   Component Value Date    HGBA1C 8.5 (H) 01/23/2025       Orders:    Fructosamine; Future    Hemoglobin A1C; Future    Comprehensive metabolic panel; Future    TSH, 3rd generation with Free T4 reflex; Future    Benign essential hypertension  BP elevated at visit today. He experienced some orthostatic hypotension while sick last month. His spouse who is a nurse checks BGs regularly at home. Recently correlated cuff with office cuff at PCP office. Plan to restart amlodipine if BP consistently elevated.        Elevated TSH  TSH has been mildly elevated, not currently on thyroid replacement. Will continue to monitor periodically   Orders:    TSH, 3rd generation with  Free T4 reflex; Future      History of Present Illness:   Elan Morrison is a 84 y.o. male with type 2 diabetes seen in follow up. Reports complications of microalbuminuria and neuropathy. Denies recent severe hypoglycemic or severe hyperglycemic episodes. Denies any issues with his current regimen. Last A1C was 8.5. Home glucose monitoring: are performed regularly.     Med options have been limited due to cost (Januvia and jardiance are too expensive)     Elan Morrison   Device used: Dexcom G7  Home use   Indication: Type 2 Diabetes  More than 72 hours of data was reviewed. Report to be scanned to chart.   Date Range: 2/15/25-2/28/25  Analysis of data:   Average Glucose: 155  Coefficient of Variation: 22.7%   SD : 35   Time in Target Range: 77%   Time Above Range: 23% high   Time Below Range: 0%   Interpretation of data:  BGs in target range majority of the time. No hypoglycemia noted.   90-day GMI as well as 30 day 7.4% on CGM      Current regimen:   Metformin XR 1,000 mg twice daily   **Glimepiride was stopped due to hypoglycemia      Last Eye Exam: UTD  Last Foot Exam: UTD     Has hypertension: Taking amlodipine and lisinopril  Has hyperlipidemia: Taking simvastatin        Thyroid disorders: elevated TSH - not currently on medication therapy    Patient Active Problem List   Diagnosis    Benign essential hypertension    Benign prostatic hyperplasia with lower urinary tract symptoms    Esophageal dysphagia    Herniated lumbar intervertebral disc    Hyperlipidemia    Vasomotor rhinitis    Elevated TSH    Type 2 diabetes mellitus with diabetic polyneuropathy (HCC)    Spinal stenosis, lumbar region, with neurogenic claudication    Radiculopathy    Chronic pain syndrome    Lumbar spondylosis    Chronic right-sided low back pain without sciatica    Subclinical hypothyroidism    Type 2 diabetes mellitus with hypoglycemia without coma, without long-term current use of insulin (HCC)    Actinic keratosis of scalp     Numbness and tingling    Type 2 diabetes mellitus with diabetic neuropathy, without long-term current use of insulin ([facility])    Iron deficiency anemia      Past Medical History:   Diagnosis Date    Abnormal blood chemistry     Abnormal glucose     Last assessed - 14    Allergic     Arthritis     Benign essential hypertension     Last assessed - 5/15/17    Cancer ([facility]) 2007    melanoma    Chronic allergic rhinitis     Last assessed - 16    Dermatitis     Last assessed - 6/16/15    Diabetes mellitus ([facility])     type 11    Difficulty walking     High risk medication use     Last assessed - 16    Hypertension     Neuropathy in diabetes ([facility])     Sciatica     Last assessed - 12    Septic bursitis     Last assessed - 9/3/13    Tick bite     Last assessed - 16    Type 2 diabetes mellitus without complication, without long-term current use of insulin ([facility]) 2019      Past Surgical History:   Procedure Laterality Date    CATARACT EXTRACTION      COLONOSCOPY      Fiberoptic    PILONIDAL CYST EXCISION        Social History     Tobacco Use    Smoking status: Former     Current packs/day: 0.00     Average packs/day: 1 pack/day for 28.8 years (28.8 ttl pk-yrs)     Types: Cigarettes     Start date: 1958     Quit date: 1987     Years since quittin.7     Passive exposure: Past    Smokeless tobacco: Never   Substance Use Topics    Alcohol use: Yes     Alcohol/week: 5.0 standard drinks of alcohol     Types: 5 Glasses of wine per week     Comment: social usage     No Known Allergies      Current Outpatient Medications:     B Complex-Biotin-FA (B COMPLETE) TABS, Take by mouth in the morning, Disp: , Rfl:     Blood Glucose Monitoring Suppl (CONTOUR NEXT MONITOR) w/Device KIT, 1 Device by Does not apply route 2 (two) times a day, Disp: 1 kit, Rfl: 0    Coenzyme Q10 (COQ10 PO), Take by mouth in the morning, Disp: , Rfl:     docusate sodium (COLACE) 100 mg capsule, Take 1 capsule by mouth as  "needed, Disp: , Rfl:     ferrous sulfate 324 (65 Fe) mg, Take 1 tablet (324 mg total) by mouth daily before breakfast, Disp: 90 tablet, Rfl: 3    glucose blood (Contour Next Test) test strip, Use 1 each 2 (two) times a day, Disp: 200 strip, Rfl: 1    lisinopril (ZESTRIL) 10 mg tablet, Take 1 tablet (10 mg total) by mouth daily, Disp: 90 tablet, Rfl: 3    metFORMIN (GLUCOPHAGE-XR) 500 mg 24 hr tablet, TAKE TWO TABLETS BY MOUTH TWICE A DAY IN THE MORNING AND EVENING WITH MEALS, Disp: 360 tablet, Rfl: 1    Microlet Lancets MISC, Use 2 (two) times a day, Disp: 200 each, Rfl: 1    Multiple Vitamin (multivitamin) tablet, Take 1 tablet by mouth daily, Disp: , Rfl:     simvastatin (ZOCOR) 20 mg tablet, TAKE ONE TABLET BY MOUTH AT BEDTIME, Disp: 90 tablet, Rfl: 1    Specialty Vitamins Products (MAGNESIUM, AMINO ACID CHELATE,) 133 MG tablet, Take 1 tablet by mouth in the morning, Disp: , Rfl:     Thiamine 50 MG CAPS, Take 1 capsule by mouth daily, Disp: , Rfl:     ipratropium (ATROVENT) 0.03 % nasal spray, 2 sprays into each nostril 3 (three) times a day If needed for runny nose (Patient not taking: Reported on 1/28/2025), Disp: 30 mL, Rfl: 1    Review of Systems   Constitutional:  Negative for fatigue, fever and unexpected weight change.   Eyes:  Negative for visual disturbance.   Respiratory:  Negative for shortness of breath.    Cardiovascular:  Negative for chest pain.   Gastrointestinal:  Negative for abdominal pain, constipation, diarrhea, nausea and vomiting.   Endocrine: Negative for polydipsia and polyuria.   Skin:  Negative for wound.   Neurological:  Negative for dizziness, syncope and numbness.   All other systems reviewed and are negative.      Physical Exam:  Body mass index is 20.22 kg/m².  /60   Pulse 76   Ht 5' 11\" (1.803 m)   Wt 65.8 kg (145 lb)   SpO2 96%   BMI 20.22 kg/m²    Wt Readings from Last 3 Encounters:   02/28/25 65.8 kg (145 lb)   02/25/25 64.4 kg (142 lb)   01/28/25 64 kg (141 lb) "       Physical Exam  Vitals reviewed.   Constitutional:       Appearance: Normal appearance.   Cardiovascular:      Rate and Rhythm: Normal rate.   Pulmonary:      Effort: Pulmonary effort is normal.   Neurological:      Mental Status: He is alert and oriented to person, place, and time.   Psychiatric:         Mood and Affect: Mood normal.         Labs:   Lab Results   Component Value Date    HGBA1C 8.5 (H) 01/23/2025    HGBA1C 7.6 (H) 08/20/2024    HGBA1C 6.5 06/19/2024     Lab Results   Component Value Date    CREATININE 0.82 02/17/2025    CREATININE 0.81 01/23/2025    CREATININE 0.83 08/20/2024    BUN 16 02/17/2025     10/20/2015    K 4.6 02/17/2025    CL 99 02/17/2025    CO2 27 02/17/2025     eGFR   Date Value Ref Range Status   02/17/2025 82 ml/min/1.73sq m Final     Lab Results   Component Value Date    CHOL 114 04/23/2015    HDL 46 01/23/2025    TRIG 81 01/23/2025     Lab Results   Component Value Date    ALT 12 01/23/2025    AST 16 01/23/2025    ALKPHOS 96 01/23/2025    BILITOT 0.54 04/23/2015     Lab Results   Component Value Date    VAG6HUPUQARK 6.202 (H) 08/20/2024    EBK8FIYNMXQX 6.549 (H) 11/08/2023    [id number] 5.280 (H) 10/31/2022       There are no Patient Instructions on file for this visit.    Discussed with the patient and all questioned fully answered. He will call me if any problems arise.    DANIEL Concepcion

## 2025-03-24 ENCOUNTER — OFFICE VISIT (OUTPATIENT)
Dept: PODIATRY | Facility: CLINIC | Age: 85
End: 2025-03-24
Payer: MEDICARE

## 2025-03-24 VITALS — HEIGHT: 71 IN | WEIGHT: 142 LBS | BODY MASS INDEX: 19.88 KG/M2

## 2025-03-24 DIAGNOSIS — B35.1 ONYCHOMYCOSIS: Primary | ICD-10-CM

## 2025-03-24 DIAGNOSIS — E11.40 TYPE 2 DIABETES MELLITUS WITH DIABETIC NEUROPATHY, WITHOUT LONG-TERM CURRENT USE OF INSULIN (HCC): ICD-10-CM

## 2025-03-24 PROCEDURE — RECHECK: Performed by: PODIATRIST

## 2025-03-24 PROCEDURE — 11721 DEBRIDE NAIL 6 OR MORE: CPT | Performed by: PODIATRIST

## 2025-03-24 RX ORDER — OMEGA-3 FATTY ACIDS/FISH OIL 300-1000MG
CAPSULE ORAL
COMMUNITY

## 2025-03-26 NOTE — PROGRESS NOTES
"   PATIENT:  Elan Morrison  1940    ASSESSMENT:  1. Onychomycosis        2. Type 2 diabetes mellitus with diabetic neuropathy, without long-term current use of insulin (HCC)          No orders of the defined types were placed in this encounter.         PLAN:  Disease prevention and related risk factors of diabetes were identified and discussed.    The patient was educated in proper foot wear for diabetics.    Educated in daily foot assessment and routine diabetic foot care.    Discussed the importance of controlling BS through diet and exercise.    The patient will follow up in 12 weeks for further diabetic foot exam and care.      Procedures: 09909  All mycotic toenails were reduced and debrided in length, width, and girth using a nail nipper and electric dremel.    All hyperkeratotic skin lesion(s) if present were sharply pared with a #10 scalpel with no evidence of ulceration/abscess.    Patient tolerated procedure(s) well without complications.    Procedures     HPI:  Elan Morrison is a 85 y.o.year old male seen for initial diabetic foot exam referred by his endocrinologist.  Patient has class findings with type II DM.    Reports blood sugars are doing better.  Patient concerned of thick toenails.  The patient denied any acute pedal disorder, redness, acute swelling, or recent injury.      The following portions of the patient's history were reviewed and updated as appropriate: allergies, current medications, past family history, past medical history, past social history, past surgical history, and problem list.    REVIEW OF SYSTEMS:  GENERAL: No fever or chills  HEART: No chest pain, or palpitation  RESPIRATORY:  No acute SOB or cough  GI: No Nausea, vomit or diarrhea  NEUROLOGIC: No syncope or acute weakness    PHYSICAL EXAM:    Ht 5' 11\" (1.803 m) Comment: stated  Wt 64.4 kg (142 lb)   BMI 19.80 kg/m²     VASCULAR EXAM:  Posterior tibial artery absent bilateral  Dorsalis pedis artery +1 " bilateral  The patient has moderate skin atrophy, color changes, lack of digital hair, and nail dystrophy.    There is trace lower extremity edema bilaterally.      NEUROLOGIC EXAM:  Sensation is intact to light touch. Yes   Sensation is grossly intact to 10gm monofilament.    Vibratory sensation slightly diminished.     No paresthesias noted bilateral.         DERMATOLOGIC EXAM:   Texture, Tone and Turgor are diminished bilateral.    The patient has dystrophic/hypertrophic toenails with yellow/white discoloration, onycholysis, and subungal debris.   Fungal odor noted.  Brittle nature noted.  Right foot nails severely dystrophic x5 with 0.4 cm ave thickness (1-5)  Left foot nails severely dystrophic x5 with 0.4 cm ave thickness (1-5)    Patient has hyperkeratotic lesions noted:  Right foot located at none.  Left foot located at none  No notable suspicious skin lesions.      MUSCULOSKELETAL EXAM:   No acute joint pain, edema, or redness.  Denies any acute musculoskeletal problem.    Patient has no gross pedal deformities. Patient has no ambulation limitations.      Patient wears DM shoes? No    Risk Category/Class Findings:  Q8(B1, B2 ABC)  0 = No loss of protective sensation    A1)  Has the patient had a previous amputation of the foot or integral skeletal portion thereof? No  B1)  Does the patient have absent posterior tibial pulse? Yes  B3)  Does the patient have absent dorsalis pedis? No  B2)  Does the patient have three of the following? Yes           1.  Hair growth (increased or decreased), 2.  Nail changes (thickening), and 3.  Pigmentary changes (discoloring)  C)  Does the patient have two of the following and one above? No

## 2025-04-11 ENCOUNTER — TELEPHONE (OUTPATIENT)
Dept: FAMILY MEDICINE CLINIC | Facility: HOSPITAL | Age: 85
End: 2025-04-11

## 2025-04-22 ENCOUNTER — APPOINTMENT (OUTPATIENT)
Dept: LAB | Facility: HOSPITAL | Age: 85
End: 2025-04-22
Payer: MEDICARE

## 2025-04-22 DIAGNOSIS — D50.9 IRON DEFICIENCY ANEMIA, UNSPECIFIED IRON DEFICIENCY ANEMIA TYPE: ICD-10-CM

## 2025-04-22 LAB
FERRITIN SERPL-MCNC: 27 NG/ML (ref 30–336)
IRON SATN MFR SERPL: 12 % (ref 15–50)
IRON SERPL-MCNC: 42 UG/DL (ref 50–212)
TIBC SERPL-MCNC: 357 UG/DL (ref 250–450)
TRANSFERRIN SERPL-MCNC: 255 MG/DL (ref 203–362)
UIBC SERPL-MCNC: 315 UG/DL (ref 155–355)

## 2025-04-22 PROCEDURE — 83550 IRON BINDING TEST: CPT

## 2025-04-22 PROCEDURE — 36415 COLL VENOUS BLD VENIPUNCTURE: CPT

## 2025-04-22 PROCEDURE — 83540 ASSAY OF IRON: CPT

## 2025-04-22 PROCEDURE — 82728 ASSAY OF FERRITIN: CPT

## 2025-04-23 ENCOUNTER — RESULTS FOLLOW-UP (OUTPATIENT)
Dept: FAMILY MEDICINE CLINIC | Facility: HOSPITAL | Age: 85
End: 2025-04-23

## 2025-04-24 ENCOUNTER — TELEPHONE (OUTPATIENT)
Dept: ENDOCRINOLOGY | Facility: CLINIC | Age: 85
End: 2025-04-24

## 2025-04-25 ENCOUNTER — OFFICE VISIT (OUTPATIENT)
Dept: FAMILY MEDICINE CLINIC | Facility: HOSPITAL | Age: 85
End: 2025-04-25
Payer: MEDICARE

## 2025-04-25 VITALS
DIASTOLIC BLOOD PRESSURE: 68 MMHG | BODY MASS INDEX: 20.02 KG/M2 | SYSTOLIC BLOOD PRESSURE: 146 MMHG | HEIGHT: 71 IN | HEART RATE: 64 BPM | WEIGHT: 143 LBS | OXYGEN SATURATION: 99 %

## 2025-04-25 DIAGNOSIS — D50.9 IRON DEFICIENCY ANEMIA, UNSPECIFIED IRON DEFICIENCY ANEMIA TYPE: ICD-10-CM

## 2025-04-25 DIAGNOSIS — I10 BENIGN ESSENTIAL HYPERTENSION: Primary | ICD-10-CM

## 2025-04-25 DIAGNOSIS — D50.0 IRON DEFICIENCY ANEMIA DUE TO CHRONIC BLOOD LOSS: ICD-10-CM

## 2025-04-25 PROCEDURE — 99214 OFFICE O/P EST MOD 30 MIN: CPT | Performed by: NURSE PRACTITIONER

## 2025-04-25 PROCEDURE — G2211 COMPLEX E/M VISIT ADD ON: HCPCS | Performed by: NURSE PRACTITIONER

## 2025-04-25 RX ORDER — SODIUM CHLORIDE 9 MG/ML
20 INJECTION, SOLUTION INTRAVENOUS ONCE
OUTPATIENT
Start: 2025-04-25

## 2025-04-25 NOTE — PATIENT INSTRUCTIONS
Please call to schedule IV iron infusions.  Will recheck iron after infusions completed  Continue lisinopril 10mg daily to be taken at the same time daily.  Hold if systolic (top number) is less than 110.

## 2025-04-25 NOTE — PROGRESS NOTES
[facility]   Chasity SANCHEZ    Assessment/Plan:   1. Benign essential hypertension  Assessment & Plan:  History of hypertension above goal at today's office visit.  Orthostatics checked and were negative.  Reports he had not taken his lisinopril this week due to [place]ally at [place] with subsequent low blood pressures to which his spouse who is a nurse monitors at home.  He did check his blood pressure this morning noted it was elevated and restarted lisinopril.  Denies any chest pressure pain palpitation shortness of breath dizziness nor lightheadedness no headaches.  Reports his appetite is stable and he is staying well-hydrated.  -Discussed taking lisinopril 10 mg p.o. daily consistently at the same time.  His spouse will continue to monitor his blood pressure; hold if systolic less than 110 but will reach out if consistently above 140/90.  He does have follow-up with PCP in 3 months.  2. Iron deficiency anemia, unspecified iron deficiency anemia type  Assessment & Plan:   Latest Reference Range & Units 04/22/25 12:36   Iron 50 - 212 ug/dL 42 (L)   FERRITIN 30 - 336 ng/mL 27 (L)   Iron Saturation 15 - 50 % 12 (L)   TRANSFERRIN 203 - 362 mg/dL 255   TIBC 250 - 450 ug/dL 357   UIBC 155 - 355 ug/dL 315   (L): Data is abnormally low  Despite oral supplementation iron levels not greatly improved.  Will start Venofer infusions and recheck iron level post completion.          3. Iron deficiency anemia due to chronic blood loss  Assessment & Plan:   Latest Reference Range & Units 04/22/25 12:36   Iron 50 - 212 ug/dL 42 (L)   FERRITIN 30 - 336 ng/mL 27 (L)   Iron Saturation 15 - 50 % 12 (L)   TRANSFERRIN 203 - 362 mg/dL 255   TIBC 250 - 450 ug/dL 357   UIBC 155 - 355 ug/dL 315   (L): Data is abnormally low  Despite oral supplementation iron levels not greatly improved.  Will start Venofer infusions and recheck iron level post completion.              Please call to schedule IV iron infusions.   Will recheck iron after infusions completed  Continue lisinopril 10mg daily to be taken at the same time daily.  Hold if systolic (top number) is less than 110.    Return for Annual Medicare Wellness Visit, Next scheduled follow up.  Patient may call or return to office with any questions or concerns.     ______________________________________________________________________  Subjective:     Patient ID: Elan Morrison is a 85 y.o. male.  Elan Morrison  Chief Complaint   Patient presents with    Follow-up     2 month - labs done      Here for follow-up regarding blood pressure as well as iron deficiency.  Has been taking iron supplementation without complication.                 The following portions of the patient's history were reviewed and updated as appropriate: allergies, current medications, past family history, past medical history, past social history, past surgical history, and problem list.    Review of Systems   Constitutional: Negative.  Negative for activity change, appetite change, chills, fatigue and fever.   HENT: Negative.  Negative for congestion, ear pain, postnasal drip and sinus pain.    Eyes: Negative.    Respiratory: Negative.  Negative for cough and shortness of breath.    Cardiovascular: Negative.  Negative for chest pain and leg swelling.   Gastrointestinal: Negative.  Negative for constipation and diarrhea.   Endocrine: Negative.    Genitourinary: Negative.  Negative for dysuria.   Musculoskeletal: Negative.    Skin: Negative.    Allergic/Immunologic: Negative.  Negative for immunocompromised state.   Neurological: Negative.  Negative for dizziness and light-headedness.   Hematological: Negative.    Psychiatric/Behavioral: Negative.           Objective:      Vitals:    04/25/25 1137   BP: 146/68   Pulse: 64   SpO2: 99%      Physical Exam  Vitals and nursing note reviewed.   Constitutional:       General: He is awake. He is not in acute distress.     Appearance: Normal appearance. He is  "not ill-appearing.      Comments: Frail   HENT:      Head: Normocephalic and atraumatic.      Right Ear: Tympanic membrane, ear canal and external ear normal.      Left Ear: Tympanic membrane, ear canal and external ear normal.      Nose: Nose normal.      Mouth/Throat:      Mouth: Mucous membranes are moist.      Pharynx: Oropharynx is clear.   Eyes:      Extraocular Movements: Extraocular movements intact.      Conjunctiva/sclera: Conjunctivae normal.      Pupils: Pupils are equal, round, and reactive to light.   Cardiovascular:      Rate and Rhythm: Normal rate and regular rhythm.      Pulses: Normal pulses.      Heart sounds: Normal heart sounds.   Pulmonary:      Effort: Pulmonary effort is normal.      Breath sounds: Normal breath sounds.   Abdominal:      General: Bowel sounds are normal.      Palpations: Abdomen is soft.   Musculoskeletal:         General: Normal range of motion.      Cervical back: Normal range of motion and neck supple.   Skin:     General: Skin is warm and dry.   Neurological:      General: No focal deficit present.      Mental Status: He is alert and oriented to person, place, and time.      Sensory: Sensory deficit present.      Gait: Gait abnormal.      Comments: Slight shuffle to his gait independent of AD   Psychiatric:         Mood and Affect: Mood normal.         Behavior: Behavior normal. Behavior is cooperative.         Thought Content: Thought content normal.         Judgment: Judgment normal.           Portions of the record may have been created with voice recognition software. Occasional wrong word or \"sound alike\" substitutions may have occurred due to the inherent limitations of voice recognition software. Please review the chart carefully and recognize, using context, where substitutions/typographical errors may have occurred.       "

## 2025-04-25 NOTE — ASSESSMENT & PLAN NOTE
History of hypertension above goal at today's office visit.  Orthostatics checked and were negative.  Reports he had not taken his lisinopril this week due to eating abnormally at [facility] with subsequent low blood pressures to which his spouse who is a nurse monitors at home.  He did check his blood pressure this morning noted it was elevated and restarted lisinopril.  Denies any chest pressure pain palpitation shortness of breath dizziness nor lightheadedness no headaches.  Reports his appetite is stable and he is staying well-hydrated.  -Discussed taking lisinopril 10 mg p.o. daily consistently at the same time.  His spouse will continue to monitor his blood pressure; hold if systolic less than 110 but will reach out if consistently above 140/90.  He does have follow-up with PCP in 3 months.

## 2025-04-25 NOTE — ASSESSMENT & PLAN NOTE
Latest Reference Range & Units 04/22/25 12:36   Iron 50 - 212 ug/dL 42 (L)   FERRITIN 30 - 336 ng/mL 27 (L)   Iron Saturation 15 - 50 % 12 (L)   TRANSFERRIN 203 - 362 mg/dL 255   TIBC 250 - 450 ug/dL 357   UIBC 155 - 355 ug/dL 315   (L): Data is abnormally low  Despite oral supplementation iron levels not greatly improved.  Will start Venofer infusions and recheck iron level post completion.

## 2025-05-09 ENCOUNTER — HOSPITAL ENCOUNTER (OUTPATIENT)
Dept: INFUSION CENTER | Facility: HOSPITAL | Age: 85
Discharge: HOME | End: 2025-05-09
Attending: STUDENT IN AN ORGANIZED HEALTH CARE EDUCATION/TRAINING PROGRAM

## 2025-05-09 ENCOUNTER — HOSPITAL ENCOUNTER (OUTPATIENT)
Dept: INFUSION CENTER | Facility: HOSPITAL | Age: 85
End: 2025-05-09
Attending: STUDENT IN AN ORGANIZED HEALTH CARE EDUCATION/TRAINING PROGRAM
Payer: MEDICARE

## 2025-05-09 VITALS
HEART RATE: 82 BPM | TEMPERATURE: 96.4 F | OXYGEN SATURATION: 100 % | DIASTOLIC BLOOD PRESSURE: 58 MMHG | RESPIRATION RATE: 16 BRPM | SYSTOLIC BLOOD PRESSURE: 156 MMHG

## 2025-05-09 DIAGNOSIS — D50.0 IRON DEFICIENCY ANEMIA DUE TO CHRONIC BLOOD LOSS: Primary | ICD-10-CM

## 2025-05-09 PROCEDURE — 96365 THER/PROPH/DIAG IV INF INIT: CPT

## 2025-05-09 RX ORDER — SODIUM CHLORIDE 9 MG/ML
20 INJECTION, SOLUTION INTRAVENOUS ONCE
Status: CANCELLED | OUTPATIENT
Start: 2025-05-16

## 2025-05-09 RX ORDER — SODIUM CHLORIDE 9 MG/ML
20 INJECTION, SOLUTION INTRAVENOUS ONCE
Status: COMPLETED | OUTPATIENT
Start: 2025-05-09 | End: 2025-05-09

## 2025-05-09 RX ADMIN — SODIUM CHLORIDE 20 ML/HR: 9 INJECTION, SOLUTION INTRAVENOUS at 12:28

## 2025-05-09 RX ADMIN — SODIUM CHLORIDE 200 MG: 9 INJECTION, SOLUTION INTRAVENOUS at 12:32

## 2025-05-09 NOTE — PROGRESS NOTES
Elan Morrison  tolerated treatment well with no complications.      Elan Morrison is aware of future appt on 5/16 at 1400.     AVS printed and given to Elan Morrison:    No (Declined by Elan Morrison)        no

## 2025-05-13 ENCOUNTER — OFFICE VISIT (OUTPATIENT)
Dept: FAMILY MEDICINE CLINIC | Facility: HOSPITAL | Age: 85
End: 2025-05-13
Payer: MEDICARE

## 2025-05-13 VITALS
SYSTOLIC BLOOD PRESSURE: 150 MMHG | TEMPERATURE: 97.5 F | HEART RATE: 71 BPM | DIASTOLIC BLOOD PRESSURE: 70 MMHG | BODY MASS INDEX: 19.53 KG/M2 | OXYGEN SATURATION: 99 % | WEIGHT: 140 LBS

## 2025-05-13 DIAGNOSIS — R06.89 DYSPNEA AND RESPIRATORY ABNORMALITIES: ICD-10-CM

## 2025-05-13 DIAGNOSIS — R79.89 ELEVATED TSH: ICD-10-CM

## 2025-05-13 DIAGNOSIS — E78.2 MIXED HYPERLIPIDEMIA: ICD-10-CM

## 2025-05-13 DIAGNOSIS — I10 BENIGN ESSENTIAL HYPERTENSION: Primary | ICD-10-CM

## 2025-05-13 DIAGNOSIS — I44.7 LBBB (LEFT BUNDLE BRANCH BLOCK): ICD-10-CM

## 2025-05-13 DIAGNOSIS — R06.00 DYSPNEA AND RESPIRATORY ABNORMALITIES: ICD-10-CM

## 2025-05-13 LAB — VENTRICULAR RATE: 75 DEGREES

## 2025-05-13 PROCEDURE — 99214 OFFICE O/P EST MOD 30 MIN: CPT | Performed by: NURSE PRACTITIONER

## 2025-05-13 PROCEDURE — 93000 ELECTROCARDIOGRAM COMPLETE: CPT | Performed by: NURSE PRACTITIONER

## 2025-05-13 PROCEDURE — G2211 COMPLEX E/M VISIT ADD ON: HCPCS | Performed by: NURSE PRACTITIONER

## 2025-05-13 RX ORDER — LISINOPRIL 10 MG/1
10 TABLET ORAL 2 TIMES DAILY
Qty: 180 TABLET | Refills: 3 | Status: SHIPPED | OUTPATIENT
Start: 2025-05-13

## 2025-05-13 NOTE — ASSESSMENT & PLAN NOTE
History of hypertension above goal at today's office visit.  Spouse who is present for today's office visit reports blood pressure range 120-140/70s at home.  Denies any orthostasis chest pressure pain does have QUIROGA today with lower extremity edema.  Reports medication adherence to lisinopril 10 mg p.o. daily since last office visit on 4/25/2025.  - Reports having URI symptoms last week with fever x 1 day (Tmax 102).  None since-feeling better today than at onset.  No seasonal allergies to his knowledge; no known sick contacts.  Has persistent productive cough with QUIROGA and lower extremity edema.  Reports he is staying well-hydrated and trying to optimize his nutrition.  Reports he is sleeping well without known orthopnea.  -Left lower lobe rales with lower extremity edema.  EKG in office sinus rhythm with LBBB and left axis deviation.  No EKG for comparison.  -Already ordered CMP and thyroid studies per endocrinology.  Will add CBC and BMP to endocrine work.  Advised heart healthy diet and to monitor salt intake.  Will order echocardiogram and refer to cardiology.  Adjust lisinopril to 10 mg twice daily to keep systolic greater than 110.  Spouse is a nurse and will continue to monitor blood pressure at home.  Has follow-up with PCP in 1 month.

## 2025-05-13 NOTE — PROGRESS NOTES
[facility]   Chasity SANCHEZ    Assessment/Plan:   1. Benign essential hypertension  Assessment & Plan:  History of hypertension above goal at today's office visit.  Spouse who is present for today's office visit reports blood pressure range 120-140/70s at home.  Denies any orthostasis chest pressure pain does have QUIROGA today with lower extremity edema.  Reports medication adherence to lisinopril 10 mg p.o. daily since last office visit on 4/25/2025.  - Reports having URI symptoms last week with fever x 1 day (Tmax 102).  None since-feeling better today than at onset.  No seasonal allergies to his knowledge; no known sick contacts.  Has persistent productive cough with QUIROGA and lower extremity edema.  Reports he is staying well-hydrated and trying to optimize his nutrition.  Reports he is sleeping well without known orthopnea.  -Left lower lobe rales with lower extremity edema.  EKG in office sinus rhythm with LBBB and left axis deviation.  No EKG for comparison.  -Already ordered CMP and thyroid studies per endocrinology.  Will add CBC and BMP to endocrine work.  Advised heart healthy diet and to monitor salt intake.  Will order echocardiogram and refer to cardiology.  Adjust lisinopril to 10 mg twice daily to keep systolic greater than 110.  Spouse is a nurse and will continue to monitor blood pressure at home.  Has follow-up with PCP in 1 month.  Orders:  -     POCT ECG  -     Echo complete w/ contrast if indicated; Future; Expected date: 05/13/2025  -     Ambulatory Referral to Cardiology; Future  -     lisinopril (ZESTRIL) 10 mg tablet; Take 1 tablet (10 mg total) by mouth 2 (two) times a day  -     B-Type Natriuretic Peptide(BNP); Future  2. Mixed hyperlipidemia  Assessment & Plan:   Latest Reference Range & Units 01/23/25 09:26   Cholesterol See Comment mg/dL 118   Triglycerides See Comment mg/dL 81   HDL >=40 mg/dL 46   LDL Calculated 0 - 100 mg/dL 56     History of hyperlipidemia with  excellent control per recent lipid panel.  Reports medication adherence to simvastatin 20 mg nightly.  3. Elevated TSH  Assessment & Plan:   Latest Reference Range & Units 08/20/24 09:43   TSH 3RD GENERATON 0.450 - 4.500 uIU/mL 6.202 (H)   FREE T4 0.61 - 1.12 ng/dL 0.91   (H): Data is abnormally high    History of elevated TSH with normal free T4.  Not currently on thyroid replacement medication.  Followed closely by endocrinology who has repeat thyroid studies in place.  4. Dyspnea and respiratory abnormalities  -     POCT ECG  -     Echo complete w/ contrast if indicated; Future; Expected date: 05/13/2025  -     Ambulatory Referral to Cardiology; Future  -     B-Type Natriuretic Peptide(BNP); Future  -     CBC and Platelet; Future  5. LBBB (left bundle branch block)  -     POCT ECG  -     Echo complete w/ contrast if indicated; Future; Expected date: 05/13/2025  -     Ambulatory Referral to Cardiology; Future  -     B-Type Natriuretic Peptide(BNP); Future  -     CBC and Platelet; Future      Adjust lisinopril to 10mg twice daily to keep systolic greater than 110.  Continue to monitor blood pressure at home.    Complete echocardiogram.    Refer to cardiology   Heart healthy diet.  Monitor salt intake  Adding CBC and BNP to endocrine's labwork.   No follow-ups on file.  Patient may call or return to office with any questions or concerns.     ______________________________________________________________________  Subjective:     Patient ID: Elan Morrison is a 85 y.o. male.  Elan Morrison  Chief Complaint   Patient presents with    Cough     LUQ pain x 1 week. Denies fever/chills.     Per A/P                 The following portions of the patient's history were reviewed and updated as appropriate: allergies, current medications, past family history, past medical history, past social history, past surgical history, and problem list.    Review of Systems   Constitutional: Negative.  Negative for activity change,  appetite change, chills, fatigue and fever.   HENT: Negative.  Negative for congestion, ear pain, postnasal drip, rhinorrhea and sinus pain.    Eyes: Negative.    Respiratory:  Positive for cough and shortness of breath.         Paroxysmal QUIROGA with productive cough.    Cardiovascular: Negative.  Negative for chest pain, palpitations and leg swelling.   Gastrointestinal: Negative.  Negative for constipation and diarrhea.   Endocrine: Negative.    Genitourinary: Negative.  Negative for dysuria.   Musculoskeletal: Negative.    Skin: Negative.    Allergic/Immunologic: Negative.  Negative for immunocompromised state.   Neurological: Negative.  Negative for dizziness and light-headedness.   Hematological: Negative.    Psychiatric/Behavioral: Negative.  Negative for sleep disturbance.          Objective:      Vitals:    05/13/25 1354   BP: 150/70   Pulse: 71   Temp: 97.5 °F (36.4 °C)   SpO2: 99%      Physical Exam  Vitals and nursing note reviewed.   Constitutional:       General: He is awake.      Appearance: Normal appearance.      Comments: frail   HENT:      Head: Normocephalic and atraumatic.      Right Ear: Tympanic membrane, ear canal and external ear normal.      Left Ear: Tympanic membrane, ear canal and external ear normal.      Nose: Nose normal.      Right Sinus: No maxillary sinus tenderness or frontal sinus tenderness.      Left Sinus: No frontal sinus tenderness.      Mouth/Throat:      Mouth: Mucous membranes are moist.      Pharynx: Oropharynx is clear. No oropharyngeal exudate, posterior oropharyngeal erythema or postnasal drip.   Eyes:      Extraocular Movements: Extraocular movements intact.      Conjunctiva/sclera: Conjunctivae normal.      Pupils: Pupils are equal, round, and reactive to light.   Cardiovascular:      Rate and Rhythm: Normal rate and regular rhythm.      Pulses: Normal pulses.      Heart sounds: Normal heart sounds. No murmur heard.     Comments: Trace to +1 pitting BLE  "edema  Pulmonary:      Effort: Pulmonary effort is normal.      Breath sounds: Rales present.      Comments: Strong MNPC  Abdominal:      General: Bowel sounds are normal.      Palpations: Abdomen is soft.   Musculoskeletal:         General: Normal range of motion.      Cervical back: Normal range of motion and neck supple.      Right lower leg: Edema present.      Left lower leg: Edema present.   Skin:     General: Skin is warm and dry.   Neurological:      General: No focal deficit present.      Mental Status: He is alert and oriented to person, place, and time.      Sensory: Sensory deficit present.      Gait: Gait abnormal.      Comments: Shuffled gait   Psychiatric:         Mood and Affect: Mood normal.         Behavior: Behavior normal. Behavior is cooperative.         Thought Content: Thought content normal.         Judgment: Judgment normal.           Portions of the record may have been created with voice recognition software. Occasional wrong word or \"sound alike\" substitutions may have occurred due to the inherent limitations of voice recognition software. Please review the chart carefully and recognize, using context, where substitutions/typographical errors may have occurred.       "

## 2025-05-13 NOTE — ASSESSMENT & PLAN NOTE
Latest Reference Range & Units 08/20/24 09:43   TSH 3RD GENERATON 0.450 - 4.500 uIU/mL 6.202 (H)   FREE T4 0.61 - 1.12 ng/dL 0.91   (H): Data is abnormally high    History of elevated TSH with normal free T4.  Not currently on thyroid replacement medication.  Followed closely by endocrinology who has repeat thyroid studies in place.

## 2025-05-13 NOTE — PATIENT INSTRUCTIONS
Adjust lisinopril to 10mg twice daily to keep systolic greater than 110.  Continue to monitor blood pressure at home.    Complete echocardiogram.    Refer to cardiology   Heart healthy diet.  Monitor salt intake  Adding CBC and BNP to endocrine's labwork.

## 2025-05-13 NOTE — ASSESSMENT & PLAN NOTE
Latest Reference Range & Units 01/23/25 09:26   Cholesterol See Comment mg/dL 118   Triglycerides See Comment mg/dL 81   HDL >=40 mg/dL 46   LDL Calculated 0 - 100 mg/dL 56     History of hyperlipidemia with excellent control per recent lipid panel.  Reports medication adherence to simvastatin 20 mg nightly.

## 2025-05-16 ENCOUNTER — TELEPHONE (OUTPATIENT)
Dept: FAMILY MEDICINE CLINIC | Facility: HOSPITAL | Age: 85
End: 2025-05-16

## 2025-05-16 ENCOUNTER — HOSPITAL ENCOUNTER (OUTPATIENT)
Dept: INFUSION CENTER | Facility: HOSPITAL | Age: 85
End: 2025-05-16
Attending: STUDENT IN AN ORGANIZED HEALTH CARE EDUCATION/TRAINING PROGRAM
Payer: MEDICARE

## 2025-05-16 VITALS
DIASTOLIC BLOOD PRESSURE: 67 MMHG | RESPIRATION RATE: 16 BRPM | SYSTOLIC BLOOD PRESSURE: 170 MMHG | OXYGEN SATURATION: 99 % | HEART RATE: 80 BPM | TEMPERATURE: 97 F

## 2025-05-16 DIAGNOSIS — D50.0 IRON DEFICIENCY ANEMIA DUE TO CHRONIC BLOOD LOSS: Primary | ICD-10-CM

## 2025-05-16 RX ORDER — SODIUM CHLORIDE 9 MG/ML
20 INJECTION, SOLUTION INTRAVENOUS ONCE
Status: CANCELLED | OUTPATIENT
Start: 2025-05-23

## 2025-05-16 RX ORDER — SODIUM CHLORIDE 9 MG/ML
20 INJECTION, SOLUTION INTRAVENOUS ONCE
Status: COMPLETED | OUTPATIENT
Start: 2025-05-16 | End: 2025-05-16

## 2025-05-16 RX ADMIN — IRON SUCROSE 200 MG: 20 INJECTION, SOLUTION INTRAVENOUS at 13:55

## 2025-05-16 RX ADMIN — SODIUM CHLORIDE 20 ML/HR: 9 INJECTION, SOLUTION INTRAVENOUS at 13:55

## 2025-05-16 NOTE — PROGRESS NOTES
Elan Morrison  tolerated treatment well with no complications.      Elan Morrison is aware of future appt on 5/23/2025 at 1430.     AVS printed and given to Elan Morrison:    No (Declined by Elan Morrison)

## 2025-05-23 ENCOUNTER — HOSPITAL ENCOUNTER (OUTPATIENT)
Dept: INFUSION CENTER | Facility: HOSPITAL | Age: 85
End: 2025-05-23
Attending: STUDENT IN AN ORGANIZED HEALTH CARE EDUCATION/TRAINING PROGRAM
Payer: MEDICARE

## 2025-05-23 DIAGNOSIS — D50.0 IRON DEFICIENCY ANEMIA DUE TO CHRONIC BLOOD LOSS: Primary | ICD-10-CM

## 2025-05-23 PROCEDURE — 96365 THER/PROPH/DIAG IV INF INIT: CPT

## 2025-05-23 RX ORDER — SODIUM CHLORIDE 9 MG/ML
20 INJECTION, SOLUTION INTRAVENOUS ONCE
OUTPATIENT
Start: 2025-05-30

## 2025-05-23 RX ORDER — SODIUM CHLORIDE 9 MG/ML
20 INJECTION, SOLUTION INTRAVENOUS ONCE
Status: COMPLETED | OUTPATIENT
Start: 2025-05-23 | End: 2025-05-23

## 2025-05-23 RX ADMIN — SODIUM CHLORIDE 20 ML/HR: 9 INJECTION, SOLUTION INTRAVENOUS at 14:44

## 2025-05-23 RX ADMIN — IRON SUCROSE 200 MG: 20 INJECTION, SOLUTION INTRAVENOUS at 14:42

## 2025-05-23 NOTE — PROGRESS NOTES
Elan Morrison  tolerated treatment well with no complications.      Elan Morrison is aware of future appt on 5/30 at 1430.     AVS printed and given to Elan Morrison:    No (Declined by Elan Morrison)

## 2025-05-30 ENCOUNTER — HOSPITAL ENCOUNTER (OUTPATIENT)
Dept: INFUSION CENTER | Facility: HOSPITAL | Age: 85
Discharge: HOME | End: 2025-05-30
Attending: STUDENT IN AN ORGANIZED HEALTH CARE EDUCATION/TRAINING PROGRAM
Payer: MEDICARE

## 2025-05-30 ENCOUNTER — HOSPITAL ENCOUNTER (OUTPATIENT)
Dept: INFUSION CENTER | Facility: HOSPITAL | Age: 85
Discharge: HOME | End: 2025-05-30
Attending: STUDENT IN AN ORGANIZED HEALTH CARE EDUCATION/TRAINING PROGRAM

## 2025-05-30 VITALS
DIASTOLIC BLOOD PRESSURE: 63 MMHG | HEART RATE: 80 BPM | TEMPERATURE: 97.5 F | OXYGEN SATURATION: 97 % | RESPIRATION RATE: 16 BRPM | SYSTOLIC BLOOD PRESSURE: 146 MMHG

## 2025-05-30 DIAGNOSIS — D50.0 IRON DEFICIENCY ANEMIA DUE TO CHRONIC BLOOD LOSS: Primary | ICD-10-CM

## 2025-05-30 PROCEDURE — 96365 THER/PROPH/DIAG IV INF INIT: CPT

## 2025-05-30 RX ORDER — SODIUM CHLORIDE 9 MG/ML
20 INJECTION, SOLUTION INTRAVENOUS ONCE
Status: CANCELLED | OUTPATIENT
Start: 2025-06-09

## 2025-05-30 RX ORDER — SODIUM CHLORIDE 9 MG/ML
20 INJECTION, SOLUTION INTRAVENOUS ONCE
Status: COMPLETED | OUTPATIENT
Start: 2025-05-30 | End: 2025-05-30

## 2025-05-30 RX ADMIN — IRON SUCROSE 200 MG: 20 INJECTION, SOLUTION INTRAVENOUS at 14:36

## 2025-05-30 RX ADMIN — SODIUM CHLORIDE 20 ML/HR: 9 INJECTION, SOLUTION INTRAVENOUS at 14:36

## 2025-05-30 NOTE — PROGRESS NOTES
Elan Morrison  tolerated treatment well with no complications.      Elan Morrison is aware of future appt on 06/09/2025 at 02:30 PM.     AVS printed and given to Elan Morrison:  No (Declined by Elan Morrison)

## 2025-06-05 ENCOUNTER — HOSPITAL ENCOUNTER (OUTPATIENT)
Dept: NON INVASIVE DIAGNOSTICS | Facility: HOSPITAL | Age: 85
Discharge: HOME | End: 2025-06-05
Payer: MEDICARE

## 2025-06-05 ENCOUNTER — RESULTS FOLLOW-UP (OUTPATIENT)
Dept: FAMILY MEDICINE CLINIC | Facility: HOSPITAL | Age: 85
End: 2025-06-05

## 2025-06-05 VITALS
WEIGHT: 140 LBS | HEART RATE: 72 BPM | HEIGHT: 71 IN | BODY MASS INDEX: 19.6 KG/M2 | SYSTOLIC BLOOD PRESSURE: 146 MMHG | DIASTOLIC BLOOD PRESSURE: 63 MMHG

## 2025-06-05 DIAGNOSIS — R06.00 DYSPNEA AND RESPIRATORY ABNORMALITIES: ICD-10-CM

## 2025-06-05 DIAGNOSIS — I10 BENIGN ESSENTIAL HYPERTENSION: ICD-10-CM

## 2025-06-05 DIAGNOSIS — I44.7 LBBB (LEFT BUNDLE BRANCH BLOCK): ICD-10-CM

## 2025-06-05 DIAGNOSIS — R06.89 DYSPNEA AND RESPIRATORY ABNORMALITIES: ICD-10-CM

## 2025-06-05 LAB
AORTIC ROOT: 3.3 CM
AORTIC VALVE MEAN VELOCITY: 7.4 M/S
AV LVOT MEAN GRADIENT: 1 MMHG
AV LVOT PEAK GRADIENT: 2 MMHG
AV MEAN PRESS GRAD SYS DOP V1V2: 3 MMHG
AV PEAK GRADIENT: 4 MMHG
AV VELOCITY RATIO: 0.75
AV VMAX SYS DOP: 1 M/S
BSA FOR ECHO PROCEDURE: 1.81 M2
DOP CALC AO VTI: 23.66 CM
DOP CALC LVOT PEAK VEL VTI: 17.67 CM
DOP CALC LVOT PEAK VEL: 0.77 M/S
E WAVE DECELERATION TIME: 187 MS
E/A RATIO: 0.98
FRACTIONAL SHORTENING: 36 (ref 28–44)
INTERVENTRICULAR SEPTUM IN DIASTOLE (PARASTERNAL SHORT AXIS VIEW): 0.6 CM
INTERVENTRICULAR SEPTUM: 0.6 CM (ref 0.6–1.1)
LAAS-AP2: 16.4 CM2
LAAS-AP4: 18.8 CM2
LEFT ATRIUM SIZE: 3.8 CM
LEFT ATRIUM VOLUME (MOD BIPLANE): 44 ML
LEFT ATRIUM VOLUME INDEX (MOD BIPLANE): 24.3 ML/M2
LEFT INTERNAL DIMENSION IN SYSTOLE: 3.4 CM (ref 2.1–4)
LEFT VENTRICLE DIASTOLIC VOLUME (MOD BIPLANE): 132 ML
LEFT VENTRICLE DIASTOLIC VOLUME INDEX (MOD BIPLANE): 72.9 ML/M2
LEFT VENTRICLE SYSTOLIC VOLUME (MOD BIPLANE): 52 ML
LEFT VENTRICLE SYSTOLIC VOLUME INDEX (MOD BIPLANE): 28.7 ML/M2
LEFT VENTRICULAR INTERNAL DIMENSION IN DIASTOLE: 5.3 CM (ref 3.5–6)
LEFT VENTRICULAR POSTERIOR WALL IN END DIASTOLE: 0.8 CM
LEFT VENTRICULAR STROKE VOLUME: 84 ML
LV EF BIPLANE MOD: 60 %
LV EF US.2D.A4C+ESTIMATED: 66 %
LVSV (TEICH): 84 ML
MV E'TISSUE VEL-LAT: 9 CM/S
MV E'TISSUE VEL-SEP: 7 CM/S
MV PEAK A VEL: 0.9 M/S
MV PEAK E VEL: 88 CM/S
MV STENOSIS PRESSURE HALF TIME: 54 MS
MV VALVE AREA P 1/2 METHOD: 4.07
RA PRESSURE ESTIMATED: 8 MMHG
RIGHT ATRIUM AREA SYSTOLE A4C: 17.6 CM2
RIGHT VENTRICLE ID DIMENSION: 4 CM
RV PSP: 37 MMHG
SL CV LEFT ATRIUM LENGTH A2C: 5 CM
SL CV LV EF: 46
SL CV PED ECHO LEFT VENTRICLE DIASTOLIC VOLUME (MOD BIPLANE) 2D: 133 ML
SL CV PED ECHO LEFT VENTRICLE SYSTOLIC VOLUME (MOD BIPLANE) 2D: 49 ML
TR MAX PG: 29 MMHG
TR PEAK VELOCITY: 2.7 M/S
TRICUSPID ANNULAR PLANE SYSTOLIC EXCURSION: 1.8 CM
TRICUSPID VALVE PEAK REGURGITATION VELOCITY: 2.69 M/S

## 2025-06-05 PROCEDURE — 93306 TTE W/DOPPLER COMPLETE: CPT | Performed by: INTERNAL MEDICINE

## 2025-06-05 PROCEDURE — 93306 TTE W/DOPPLER COMPLETE: CPT

## 2025-06-06 ENCOUNTER — HOSPITAL ENCOUNTER (OUTPATIENT)
Dept: INFUSION CENTER | Facility: HOSPITAL | Age: 85
Discharge: HOME | End: 2025-06-06
Attending: STUDENT IN AN ORGANIZED HEALTH CARE EDUCATION/TRAINING PROGRAM

## 2025-06-09 ENCOUNTER — HOSPITAL ENCOUNTER (OUTPATIENT)
Dept: INFUSION CENTER | Facility: HOSPITAL | Age: 85
Discharge: HOME | End: 2025-06-09
Attending: STUDENT IN AN ORGANIZED HEALTH CARE EDUCATION/TRAINING PROGRAM
Payer: MEDICARE

## 2025-06-09 VITALS
DIASTOLIC BLOOD PRESSURE: 60 MMHG | SYSTOLIC BLOOD PRESSURE: 171 MMHG | OXYGEN SATURATION: 99 % | RESPIRATION RATE: 18 BRPM | TEMPERATURE: 96.6 F | HEART RATE: 64 BPM

## 2025-06-09 DIAGNOSIS — D50.0 IRON DEFICIENCY ANEMIA DUE TO CHRONIC BLOOD LOSS: Primary | ICD-10-CM

## 2025-06-09 PROCEDURE — 96365 THER/PROPH/DIAG IV INF INIT: CPT

## 2025-06-09 RX ORDER — SODIUM CHLORIDE 9 MG/ML
20 INJECTION, SOLUTION INTRAVENOUS ONCE
Status: CANCELLED | OUTPATIENT
Start: 2025-06-13

## 2025-06-09 RX ORDER — SODIUM CHLORIDE 9 MG/ML
20 INJECTION, SOLUTION INTRAVENOUS ONCE
Status: COMPLETED | OUTPATIENT
Start: 2025-06-09 | End: 2025-06-09

## 2025-06-09 RX ADMIN — SODIUM CHLORIDE 200 MG: 9 INJECTION, SOLUTION INTRAVENOUS at 14:17

## 2025-06-09 RX ADMIN — SODIUM CHLORIDE 20 ML/HR: 9 INJECTION, SOLUTION INTRAVENOUS at 14:14

## 2025-06-09 NOTE — PROGRESS NOTES
Elan Morrison  tolerated treatment well with no complications.      Elan Morrison has completed planned iron infusions at this time.     AVS printed and given to Elan Morrison:    No (Declined by Elan Morrison)

## 2025-06-19 ENCOUNTER — TELEPHONE (OUTPATIENT)
Dept: FAMILY MEDICINE CLINIC | Facility: HOSPITAL | Age: 85
End: 2025-06-19

## 2025-06-24 ENCOUNTER — APPOINTMENT (OUTPATIENT)
Dept: LAB | Facility: HOSPITAL | Age: 85
End: 2025-06-24
Payer: MEDICARE

## 2025-06-24 DIAGNOSIS — I10 BENIGN ESSENTIAL HYPERTENSION: ICD-10-CM

## 2025-06-24 DIAGNOSIS — R06.89 DYSPNEA AND RESPIRATORY ABNORMALITIES: ICD-10-CM

## 2025-06-24 DIAGNOSIS — I44.7 LBBB (LEFT BUNDLE BRANCH BLOCK): ICD-10-CM

## 2025-06-24 DIAGNOSIS — R06.00 DYSPNEA AND RESPIRATORY ABNORMALITIES: ICD-10-CM

## 2025-06-24 LAB
BNP SERPL-MCNC: 151 PG/ML (ref 0–100)
ERYTHROCYTE [DISTWIDTH] IN BLOOD BY AUTOMATED COUNT: 15.2 % (ref 11.6–15.1)
HCT VFR BLD AUTO: 34.1 % (ref 36.5–49.3)
HGB BLD-MCNC: 10.6 G/DL (ref 12–17)
MCH RBC QN AUTO: 28.6 PG (ref 26.8–34.3)
MCHC RBC AUTO-ENTMCNC: 31.1 G/DL (ref 31.4–37.4)
MCV RBC AUTO: 92 FL (ref 82–98)
PLATELET # BLD AUTO: 352 THOUSANDS/UL (ref 149–390)
PMV BLD AUTO: 9.7 FL (ref 8.9–12.7)
RBC # BLD AUTO: 3.71 MILLION/UL (ref 3.88–5.62)
WBC # BLD AUTO: 7.22 THOUSAND/UL (ref 4.31–10.16)

## 2025-06-24 PROCEDURE — 36415 COLL VENOUS BLD VENIPUNCTURE: CPT

## 2025-06-24 PROCEDURE — 83880 ASSAY OF NATRIURETIC PEPTIDE: CPT

## 2025-06-24 PROCEDURE — 85027 COMPLETE CBC AUTOMATED: CPT

## 2025-06-25 DIAGNOSIS — R53.81 PHYSICAL DECONDITIONING: Primary | ICD-10-CM

## 2025-06-25 DIAGNOSIS — M62.81 GENERALIZED MUSCLE WEAKNESS: ICD-10-CM

## 2025-07-08 ENCOUNTER — EVALUATION (OUTPATIENT)
Dept: PHYSICAL THERAPY | Facility: CLINIC | Age: 85
End: 2025-07-08
Attending: FAMILY MEDICINE
Payer: MEDICARE

## 2025-07-08 DIAGNOSIS — R53.81 PHYSICAL DECONDITIONING: ICD-10-CM

## 2025-07-08 DIAGNOSIS — M62.81 GENERALIZED MUSCLE WEAKNESS: ICD-10-CM

## 2025-07-08 PROCEDURE — 97110 THERAPEUTIC EXERCISES: CPT | Performed by: PHYSICAL THERAPIST

## 2025-07-08 PROCEDURE — 97161 PT EVAL LOW COMPLEX 20 MIN: CPT | Performed by: PHYSICAL THERAPIST

## 2025-07-08 NOTE — PROGRESS NOTES
PT Evaluation     Today's date: 2025  Patient name: Elan Morrison  : 1940  MRN: [id number]  Referring provider: Yovani Ying MD  Dx:   Encounter Diagnosis     ICD-10-CM    1. Physical deconditioning  R53.81 Ambulatory referral to Physical Therapy      2. Generalized muscle weakness  M62.81 Ambulatory referral to Physical Therapy          Start Time: 1402  Stop Time: 1445  Total time in clinic (min): 43 minutes    Assessment  Impairments: activity intolerance, impaired physical strength, lacks appropriate home exercise program and pain with function    Assessment details: Patient is a 85 y.o. male presenting to physical therapy for general strengthening and balance. His primary impairments are lower extremity weakness, decreased balance, and general deconditioning. Balance testing is as follows: 5x STS 31.32 seconds, TUG 16.5 seconds, and Fernandez balance score of 27, all of which place him at a high fall risk. Patient would benefit from skilled PT to improve balance, strength and overall level of function.  Understanding of Dx/Px/POC: good     Prognosis: good    Goals  1. Patient will be independent with HEP upon discharge.  2. Patient will ambulate around his home without holding onto furniture in order to improve functional independence.  3. Patient will improve TUG score by 3 seconds to improve his balance and safety while ambulating.  4. Patient will improve hip and knee flexion to 4/5 MMT grading to increase strength of lower extremities.    Plan  Patient would benefit from: skilled physical therapy    Planned therapy interventions: functional ROM exercises, therapeutic activities, therapeutic exercise, therapeutic training, stretching, strengthening, home exercise program, neuromuscular re-education, manual therapy and patient education    Frequency: 2x week  Duration in weeks: 8  Treatment plan discussed with: patient and family        Subjective Evaluation    History of Present  Illness  Mechanism of injury: Pt states he is here for general strengthening and balance. He just got done with iron infusions for his deficiency a few weeks ago. No falls recently or within the past year, some close calls. He holds onto furniture or 'something' when walking around his home. Does not use a cane or walker. No lengthy stays in the hospital in the past couple of years. Pt and his wife live with their daughter in a basement apartment. In order to get downstairs there are about 8 stairs, with railing on right. Once they get down to the apartment, bathroom and bedroom are all on one floor. He reports he can still do everything he needs to do, just very slowly and carefully. He states it is hard to tell what makes him feel this way (weak/tired). Working outside/bending gardening he finds trouble with, but used to really enjoy doing. He feels sore after completing these task the next day. Feels better in afternoon than in the morning, and is more steady and stronger. If he is walking or performing a task, he needs to have one hand free and can't have both hands 'in use.' Wife states he has lost weight over the past couple of years (~20 lbs) due to various illnesses. Their daughter is a healthcare clinician, and is the one who recommended he go to PT. He is diabetic, has neuropathy, and a 'bad back'. He went to PT for a long time to treat the low back pain. He reports he has a therapy table, bands, and weights at home that he used to use for his exercises, but hasn't kept up with.  Patient Goals  Patient goals for therapy: improved balance and increased strength  Patient goal: return to gardening, yardwork  Social Support  8  Lives in: apartment        Objective     Strength/Myotome Testing     Left Shoulder     Planes of Motion   Flexion: 4+   Abduction: 4+     Isolated Muscles   Biceps: 4+   Triceps: 4+     Right Shoulder     Planes of Motion   Flexion: 4+   Abduction: 4+     Isolated Muscles   Biceps: 4+    Triceps: 4+     Left Hip   Planes of Motion   Flexion: 3-    Right Hip   Planes of Motion   Flexion: 3- (pain noted in right glute)    Left Knee   Flexion: 3-  Extension: 4-    Right Knee   Flexion: 3-  Extension: 4-    Functional Assessment        Comments  5x STS: 31.32 seconds  TU.5 seconds    Flowsheet Rows      Flowsheet Row Most Recent Value   Fernandez Balance Scale    1. Sitting to Standing 1   2. Standing Unsupported 3   3. Sitting with Back Unsupported but Feet Supported on Floor or on a Stool 4   4. Standing to Sitting 2   5.  Transfers 3   6. Standing Unsupported with Eyes Closed 4   7. Standing Unsupported with Feet Together 4   8. Reach Forward with Outstretched Arm While Standing 2   9.  Object from Floor from a Standing Position 0   10. Turning to Look Behind Over Left and Right Shoulders While Standing 2   11. Turn 360 Degrees 2   12. Place Alternate Foot on Step or Stool While Standing Unsupported 0   13. Standing Unsupported One Foot in Front 0   14. Standing on One Leg 0   Fernandez Balance Score 27                Flowsheet Rows      Flowsheet Row Most Recent Value   PT/OT G-Codes    Current Score 45   Projected Score 56               Precautions: falls risk      Manuals                                                                 Neuro Re-Ed             SLS             Tandem stance             NBOS w/ EC             Cone taps             Cone knockovers             Side stepping             Heel raise/toe raise             Ther Ex             Supine bridges HEP            Standing march w/ counter support HEP            Seated TL extension HEP            Seated LAQ HEP            Hurdles              Hip 3 ways             Bike              Leg press                          Ther Activity             Step ups             Squigz             Sit to stands             Gait Training                                       Modalities

## 2025-07-08 NOTE — HOME EXERCISE EDUCATION
Program_ID:[id number]   Access Code: [email or web address]  URL: [email or web address]  Date: 07-  Prepared By: Zo Benitez    Program Notes      Exercises      - Supine Bridge - 1 x daily - 7 x weekly - 3 sets - 10 reps      - Standing March with Counter Support - 1 x daily - 7 x weekly - 3 sets - 10 reps      - Seated Thoracic Lumbar Extension with Pectoralis Stretch - 1 x daily - 7 x weekly - 3 sets - 10 reps      - Seated Long Arc Quad - 1 x daily - 7 x weekly - 3 sets - 10 reps

## 2025-07-10 ENCOUNTER — OFFICE VISIT (OUTPATIENT)
Dept: PHYSICAL THERAPY | Facility: CLINIC | Age: 85
End: 2025-07-10
Attending: FAMILY MEDICINE
Payer: MEDICARE

## 2025-07-10 ENCOUNTER — PROCEDURE VISIT (OUTPATIENT)
Dept: PODIATRY | Facility: CLINIC | Age: 85
End: 2025-07-10
Payer: MEDICARE

## 2025-07-10 VITALS — HEIGHT: 71 IN | BODY MASS INDEX: 19.18 KG/M2 | WEIGHT: 137 LBS

## 2025-07-10 DIAGNOSIS — M62.81 GENERALIZED MUSCLE WEAKNESS: ICD-10-CM

## 2025-07-10 DIAGNOSIS — B35.1 ONYCHOMYCOSIS: Primary | ICD-10-CM

## 2025-07-10 DIAGNOSIS — R53.81 PHYSICAL DECONDITIONING: Primary | ICD-10-CM

## 2025-07-10 DIAGNOSIS — E11.40 TYPE 2 DIABETES MELLITUS WITH DIABETIC NEUROPATHY, WITHOUT LONG-TERM CURRENT USE OF INSULIN (HCC): ICD-10-CM

## 2025-07-10 PROCEDURE — 11721 DEBRIDE NAIL 6 OR MORE: CPT | Performed by: PODIATRIST

## 2025-07-10 PROCEDURE — 97530 THERAPEUTIC ACTIVITIES: CPT

## 2025-07-10 PROCEDURE — 97110 THERAPEUTIC EXERCISES: CPT

## 2025-07-10 PROCEDURE — 97112 NEUROMUSCULAR REEDUCATION: CPT

## 2025-07-10 NOTE — PROGRESS NOTES
"Daily Note     Today's date: 7/10/2025  Patient name: Elan Morrison  : 1940  MRN: [id number]  Referring provider: Yovani Ying MD  Dx:   Encounter Diagnosis     ICD-10-CM    1. Physical deconditioning  R53.81       2. Generalized muscle weakness  M62.81           Start Time: 1522  Stop Time: 1600  Total time in clinic (min): 38 minutes    Subjective: Patient reports no new complaints or changes since IE.       Objective: See treatment diary below      Assessment: Patient tolerated introduction to exercises well. He demonstrates poor spatial awareness when ambulating throughout department requiring SBA/CGA for safety. Demonstrates PF strength deficits and are consistent with his difficulty maintaining balance on the forefoot. Fatigued with a variety of functional strengthening that were performed today. Patient would benefit from continued PT to improve level of function.       Plan: Continue per POC. Increase reps/resistance as tolerated.      Precautions: falls risk      Manuals 7/8 7/10                                                               Neuro Re-Ed             SLS  2x30\" ea           Tandem stance             NBOS w/ EC             Cone taps             Cone knockovers             Side stepping             Heel raise/toe raise  1 UE 2x10 ea           Ther Ex             Supine bridges HEP            Standing march w/ counter support HEP            Seated TL extension HEP 10x10\"           Seated LAQ HEP            Hurdles              Hip 3 ways  Abd/ext 15x ea           Bike   6'           Leg press                          Ther Activity             Step ups  6\" 2x10 ea           Squigz             Sit to stands  2x10  No UE           Gait Training                                       Modalities                                            "

## 2025-07-15 ENCOUNTER — OFFICE VISIT (OUTPATIENT)
Dept: PHYSICAL THERAPY | Facility: CLINIC | Age: 85
End: 2025-07-15
Attending: FAMILY MEDICINE
Payer: MEDICARE

## 2025-07-15 DIAGNOSIS — R53.81 PHYSICAL DECONDITIONING: Primary | ICD-10-CM

## 2025-07-15 DIAGNOSIS — M62.81 GENERALIZED MUSCLE WEAKNESS: ICD-10-CM

## 2025-07-15 PROCEDURE — 97112 NEUROMUSCULAR REEDUCATION: CPT | Performed by: PHYSICAL THERAPIST

## 2025-07-15 PROCEDURE — 97110 THERAPEUTIC EXERCISES: CPT | Performed by: PHYSICAL THERAPIST

## 2025-07-15 PROCEDURE — 97530 THERAPEUTIC ACTIVITIES: CPT | Performed by: PHYSICAL THERAPIST

## 2025-07-15 NOTE — ASSESSMENT & PLAN NOTE
Disease prevention and related risk factors of diabetes were identified and discussed.    The patient was educated in proper foot wear for diabetics.    Educated in daily foot assessment and routine diabetic foot care.    Discussed the importance of controlling BS through diet and exercise.    Recommend patient follow up in 3 months for further diabetic foot exam and footcare.  Debridement mycotic nails as noted below  Meds close finding for diabetic footcare Q8    Lab Results   Component Value Date    HGBA1C 8.5 (H) 01/23/2025

## 2025-07-15 NOTE — PROGRESS NOTES
"   PATIENT:  Elan Morrison  1940    Assessment & Plan  Onychomycosis  Type 2 diabetes mellitus with diabetic neuropathy, without long-term current use of insulin (HCC)  Disease prevention and related risk factors of diabetes were identified and discussed.    The patient was educated in proper foot wear for diabetics.    Educated in daily foot assessment and routine diabetic foot care.    Discussed the importance of controlling BS through diet and exercise.    Recommend patient follow up in 3 months for further diabetic foot exam and footcare.  Debridement mycotic nails as noted below  Meds close finding for diabetic footcare Q8    Lab Results   Component Value Date    HGBA1C 8.5 (H) 01/23/2025        Procedures: 06314  All mycotic toenails were reduced and debrided in length, width, and girth using a nail nipper and electric dremel.    Patient tolerated procedure(s) well without complications.    Procedures     HPI:  Elan Morrison is a 85 y.o.year old male seen for initial diabetic foot exam referred by his endocrinologist.  Patient has class findings with type II DM.    Reports blood sugars are doing better.  Patient concerned of thick toenails.  The patient denied any acute pedal disorder, redness, acute swelling, or recent injury.      The following portions of the patient's history were reviewed and updated as appropriate: allergies, current medications, past family history, past medical history, past social history, past surgical history, and problem list.    REVIEW OF SYSTEMS:  GENERAL: No fever or chills  HEART: No chest pain, or palpitation  RESPIRATORY:  No acute SOB or cough  GI: No Nausea, vomit or diarrhea  NEUROLOGIC: No syncope or acute weakness    PHYSICAL EXAM:    Ht 5' 11\" (1.803 m) Comment: stated  Wt 62.1 kg (137 lb)   BMI 19.11 kg/m²     VASCULAR EXAM:  Posterior tibial artery absent bilateral  Dorsalis pedis artery +1 bilateral  The patient has moderate skin atrophy, color changes, " lack of digital hair, and nail dystrophy.    There is trace lower extremity edema bilaterally.      NEUROLOGIC EXAM:  Sensation is intact to light touch. Yes   Sensation is grossly intact to 10gm monofilament.    Vibratory sensation slightly diminished.     No paresthesias noted bilateral.         DERMATOLOGIC EXAM:   Texture, Tone and Turgor are diminished bilateral.    The patient has dystrophic/hypertrophic toenails with yellow/white discoloration, onycholysis, and subungal debris. Fungal odor noted.  Brittle nature noted.  Right foot nails severely dystrophic x5 with 0.4 cm ave thickness (1-5)  Left foot nails severely dystrophic x5 with 0.4 cm ave thickness (1-5)    Patient has hyperkeratotic lesions noted:  Right foot located at none.  Left foot located at none  No notable suspicious skin lesions.      MUSCULOSKELETAL EXAM:   No acute joint pain, edema, or redness.  Denies any acute musculoskeletal problem.    Patient has no gross pedal deformities. Patient has no ambulation limitations.      Patient wears DM shoes? No    Risk Category/Class Findings:  Q8(B1, B2 ABC)  0 = No loss of protective sensation  A1)  Has the patient had a previous amputation of the foot or integral skeletal portion thereof? No  B1)  Does the patient have absent posterior tibial pulse? Yes  B3)  Does the patient have absent dorsalis pedis? No  B2)  Does the patient have three of the following? Yes           1.  Hair growth (increased or decreased), 2.  Nail changes (thickening), and 3.  Pigmentary changes (discoloring)  C)  Does the patient have two of the following and one above? No

## 2025-07-17 ENCOUNTER — OFFICE VISIT (OUTPATIENT)
Dept: PHYSICAL THERAPY | Facility: CLINIC | Age: 85
End: 2025-07-17
Attending: FAMILY MEDICINE
Payer: MEDICARE

## 2025-07-17 DIAGNOSIS — M62.81 GENERALIZED MUSCLE WEAKNESS: ICD-10-CM

## 2025-07-17 DIAGNOSIS — R53.81 PHYSICAL DECONDITIONING: Primary | ICD-10-CM

## 2025-07-17 PROCEDURE — 97112 NEUROMUSCULAR REEDUCATION: CPT | Performed by: PHYSICAL THERAPIST

## 2025-07-17 PROCEDURE — 97530 THERAPEUTIC ACTIVITIES: CPT | Performed by: PHYSICAL THERAPIST

## 2025-07-17 PROCEDURE — 97110 THERAPEUTIC EXERCISES: CPT | Performed by: PHYSICAL THERAPIST

## 2025-07-17 NOTE — PROGRESS NOTES
"Daily Note     Today's date: 2025  Patient name: Elan Morrison  : 1940  MRN: [id number]  Referring provider: Yovani Ying MD  Dx:   Encounter Diagnosis     ICD-10-CM    1. Physical deconditioning  R53.81       2. Generalized muscle weakness  M62.81                      Subjective: Pt reports feeling a decent amount of soreness since last PT session. He arrives to PT reporting soreness continues in the quads and hips.       Objective: See treatment diary below      Assessment: Pt continues to require frequent rest breaks secondary to muscle fatigue. Balance exercises were appropriately challenging. Provided close supervision/CGA to allow pt to appropriately challenge himself with ankle strategy. Will progress per ability.       Plan: Continue per plan of care.      Precautions: falls risk      Manuals 7/8 7/10 7/15 7/17                                                             Neuro Re-Ed             SLS  2x30\" ea Foot on medball 2x30\" ea 2x30\" ea.          Tandem stance             NBOS w/ EC             Cone taps   Step tap 6\" 2x10 alt. Cone tap 10x Alt CGA         Cone knockovers   10x ea.  10x ea.          Side stepping             Heel raise/toe raise  1 UE 2x10 ea 20x ea.  20x ea.         Ther Ex             Supine bridges HEP            Standing march w/ counter support HEP            Seated TL extension HEP 10x10\"           Seated LAQ HEP            Hurdles              Hip 3 ways  Abd/ext 15x ea Abd/ext 20x ea.  Abd/ext 20x ea         Bike   6' 6' 6'         Leg press                          Ther Activity             Step ups  6\" 2x10 ea 6\" 2x10 ea 6\" 2x10 ea         Squigz   Airex NBOSfull jar Semitandem airex          Sit to stands  2x10  No UE 2x10  2x10         Gait Training                                       Modalities                                                "

## 2025-07-21 DIAGNOSIS — E78.2 MIXED HYPERLIPIDEMIA: ICD-10-CM

## 2025-07-21 DIAGNOSIS — E11.42 TYPE 2 DIABETES MELLITUS WITH DIABETIC POLYNEUROPATHY, WITHOUT LONG-TERM CURRENT USE OF INSULIN (HCC): ICD-10-CM

## 2025-07-22 ENCOUNTER — OFFICE VISIT (OUTPATIENT)
Dept: PHYSICAL THERAPY | Facility: CLINIC | Age: 85
End: 2025-07-22
Attending: FAMILY MEDICINE
Payer: MEDICARE

## 2025-07-22 DIAGNOSIS — M62.81 GENERALIZED MUSCLE WEAKNESS: ICD-10-CM

## 2025-07-22 DIAGNOSIS — R53.81 PHYSICAL DECONDITIONING: Primary | ICD-10-CM

## 2025-07-22 PROCEDURE — 97112 NEUROMUSCULAR REEDUCATION: CPT | Performed by: PHYSICAL THERAPIST

## 2025-07-22 PROCEDURE — 97530 THERAPEUTIC ACTIVITIES: CPT | Performed by: PHYSICAL THERAPIST

## 2025-07-22 PROCEDURE — 97110 THERAPEUTIC EXERCISES: CPT | Performed by: PHYSICAL THERAPIST

## 2025-07-22 RX ORDER — METFORMIN HYDROCHLORIDE 500 MG/1
TABLET, EXTENDED RELEASE ORAL
Qty: 360 TABLET | Refills: 1 | Status: SHIPPED | OUTPATIENT
Start: 2025-07-22

## 2025-07-22 NOTE — PROGRESS NOTES
"Daily Note     Today's date: 2025  Patient name: Elan Morrison  : 1940  MRN: [id number]  Referring provider: Yovani Ying MD  Dx:   Encounter Diagnosis     ICD-10-CM    1. Physical deconditioning  R53.81       2. Generalized muscle weakness  M62.81           Start Time: 1400  Stop Time: 1440  Total time in clinic (min): 40 minutes    Subjective: Pt states he is sore after therapy and today. He assumes his body is not used to everything we are doing.      Objective: See treatment diary below      Assessment: Pt requires frequent rest breaks due to overall muscle fatigue and lack of endurance. Addition of flexion into hip 3 ways was tolerated well. Pt demonstrated the appropriate level of difficulty during balance exercises, and required at least one UE on the bar for all as well as CGA. Will continue to progress as able.       Plan: Continue per plan of care.     Precautions: falls risk      Manuals 7/8 7/10 7/15 7/17 7/21                                                            Neuro Re-Ed             SLS  2x30\" ea Foot on medball 2x30\" ea 2x30\" ea.  2x30\" ea.        Tandem stance             NBOS w/ EC             Cone taps   Step tap 6\" 2x10 alt. Cone tap 10x Alt CGA Cone tap 10x alt CGA        Cone knockovers   10x ea.  10x ea.  10x ea.        Side stepping             Heel raise/toe raise  1 UE 2x10 ea 20x ea.  20x ea. 20x ea.        Ther Ex             Supine bridges HEP            Standing march w/ counter support HEP            Seated TL extension HEP 10x10\"           Seated LAQ HEP            Hurdles              Hip 3 ways  Abd/ext 15x ea Abd/ext 20x ea.  Abd/ext 20x ea Flex/abd/ext 20x ea        Bike   6' 6' 6' 6'        Leg press                          Ther Activity             Step ups  6\" 2x10 ea 6\" 2x10 ea 6\" 2x10 ea 6\" 2x10 ea        Squigz   Airex NBOSfull jar Semitandem airex  Airex full jar 2x CGA        Sit to stands  2x10  No UE 2x10  2x10 2x10        Gait Training        "                                Modalities

## 2025-07-23 RX ORDER — SIMVASTATIN 20 MG
20 TABLET ORAL
Qty: 90 TABLET | Refills: 1 | Status: SHIPPED | OUTPATIENT
Start: 2025-07-23

## 2025-07-24 ENCOUNTER — TELEPHONE (OUTPATIENT)
Dept: ENDOCRINOLOGY | Facility: CLINIC | Age: 85
End: 2025-07-24

## 2025-07-24 ENCOUNTER — OFFICE VISIT (OUTPATIENT)
Dept: FAMILY MEDICINE CLINIC | Facility: HOSPITAL | Age: 85
End: 2025-07-24
Payer: MEDICARE

## 2025-07-24 VITALS
HEART RATE: 85 BPM | SYSTOLIC BLOOD PRESSURE: 138 MMHG | HEIGHT: 71 IN | DIASTOLIC BLOOD PRESSURE: 56 MMHG | OXYGEN SATURATION: 99 % | WEIGHT: 135.6 LBS | BODY MASS INDEX: 18.98 KG/M2

## 2025-07-24 DIAGNOSIS — Z00.00 MEDICARE ANNUAL WELLNESS VISIT, SUBSEQUENT: ICD-10-CM

## 2025-07-24 DIAGNOSIS — R06.00 DYSPNEA AND RESPIRATORY ABNORMALITIES: ICD-10-CM

## 2025-07-24 DIAGNOSIS — R06.89 DYSPNEA AND RESPIRATORY ABNORMALITIES: ICD-10-CM

## 2025-07-24 DIAGNOSIS — D50.9 IRON DEFICIENCY ANEMIA, UNSPECIFIED IRON DEFICIENCY ANEMIA TYPE: Primary | ICD-10-CM

## 2025-07-24 DIAGNOSIS — D53.9 NUTRITIONAL ANEMIA, UNSPECIFIED: ICD-10-CM

## 2025-07-24 PROCEDURE — G2211 COMPLEX E/M VISIT ADD ON: HCPCS | Performed by: FAMILY MEDICINE

## 2025-07-24 PROCEDURE — G0439 PPPS, SUBSEQ VISIT: HCPCS | Performed by: FAMILY MEDICINE

## 2025-07-24 PROCEDURE — 99214 OFFICE O/P EST MOD 30 MIN: CPT | Performed by: FAMILY MEDICINE

## 2025-07-24 NOTE — ASSESSMENT & PLAN NOTE
Finding of iron deficiency anemia.   In retrospect , chronic, but no etiology noted.     Has received Iron infusions without improvement of hemoglobin.     Will recheck CBC, iron levels.   Will add b12 and folate as may be mixed.   Check upep/spep. Although platelets and wbc have been normal.     Consider restart Iron infusions vs oral supplementation.   If labs negative will refer to GI for endcscopy- egd and cscope to look for etiology. There is no active signs of bleeding.         Orders:  •  CBC; Future  •  Comprehensive metabolic panel; Future  •  Iron; Future  •  Ferritin; Future  •  Vitamin B12; Future  •  Protein electrophoresis, urine; Future  •  Protein electrophoresis, serum; Future

## 2025-07-24 NOTE — PROGRESS NOTES
Name: Elan Morrison      : 1940      MRN: [id number]  Encounter Provider: Yovani Ying MD  Encounter Date: 2025   Encounter department: [facility] CARE [address]   :  Assessment & Plan  Iron deficiency anemia, unspecified iron deficiency anemia type      Finding of iron deficiency anemia.   In retrospect , chronic, but no etiology noted.     Has received Iron infusions without improvement of hemoglobin.     Will recheck CBC, iron levels.   Will add b12 and folate as may be mixed.   Check upep/spep. Although platelets and wbc have been normal.     Consider restart Iron infusions vs oral supplementation.   If labs negative will refer to GI for endcscopy- egd and cscope to look for etiology. There is no active signs of bleeding.         Orders:  •  CBC; Future  •  Comprehensive metabolic panel; Future  •  Iron; Future  •  Ferritin; Future  •  Vitamin B12; Future  •  Protein electrophoresis, urine; Future  •  Protein electrophoresis, serum; Future    Dyspnea and respiratory abnormalities    Ongoing dyspnea.   May have been a viral illness that started a few months ago.     Ongoing cough, mild dyspnea.   No chest pain.   Echo with global hypokinesis.   Has cardiology apt.     Needs pulmo workup .   Obtain CXR first.   Consider pft and referral to Pulmo as well.   Remote hx of smoking.   Orders:  •  XR chest pa and lateral; Future    Nutritional anemia, unspecified        Orders:  •  Vitamin B12; Future  •  Folate; Future    Medicare annual wellness visit, subsequent            Preventive health issues were discussed with patient, and age appropriate screening tests were ordered as noted in patient's After Visit Summary. Personalized health advice and appropriate referrals for health education or preventive services given if needed, as noted in patient's After Visit Summary.    History of Present Illness     Here for fu with me.   Last seen by me about 7 months ago.     Since then  he has been ill.   Started with viral illness causing diarrhea.   Consistent with [place] virus.   That has resolved.     Since then also with sob, sob on exertion.   Coughing and intermittent wheezing.   No fever,no chills currently but did have this.   No hemoptysis.     Echo completed: showing global hypokineses.   Has apt with cardiology.   Also found to have iron deficiency anemia. No active bleeding. No black or red stool.   No bowel irregularities.   Has had iron supplementation as well as iron infusions. Last hb was stable but ocntinues to be anemic but platelets and wbc have been stable and in normal range.        Patient Care Team:  Yovani Ying MD as PCP - General (Family Medicine)  Luis Antonio Flowers MD as PCP - Endocrinology (Endocrinology)  DO Carl Barber MD Nicholle Elizabeth Shannon, CRNP as Nurse Practitioner (Endocrinology)    Review of Systems   Constitutional: Negative.  Negative for activity change, appetite change, chills and diaphoresis.   HENT:  Negative for congestion and dental problem.    Respiratory: Negative.  Negative for apnea, chest tightness, shortness of breath and wheezing.    Cardiovascular: Negative.  Negative for chest pain, palpitations and leg swelling.   Gastrointestinal: Negative.  Negative for abdominal distention, abdominal pain, constipation, diarrhea and nausea.   Genitourinary: Negative.  Negative for difficulty urinating, dysuria and frequency.     Medical History Reviewed by provider this encounter:       Annual Wellness Visit Questionnaire   Elan is here for his Subsequent Wellness visit.     Health Risk Assessment:   Patient rates overall health as good. Patient feels that their physical health rating is same. Patient is satisfied with their life. Eyesight was rated as same. Hearing was rated as same. Patient feels that their emotional and mental health rating is same. Patients states they are never, rarely angry. Patient states they  are sometimes unusually tired/fatigued. Pain experienced in the last 7 days has been a lot. Patient's pain rating has been 6/10. Patient states that he has experienced no weight loss or gain in last 6 months.     Depression Screening:   PHQ-2 Score: 0      Fall Risk Screening:   In the past year, patient has experienced: no history of falling in past year      Home Safety:  Patient does not have trouble with stairs inside or outside of their home. Patient has working smoke alarms and has no working carbon monoxide detector. Home safety hazards include: none.     Nutrition:   Current diet is Regular.     Medications:   Patient is currently taking over-the-counter supplements. OTC medications include: see medication list. Patient is able to manage medications.     Activities of Daily Living (ADLs)/Instrumental Activities of Daily Living (IADLs):   Walk and transfer into and out of bed and chair?: Yes  Dress and groom yourself?: Yes    Bathe or shower yourself?: Yes    Feed yourself? Yes  Do your laundry/housekeeping?: Yes  Manage your money, pay your bills and track your expenses?: Yes  Make your own meals?: Yes    Do your own shopping?: Yes    Previous Hospitalizations:   Any hospitalizations or ED visits within the last 12 months?: No      Advance Care Planning:   Living will: Yes    Durable POA for healthcare: Yes    Advanced directive: Yes      Cognitive Screening:   Provider or family/friend/caregiver concerned regarding cognition?: No    Preventive Screenings      Cardiovascular Screening:    General: Screening Not Indicated and History Lipid Disorder      Diabetes Screening:     General: Screening Not Indicated and History Diabetes      Colorectal Cancer Screening:     General: Risks and Benefits Discussed      Prostate Cancer Screening:    General: Screening Not Indicated      Osteoporosis Screening:    General: Screening Not Indicated      Abdominal Aortic Aneurysm (AAA) Screening:    Risk factors include:  "tobacco use        General: Screening Not Indicated      Lung Cancer Screening:     General: Screening Not Indicated      Hepatitis C Screening:    General: Screening Not Indicated    Screening, Brief Intervention, and Referral to Treatment (SBIRT)     Screening      Single Item Drug Screening:  How often have you used an illegal drug (including marijuana) or a prescription medication for non-medical reasons in the past year? never    Single Item Drug Screen Score: 0  Interpretation: Negative screen for possible drug use disorder    Social Drivers of Health     Financial Resource Strain: Low Risk  (6/7/2023)    Overall Financial Resource Strain (CARDIA)    • Difficulty of Paying Living Expenses: Not hard at all   Food Insecurity: No Food Insecurity (7/24/2025)    Nursing - Inadequate Food Risk Classification    • Worried About Running Out of Food in the Last Year: Never true    • Ran Out of Food in the Last Year: Never true   Transportation Needs: No Transportation Needs (7/24/2025)    PRAPARE - Transportation    • Lack of Transportation (Medical): No    • Lack of Transportation (Non-Medical): No   Housing Stability: Low Risk  (7/24/2025)    Housing Stability Vital Sign    • Unable to Pay for Housing in the Last Year: No    • Number of Times Moved in the Last Year: 0    • Homeless in the Last Year: No   Utilities: Not At Risk (7/24/2025)    [facility] Utilities    • Threatened with loss of utilities: No     No results found.    Objective   /56 (BP Location: Left arm, Patient Position: Sitting)   Pulse 85   Ht 5' 10.87\" (1.8 m)   Wt 61.5 kg (135 lb 9.6 oz)   SpO2 99%   BMI 18.98 kg/m²     Physical Exam  Vitals reviewed.   Constitutional:       General: He is not in acute distress.     Appearance: He is well-developed. He is not ill-appearing.   HENT:      Head: Normocephalic and atraumatic.      Right Ear: Tympanic membrane, ear canal and external ear normal.      Left Ear: Tympanic membrane, ear canal and " external ear normal.      Mouth/Throat:      Mouth: Mucous membranes are moist.      Pharynx: Oropharynx is clear.     Eyes:      Extraocular Movements: Extraocular movements intact.      Conjunctiva/sclera: Conjunctivae normal.      Pupils: Pupils are equal, round, and reactive to light.       Cardiovascular:      Rate and Rhythm: Normal rate and regular rhythm.      Heart sounds: Normal heart sounds. No murmur heard.  Pulmonary:      Effort: Pulmonary effort is normal.      Breath sounds: Examination of the right-middle field reveals rhonchi. Examination of the left-middle field reveals rhonchi. Examination of the right-lower field reveals rhonchi and rales. Examination of the left-lower field reveals rhonchi and rales. Rhonchi and rales present.   Abdominal:      General: Bowel sounds are normal. There is no distension.      Palpations: Abdomen is soft. There is no mass.      Tenderness: There is no abdominal tenderness. There is no guarding.      Hernia: No hernia is present.     Musculoskeletal:         General: Normal range of motion.      Cervical back: Normal range of motion and neck supple.     Skin:     General: Skin is warm and dry.      Capillary Refill: Capillary refill takes less than 2 seconds.     Neurological:      General: No focal deficit present.      Mental Status: He is alert and oriented to person, place, and time.     Psychiatric:         Mood and Affect: Mood normal.         Behavior: Behavior normal.          Inspection  Skin Exam: skin normal and skin intact. No dry skin, no warmth, no callus, no erythema, no maceration, no abnormal color, no pre-ulcer, no ulcer and no callus.     Toe Exam: ROM and strength within normal limits.     Sensory   Vibration: intact  Proprioception: intact  Monofilament testing: intact    Vascular  Capillary refills: < 3 seconds  The right DP pulse is 2+. The right PT pulse is 2+.     Left Foot/Ankle  Left Foot Inspection  Skin Exam: skin normal and skin intact. No dry skin, no warmth, no erythema, no maceration, normal color, no pre-ulcer, no ulcer and no callus.     Toe Exam: ROM and strength within normal limits.     Sensory   Vibration: intact  Proprioception: intact  Monofilament testing: intact    Vascular  Capillary refills: < 3 seconds  The left DP pulse is 2+. The left PT pulse is 2+.     Assign Risk Category  No deformity present  No loss of protective sensation  No weak pulses  Risk: 0

## 2025-07-24 NOTE — PATIENT INSTRUCTIONS
Medicare Preventive Visit Patient Instructions  Thank you for completing your Welcome to Medicare Visit or Medicare Annual Wellness Visit today. Your next wellness visit will be due in one year (7/25/2026).  The screening/preventive services that you may require over the next 5-10 years are detailed below. Some tests may not apply to you based off risk factors and/or age. Screening tests ordered at today's visit but not completed yet may show as past due. Also, please note that scanned in results may not display below.  Preventive Screenings:  Service Recommendations Previous Testing/Comments   Colorectal Cancer Screening  Colonoscopy    Fecal Occult Blood Test (FOBT)/Fecal Immunochemical Test (FIT)  Fecal DNA/Cologuard Test  Flexible Sigmoidoscopy Age: 45-75 years old   Colonoscopy: every 10 years (May be performed more frequently if at higher risk)  OR  FOBT/FIT: every 1 year  OR  Cologuard: every 3 years  OR  Sigmoidoscopy: every 5 years  Screening may be recommended earlier than age 45 if at higher risk for colorectal cancer. Also, an individualized decision between you and your healthcare provider will decide whether screening between the ages of 76-85 would be appropriate. Colonoscopy: Not on file  FOBT/FIT: Not on file  Cologuard: Not on file  Sigmoidoscopy: Not on file          Prostate Cancer Screening Individualized decision between patient and health care provider in men between ages of 55-69   Medicare will cover every 12 months beginning on the day after your 50th birthday PSA: No results in last 5 years     Screening Not Indicated     Hepatitis C Screening Once for adults born between 1945 and 1965  More frequently in patients at high risk for Hepatitis C Hep C Antibody: Not on file        Diabetes Screening 1-2 times per year if you're at risk for diabetes or have pre-diabetes Fasting glucose: 131 mg/dL (2/17/2025)  A1C: 8.5 % (1/23/2025)  Screening Not Indicated  History Diabetes   Cholesterol  Screening Once every 5 years if you don't have a lipid disorder. May order more often based on risk factors. Lipid panel: 01/23/2025  Screening Not Indicated  History Lipid Disorder      Other Preventive Screenings Covered by Medicare:  Abdominal Aortic Aneurysm (AAA) Screening: covered once if your at risk. You're considered to be at risk if you have a family history of AAA or a male between the age of 65-75 who smoking at least 100 cigarettes in your lifetime.  Lung Cancer Screening: covers low dose CT scan once per year if you meet all of the following conditions: (1) Age 55-77; (2) No signs or symptoms of lung cancer; (3) Current smoker or have quit smoking within the last 15 years; (4) You have a tobacco smoking history of at least 20 pack years (packs per day x number of years you smoked); (5) You get a written order from a healthcare provider.  Glaucoma Screening: covered annually if you're considered high risk: (1) You have diabetes OR (2) Family history of glaucoma OR (3)  aged 50 and older OR (4)  American aged 65 and older  Osteoporosis Screening: covered every 2 years if you meet one of the following conditions: (1) Have a vertebral abnormality; (2) On glucocorticoid therapy for more than 3 months; (3) Have primary hyperparathyroidism; (4) On osteoporosis medications and need to assess response to drug therapy.  HIV Screening: covered annually if you're between the age of 15-65. Also covered annually if you are younger than 15 and older than 65 with risk factors for HIV infection. For pregnant patients, it is covered up to 3 times per pregnancy.    Immunizations:  Immunization Recommendations   Influenza Vaccine Annual influenza vaccination during flu season is recommended for all persons aged >= 6 months who do not have contraindications   Pneumococcal Vaccine   * Pneumococcal conjugate vaccine = PCV13 (Prevnar 13), PCV15 (Vaxneuvance), PCV20 (Prevnar 20)  * Pneumococcal  polysaccharide vaccine = PPSV23 (Pneumovax) Adults 19-63 yo with certain risk factors or if 65+ yo  If never received any pneumonia vaccine: recommend Prevnar 20 (PCV20)  Give PCV20 if previously received 1 dose of PCV13 or PPSV23   Hepatitis B Vaccine 3 dose series if at intermediate or high risk (ex: diabetes, end stage renal disease, liver disease)   Respiratory syncytial virus (RSV) Vaccine - COVERED BY MEDICARE PART D  * RSVPreF3 (Arexvy) CDC recommends that adults 60 years of age and older may receive a single dose of RSV vaccine using shared clinical decision-making (SCDM)   Tetanus (Td) Vaccine - COST NOT COVERED BY MEDICARE PART B Following completion of primary series, a booster dose should be given every 10 years to maintain immunity against tetanus. Td may also be given as tetanus wound prophylaxis.   Tdap Vaccine - COST NOT COVERED BY MEDICARE PART B Recommended at least once for all adults. For pregnant patients, recommended with each pregnancy.   Shingles Vaccine (Shingrix) - COST NOT COVERED BY MEDICARE PART B  2 shot series recommended in those 19 years and older who have or will have weakened immune systems or those 50 years and older     Health Maintenance Due:  There are no preventive care reminders to display for this patient.  Immunizations Due:      Topic Date Due   • COVID-19 Vaccine (6 - 2024-25 season) 09/01/2024   • Influenza Vaccine (1) 09/01/2025     Advance Directives   What are advance directives?  Advance directives are legal documents that state your wishes and plans for medical care. These plans are made ahead of time in case you lose your ability to make decisions for yourself. Advance directives can apply to any medical decision, such as the treatments you want, and if you want to donate organs.   What are the types of advance directives?  There are many types of advance directives, and each state has rules about how to use them. You may choose a combination of any of the  following:  Living will:  This is a written record of the treatment you want. You can also choose which treatments you do not want, which to limit, and which to stop at a certain time. This includes surgery, medicine, IV fluid, and tube feedings.   Durable power of  for healthcare (DPAHC):  This is a written record that states who you want to make healthcare choices for you when you are unable to make them for yourself. This person, called a proxy, is usually a family member or a friend. You may choose more than 1 proxy.  Do not resuscitate (DNR) order:  A DNR order is used in case your heart stops beating or you stop breathing. It is a request not to have certain forms of treatment, such as CPR. A DNR order may be included in other types of advance directives.  Medical directive:  This covers the care that you want if you are in a coma, near death, or unable to make decisions for yourself. You can list the treatments you want for each condition. Treatment may include pain medicine, surgery, blood transfusions, dialysis, IV or tube feedings, and a ventilator (breathing machine).  Values history:  This document has questions about your views, beliefs, and how you feel and think about life. This information can help others choose the care that you would choose.  Why are advance directives important?  An advance directive helps you control your care. Although spoken wishes may be used, it is better to have your wishes written down. Spoken wishes can be misunderstood, or not followed. Treatments may be given even if you do not want them. An advance directive may make it easier for your family to make difficult choices about your care.       © Copyright [employer] 2018 Information is for End User's use only and may not be sold, redistributed or otherwise used for commercial purposes. All illustrations and images included in CareNotes® are the copyrighted property of [employer]M., Inc[employer] or [employer]

## 2025-07-25 ENCOUNTER — OFFICE VISIT (OUTPATIENT)
Dept: PHYSICAL THERAPY | Facility: CLINIC | Age: 85
End: 2025-07-25
Attending: FAMILY MEDICINE
Payer: MEDICARE

## 2025-07-25 DIAGNOSIS — R53.81 PHYSICAL DECONDITIONING: Primary | ICD-10-CM

## 2025-07-25 PROCEDURE — 97112 NEUROMUSCULAR REEDUCATION: CPT | Performed by: PHYSICAL THERAPIST

## 2025-07-25 PROCEDURE — 97530 THERAPEUTIC ACTIVITIES: CPT | Performed by: PHYSICAL THERAPIST

## 2025-07-25 PROCEDURE — 97110 THERAPEUTIC EXERCISES: CPT | Performed by: PHYSICAL THERAPIST

## 2025-07-25 NOTE — PROGRESS NOTES
"Daily Note     Today's date: 2025  Patient name: Elan Morrison  : 1940  MRN: [id number]  Referring provider: Yovani Ying MD  Dx:   Encounter Diagnosis     ICD-10-CM    1. Physical deconditioning  R53.81                      Subjective: Pt states reports mm soreness and fatigue after his PT session but that it is tolerable      Objective: See treatment diary below      Assessment: continues to require CGA for safety during most therex. Progressed as listed with  no complaints other then fatigued. Pt is most challenged in SLS.       Plan: Continue per plan of care.     Precautions: falls risk      Manuals 7/8 7/10 7/15 7/17 7/21 7/25                                                           Neuro Re-Ed             SLS  2x30\" ea Foot on medball 2x30\" ea 2x30\" ea.  2x30\" ea. 30\"x2ea       Tandem stance             NBOS w/ EC             Cone taps   Step tap 6\" 2x10 alt. Cone tap 10x Alt CGA Cone tap 10x alt CGA x20       Cone knockovers   10x ea.  10x ea.  10x ea.        Side stepping             Heel raise/toe raise  1 UE 2x10 ea 20x ea.  20x ea. 20x ea. x20       Ther Ex             Supine bridges HEP            Standing march w/ counter support HEP            Seated TL extension HEP 10x10\"           Seated LAQ HEP            Hurdles              Hip 3 ways  Abd/ext 15x ea Abd/ext 20x ea.  Abd/ext 20x ea Flex/abd/ext 20x ea x20ea       Bike   6' 6' 6' 6' 6'       Leg press                          Ther Activity             Step ups  6\" 2x10 ea 6\" 2x10 ea 6\" 2x10 ea 6\" 2x10 ea Fwd x20 lat x10       Squigz   Airex NBOSfull jar Semitandem airex  Airex full jar 2x CGA Airex full jar       Sit to stands  2x10  No UE 2x10  2x10 2x10 2x10       rows      Blue tb x20       Gait Training                                       Modalities                                                  "

## 2025-07-28 ENCOUNTER — HOSPITAL ENCOUNTER (OUTPATIENT)
Dept: RADIOLOGY | Facility: HOSPITAL | Age: 85
Discharge: HOME | End: 2025-07-28
Payer: MEDICARE

## 2025-07-28 DIAGNOSIS — R06.89 DYSPNEA AND RESPIRATORY ABNORMALITIES: ICD-10-CM

## 2025-07-28 DIAGNOSIS — R06.00 DYSPNEA AND RESPIRATORY ABNORMALITIES: ICD-10-CM

## 2025-07-28 PROCEDURE — 71046 X-RAY EXAM CHEST 2 VIEWS: CPT

## 2025-07-29 ENCOUNTER — APPOINTMENT (OUTPATIENT)
Dept: PHYSICAL THERAPY | Facility: CLINIC | Age: 85
End: 2025-07-29
Attending: FAMILY MEDICINE
Payer: MEDICARE

## 2025-07-29 DIAGNOSIS — J18.9 PNEUMONIA DUE TO INFECTIOUS ORGANISM, UNSPECIFIED LATERALITY, UNSPECIFIED PART OF LUNG: Primary | ICD-10-CM

## 2025-07-31 ENCOUNTER — APPOINTMENT (OUTPATIENT)
Dept: PHYSICAL THERAPY | Facility: CLINIC | Age: 85
End: 2025-07-31
Attending: FAMILY MEDICINE
Payer: MEDICARE

## 2025-07-31 ENCOUNTER — APPOINTMENT (OUTPATIENT)
Dept: LAB | Facility: HOSPITAL | Age: 85
End: 2025-07-31
Payer: MEDICARE

## 2025-07-31 DIAGNOSIS — D53.9 NUTRITIONAL ANEMIA, UNSPECIFIED: ICD-10-CM

## 2025-07-31 DIAGNOSIS — D50.9 IRON DEFICIENCY ANEMIA, UNSPECIFIED IRON DEFICIENCY ANEMIA TYPE: ICD-10-CM

## 2025-07-31 LAB
ALBUMIN SERPL BCG-MCNC: 3.9 G/DL (ref 3.5–5)
ALP SERPL-CCNC: 119 U/L (ref 34–104)
ALT SERPL W P-5'-P-CCNC: 16 U/L (ref 7–52)
ANION GAP SERPL CALCULATED.3IONS-SCNC: 10 MMOL/L (ref 4–13)
AST SERPL W P-5'-P-CCNC: 18 U/L (ref 13–39)
BILIRUB SERPL-MCNC: 0.39 MG/DL (ref 0.2–1)
BUN SERPL-MCNC: 20 MG/DL (ref 5–25)
CALCIUM SERPL-MCNC: 9.6 MG/DL (ref 8.4–10.2)
CHLORIDE SERPL-SCNC: 95 MMOL/L (ref 96–108)
CO2 SERPL-SCNC: 26 MMOL/L (ref 21–32)
CREAT SERPL-MCNC: 0.8 MG/DL (ref 0.6–1.3)
ERYTHROCYTE [DISTWIDTH] IN BLOOD BY AUTOMATED COUNT: 14.5 % (ref 11.6–15.1)
FERRITIN SERPL-MCNC: 203 NG/ML (ref 30–336)
FOLATE SERPL-MCNC: >22.3 NG/ML
GFR SERPL CREATININE-BSD FRML MDRD: 82 ML/MIN/1.73SQ M
GLUCOSE P FAST SERPL-MCNC: 147 MG/DL (ref 65–99)
HCT VFR BLD AUTO: 33.8 % (ref 36.5–49.3)
HGB BLD-MCNC: 10.7 G/DL (ref 12–17)
IRON SERPL-MCNC: 48 UG/DL (ref 50–212)
MCH RBC QN AUTO: 28.9 PG (ref 26.8–34.3)
MCHC RBC AUTO-ENTMCNC: 31.7 G/DL (ref 31.4–37.4)
MCV RBC AUTO: 91 FL (ref 82–98)
PLATELET # BLD AUTO: 436 THOUSANDS/UL (ref 149–390)
PMV BLD AUTO: 9.6 FL (ref 8.9–12.7)
POTASSIUM SERPL-SCNC: 5.1 MMOL/L (ref 3.5–5.3)
PROT SERPL-MCNC: 7.1 G/DL (ref 6.4–8.4)
RBC # BLD AUTO: 3.7 MILLION/UL (ref 3.88–5.62)
SODIUM SERPL-SCNC: 131 MMOL/L (ref 135–147)
VIT B12 SERPL-MCNC: 167 PG/ML (ref 180–914)
WBC # BLD AUTO: 8.5 THOUSAND/UL (ref 4.31–10.16)

## 2025-07-31 PROCEDURE — 82728 ASSAY OF FERRITIN: CPT

## 2025-07-31 PROCEDURE — 85027 COMPLETE CBC AUTOMATED: CPT

## 2025-07-31 PROCEDURE — 84165 PROTEIN E-PHORESIS SERUM: CPT

## 2025-07-31 PROCEDURE — 80053 COMPREHEN METABOLIC PANEL: CPT

## 2025-07-31 PROCEDURE — 83540 ASSAY OF IRON: CPT

## 2025-07-31 PROCEDURE — 36415 COLL VENOUS BLD VENIPUNCTURE: CPT

## 2025-07-31 PROCEDURE — 84166 PROTEIN E-PHORESIS/URINE/CSF: CPT

## 2025-07-31 PROCEDURE — 82746 ASSAY OF FOLIC ACID SERUM: CPT

## 2025-07-31 PROCEDURE — 82607 VITAMIN B-12: CPT

## 2025-08-01 LAB
ALBUMIN UR ELPH-MCNC: 100 %
ALPHA1 GLOB MFR UR ELPH: 0 %
ALPHA2 GLOB MFR UR ELPH: 0 %
B-GLOBULIN MFR UR ELPH: 0 %
GAMMA GLOB MFR UR ELPH: 0 %
PROT UR-MCNC: 17.7 MG/DL

## 2025-08-01 PROCEDURE — 84166 PROTEIN E-PHORESIS/URINE/CSF: CPT | Performed by: STUDENT IN AN ORGANIZED HEALTH CARE EDUCATION/TRAINING PROGRAM

## 2025-08-06 LAB
ALBUMIN SERPL ELPH-MCNC: 3.7 G/DL (ref 3.2–5.1)
ALBUMIN SERPL ELPH-MCNC: 53.6 % (ref 48–70)
ALPHA1 GLOB SERPL ELPH-MCNC: 0.37 G/DL (ref 0.15–0.47)
ALPHA1 GLOB SERPL ELPH-MCNC: 5.3 % (ref 1.8–7)
ALPHA2 GLOB SERPL ELPH-MCNC: 0.97 G/DL (ref 0.42–1.04)
ALPHA2 GLOB SERPL ELPH-MCNC: 14 % (ref 5.9–14.9)
BETA GLOB ABNORMAL SERPL ELPH-MCNC: 0.43 G/DL (ref 0.31–0.57)
BETA1 GLOB SERPL ELPH-MCNC: 6.3 % (ref 4.7–7.7)
BETA2 GLOB SERPL ELPH-MCNC: 6.4 % (ref 3.1–7.9)
BETA2+GAMMA GLOB SERPL ELPH-MCNC: 0.44 G/DL (ref 0.2–0.58)
GAMMA GLOB ABNORMAL SERPL ELPH-MCNC: 0.99 G/DL (ref 0.4–1.66)
GAMMA GLOB SERPL ELPH-MCNC: 14.4 % (ref 6.9–22.3)
IGG/ALB SER: 1.16 {RATIO} (ref 1.1–1.8)
PROT SERPL-MCNC: 6.9 G/DL (ref 6.4–8.4)

## 2025-08-06 PROCEDURE — 84165 PROTEIN E-PHORESIS SERUM: CPT | Performed by: STUDENT IN AN ORGANIZED HEALTH CARE EDUCATION/TRAINING PROGRAM

## 2025-08-12 ENCOUNTER — OFFICE VISIT (OUTPATIENT)
Dept: PHYSICAL THERAPY | Facility: CLINIC | Age: 85
End: 2025-08-12
Attending: FAMILY MEDICINE
Payer: MEDICARE

## 2025-08-14 ENCOUNTER — OFFICE VISIT (OUTPATIENT)
Dept: PHYSICAL THERAPY | Facility: CLINIC | Age: 85
End: 2025-08-14
Attending: FAMILY MEDICINE
Payer: MEDICARE

## 2025-08-18 ENCOUNTER — APPOINTMENT (OUTPATIENT)
Dept: LAB | Facility: HOSPITAL | Age: 85
End: 2025-08-18
Payer: MEDICARE

## 2025-08-18 DIAGNOSIS — E11.42 TYPE 2 DIABETES MELLITUS WITH DIABETIC POLYNEUROPATHY, WITHOUT LONG-TERM CURRENT USE OF INSULIN (HCC): ICD-10-CM

## 2025-08-18 DIAGNOSIS — R79.89 ELEVATED TSH: ICD-10-CM

## 2025-08-18 LAB
ALBUMIN SERPL BCG-MCNC: 4.2 G/DL (ref 3.5–5)
ALP SERPL-CCNC: 102 U/L (ref 34–104)
ALT SERPL W P-5'-P-CCNC: 21 U/L (ref 7–52)
ANION GAP SERPL CALCULATED.3IONS-SCNC: 5 MMOL/L (ref 4–13)
AST SERPL W P-5'-P-CCNC: 22 U/L (ref 13–39)
BILIRUB SERPL-MCNC: 0.44 MG/DL (ref 0.2–1)
BUN SERPL-MCNC: 17 MG/DL (ref 5–25)
CALCIUM SERPL-MCNC: 9.2 MG/DL (ref 8.4–10.2)
CHLORIDE SERPL-SCNC: 94 MMOL/L (ref 96–108)
CO2 SERPL-SCNC: 29 MMOL/L (ref 21–32)
CREAT SERPL-MCNC: 0.76 MG/DL (ref 0.6–1.3)
EST. AVERAGE GLUCOSE BLD GHB EST-MCNC: 197 MG/DL
GFR SERPL CREATININE-BSD FRML MDRD: 84 ML/MIN/1.73SQ M
GLUCOSE P FAST SERPL-MCNC: 133 MG/DL (ref 65–99)
HBA1C MFR BLD: 8.5 %
POTASSIUM SERPL-SCNC: 4.4 MMOL/L (ref 3.5–5.3)
PROT SERPL-MCNC: 6.8 G/DL (ref 6.4–8.4)
SODIUM SERPL-SCNC: 128 MMOL/L (ref 135–147)
T4 FREE SERPL-MCNC: 0.93 NG/DL (ref 0.61–1.12)
TSH SERPL DL<=0.05 MIU/L-ACNC: 8.32 UIU/ML (ref 0.45–4.5)

## 2025-08-18 PROCEDURE — 36415 COLL VENOUS BLD VENIPUNCTURE: CPT

## 2025-08-18 PROCEDURE — 82985 ASSAY OF GLYCATED PROTEIN: CPT

## 2025-08-18 PROCEDURE — 80053 COMPREHEN METABOLIC PANEL: CPT

## 2025-08-18 PROCEDURE — 83036 HEMOGLOBIN GLYCOSYLATED A1C: CPT

## 2025-08-18 PROCEDURE — 84443 ASSAY THYROID STIM HORMONE: CPT

## 2025-08-18 PROCEDURE — 84439 ASSAY OF FREE THYROXINE: CPT

## 2025-08-19 ENCOUNTER — CONSULT (OUTPATIENT)
Dept: CARDIOLOGY CLINIC | Facility: CLINIC | Age: 85
End: 2025-08-19
Attending: NURSE PRACTITIONER
Payer: MEDICARE

## 2025-08-19 VITALS
BODY MASS INDEX: 19.33 KG/M2 | OXYGEN SATURATION: 100 % | DIASTOLIC BLOOD PRESSURE: 62 MMHG | HEART RATE: 76 BPM | WEIGHT: 135 LBS | SYSTOLIC BLOOD PRESSURE: 152 MMHG | HEIGHT: 70 IN

## 2025-08-19 DIAGNOSIS — I42.8 NICM (NONISCHEMIC CARDIOMYOPATHY) (HCC): ICD-10-CM

## 2025-08-19 DIAGNOSIS — I10 BENIGN ESSENTIAL HYPERTENSION: ICD-10-CM

## 2025-08-19 DIAGNOSIS — E78.2 MIXED HYPERLIPIDEMIA: ICD-10-CM

## 2025-08-19 DIAGNOSIS — I44.7 LBBB (LEFT BUNDLE BRANCH BLOCK): ICD-10-CM

## 2025-08-19 DIAGNOSIS — R06.89 DYSPNEA AND RESPIRATORY ABNORMALITIES: ICD-10-CM

## 2025-08-19 DIAGNOSIS — R06.00 DYSPNEA AND RESPIRATORY ABNORMALITIES: ICD-10-CM

## 2025-08-19 LAB — FRUCTOSAMINE SERPL-SCNC: 341 UMOL/L (ref 0–285)

## 2025-08-19 PROCEDURE — 99204 OFFICE O/P NEW MOD 45 MIN: CPT | Performed by: INTERNAL MEDICINE

## 2025-08-19 RX ORDER — METOPROLOL SUCCINATE 25 MG/1
25 TABLET, EXTENDED RELEASE ORAL DAILY
Qty: 90 TABLET | Refills: 3 | Status: SHIPPED | OUTPATIENT
Start: 2025-08-19

## 2025-08-19 RX ORDER — LOSARTAN POTASSIUM 50 MG/1
50 TABLET ORAL DAILY
Qty: 90 TABLET | Refills: 3 | Status: SHIPPED | OUTPATIENT
Start: 2025-08-19

## 2025-08-20 ENCOUNTER — OFFICE VISIT (OUTPATIENT)
Dept: PHYSICAL THERAPY | Facility: CLINIC | Age: 85
End: 2025-08-20
Attending: FAMILY MEDICINE
Payer: MEDICARE

## 2025-08-20 DIAGNOSIS — M62.81 GENERALIZED MUSCLE WEAKNESS: ICD-10-CM

## 2025-08-20 DIAGNOSIS — R53.81 PHYSICAL DECONDITIONING: Primary | ICD-10-CM

## 2025-08-20 PROCEDURE — 97112 NEUROMUSCULAR REEDUCATION: CPT

## 2025-08-20 PROCEDURE — 97530 THERAPEUTIC ACTIVITIES: CPT

## 2025-08-20 PROCEDURE — 97110 THERAPEUTIC EXERCISES: CPT

## 2025-08-21 ENCOUNTER — OFFICE VISIT (OUTPATIENT)
Dept: FAMILY MEDICINE CLINIC | Facility: HOSPITAL | Age: 85
End: 2025-08-21
Payer: MEDICARE

## 2025-08-21 VITALS
OXYGEN SATURATION: 99 % | HEIGHT: 70 IN | DIASTOLIC BLOOD PRESSURE: 58 MMHG | SYSTOLIC BLOOD PRESSURE: 148 MMHG | WEIGHT: 135.6 LBS | BODY MASS INDEX: 19.41 KG/M2 | HEART RATE: 63 BPM

## 2025-08-21 DIAGNOSIS — D51.9 ANEMIA DUE TO VITAMIN B12 DEFICIENCY, UNSPECIFIED B12 DEFICIENCY TYPE: ICD-10-CM

## 2025-08-21 DIAGNOSIS — D50.9 IRON DEFICIENCY ANEMIA, UNSPECIFIED IRON DEFICIENCY ANEMIA TYPE: Primary | ICD-10-CM

## 2025-08-21 PROCEDURE — G2211 COMPLEX E/M VISIT ADD ON: HCPCS | Performed by: FAMILY MEDICINE

## 2025-08-21 PROCEDURE — 99214 OFFICE O/P EST MOD 30 MIN: CPT | Performed by: FAMILY MEDICINE
